# Patient Record
Sex: MALE | Race: BLACK OR AFRICAN AMERICAN | Employment: UNEMPLOYED | URBAN - METROPOLITAN AREA
[De-identification: names, ages, dates, MRNs, and addresses within clinical notes are randomized per-mention and may not be internally consistent; named-entity substitution may affect disease eponyms.]

---

## 2017-03-28 ENCOUNTER — HOSPITAL ENCOUNTER (INPATIENT)
Age: 42
LOS: 6 days | Discharge: PSYCHIATRIC OR SUBSTANCE USE FACILITY | DRG: 885 | End: 2017-04-03
Attending: PSYCHIATRY & NEUROLOGY | Admitting: PSYCHIATRY & NEUROLOGY
Payer: MEDICARE

## 2017-03-28 PROCEDURE — 1240000000 HC EMOTIONAL WELLNESS R&B

## 2017-03-28 RX ORDER — OLANZAPINE 10 MG/1
10 TABLET ORAL 2 TIMES DAILY
Status: DISCONTINUED | OUTPATIENT
Start: 2017-03-29 | End: 2017-04-03 | Stop reason: HOSPADM

## 2017-03-28 RX ORDER — ACETAMINOPHEN 325 MG/1
650 TABLET ORAL EVERY 4 HOURS PRN
Status: DISCONTINUED | OUTPATIENT
Start: 2017-03-28 | End: 2017-04-03 | Stop reason: HOSPADM

## 2017-03-28 RX ORDER — IBUPROFEN 400 MG/1
400 TABLET ORAL EVERY 6 HOURS PRN
Status: DISCONTINUED | OUTPATIENT
Start: 2017-03-28 | End: 2017-04-03 | Stop reason: HOSPADM

## 2017-03-28 RX ORDER — TRAZODONE HYDROCHLORIDE 100 MG/1
100 TABLET ORAL NIGHTLY PRN
Status: DISCONTINUED | OUTPATIENT
Start: 2017-03-28 | End: 2017-04-03 | Stop reason: HOSPADM

## 2017-03-28 RX ORDER — BENZTROPINE MESYLATE 1 MG/ML
2 INJECTION INTRAMUSCULAR; INTRAVENOUS 2 TIMES DAILY PRN
Status: DISCONTINUED | OUTPATIENT
Start: 2017-03-28 | End: 2017-04-03 | Stop reason: HOSPADM

## 2017-03-28 RX ORDER — HALOPERIDOL 5 MG/ML
10 INJECTION INTRAMUSCULAR EVERY 6 HOURS PRN
Status: DISCONTINUED | OUTPATIENT
Start: 2017-03-28 | End: 2017-04-03 | Stop reason: HOSPADM

## 2017-03-28 ASSESSMENT — SLEEP AND FATIGUE QUESTIONNAIRES
AVERAGE NUMBER OF SLEEP HOURS: 4
DIFFICULTY STAYING ASLEEP: YES
DO YOU HAVE DIFFICULTY SLEEPING: YES
DO YOU USE A SLEEP AID: NO
RESTFUL SLEEP: NO
DIFFICULTY ARISING: NO
SLEEP PATTERN: DIFFICULTY FALLING ASLEEP;DISTURBED/INTERRUPTED SLEEP;RESTLESSNESS
DIFFICULTY FALLING ASLEEP: YES

## 2017-03-28 ASSESSMENT — LIFESTYLE VARIABLES: HISTORY_ALCOHOL_USE: NO

## 2017-03-29 PROCEDURE — 1240000000 HC EMOTIONAL WELLNESS R&B

## 2017-03-29 PROCEDURE — 6370000000 HC RX 637 (ALT 250 FOR IP): Performed by: PSYCHIATRY & NEUROLOGY

## 2017-03-29 RX ADMIN — OLANZAPINE 10 MG: 10 TABLET, FILM COATED ORAL at 09:18

## 2017-03-29 ASSESSMENT — ENCOUNTER SYMPTOMS: VOMITING: 0

## 2017-03-29 ASSESSMENT — LIFESTYLE VARIABLES: HISTORY_ALCOHOL_USE: COMMENT

## 2017-03-30 PROCEDURE — 6370000000 HC RX 637 (ALT 250 FOR IP): Performed by: PSYCHIATRY & NEUROLOGY

## 2017-03-30 PROCEDURE — 1240000000 HC EMOTIONAL WELLNESS R&B

## 2017-03-30 RX ADMIN — OLANZAPINE 10 MG: 10 TABLET, FILM COATED ORAL at 21:12

## 2017-03-30 RX ADMIN — OLANZAPINE 10 MG: 10 TABLET, FILM COATED ORAL at 09:05

## 2017-03-30 RX ADMIN — TRAZODONE HYDROCHLORIDE 100 MG: 100 TABLET ORAL at 21:12

## 2017-03-30 ASSESSMENT — PAIN SCALES - GENERAL: PAINLEVEL_OUTOF10: 7

## 2017-03-31 PROCEDURE — 1240000000 HC EMOTIONAL WELLNESS R&B

## 2017-03-31 PROCEDURE — 6370000000 HC RX 637 (ALT 250 FOR IP): Performed by: PSYCHIATRY & NEUROLOGY

## 2017-03-31 RX ADMIN — OLANZAPINE 10 MG: 10 TABLET, FILM COATED ORAL at 22:52

## 2017-03-31 RX ADMIN — TRAZODONE HYDROCHLORIDE 100 MG: 100 TABLET ORAL at 22:52

## 2017-03-31 ASSESSMENT — PAIN SCALES - GENERAL: PAINLEVEL_OUTOF10: 0

## 2017-04-01 PROCEDURE — 6370000000 HC RX 637 (ALT 250 FOR IP): Performed by: PSYCHIATRY & NEUROLOGY

## 2017-04-01 PROCEDURE — 1240000000 HC EMOTIONAL WELLNESS R&B

## 2017-04-01 RX ADMIN — OLANZAPINE 10 MG: 10 TABLET, FILM COATED ORAL at 22:53

## 2017-04-01 RX ADMIN — TRAZODONE HYDROCHLORIDE 100 MG: 100 TABLET ORAL at 22:53

## 2017-04-01 RX ADMIN — OLANZAPINE 10 MG: 10 TABLET, FILM COATED ORAL at 08:13

## 2017-04-02 VITALS
TEMPERATURE: 98.6 F | DIASTOLIC BLOOD PRESSURE: 60 MMHG | HEART RATE: 94 BPM | HEIGHT: 71 IN | SYSTOLIC BLOOD PRESSURE: 117 MMHG | BODY MASS INDEX: 31.22 KG/M2 | WEIGHT: 223 LBS | RESPIRATION RATE: 14 BRPM

## 2017-04-02 PROCEDURE — 6370000000 HC RX 637 (ALT 250 FOR IP): Performed by: PSYCHIATRY & NEUROLOGY

## 2017-04-02 PROCEDURE — 1240000000 HC EMOTIONAL WELLNESS R&B

## 2017-04-02 RX ADMIN — OLANZAPINE 10 MG: 10 TABLET, FILM COATED ORAL at 08:18

## 2017-04-02 RX ADMIN — OLANZAPINE 10 MG: 10 TABLET, FILM COATED ORAL at 23:44

## 2017-04-02 RX ADMIN — TRAZODONE HYDROCHLORIDE 100 MG: 100 TABLET ORAL at 23:44

## 2017-04-03 PROCEDURE — 6370000000 HC RX 637 (ALT 250 FOR IP): Performed by: PSYCHIATRY & NEUROLOGY

## 2017-04-03 PROCEDURE — 5130000000 HC BRIDGE APPOINTMENT: Performed by: COUNSELOR

## 2017-04-03 RX ORDER — TRAZODONE HYDROCHLORIDE 100 MG/1
100 TABLET ORAL NIGHTLY PRN
Qty: 30 TABLET | Refills: 0 | Status: ON HOLD
Start: 2017-04-03 | End: 2017-11-26 | Stop reason: HOSPADM

## 2017-04-03 RX ORDER — OLANZAPINE 10 MG/1
10 TABLET ORAL 2 TIMES DAILY
Qty: 30 TABLET | Refills: 0 | Status: ON HOLD
Start: 2017-04-03 | End: 2017-11-26 | Stop reason: HOSPADM

## 2017-04-03 RX ADMIN — OLANZAPINE 10 MG: 10 TABLET, FILM COATED ORAL at 08:38

## 2017-11-17 ENCOUNTER — HOSPITAL ENCOUNTER (INPATIENT)
Age: 42
LOS: 10 days | Discharge: HOME | DRG: 885 | End: 2017-11-27
Attending: PSYCHIATRY & NEUROLOGY | Admitting: PSYCHIATRY & NEUROLOGY
Payer: MEDICARE

## 2017-11-17 PROBLEM — F20.0 SCHIZOPHRENIA, PARANOID (HCC): Status: ACTIVE | Noted: 2017-11-17

## 2017-11-17 PROBLEM — F20.9 SCHIZOPHRENIA (HCC): Status: RESOLVED | Noted: 2017-11-17 | Resolved: 2017-11-17

## 2017-11-17 PROBLEM — F20.9 SCHIZOPHRENIA (HCC): Status: ACTIVE | Noted: 2017-11-17

## 2017-11-17 PROCEDURE — 6370000000 HC RX 637 (ALT 250 FOR IP): Performed by: PSYCHIATRY & NEUROLOGY

## 2017-11-17 PROCEDURE — 1240000000 HC EMOTIONAL WELLNESS R&B

## 2017-11-17 RX ORDER — OLANZAPINE 10 MG/1
10 TABLET ORAL 2 TIMES DAILY
Status: DISCONTINUED | OUTPATIENT
Start: 2017-11-17 | End: 2017-11-27 | Stop reason: HOSPADM

## 2017-11-17 RX ORDER — ACETAMINOPHEN 325 MG/1
650 TABLET ORAL EVERY 4 HOURS PRN
Status: DISCONTINUED | OUTPATIENT
Start: 2017-11-17 | End: 2017-11-27 | Stop reason: HOSPADM

## 2017-11-17 RX ORDER — TRAZODONE HYDROCHLORIDE 100 MG/1
100 TABLET ORAL NIGHTLY PRN
Status: DISCONTINUED | OUTPATIENT
Start: 2017-11-17 | End: 2017-11-27 | Stop reason: HOSPADM

## 2017-11-17 RX ORDER — HYDROXYZINE HYDROCHLORIDE 25 MG/1
25 TABLET, FILM COATED ORAL 3 TIMES DAILY PRN
Status: DISCONTINUED | OUTPATIENT
Start: 2017-11-17 | End: 2017-11-27 | Stop reason: HOSPADM

## 2017-11-17 RX ORDER — BENZTROPINE MESYLATE 1 MG/ML
2 INJECTION INTRAMUSCULAR; INTRAVENOUS DAILY PRN
Status: DISCONTINUED | OUTPATIENT
Start: 2017-11-17 | End: 2017-11-27 | Stop reason: HOSPADM

## 2017-11-17 RX ADMIN — OLANZAPINE 10 MG: 10 TABLET, FILM COATED ORAL at 14:42

## 2017-11-17 RX ADMIN — TRAZODONE HYDROCHLORIDE 100 MG: 100 TABLET ORAL at 20:43

## 2017-11-17 RX ADMIN — HYDROXYZINE HYDROCHLORIDE 25 MG: 25 TABLET, FILM COATED ORAL at 14:42

## 2017-11-17 RX ADMIN — OLANZAPINE 10 MG: 10 TABLET, FILM COATED ORAL at 20:43

## 2017-11-17 NOTE — PLAN OF CARE
Problem: Anger Management/Homicidal Ideation  Goal: LTG-Absence of homicidal ideation  Outcome: Ongoing  Patient will not communicate with writer during intake assessment.   Per report he had been homicidal and suicidal without any specific plan

## 2017-11-17 NOTE — PLAN OF CARE
Problem: Anger Management/Homicidal Ideation  Goal: STG-Knowledge of positive coping patterns  Outcome: Ongoing  Pt did not participate in cognitive skills/ mental health education group at 1330 despite staff encouragement to attend.

## 2017-11-18 PROCEDURE — 1240000000 HC EMOTIONAL WELLNESS R&B

## 2017-11-18 ASSESSMENT — PAIN DESCRIPTION - PAIN TYPE: TYPE: ACUTE PAIN

## 2017-11-18 ASSESSMENT — PAIN DESCRIPTION - DESCRIPTORS: DESCRIPTORS: HEADACHE

## 2017-11-18 ASSESSMENT — PAIN SCALES - GENERAL: PAINLEVEL_OUTOF10: 5

## 2017-11-18 NOTE — PLAN OF CARE
Problem: Anger Management/Homicidal Ideation  Goal: STG-Able to express anger through appropriate verbalization and healthy physical outlets  Outcome: Ongoing  1:1 with pt x ten minutes. Pt encouraged to attend unit programming and interact with peers and staff. Pt also encouraged to tend to hygiene and ADLs. Pt encouraged to discuss feelings with staff and feedback and reassurance provided. Pt denies thoughts of self harm and is agreeable to seeking out should thoughts of self harm arise. Safe environment maintained. Q15 minute checks for safety cont per unit policy. Will cont to monitor for safety and provides support and reassurance as needed. Pt refuses medications. Pt is uncooperative with any staff redirection. seclusive to room and refuses groups. Much loud self talk while in dayroom. No insight into need for hospital stay or need for medications.

## 2017-11-18 NOTE — PLAN OF CARE
Problem: Altered Mood, Depressive Behavior  Goal: LTG-Able to verbalize and/or display a decrease in depressive symptoms  Outcome: Ongoing  Pt did not attend music therapy group at [phone] despite staff invitation to attend.

## 2017-11-18 NOTE — PLAN OF CARE
Problem: Anger Management/Homicidal Ideation  Goal: LTG-Able to display appropriate communication and problem solving  Outcome: Ongoing  [address] [facility]  Initial Interdisciplinary Treatment Plan NO      Original treatment plan Date & Time: 11/18/17 0818    Admission Type:  Admission Type: Voluntary    Reason for admission:   Reason for Admission: suicidal ideations, no plan armond lyles    Estimated Length of Stay:  5-7days  Estimated Discharge Date: to be determined by physician    PATIENT STRENGTHS:  Patient Strengths:Strengths:  (MUNIRA)  Patient Strengths and Limitations:Limitations:  (MUNIRA)  Addictive Behavior: Addictive Behavior  In the past 3 months, have you felt or has someone told you that you have a problem with:  :  (MUNIRA)  Do you have a history of Chemical Use?:  (MUNIRA)  Do you have a history of Alcohol Use?:  (MUNIRA)  Do you have a history of Street Drug Abuse?:  (MUNIRA)  Histroy of Prescripton Drug Abuse?:  (MUNIRA)  Medical Problems:  Past Medical History:   Diagnosis Date    Asthma     Schizoaffective disorder ([facility] [address]     Substance abuse      Status EXAM:Status and Exam  Normal: No  Facial Expression: Expressionless, Avoids Gaze  Affect: Unstable, Blunt  Level of Consciousness: Confused  Mood:Normal: No  Mood: Irritable, Suspicious  Motor Activity:Normal: No  Motor Activity: Decreased  Interview Behavior: Irritable, Uncooperative/Withdrawn, Evasive, Impulsive  Preception: Campbell to Person, Campbell to Time, Campbell to Place, Campbell to Situation  Attention:Normal: No  Attention: Unable to Concentrate, Distractible  Thought Processes: Blocking  Thought Content:Normal: No  Thought Content: Paranoia, Preoccupations  Hallucinations: Unable to assess  Delusions: No  Memory:Normal: No  Memory: Confabulation, Poor Recent, Poor Remote  Insight and Judgment: No  Insight and Judgment: Unmotivated, Unrealistic, Poor Judgment, Poor Insight  Present Suicidal Ideation:  (Pt not answering)  Present Homicidal Ideation:  (Pt not answering)    EDUCATION:   Learner Progress Toward Treatment Goals: reviewed group plans and strategies for care    Method:group therapy, medication compliance, individualized assessments and care planning    Outcome: needs reinforcement    PATIENT GOALS: to be discussed with patient within 72 hours    PLAN/TREATMENT RECOMMENDATIONS:     continue group therapy , medications compliance, goal setting, individualized assessments and care, continue to monitor pt on unit      SHORT-TERM GOALS:   Time frame for Short-Term Goals: 5-7 days    LONG-TERM GOALS:  Time frame for Long-Term Goals: 6 months  Members Present in Team Meeting: See Signature Sheet    OLGA Jang    Goal: STG-Able to express anger through appropriate verbalization and healthy physical outlets  Outcome: Ongoing    Goal: LTG-Absence of angry outbursts  Outcome: Ongoing    Goal: LTG-Absence of homicidal ideation  Outcome: Ongoing    Goal: STG-Knowledge of positive coping patterns  Outcome: Ongoing    Goal: STG-Participation in care planning  Outcome: Ongoing    Goal: Patient Specific Goal  Outcome: Ongoing      Problem: Altered Mood, Depressive Behavior  Goal: LTG-Able to verbalize acceptance of life and situations over which he or she has no control  Outcome: Ongoing    Goal: LTG-Able to verbalize and/or display a decrease in depressive symptoms  Outcome: Ongoing    Goal: STG-Able to verbalize suicidal ideations  Outcome: Ongoing    Goal: STG-Able to verbalize support system  Outcome: Ongoing    Goal: LTG-Absence of self-harm  Outcome: Ongoing    Goal: Participation in care planning  Outcome: Ongoing      Problem: Altered Mood, Psychotic Behavior  Goal: STG-Able to demonstrate trust by eating, participating in treatment and following staff's directions  Outcome: Ongoing    Goal: LTG-Able to verbalize decrease in frequency and intensity of hallucinations  Outcome: Ongoing    Goal: LTG-Able to verbalize reality based thinking  Outcome: Ongoing    Goal: STG-Adequate nutritional intake  Outcome: Ongoing    Goal: LTG-Appropriate social interaction  Outcome: Ongoing    Goal: STG-Medication therapy compliance  Outcome: Ongoing

## 2017-11-18 NOTE — PLAN OF CARE
Problem: Anger Management/Homicidal Ideation  Goal: LTG-Able to display appropriate communication and problem solving  Outcome: Ongoing  Pt not communicating at this time. Goal: STG-Able to express anger through appropriate verbalization and healthy physical outlets  Outcome: Ongoing  Pt ignores staff at this time. Goal: LTG-Absence of angry outbursts  Outcome: Ongoing  Pt does not answer any questions from staff at this time. Goal: LTG-Absence of homicidal ideation  Outcome: Ongoing  Pt would not talk to staff at this time. Goal: STG-Knowledge of positive coping patterns  Outcome: Ongoing  Pt encouraged to explore coping skills for reducing anxiety. None verbalized at this time. Goal: STG-Participation in care planning  Outcome: Ongoing  Pt is non-compliant at this time. Problem: Altered Mood, Depressive Behavior  Goal: LTG-Able to verbalize acceptance of life and situations over which he or she has no control  Outcome: Ongoing  Pt is being uncooperative at this time. Goal: LTG-Able to verbalize and/or display a decrease in depressive symptoms  Outcome: Ongoing  Pt would not answer any questions at this time. Goal: STG-Able to verbalize suicidal ideations  Outcome: Ongoing  Pt refuses to answer at this time. Goal: STG-Able to verbalize support system  Outcome: Ongoing  Pt does not verbalize at this time. Goal: LTG-Absence of self-harm  Outcome: Ongoing  Pt refuses to answer at this time. Problem: Altered Mood, Psychotic Behavior  Goal: STG-Able to demonstrate trust by eating, participating in treatment and following staff's directions  Outcome: Ongoing  Pt not following staff's directions at this time. Goal: LTG-Able to verbalize decrease in frequency and intensity of hallucinations  Outcome: Ongoing  Pt not talking to staff at this time. Goal: LTG-Able to verbalize reality based thinking  Outcome: Ongoing  Pt is isolative to self in room at this time.   Goal: STG-Adequate nutritional intake  Outcome: Ongoing  Pt would not come out of room for snacks at this time. Goal: LTG-Appropriate social interaction  Outcome: Ongoing  Pt is seclusive to room at this time. Goal: STG-Medication therapy compliance  Outcome: Ongoing  Pt is medication compliant.

## 2017-11-18 NOTE — BH NOTE
`[facility]  Admission Note     Admission Type:   Admission Type: Voluntary    Reason for admission:  Reason for Admission: suicidal ideations, no plan command trupti    PATIENT STRENGTHS:  Strengths:  (MUNIRA)    Patient Strengths and Limitations:  Limitations:  (MUNIRA)    Addictive Behavior:   Addictive Behavior  In the past 3 months, have you felt or has someone told you that you have a problem with:  :  (MUNIRA)  Do you have a history of Chemical Use?:  (MUNIRA)  Do you have a history of Alcohol Use?:  (MUNIRA)  Do you have a history of Street Drug Abuse?:  (MUNIRA)  Histroy of Prescripton Drug Abuse?:  (MUNIRA)    Medical Problems:   Past Medical History:   Diagnosis Date    Asthma     Schizoaffective disorder ([facility] [address]     Substance abuse        Status EXAM:  Status and Exam  Normal: No  Facial Expression: Avoids Gaze, Expressionless, Flat  Affect: Blunt, Unstable  Level of Consciousness: Confused  Mood:Normal: No  Mood: Suspicious, Irritable  Motor Activity:Normal: No  Motor Activity: Agitated  Interview Behavior: Evasive, Impulsive, Irritable, Uncooperative/Withdrawn  Preception: Boomer to Person, Lonni Folds to Time, Boomer to Place, Boomer to Situation  Attention:Normal: No  Attention: Distractible, Unable to Concentrate  Thought Processes: Blocking  Thought Content:Normal: No  Thought Content: Preoccupations, Paranoia  Hallucinations:  Auditory (Comment)  Delusions: No  Memory:Normal: No  Memory: Poor Recent, Poor Remote, Confabulation  Insight and Judgment: No  Insight and Judgment: Poor Judgment, Poor Insight, Unmotivated, Unrealistic  Present Suicidal Ideation: Yes  Present Homicidal Ideation: Yes    Tobacco Screening:  Practical Counseling, on admission, sung X, if applicable and completed (first 3 are required if patient doesn't refuse):            ( )  Recognizing danger situations (included triggers and roadblocks)                    ( )  Coping skills (new ways to manage stress, exercise, relaxation techniques, changing routine, distraction)                                                           ( )  Basic information about quitting (benefits of quitting, techniques in how to quit, available resources  ( ) Referral for counseling faxed to [facility]                                           ( ) Patient refused counseling  (X  ) Patient has not smoked in the last 30 days      Pt admitted with followings belongings:  Dentures: None  Vision - Corrective Lenses: None  Hearing Aid: None  Jewelry: Bracelet (Pt kept)  Body Piercings Removed: N/A  Clothing: Footwear, Jacket / coat, Shirt, Socks, Undergarments (Comment)  Were All Patient Medications Collected?: Not Applicable  Other Valuables: Cell phone, Money (Comment), Keys, Other (Comment) ($242.00 (bills))     Valuables sent home with ( N/A). Valuables placed in safe in security envelope, number:  [id number] . Patient's home medications were ( N/A) as he would not provide any information. Patient oriented to surroundings and program expectations and copy of patient rights given. Received admission packet: Refused . Consents reviewed, signed.  Refused all consents and only signed voluntary agreement                     Patient verbalize understanding:  No    Patient education on precautions: given with no indication of patient understanding                 Earlene Ramirez, LATOYA

## 2017-11-19 PROCEDURE — 1240000000 HC EMOTIONAL WELLNESS R&B

## 2017-11-19 NOTE — PROGRESS NOTES
Seclusion       No contraindication     Restraints      No contraindications    Psychiatric evaluation: This is a 60-year-old -American male patient was admitted on 79 though November 2017 from emergency room where he was brought as he has been very paranoid agitated and threatening to kill himself as he has been having auditory hallucinations. He is very labile, irritable, belligerent and not willing to provide much information. He has history of multiple psychiatric hospitalizations and poor compliance with treatment. He is not able to provide any history of substance abuse, medical problems or personal history. On mental status examination He is of average height and built, uncooperative and poorly informative as he is very paranoid, guarded, belligerent and threatening when approached. His thought process is poorly productive and is out of contact with reality. He is complaining about auditory hallucinations as well as suicidal feelings. He is alert but his orientation memory or intellectual functions cannot be evaluated because of severity of thought process disorganization and agitation. He has poor impulse control, poor judgment and no insight. Diagnostic impression  : Schizophrenia undifferentiated type    Treatment plan: Medication management, supportive therapy and hospital milieu. Side effects of medications reviewed and medication education provided.     ELOS:5-7 days

## 2017-11-19 NOTE — BH NOTE
RN has reviewed all charting per [facility], patient also refuses physical assessments and medications. He becomes irate at times during rounds, when asked if he will take his medication.

## 2017-11-20 PROCEDURE — 1240000000 HC EMOTIONAL WELLNESS R&B

## 2017-11-20 NOTE — PLAN OF CARE
Problem: Altered Mood, Depressive Behavior  Goal: STG-Able to verbalize suicidal ideations  Outcome: Ongoing  Pt did not attend communication and socialization skills group at [phone] despite staff invitation to attend.

## 2017-11-20 NOTE — PLAN OF CARE
Problem: Anger Management/Homicidal Ideation  Goal: LTG-Absence of angry outbursts  Outcome: Ongoing  Pt had no angry outbursts this shift. Goal: STG-Knowledge of positive coping patterns  Outcome: Ongoing  Pt encouraged to attend unit programming and interact with peers and staff to increase coping skills. Problem: Altered Mood, Depressive Behavior  Goal: STG-Able to verbalize suicidal ideations  Outcome: Ongoing  Pt did not vocalize SI this shift.

## 2017-11-20 NOTE — PLAN OF CARE
Problem: Anger Management/Homicidal Ideation  Goal: LTG-Absence of angry outbursts  Outcome: Ongoing  Pt did not participate in leisure/ cognitive skills group at 1100 despite staff encouragement to attend.

## 2017-11-20 NOTE — PROGRESS NOTES
He continues to be responding to internal stimuli, isolating self and doesn't have any motivation or insight. Thought process is disorganized and poorly productive. Getting agitated when approached . Affect-Superficial  Mood -  Agitated and labile  Thought process -  Disorganized showing looseness of association and tangentiality. Reality testing-Impaired  Cognition -  Impaired  Memory- Recent-Impaired                Remote-Impaired  Orientation-Disoriented                                              Insight-Poor  Judgement-Poor     Encouraged to participate and interact with peers. Attempted to develop insight. Medications reviewed. Side effects of medications reviewed and medication education provided. No side effects including akathisia,tremers,dystonia or orthostasis reported or observed. Plan: Stabilization with antipsychotic and mood stabilization medications,group therapy and develop coping skills,discharge to community. Encouraged to interact with peers  and participate in activities.

## 2017-11-20 NOTE — PLAN OF CARE
Problem: Altered Mood, Depressive Behavior  Intervention: Group therapy participation per treatment plan to increase activity level  Pt declined to attend psychotherapy at 1000 am despite encouragement. Pt offered 1:1. Patient refused one on one and continues to stay isolative to room.

## 2017-11-20 NOTE — PLAN OF CARE
Problem: Altered Mood, Depressive Behavior  Goal: STG-Able to verbalize suicidal ideations  Outcome: Ongoing  Pt did not attend community meeting and goal setting group at 0900 despite staff invitation to attend.

## 2017-11-21 PROCEDURE — 1240000000 HC EMOTIONAL WELLNESS R&B

## 2017-11-21 NOTE — PLAN OF CARE
Problem: Altered Mood, Depressive Behavior  Goal: STG-Able to verbalize suicidal ideations  Outcome: Ongoing  Therapeutic Recreation Refusal  Pt did not participate in cognitive skills at 1400 despite staff encouragement.

## 2017-11-21 NOTE — PLAN OF CARE
Problem: Altered Mood, Depressive Behavior  Goal: STG-Able to verbalize suicidal ideations  Outcome: Ongoing  Therapeutic Recreation Refusal  Pt did not participate in community meeting/goals at 0900 despite staff encouragement.

## 2017-11-22 PROCEDURE — 1240000000 HC EMOTIONAL WELLNESS R&B

## 2017-11-22 NOTE — PLAN OF CARE
Problem: Anger Management/Homicidal Ideation  Goal: LTG-Absence of angry outbursts  Outcome: Ongoing  Pt was free of angry outbursts  Goal: STG-Knowledge of positive coping patterns  Outcome: Ongoing      Problem: Altered Mood, Depressive Behavior  Goal: STG-Able to verbalize suicidal ideations  Outcome: Ongoing  Pt denies SI at this time. Pt agrees to seek out staff if they begin having SI or need to talk.  Q15min safety checks continue

## 2017-11-22 NOTE — BH NOTE
Pt was encouraged to attend evening wrap up group with staff encouragement. Pt declined to attend. Pt will continue to be encouraged to attend groups. Pt was encouraged to attend evening relaxation group pt declined group at this time. Pt will continue to be encouraged to attend groups.

## 2017-11-22 NOTE — PLAN OF CARE
Problem: Altered Mood, Depressive Behavior  Goal: STG-Able to verbalize suicidal ideations  Outcome: Ongoing  Pt did not attend RT group at 1100 d/t resting in room despite staff invitation to attend.

## 2017-11-22 NOTE — PROGRESS NOTES
His thought process is slightly improving as less incoherent and bizarre. Starting to make some sense. Still quite delusional and reality testing is impaired. Withdrawn and isolating self as paranoid around others. Affect-Superficial  Mood -  Agitated and labile  Thought process -  Disorganized showing looseness of association and tangentiality. Reality testing-Impaired  Cognition -  Impaired  Memory- Recent-Impaired                Remote-Impaired  Orientation-Disoriented                                              Insight-Poor  Judgement-Poor                                                Attempted to develop insight. Medications reviewed. Side effects of medications reviewed and medication education provided. Reassurance and support provided with focus on developing No side effects including akathisia,tremers,dystonia or orthostasis reported or observed. Plan: Stabilization with antipsychotic and mood stabilization medications,group therapy and develop copingl skills,discharge to community. Diego Payne

## 2017-11-22 NOTE — PLAN OF CARE
Problem: Anger Management/Homicidal Ideation  Goal: STG-Knowledge of positive coping patterns  Outcome: Ongoing  Pt did not attend goal setting group at 0915 despite staff invitation to attend.

## 2017-11-22 NOTE — PLAN OF CARE
Problem: Anger Management/Homicidal Ideation  Goal: LTG-Absence of angry outbursts  Outcome: Ongoing  Patient is irritable when approached,refuses groups,refused vital signs,refused medication. He comes out of his room for meals only,states \" I want people to leave me alone\"Denies thoughts of suicide,no angry outbursts noted.

## 2017-11-22 NOTE — PLAN OF CARE
Problem: Altered Mood, Depressive Behavior  Goal: STG-Able to verbalize suicidal ideations  Outcome: Ongoing  Pt did not attend aromatherapy group at 1350 despite staff invitation to attend.

## 2017-11-23 PROCEDURE — 1240000000 HC EMOTIONAL WELLNESS R&B

## 2017-11-23 NOTE — H&P
Outpatient Prescriptions on File Prior to Encounter   Medication Sig Dispense Refill    traZODone (DESYREL) 100 MG tablet Take 1 tablet by mouth nightly as needed for Sleep 30 tablet 0    OLANZapine (ZYPREXA) 10 MG tablet Take 1 tablet by mouth 2 times daily 30 tablet 0                      General health:  Fairly good. No fever or chills. Head, eyes, ears, nose, throat:  No headache. Cardiovascular/Respiratory system:  No chest pain, palpitation, shortness of breath, coughing or expectoration. Gastrointestinal tract: No abdominal pain, nausea, vomiting, diarrhea or constipation. Neuropsychiatric:  See HPI. GENERAL PHYSICAL EXAM:     Vitals: /78   Pulse 86   Temp 98.4 °F (36.9 °C) (Oral)   Resp 14   Ht 5' 11\" (1.803 m)   Wt 230 lb (104.3 kg)   BMI 32.08 kg/m²  Body mass index is 32.08 kg/m². Pt was examined with a nurse present in the room. GENERAL APPEARANCE:  Chanelle Kramer is 40 y.o.,  male, moderately obese, nourished, conscious, alert. Does not appear to be distress or pain at this time. CHEST:  Symmetrical and equal on expansion. HEART:  Regular rate and rhythm. S1 > S2, No audible murmurs or gallops. LUNGS:  Equal on expansion, normal breath sounds. No adventitious sounds. ABDOMEN:  Obese. Soft on palpation. No localized tenderness. No guarding or rigidity. No palpable organomegaly. NEUROLOGIC:  The patient is conscious, alert, oriented.             PROVISIONAL DIAGNOSES:      Principal Problem:    Paranoid schizophrenia, chronic condition with acute exacerbation ([facility] [address]  Active Problems:    Schizophrenia, paranoid ([facility] [address]      Mariana Lyman PA-C on 11/23/2017 at 11:50 AM

## 2017-11-23 NOTE — PLAN OF CARE
Problem: Altered Mood, Depressive Behavior  Goal: STG-Able to verbalize suicidal ideations  Outcome: Ongoing  Therapeutic Recreation Refusal  Pt did not participate in self assessment  at 1400 despite staff encouragement.

## 2017-11-23 NOTE — PLAN OF CARE
Problem: Anger Management/Homicidal Ideation  Goal: STG-Knowledge of positive coping patterns  Outcome: Ongoing  Patient was not able to verbalize any positive coping patterns.

## 2017-11-23 NOTE — PLAN OF CARE
Problem: Altered Mood, Depressive Behavior  Goal: LTG-Able to verbalize and/or display a decrease in depressive symptoms  Outcome: Ongoing  Wellness Group  Pt did not participate in community meeting/goals at 1100 despite staff encouragement

## 2017-11-23 NOTE — PLAN OF CARE
Problem: Altered Mood, Depressive Behavior  Goal: STG-Able to verbalize suicidal ideations  Outcome: Ongoing  Patient denies any suicidal thoughts at this time.

## 2017-11-23 NOTE — PLAN OF CARE
Problem: Anger Management/Homicidal Ideation  Goal: LTG-Absence of angry outbursts  Outcome: Ongoing  Patient remains free from angry outbursts at this time.

## 2017-11-24 PROCEDURE — 1240000000 HC EMOTIONAL WELLNESS R&B

## 2017-11-24 PROCEDURE — 6370000000 HC RX 637 (ALT 250 FOR IP): Performed by: PSYCHIATRY & NEUROLOGY

## 2017-11-24 RX ADMIN — HYDROXYZINE HYDROCHLORIDE 25 MG: 25 TABLET, FILM COATED ORAL at 02:33

## 2017-11-24 RX ADMIN — TRAZODONE HYDROCHLORIDE 100 MG: 100 TABLET ORAL at 02:33

## 2017-11-24 NOTE — PLAN OF CARE
Problem: Altered Mood, Depressive Behavior  Goal: STG-Able to verbalize suicidal ideations  Outcome: Ongoing  Pt did not attend mental health education and awareness group at [phone] despite staff invitation to attend.

## 2017-11-24 NOTE — PLAN OF CARE
Problem: Altered Mood, Depressive Behavior  Goal: STG-Able to verbalize suicidal ideations  Outcome: Ongoing  Pt did not attend music therapy skills group at 1440 despite staff invitation to attend.

## 2017-11-24 NOTE — PLAN OF CARE
Problem: Anger Management/Homicidal Ideation  Goal: LTG-Absence of angry outbursts  Outcome: Ongoing  Patient refused physical and 1:1 with writer. Pt. Was non-compliant with medication, irritable, withdrawn, flat, and guarded with poor eye contact. He sat in the milieu but did not socialize with others. Q15 minute safety checks performed and pt. Safety maintained. Goal: STG-Knowledge of positive coping patterns  Outcome: Ongoing      Problem: Altered Mood, Depressive Behavior  Intervention: Group therapy participation per treatment plan to increase activity level  No therapy attended    Goal: STG-Able to verbalize suicidal ideations  Outcome: Ongoing      Problem: Pain:  Intervention: Opioid analgesia side-effects  N/A  Intervention: Assess barriers to pain control  Barriers assessed.   Intervention: Promote participation in pain management plan  Ongoing    Goal: Pain level will decrease  Pain level will decrease   Outcome: Met This Shift    Goal: Control of acute pain  Control of acute pain   Outcome: Met This Shift    Goal: Control of chronic pain  Control of chronic pain   Outcome: Met This Shift

## 2017-11-24 NOTE — PLAN OF CARE
Problem: Altered Mood, Depressive Behavior  Goal: STG-Able to verbalize suicidal ideations  Outcome: Ongoing  Patient denies any current thoughts to harm self.

## 2017-11-24 NOTE — PROGRESS NOTES
He continues to be responding to internal stimuli, isolating self and doesn't have any motivation or insight. Thought process is disorganized and poorly productive. Psychomotor activity is retarded and has no motivation . Affect-Superficial  Mood -  Agitated and labile  Thought process -  Disorganized showing looseness of association and tangentiality. Reality testing-Impaired  Cognition -  Impaired  Memory- Recent-Impaired                Remote-Impaired  Orientation-Disoriented                                              Insight-Poor  Judgement-Poor     Encouraged to participate and interact with peers. Attempted to develop insight. Medications reviewed. Side effects of medications reviewed and medication education provided. No side effects including akathisia,tremers,dystonia or orthostasis reported or observed. Plan: He is refusing to take any medications. Attempted to develop insight into need for compliance with treatment and plan stabilization with antipsychotic and mood stabilization medications,group therapy and develop coping skills,discharge to community. Encouraged to interact with peers  and participate in activities.

## 2017-11-25 PROCEDURE — 1240000000 HC EMOTIONAL WELLNESS R&B

## 2017-11-25 NOTE — PLAN OF CARE
Problem: Anger Management/Homicidal Ideation  Goal: LTG-Absence of angry outbursts  Outcome: Ongoing    Goal: STG-Knowledge of positive coping patterns  Outcome: Ongoing  Pt denies SI,HI, unable to be assessed for AVH. Controlled but evasive, pt would not engage for 1:1. Isolative aloof of peers. Irritable labile. Normal sleep and appetite. Out for needs only. Spontaneous safety checks to be maintained by staff.

## 2017-11-25 NOTE — PLAN OF CARE
Recent  Insight and Judgment: No  Insight and Judgment: Poor Judgment, Poor Insight, Unmotivated  Present Suicidal Ideation: No  Present Homicidal Ideation: No    Daily Assessment Last Entry:   Daily Sleep (WDL): Within Defined Limits         Patient Currently in Pain: No  Daily Nutrition (WDL): Within Defined Limits    Patient Monitoring:  Frequency of Checks: 4 times per hour, close    Psychiatric Symptoms:   Depression Symptoms  Depression Symptoms: Impaired concentration, Increased irritability, Isolative  Anxiety Symptoms  Anxiety Symptoms: Generalized  Mayuri Symptoms  Mayuri Symptoms: Labile     Psychosis Symptoms  Delusion Type: No problems reported or observed. Suicide Risk CSSR-S:  1) Within the past month, have you wished you were dead or wished you could go to sleep and not wake up? :  (Pt would not answer.)  2) Within the past month, have you actually had any thoughts of killing yourself? :  (Pt would not answer.)  6)  Have you ever done anything, started to do anything, or prepared to do anything to end your life?: NO  Change in Result No Change in Plan of care No    EDUCATION:   Learner Progress Toward Treatment Goals: Reviewed results and recommendations of this team, Reviewed group plan and strategies, Reviewed signs, symptoms and risk of self harm and violent behavior, Reviewed goals and plan of care    Method: individual education, small group, verbal education    Outcome: verbalized understanding, but needs reinforcement to obtain goals    PATIENT GOALS:   short term: Pt refused to attend  Long term: Pt refused to attend    PLAN/TREATMENT RECOMMENDATIONS UPDATE:   Continue with group therapies, education of coping skills   Continue to monitor patient on unit. Medications provided/ medication compliance by patient. Continue for plans to obtain long term goals after discharge.     SHORT-TERM GOALS UPDATE:  Time frame for Short-Term Goals: 8-14days     LONG-TERM GOALS UPDATE:  Time frame for

## 2017-11-25 NOTE — PLAN OF CARE
Problem: Anger Management/Homicidal Ideation  Goal: LTG-Absence of angry outbursts  Outcome: Ongoing  Pt has remained free from angry outbursts this shift. Goal: STG-Knowledge of positive coping patterns  Outcome: Ongoing      Problem: Altered Mood, Depressive Behavior  Goal: STG-Able to verbalize suicidal ideations  Outcome: Ongoing  Pt denies suicidal thoughts at this time. Q15 minute checks for safety cont per unit policy. Will cont to monitor for safety and provide support and reassurance as needed.

## 2017-11-26 PROCEDURE — 6370000000 HC RX 637 (ALT 250 FOR IP): Performed by: PSYCHIATRY & NEUROLOGY

## 2017-11-26 PROCEDURE — 1240000000 HC EMOTIONAL WELLNESS R&B

## 2017-11-26 RX ORDER — OLANZAPINE 10 MG/1
10 TABLET ORAL 2 TIMES DAILY
Qty: 60 TABLET | Refills: 0 | Status: ON HOLD | OUTPATIENT
Start: 2017-11-26 | End: 2019-11-25 | Stop reason: SDUPTHER

## 2017-11-26 RX ORDER — TRAZODONE HYDROCHLORIDE 100 MG/1
100 TABLET ORAL NIGHTLY PRN
Qty: 30 TABLET | Refills: 0 | Status: ON HOLD | OUTPATIENT
Start: 2017-11-26 | End: 2019-11-25 | Stop reason: SDUPTHER

## 2017-11-26 RX ADMIN — HYDROXYZINE HYDROCHLORIDE 25 MG: 25 TABLET, FILM COATED ORAL at 13:50

## 2017-11-26 NOTE — PLAN OF CARE
Problem: Anger Management/Homicidal Ideation  Goal: LTG-Absence of angry outbursts  Outcome: Ongoing    Goal: STG-Knowledge of positive coping patterns  Outcome: Ongoing  Pt denies any SI,HI can not be assessed for AVH, would not engage for a 1:1. Isolative aloof out for needs only. Normal sleep and appetite. Spontaneous safety checks to be maintained by staff.

## 2017-11-26 NOTE — BH NOTE
Pt was encouraged to attend evening relaxation group pt declined group at this time. Pt will continue to be encouraged to attend groups. Pt was encouraged to attend evening wrap up group with staff encouragement. Pt declined to attend. Pt will continue to be encouraged to attend groups.

## 2017-11-26 NOTE — PLAN OF CARE
Problem: Altered Mood, Depressive Behavior  Goal: STG-Able to verbalize suicidal ideations  Outcome: Ongoing  Pt did not participate in Goal Setting and [employer] at Check despite staff encouragement.

## 2017-11-26 NOTE — PROGRESS NOTES
He is doing slightly better though and has not been feeling as overwhelmed. He denies any suicidal feelings though he is still quite guarded and paranoid. He has been refusing to take his medications. Affect-Superficial  Mood -  labile but improving  Thought process -  Slightly better organized but still showing looseness of association and tangentiality. Reality testing-Impaired  Cognition -  Intact  Memory- Recent-Intact                Remote-Intact  Orientation-Oriented to time ,place and person. Insight-Poor  Judgement-Superficial                                              Attempted to develop insight and improve reality testing. Medications reviewed. Side effects of medications reviewed and medication education provided  No side effects including akathisia,tremers,dystonia or orthostasis reported or observed. Plan: Stabilization with antipsychotic and mood stabilization medications,group therapy and develop coping skills,discharge to community. Encouraged to interact with peers  and participate in activities. Suzette Salgado

## 2017-11-27 VITALS
TEMPERATURE: 97.7 F | SYSTOLIC BLOOD PRESSURE: 143 MMHG | DIASTOLIC BLOOD PRESSURE: 96 MMHG | BODY MASS INDEX: 32.2 KG/M2 | WEIGHT: 230 LBS | HEIGHT: 71 IN | HEART RATE: 108 BPM | RESPIRATION RATE: 16 BRPM

## 2017-11-27 PROCEDURE — 5130000000 HC BRIDGE APPOINTMENT: Performed by: COUNSELOR

## 2017-11-27 NOTE — PLAN OF CARE
Problem: Pain:  Goal: Control of acute pain  Control of acute pain   Outcome: Ongoing  Patient denies any thoughts of self harm as well as pain. He has been refusing medications and is has stayed in his room. He seems anxious and irritable, he states he is ready for discharge.

## 2017-11-27 NOTE — CARE COORDINATION
Bridge Appointment completed: Reviewed Discharge Instructions with patient. Patient verbalizes understanding and agreement with the discharge plan using the teachback method.        Discharge Arrangements: to own apartment and follow up with Teddy Rockwell notified: n/a  Discharge destination/address:   [address] [address]       Transported by:  cab

## 2017-11-27 NOTE — PLAN OF CARE
Problem: Altered Mood, Depressive Behavior  Goal: STG-Able to verbalize suicidal ideations  Outcome: Ongoing  Therapeutic Recreation Refusal  Pt did not participate in Discharge planning at [phone] despite staff encouragement.

## 2017-11-27 NOTE — BH NOTE
[facility]  Discharge Note    Pt discharged with followings belongings:   Dentures: None  Vision - Corrective Lenses: None  Hearing Aid: None  Jewelry: Bracelet (Pt kept)  Body Piercings Removed: N/A  Clothing: Footwear, Jacket / coat, Shirt, Socks, Undergarments (Comment)  Were All Patient Medications Collected?: Not Applicable  Other Valuables: Cell phone, Money (Comment), Keys, Other (Comment) ($242.00 (bills))   Valuables sent home with patient. Valuables retrieved from safe, Security envelope number:  [id number] and returned to patient. Patient left department with all belongings   via cab, discharged to home. Patient education on aftercare instructions: given & understood  Information faxed to [facility]  by staff. Patient verbalize understanding of AVS:  yes.     Status EXAM upon discharge:  Status and Exam  Normal: No  Facial Expression: Flat  Affect: Blunt  Level of Consciousness: Alert  Mood:Normal: No  Mood: Anxious, Irritable  Motor Activity:Normal: No  Motor Activity: Decreased  Interview Behavior: Cooperative, Irritable  Preception: [place] to Person, [place] to Time, [place] to Place, [place] to Situation  Attention:Normal: No  Attention: Distractible  Thought Processes: Blocking  Thought Content:Normal: No  Thought Content: Paranoia  Hallucinations: None  Delusions: Yes  Delusions: Persecution  Memory:Normal: No  Memory: Poor Recent  Insight and Judgment: No  Insight and Judgment: Poor Judgment, Poor Insight, Unmotivated  Present Suicidal Ideation: No  Present Homicidal Ideation: No    Isaiah Erps, [address]

## 2017-11-27 NOTE — PLAN OF CARE
Problem: Altered Mood, Depressive Behavior  Goal: STG-Able to verbalize suicidal ideations  Outcome: Ongoing  Therapeutic Recreation Refusal  Pt did not participate in community meeting/goals/walking at 0900 despite staff encouragement.

## 2017-12-07 NOTE — DISCHARGE SUMMARY
Please see admission psychiatric evaluation as first part of discharge summary. Course of hospitalization:His lab workup including CBC, differential, urinalysis and blood chemistry were ordered but he refused blood work up. He was started on antipsychotic medications and was involved in individual supportive therapy and hospital milieu. He responded well to this treatment regimen and psychomotor activity improved significantly. Thought process was better organized and developed insight into compliance with treatment. Side effects of medications reviewed and medication education provided. Smoking cessation information was provided. He was making rational and realistic plans, not in any distress so discharged as improved. Final Diagnosis : Schizophrenia paranoid type    Discharge medications: Please see discharge medication list.      Follow up was scheduled  at [facility].

## 2018-07-23 ENCOUNTER — HOSPITAL ENCOUNTER (EMERGENCY)
Age: 43
Discharge: HOME | End: 2018-07-23
Attending: EMERGENCY MEDICINE
Payer: MEDICARE

## 2018-07-23 VITALS
SYSTOLIC BLOOD PRESSURE: 193 MMHG | OXYGEN SATURATION: 99 % | TEMPERATURE: 98.8 F | DIASTOLIC BLOOD PRESSURE: 106 MMHG | RESPIRATION RATE: 18 BRPM | WEIGHT: 210 LBS | BODY MASS INDEX: 28.44 KG/M2 | HEART RATE: 92 BPM | HEIGHT: 72 IN

## 2018-07-23 DIAGNOSIS — Z00.8 MEDICAL CLEARANCE FOR PSYCHIATRIC ADMISSION: Primary | ICD-10-CM

## 2018-07-23 LAB
-: NORMAL
ABSOLUTE EOS #: 0.21 K/UL (ref 0–0.44)
ABSOLUTE IMMATURE GRANULOCYTE: <0.03 K/UL (ref 0–0.3)
ABSOLUTE LYMPH #: 2.06 K/UL (ref 1.1–3.7)
ABSOLUTE MONO #: 0.93 K/UL (ref 0.1–1.2)
ACETAMINOPHEN LEVEL: <5 UG/ML (ref 10–30)
ALBUMIN SERPL-MCNC: 4.3 G/DL (ref 3.5–5.2)
ALBUMIN/GLOBULIN RATIO: 1.3 (ref 1–2.5)
ALP BLD-CCNC: 68 U/L (ref 40–129)
ALT SERPL-CCNC: 52 U/L (ref 5–41)
AMORPHOUS: NORMAL
ANION GAP SERPL CALCULATED.3IONS-SCNC: 11 MMOL/L (ref 9–17)
AST SERPL-CCNC: 59 U/L
BACTERIA: NORMAL
BASOPHILS # BLD: 1 % (ref 0–2)
BASOPHILS ABSOLUTE: 0.05 K/UL (ref 0–0.2)
BILIRUB SERPL-MCNC: 1.08 MG/DL (ref 0.3–1.2)
BILIRUBIN URINE: NEGATIVE
BILIRUBIN URINE: NEGATIVE
BUN BLDV-MCNC: 16 MG/DL (ref 6–20)
BUN/CREAT BLD: ABNORMAL (ref 9–20)
CALCIUM SERPL-MCNC: 9.1 MG/DL (ref 8.6–10.4)
CASTS UA: NORMAL /LPF (ref 0–8)
CHLORIDE BLD-SCNC: 99 MMOL/L (ref 98–107)
CO2: 25 MMOL/L (ref 20–31)
COLOR: ABNORMAL
COLOR: YELLOW
CREAT SERPL-MCNC: 0.99 MG/DL (ref 0.7–1.2)
CRYSTALS, UA: NORMAL /HPF
DIFFERENTIAL TYPE: NORMAL
EKG ATRIAL RATE: 87 BPM
EKG P AXIS: 44 DEGREES
EKG P-R INTERVAL: 140 MS
EKG Q-T INTERVAL: 382 MS
EKG QRS DURATION: 80 MS
EKG QTC CALCULATION (BAZETT): 459 MS
EKG R AXIS: 26 DEGREES
EKG T AXIS: 8 DEGREES
EKG VENTRICULAR RATE: 87 BPM
EOSINOPHILS RELATIVE PERCENT: 3 % (ref 1–4)
EPITHELIAL CELLS UA: NORMAL /HPF (ref 0–5)
ETHANOL PERCENT: <0.01 %
ETHANOL: <10 MG/DL
GFR AFRICAN AMERICAN: >60 ML/MIN
GFR NON-AFRICAN AMERICAN: >60 ML/MIN
GFR SERPL CREATININE-BSD FRML MDRD: ABNORMAL ML/MIN/{1.73_M2}
GFR SERPL CREATININE-BSD FRML MDRD: ABNORMAL ML/MIN/{1.73_M2}
GLUCOSE BLD-MCNC: 119 MG/DL (ref 70–99)
GLUCOSE URINE: NEGATIVE
GLUCOSE URINE: NEGATIVE
HCT VFR BLD CALC: 42.7 % (ref 40.7–50.3)
HEMOGLOBIN: 13.5 G/DL (ref 13–17)
IMMATURE GRANULOCYTES: 0 %
KETONES, URINE: NEGATIVE
KETONES, URINE: NEGATIVE
LEUKOCYTE ESTERASE, URINE: NEGATIVE
LEUKOCYTE ESTERASE, URINE: NEGATIVE
LYMPHOCYTES # BLD: 27 % (ref 24–43)
MCH RBC QN AUTO: 28.6 PG (ref 25.2–33.5)
MCHC RBC AUTO-ENTMCNC: 31.6 G/DL (ref 28.4–34.8)
MCV RBC AUTO: 90.5 FL (ref 82.6–102.9)
MONOCYTES # BLD: 12 % (ref 3–12)
MUCUS: NORMAL
NITRITE, URINE: NEGATIVE
NITRITE, URINE: NEGATIVE
NRBC AUTOMATED: 0 PER 100 WBC
OTHER OBSERVATIONS UA: NORMAL
PDW BLD-RTO: 12.6 % (ref 11.8–14.4)
PH UA: 6 (ref 5–8)
PH UA: 6 (ref 5–8)
PLATELET # BLD: 274 K/UL (ref 138–453)
PLATELET ESTIMATE: NORMAL
PMV BLD AUTO: 10 FL (ref 8.1–13.5)
POTASSIUM SERPL-SCNC: 3.8 MMOL/L (ref 3.7–5.3)
PROTEIN UA: NEGATIVE
PROTEIN UA: NEGATIVE
RBC # BLD: 4.72 M/UL (ref 4.21–5.77)
RBC # BLD: NORMAL 10*6/UL
RBC UA: NORMAL /HPF (ref 0–4)
RENAL EPITHELIAL, UA: NORMAL /HPF
SALICYLATE LEVEL: <1 MG/DL (ref 3–10)
SEG NEUTROPHILS: 57 % (ref 36–65)
SEGMENTED NEUTROPHILS ABSOLUTE COUNT: 4.42 K/UL (ref 1.5–8.1)
SODIUM BLD-SCNC: 135 MMOL/L (ref 135–144)
SPECIFIC GRAVITY UA: 1.01 (ref 1–1.03)
SPECIFIC GRAVITY UA: <1.005 (ref 1–1.03)
TOTAL PROTEIN: 7.5 G/DL (ref 6.4–8.3)
TOXIC TRICYCLIC SC,BLOOD: NEGATIVE
TRICHOMONAS: NORMAL
TURBIDITY: CLEAR
TURBIDITY: CLEAR
URINE HGB: NEGATIVE
URINE HGB: NEGATIVE
UROBILINOGEN, URINE: NORMAL
UROBILINOGEN, URINE: NORMAL
WBC # BLD: 7.7 K/UL (ref 3.5–11.3)
WBC # BLD: NORMAL 10*3/UL
WBC UA: NORMAL /HPF (ref 0–5)
YEAST: NORMAL

## 2018-07-23 PROCEDURE — 80053 COMPREHEN METABOLIC PANEL: CPT

## 2018-07-23 PROCEDURE — 80307 DRUG TEST PRSMV CHEM ANLYZR: CPT

## 2018-07-23 PROCEDURE — 93005 ELECTROCARDIOGRAM TRACING: CPT

## 2018-07-23 PROCEDURE — 85025 COMPLETE CBC W/AUTO DIFF WBC: CPT

## 2018-07-23 PROCEDURE — 99284 EMERGENCY DEPT VISIT MOD MDM: CPT

## 2018-07-23 PROCEDURE — G0480 DRUG TEST DEF 1-7 CLASSES: HCPCS

## 2018-07-23 PROCEDURE — 81001 URINALYSIS AUTO W/SCOPE: CPT

## 2018-07-23 ASSESSMENT — PAIN SCALES - GENERAL: PAINLEVEL_OUTOF10: 6

## 2018-07-23 ASSESSMENT — ENCOUNTER SYMPTOMS
SHORTNESS OF BREATH: 0
EYE PAIN: 0
COLOR CHANGE: 0
ABDOMINAL PAIN: 0
VOMITING: 0
COUGH: 0
NAUSEA: 0
RHINORRHEA: 0
SORE THROAT: 0

## 2018-07-23 ASSESSMENT — PAIN DESCRIPTION - DESCRIPTORS: DESCRIPTORS: ACHING;DISCOMFORT

## 2018-07-23 ASSESSMENT — PAIN DESCRIPTION - LOCATION: LOCATION: GENERALIZED

## 2018-07-23 NOTE — ED NOTES
Labeled blood specimen collected and sent to lab via tube system.        Amanda Whitman RN  07/23/18 1929

## 2018-07-23 NOTE — ED NOTES
Pt to room 19 in [address] [place] [address] Ave custody for medical clearance to [facility]. Pt reports that his handcuffs are too tight and that he is having tingling all over. Pt alert and oriented x4, talking in complete sentences, respirations even and unlabored. Pt acting age appropriate. White board updated, will continue to monitor.        María Cortez RN  07/23/18 4826

## 2018-07-24 LAB
AMPHETAMINE SCREEN URINE: NEGATIVE
BARBITURATE SCREEN URINE: NEGATIVE
BENZODIAZEPINE SCREEN, URINE: NEGATIVE
BUPRENORPHINE URINE: NORMAL
CANNABINOID SCREEN URINE: NEGATIVE
COCAINE METABOLITE, URINE: NEGATIVE
MDMA URINE: NORMAL
METHADONE SCREEN, URINE: NEGATIVE
METHAMPHETAMINE, URINE: NORMAL
OPIATES, URINE: NEGATIVE
OXYCODONE SCREEN URINE: NEGATIVE
PHENCYCLIDINE, URINE: NEGATIVE
PROPOXYPHENE, URINE: NORMAL
TEST INFORMATION: NORMAL
TRICYCLIC ANTIDEPRESSANTS, UR: NORMAL

## 2018-07-24 NOTE — ED NOTES
Pt hands writer urine sample, urine appears very clear in color. Labeled urine specimen collected and sent to lab via tube system.        Joaquin Gong RN  07/23/18 [id number]

## 2018-07-24 NOTE — ED PROVIDER NOTES
[facility]  ED  Emergency Department Encounter  Emergency Medicine Resident     Pt Name: Anna Awan  MRN: [id number]  [place] 1975  Date of evaluation: 7/23/18  PCP:  No primary care provider on file. CHIEF COMPLAINT       Chief Complaint   Patient presents with    Other     medical clearance for [address]       HISTORY OF PRESENT ILLNESS  (Location/Symptom, Timing/Onset, Context/Setting, Quality, Duration, Modifying Factors, Severity.)      Anna Awan is a 40 y.o. male who presents With police for medical clearance. Plan is for patient to obtain labs and go to [address]. Patient does have a history of schizophrenia and bipolar disorder. Patient has been taking his medications. Patient was sent here from rescue crisis. Patient denies any constitutional complaints. PAST MEDICAL / SURGICAL / SOCIAL / FAMILY HISTORY      has a past medical history of Asthma; Schizoaffective disorder ([facility] [address] and Substance abuse.     has no past surgical history on file. Social History     Social History    Marital status: Single     Spouse name: N/A    Number of children: N/A    Years of education: N/A     Occupational History    Not on file. Social History Main Topics    Smoking status: Never Smoker    Smokeless tobacco: Never Used      Comment: pt nonsmoker    Alcohol use Yes      Comment: Hx of Alcohol abuse    Drug use: Yes      Comment: Hx of substance abuse?  Sexual activity: Not on file     Other Topics Concern    Not on file     Social History Narrative    No narrative on file       No family history on file. Allergies:  Patient has no known allergies. Home Medications:  Prior to Admission medications    Medication Sig Start Date End Date Taking?  Authorizing Provider   traZODone (DESYREL) 100 MG tablet Take 1 tablet by mouth nightly as needed for Sleep 11/26/17   Abelino Campos MD   OLANZapine (ZYPREXA) 10 MG tablet Take 1 tablet by mouth 2 times daily 11/26/17   Rupali ZARATE Comprehensive Metabolic Panel    CBC Auto Differential    TOX SCR, BLD, ED    URINALYSIS WITH MICROSCOPIC    Urinalysis with Microscopic    EKG 12 Lead       MEDICATIONS ORDERED:  No orders of the defined types were placed in this encounter.       DIAGNOSTIC RESULTS / EMERGENCY DEPARTMENT COURSE / MDM     LABS:  Results for orders placed or performed during the hospital encounter of 07/23/18   Comprehensive Metabolic Panel   Result Value Ref Range    Glucose 119 (H) 70 - 99 mg/dL    BUN 16 6 - 20 mg/dL    CREATININE 0.99 0.70 - 1.20 mg/dL    Bun/Cre Ratio NOT REPORTED 9 - 20    Calcium 9.1 8.6 - 10.4 mg/dL    Sodium 135 135 - 144 mmol/L    Potassium 3.8 3.7 - 5.3 mmol/L    Chloride 99 98 - 107 mmol/L    CO2 25 20 - 31 mmol/L    Anion Gap 11 9 - 17 mmol/L    Alkaline Phosphatase 68 40 - 129 U/L    ALT 52 (H) 5 - 41 U/L    AST 59 (H) <40 U/L    Total Bilirubin 1.08 0.3 - 1.2 mg/dL    Total Protein 7.5 6.4 - 8.3 g/dL    Alb 4.3 3.5 - 5.2 g/dL    Albumin/Globulin Ratio 1.3 1.0 - 2.5    GFR Non-African American >60 >60 mL/min    GFR African American >60 >60 mL/min    GFR Comment          GFR Staging NOT REPORTED    CBC Auto Differential   Result Value Ref Range    WBC 7.7 3.5 - 11.3 k/uL    RBC 4.72 4.21 - 5.77 m/uL    Hemoglobin 13.5 13.0 - 17.0 g/dL    Hematocrit 42.7 40.7 - 50.3 %    MCV 90.5 82.6 - 102.9 fL    MCH 28.6 25.2 - 33.5 pg    MCHC 31.6 28.4 - 34.8 g/dL    RDW 12.6 11.8 - 14.4 %    Platelets 403 957 - 250 k/uL    MPV 10.0 8.1 - 13.5 fL    NRBC Automated 0.0 0.0 per 100 WBC    Differential Type NOT REPORTED     Seg Neutrophils 57 36 - 65 %    Lymphocytes 27 24 - 43 %    Monocytes 12 3 - 12 %    Eosinophils % 3 1 - 4 %    Basophils 1 0 - 2 %    Immature Granulocytes 0 0 %    Segs Absolute 4.42 1.50 - 8.10 k/uL    Absolute Lymph # 2.06 1.10 - 3.70 k/uL    Absolute Mono # 0.93 0.10 - 1.20 k/uL    Absolute Eos # 0.21 0.00 - 0.44 k/uL    Basophils # 0.05 0.00 - 0.20 k/uL    Absolute Immature Granulocyte <0.03

## 2018-07-24 NOTE — ED NOTES
HPI:   41 y/o M sent from rescue crisis for medical clearance for [facility]. Pt has hx of schizophrenia and bi-polar disorder. He has not been taking his medication. He was initially sent to rescue crisis due to confusion, agitation, and substantial risk of physical harm to himself and/or others. Pt denies this and has no complaints.

## 2019-05-02 ENCOUNTER — HOSPITAL ENCOUNTER (INPATIENT)
Age: 44
LOS: 3 days | Discharge: HOME | DRG: 885 | End: 2019-05-05
Attending: PSYCHIATRY & NEUROLOGY | Admitting: PSYCHIATRY & NEUROLOGY
Payer: MEDICARE

## 2019-05-02 ENCOUNTER — APPOINTMENT (OUTPATIENT)
Dept: GENERAL RADIOLOGY | Age: 44
End: 2019-05-02
Payer: MEDICARE

## 2019-05-02 ENCOUNTER — HOSPITAL ENCOUNTER (EMERGENCY)
Age: 44
Discharge: PSYCHIATRIC OR SUBSTANCE USE FACILITY | End: 2019-05-02
Attending: EMERGENCY MEDICINE
Payer: MEDICARE

## 2019-05-02 VITALS
TEMPERATURE: 98.8 F | RESPIRATION RATE: 19 BRPM | HEART RATE: 104 BPM | OXYGEN SATURATION: 97 % | SYSTOLIC BLOOD PRESSURE: 184 MMHG | DIASTOLIC BLOOD PRESSURE: 95 MMHG

## 2019-05-02 DIAGNOSIS — R45.851 SUICIDAL IDEATION: Primary | ICD-10-CM

## 2019-05-02 PROBLEM — F20.0 PARANOID SCHIZOPHRENIA (HCC): Status: ACTIVE | Noted: 2019-05-02

## 2019-05-02 LAB
ABSOLUTE EOS #: 0.42 K/UL (ref 0–0.44)
ABSOLUTE IMMATURE GRANULOCYTE: 0.03 K/UL (ref 0–0.3)
ABSOLUTE LYMPH #: 2.36 K/UL (ref 1.1–3.7)
ABSOLUTE MONO #: 1.07 K/UL (ref 0.1–1.2)
ANION GAP SERPL CALCULATED.3IONS-SCNC: 15 MMOL/L (ref 9–17)
BASOPHILS # BLD: 1 % (ref 0–2)
BASOPHILS ABSOLUTE: 0.06 K/UL (ref 0–0.2)
BUN BLDV-MCNC: 14 MG/DL (ref 6–20)
BUN/CREAT BLD: NORMAL (ref 9–20)
CALCIUM SERPL-MCNC: 9.1 MG/DL (ref 8.6–10.4)
CHLORIDE BLD-SCNC: 98 MMOL/L (ref 98–107)
CO2: 23 MMOL/L (ref 20–31)
CREAT SERPL-MCNC: 1.03 MG/DL (ref 0.7–1.2)
DIFFERENTIAL TYPE: ABNORMAL
EOSINOPHILS RELATIVE PERCENT: 5 % (ref 1–4)
ETHANOL PERCENT: <0.01 %
ETHANOL: <10 MG/DL
GFR AFRICAN AMERICAN: >60 ML/MIN
GFR NON-AFRICAN AMERICAN: >60 ML/MIN
GFR SERPL CREATININE-BSD FRML MDRD: NORMAL ML/MIN/{1.73_M2}
GFR SERPL CREATININE-BSD FRML MDRD: NORMAL ML/MIN/{1.73_M2}
GLUCOSE BLD-MCNC: 98 MG/DL (ref 70–99)
HCT VFR BLD CALC: 43 % (ref 40.7–50.3)
HEMOGLOBIN: 13.8 G/DL (ref 13–17)
IMMATURE GRANULOCYTES: 0 %
LYMPHOCYTES # BLD: 29 % (ref 24–43)
MCH RBC QN AUTO: 29.2 PG (ref 25.2–33.5)
MCHC RBC AUTO-ENTMCNC: 32.1 G/DL (ref 28.4–34.8)
MCV RBC AUTO: 90.9 FL (ref 82.6–102.9)
MONOCYTES # BLD: 13 % (ref 3–12)
NRBC AUTOMATED: 0 PER 100 WBC
PDW BLD-RTO: 12.7 % (ref 11.8–14.4)
PLATELET # BLD: 264 K/UL (ref 138–453)
PLATELET ESTIMATE: ABNORMAL
PMV BLD AUTO: 10.7 FL (ref 8.1–13.5)
POTASSIUM SERPL-SCNC: 3.8 MMOL/L (ref 3.7–5.3)
RBC # BLD: 4.73 M/UL (ref 4.21–5.77)
RBC # BLD: ABNORMAL 10*6/UL
SEG NEUTROPHILS: 52 % (ref 36–65)
SEGMENTED NEUTROPHILS ABSOLUTE COUNT: 4.2 K/UL (ref 1.5–8.1)
SODIUM BLD-SCNC: 136 MMOL/L (ref 135–144)
TROPONIN INTERP: NORMAL
TROPONIN T: NORMAL NG/ML
TROPONIN, HIGH SENSITIVITY: <6 NG/L (ref 0–22)
WBC # BLD: 8.1 K/UL (ref 3.5–11.3)
WBC # BLD: ABNORMAL 10*3/UL

## 2019-05-02 PROCEDURE — G0480 DRUG TEST DEF 1-7 CLASSES: HCPCS

## 2019-05-02 PROCEDURE — 80048 BASIC METABOLIC PNL TOTAL CA: CPT

## 2019-05-02 PROCEDURE — 96372 THER/PROPH/DIAG INJ SC/IM: CPT

## 2019-05-02 PROCEDURE — 1240000000 HC EMOTIONAL WELLNESS R&B

## 2019-05-02 PROCEDURE — 84484 ASSAY OF TROPONIN QUANT: CPT

## 2019-05-02 PROCEDURE — 93005 ELECTROCARDIOGRAM TRACING: CPT

## 2019-05-02 PROCEDURE — 99285 EMERGENCY DEPT VISIT HI MDM: CPT

## 2019-05-02 PROCEDURE — 6370000000 HC RX 637 (ALT 250 FOR IP): Performed by: STUDENT IN AN ORGANIZED HEALTH CARE EDUCATION/TRAINING PROGRAM

## 2019-05-02 PROCEDURE — 85025 COMPLETE CBC W/AUTO DIFF WBC: CPT

## 2019-05-02 PROCEDURE — 73020 X-RAY EXAM OF SHOULDER: CPT

## 2019-05-02 PROCEDURE — 6360000002 HC RX W HCPCS: Performed by: STUDENT IN AN ORGANIZED HEALTH CARE EDUCATION/TRAINING PROGRAM

## 2019-05-02 PROCEDURE — 2580000003 HC RX 258: Performed by: STUDENT IN AN ORGANIZED HEALTH CARE EDUCATION/TRAINING PROGRAM

## 2019-05-02 RX ORDER — MAGNESIUM HYDROXIDE/ALUMINUM HYDROXICE/SIMETHICONE 120; 1200; 1200 MG/30ML; MG/30ML; MG/30ML
30 SUSPENSION ORAL EVERY 6 HOURS PRN
Status: CANCELLED | OUTPATIENT
Start: 2019-05-02

## 2019-05-02 RX ORDER — HYDROXYZINE 50 MG/1
50 TABLET, FILM COATED ORAL 3 TIMES DAILY PRN
Status: DISCONTINUED | OUTPATIENT
Start: 2019-05-02 | End: 2019-05-05 | Stop reason: HOSPADM

## 2019-05-02 RX ORDER — 0.9 % SODIUM CHLORIDE 0.9 %
1000 INTRAVENOUS SOLUTION INTRAVENOUS ONCE
Status: COMPLETED | OUTPATIENT
Start: 2019-05-02 | End: 2019-05-02

## 2019-05-02 RX ORDER — ACETAMINOPHEN 325 MG/1
650 TABLET ORAL EVERY 4 HOURS PRN
Status: CANCELLED | OUTPATIENT
Start: 2019-05-02

## 2019-05-02 RX ORDER — BENZTROPINE MESYLATE 1 MG/ML
2 INJECTION INTRAMUSCULAR; INTRAVENOUS 2 TIMES DAILY PRN
Status: CANCELLED | OUTPATIENT
Start: 2019-05-02

## 2019-05-02 RX ORDER — ACETAMINOPHEN 325 MG/1
650 TABLET ORAL EVERY 4 HOURS PRN
Status: DISCONTINUED | OUTPATIENT
Start: 2019-05-02 | End: 2019-05-05 | Stop reason: HOSPADM

## 2019-05-02 RX ORDER — MAGNESIUM HYDROXIDE/ALUMINUM HYDROXICE/SIMETHICONE 120; 1200; 1200 MG/30ML; MG/30ML; MG/30ML
30 SUSPENSION ORAL EVERY 6 HOURS PRN
Status: DISCONTINUED | OUTPATIENT
Start: 2019-05-02 | End: 2019-05-05 | Stop reason: HOSPADM

## 2019-05-02 RX ORDER — NICOTINE 21 MG/24HR
1 PATCH, TRANSDERMAL 24 HOURS TRANSDERMAL DAILY
Status: CANCELLED | OUTPATIENT
Start: 2019-05-02

## 2019-05-02 RX ORDER — ZIPRASIDONE MESYLATE 20 MG/ML
20 INJECTION, POWDER, LYOPHILIZED, FOR SOLUTION INTRAMUSCULAR ONCE
Status: COMPLETED | OUTPATIENT
Start: 2019-05-02 | End: 2019-05-02

## 2019-05-02 RX ORDER — HYDROXYZINE PAMOATE 50 MG/1
25 CAPSULE ORAL 3 TIMES DAILY PRN
Status: CANCELLED | OUTPATIENT
Start: 2019-05-02

## 2019-05-02 RX ORDER — LORAZEPAM 0.5 MG/1
1 TABLET ORAL ONCE
Status: COMPLETED | OUTPATIENT
Start: 2019-05-02 | End: 2019-05-02

## 2019-05-02 RX ORDER — TRAZODONE HYDROCHLORIDE 50 MG/1
50 TABLET ORAL NIGHTLY PRN
Status: DISCONTINUED | OUTPATIENT
Start: 2019-05-02 | End: 2019-05-03

## 2019-05-02 RX ORDER — NICOTINE 21 MG/24HR
1 PATCH, TRANSDERMAL 24 HOURS TRANSDERMAL DAILY
Status: DISCONTINUED | OUTPATIENT
Start: 2019-05-03 | End: 2019-05-03

## 2019-05-02 RX ORDER — TRAZODONE HYDROCHLORIDE 50 MG/1
50 TABLET ORAL NIGHTLY PRN
Status: CANCELLED | OUTPATIENT
Start: 2019-05-02

## 2019-05-02 RX ORDER — BENZTROPINE MESYLATE 1 MG/ML
2 INJECTION INTRAMUSCULAR; INTRAVENOUS 2 TIMES DAILY PRN
Status: DISCONTINUED | OUTPATIENT
Start: 2019-05-02 | End: 2019-05-05 | Stop reason: HOSPADM

## 2019-05-02 RX ADMIN — LORAZEPAM 1 MG: 0.5 TABLET ORAL at 20:25

## 2019-05-02 RX ADMIN — SODIUM CHLORIDE 1000 ML: 9 INJECTION, SOLUTION INTRAVENOUS at 20:41

## 2019-05-02 RX ADMIN — WATER 1.2 ML: 1 INJECTION INTRAMUSCULAR; INTRAVENOUS; SUBCUTANEOUS at 20:41

## 2019-05-02 RX ADMIN — ZIPRASIDONE MESYLATE 20 MG: 20 INJECTION, POWDER, LYOPHILIZED, FOR SOLUTION INTRAMUSCULAR at 20:40

## 2019-05-02 ASSESSMENT — ENCOUNTER SYMPTOMS
SHORTNESS OF BREATH: 0
COUGH: 0
WHEEZING: 0
DIARRHEA: 0
ABDOMINAL PAIN: 0
VOMITING: 0
CHEST TIGHTNESS: 0
NAUSEA: 0

## 2019-05-02 ASSESSMENT — LIFESTYLE VARIABLES: HISTORY_ALCOHOL_USE: NO

## 2019-05-02 ASSESSMENT — SLEEP AND FATIGUE QUESTIONNAIRES
DO YOU HAVE DIFFICULTY SLEEPING: NO
AVERAGE NUMBER OF SLEEP HOURS: 6
DO YOU USE A SLEEP AID: NO

## 2019-05-02 ASSESSMENT — PAIN SCALES - GENERAL: PAINLEVEL_OUTOF10: 0

## 2019-05-02 ASSESSMENT — PATIENT HEALTH QUESTIONNAIRE - PHQ9: SUM OF ALL RESPONSES TO PHQ QUESTIONS 1-9: 18

## 2019-05-02 NOTE — ED NOTES
[] Johana    [] [address]    [x]  One [facility] ASSESSMENT      Y  N     [] [] In the past two weeks have you had thoughts of hurting yourself in any way? [] [] In the past two weeks have you had thoughts that you would be better off dead? [] [] Have you made a suicide attempt in the past two months? [] [] Do you have a plan for hurting yourself or suicide? [] [] Presence of hallucinations/voices related to hurting himself or herself or someone else.  (pt refuses to answer questions)     SUICIDE/SECURITY WATCH PRECAUTION CHECKLIST     Orders    [x]  Suicide/Security Watch Precautions initiated as checked below:   5/2/19 7:29 PM BH31/BH31H    [x] Notified physician:  Amadou Barreto MD  5/2/19 7:29 PM    [x] Orders obtained as appropriate:     [x] 1:1 Observer     [] Psych Consult     [] Psych Consult    Name:  Date:  Time:    [x] 1:1 Observer, Notified by:  Anika Mendez Nurse Supervisor    [x] Remove all personal clothes from room and place in snap/paper gown/pants. Slipper only    [x] Remove all personal belongings from room and secured away from patient. Documentation    [x] Initiate Suicide/Security Watch Precaution Flow Sheet    [x] Initiate individualized Care Plan/Problem    [x] Document why precautions initiated on flow sheet (Initiate Nursing Care Plan/Problem)    [x] 1:1 Observer in place; instructions provided. Suicide precautions require observer be within arms length. [x] Nurse-Observer Communication Hand-off initiated by RN, reviewed with Observer. Subsequently used as Hand Off between Observers. [x] Initiate every 15 minute observations per observer as delegated by the RN.     [x] Initiate RN assessment and documentation    Environmental Scan  Search Criteria and Process: OPTIONAL, see Search Policy    [] Reason for search:    [] Nursing in presence of second person to search patient    [] Patient notified of reason for body assessment and belongings search:     Persons present during search:   Results of search and disposition:       Searchers Name:      These items or items similar should be removed from the room:   [] Chairs   [] Telephone   [] Trash cans and liners   [] Plastic utensils (order Patient Safety tray)   [] Empty or remove Sharps containers   [] All personal clothing/belongings removed   [] All unnecessary lead wires, electrical cords, draw cords, etc.   [] Flowers and plants   [] Double check for lighters, matches, razors, any glass items etc that can be used as weapons. Person completing Checklist: Saniya Washburn       GENERAL INFORMATION     Y  N     [x] [] Has the patient been informed that they are on a watch and what that means? [x] [] Can the patient get out of Bed without nursing assistance? [x] [] Can the patient use the restroom without nursing assistance? [x] [] Can the patient walk the halls to [place] their legs? \"   [x] [] Does the patient have metal utensils? [x] [] Have the patient's belongings been placed out of control of the patient? [x] [] Have the patient and his/her belongings been checked for contraband? [x] [] Is the patient under any visitor restrictions? If Yes, explain:   [] [x] Is the patient under an alias? Alias Name:   Authorized visitors (no more than two are to be on the list)   Name/Relationship:   Name/Relationship:    Name of Staff member that you  Received this information from?:     General Description:    Marco VENEGAS [address] male 44 y.o. Admission weight:      Race: []  [] Black  []   []   [] [place] [] Other  Facial Hair:  [] Yes  [] No  If yes, please describe: Identifying Marks (i.e. Visible tattoos, scars, etc... ):     NURSING CARE PLAN    Nursing Diagnosis: Risk of Self Directed Harm  [] Actual  [] Potential  Date Started: 5/2/19      Etiological Factors: (related to)  [] Expressed or implied suicidal ideation/behavior  []

## 2019-05-02 NOTE — ED PROVIDER NOTES
 Physical activity:     Days per week: Not on file     Minutes per session: Not on file    Stress: Not on file   Relationships    Social connections:     Talks on phone: Not on file     Gets together: Not on file     Attends Taoist service: Not on file     Active member of club or organization: Not on file     Attends meetings of clubs or organizations: Not on file     Relationship status: Not on file    Intimate partner violence:     Fear of current or ex partner: Not on file     Emotionally abused: Not on file     Physically abused: Not on file     Forced sexual activity: Not on file   Other Topics Concern    Not on file   Social History Narrative    Not on file       No family history on file. Allergies:  Patient has no known allergies. Home Medications:  Prior to Admission medications    Medication Sig Start Date End Date Taking? Authorizing Provider   traZODone (DESYREL) 100 MG tablet Take 1 tablet by mouth nightly as needed for Sleep 11/26/17   Mariana Townsend MD   OLANZapine (ZYPREXA) 10 MG tablet Take 1 tablet by mouth 2 times daily 11/26/17   Mariana Townsend MD       REVIEW OFSYSTEMS    (2-9 systems for level 4, 10 or more for level 5)      Review of Systems   Constitutional: Negative for chills and fever. HENT: Negative. Eyes: Negative for visual disturbance. Respiratory: Negative for cough, chest tightness, shortness of breath and wheezing. Cardiovascular: Negative for chest pain, palpitations and leg swelling. Gastrointestinal: Negative for abdominal pain, diarrhea, nausea and vomiting. Musculoskeletal:        Right shoulder pain. Skin: Negative for rash and wound. Neurological: Negative for syncope, weakness, light-headedness and numbness. Psychiatric/Behavioral: Positive for suicidal ideas.        PHYSICAL EXAM   (up to 7 for level 4, 8 or more forlevel 5)      INITIAL VITALS:   Vitals:    05/02/19 1916 05/02/19 2130   BP: (!) 184/95    Pulse: 120 104   Resp: 19    Temp: 98.8 °F (37.1 °C)    TempSrc: Oral    SpO2: 97%        Physical Exam   Constitutional: He is oriented to person, place, and time. He appears well-developed and well-nourished. Patient pacing around the room, disorganized thought and speech, argumentative. HENT:   Head: Normocephalic and atraumatic. Eyes: Pupils are equal, round, and reactive to light. Conjunctivae and EOM are normal.   Neck: Normal range of motion. Neck supple. Cardiovascular: Regular rhythm and normal heart sounds. Exam reveals no gallop and no friction rub. No murmur heard. Mildly tachycardic. Pulmonary/Chest: Effort normal and breath sounds normal. No stridor. No respiratory distress. He has no wheezes. He has no rales. Abdominal: Soft. He exhibits no distension and no mass. There is no tenderness. There is no guarding. Musculoskeletal: He exhibits no edema or tenderness. Right shoulder with full painless range of motion, upper extremities neurovascularly intact with equal strength. Neurological: He is alert and oriented to person, place, and time. Skin: Skin is warm and dry. Psychiatric:   Patient very argumentative with disorganized speech and thoughts consistent with schizophrenia. Nursing note and vitals reviewed. DIFFERENTIAL  DIAGNOSIS     PLAN (LABS / IMAGING / EKG):  Orders Placed This Encounter   Procedures    XR Shoulder Right 1 VW    CBC Auto Differential    Basic Metabolic Panel    Troponin    ETHANOL    Inpatient consult to Social Work    EKG 12 Lead       MEDICATIONS ORDERED:  Orders Placed This Encounter   Medications    LORazepam (ATIVAN) tablet 1 mg    AND Linked Order Group     ziprasidone (GEODON) injection 20 mg     sterile water injection 1.2 mL    0.9 % sodium chloride bolus       DDX: Schizophrenia, fracture, shoulder dislocation, intoxication, dehydration    Initial MDM/Plan/ED course: 44 y.o. male who presents with suicidal ideation and psychiatric problem.   Patient with history of schizoaffective disorder, patient states he hasn't been on his medications for a month. He states he has suicidal thoughts but no plan and that he needs to be seen by a psychiatrist for his mental illness. Patient also complaining of right shoulder pain but denies any trauma or injury. Patient is persistently verbally argumentative and having disorganized thoughts. Vitals on exam showed tachycardia of 120 and hypertension, patient denies any cocaine use or illicit drug use but has a history of polysubstance abuse in the past.  Physical exam unremarkable, shoulder with full range of motion and neurovascularly intact. Due to patient's tachycardia and shoulder pain, EKG, shoulder x-ray, troponin ×1, basic labs obtained. Patient given oral Ativan and IM Geodon for agitation. Patient also given fluid bolus due to dehydration and tachycardia. After medication and fluid bolus, patient's heart rate returned to normal.  Lab workup unremarkable. Patient will be transferred to [facility]. Signed out to Dr. Raman Calzada discussed history, physical, work up and plan. Please see his note for further information. DIAGNOSTIC RESULTS / EMERGENCY DEPARTMENT COURSE / MDM     LABS:  Labs Reviewed   CBC WITH AUTO DIFFERENTIAL - Abnormal; Notable for the following components:       Result Value    Monocytes 13 (*)     Eosinophils % 5 (*)     All other components within normal limits   BASIC METABOLIC PANEL   TROPONIN   ETHANOL         RADIOLOGY:  Xr Shoulder Right 1 Vw    Result Date: 5/2/2019  EXAMINATION: SINGLE XRAY VIEW OF THE RIGHT SHOULDER 5/2/2019 8:49 pm COMPARISON: None. HISTORY: ORDERING SYSTEM PROVIDED HISTORY: pain TECHNOLOGIST PROVIDED HISTORY: pain Ordering Physician Provided Reason for Exam: pain Acuity: Unknown Type of Exam: Unknown FINDINGS: Single portable view of the right shoulder. No gross evidence of acute fracture or dislocation. Mild acromioclavicular degenerative changes. No acute findings. EKG  sinus tachycardia, otherwise unremarkable    All EKG's are interpreted by the [facility] Physician who either signs or Co-signs this chart in the absence of a cardiologist.      PROCEDURES:  None    CONSULTS:  IP CONSULT TO SOCIAL WORK  IP CONSULT TO HOSPITALIST    CRITICAL CARE:  Please see attending note    FINAL IMPRESSION      1. Suicidal ideation          DISPOSITION / PLAN     DISPOSITION    Transfer    PATIENT REFERRED TO:  No follow-up provider specified.     DISCHARGE MEDICATIONS:  New Prescriptions    No medications on file       Lucy Nguyen DO  Emergency Medicine Resident    (Please note that portions of this note were completed with a voice recognition program.Efforts were made to edit the dictations but occasionally words are mis-transcribed.)       Magda Vazquez DO  Resident  05/02/19 6053

## 2019-05-02 NOTE — ED PROVIDER NOTES
[facility]     Emergency Department     Faculty Note/ Attestation      Pt Name: Sandip De La Paz                                       MRN: [id number]  [place] 1975  Date of evaluation: 5/2/2019    Patients PCP:    No primary care provider on file. Attestation  I performed a history and physical examination of the patient and discussed management with the resident. I reviewed the residents note and agree with the documented findings and plan of care. Any areas of disagreement are noted on the chart. I was personally present for the key portions of any procedures. I have documented in the chart those procedures where I was not present during the key portions. I have reviewed the emergency nurses triage note. I agree with the chief complaint, past medical history, past surgical history, allergies, medications, social and family history as documented unless otherwise noted below. For Physician Assistant/ Nurse Practitioner cases/documentation I have personally evaluated this patient and have completed at least one if not all key elements of the E/M (history, physical exam, and MDM). Additional findings are as noted.       Initial Screens:        Hutchinson Coma Scale  Eye Opening: Spontaneous  Best Verbal Response: Oriented  Best Motor Response: Obeys commands  Vahid Coma Scale Score: 15    Vitals:    Vitals:    05/02/19 1916   BP: (!) 184/95   Pulse: 120   Resp: 19   Temp: 98.8 °F (37.1 °C)   TempSrc: Oral   SpO2: 97%       CHIEF COMPLAINT       Chief Complaint   Patient presents with    Suicidal     pt not willing to answer questions             DIAGNOSTIC RESULTS             RADIOLOGY:   No orders to display         LABS:  Labs Reviewed - No data to display      EMERGENCY DEPARTMENT COURSE:     -------------------------  BP: (!) 184/95, Temp: 98.8 °F (37.1 °C), Pulse: 120, Resp: 19      Comments    Schizoaffective  SI without plan  Requesting to see psychiatrist for his \"mental problems\"    8:05 PM  On my exam patient is disorganized and agitated, he is alert and knows his name, does not know which hospital this but does not the date. He does not have decisional capacity at this time, does state that he is still actively suicidal although cannot describe the plan. He consented to lab work and fluids, will give him an antipsychotic And benzo, we will need social work consult for placement.     (Please note that portions of this note were completed with a voice recognition program.  Efforts were made to edit the dictations but occasionally words are mis-transcribed.)      Martins MD  Attending Emergency Physician          Frankie Chaudhary MD  05/02/19 2005

## 2019-05-02 NOTE — ED NOTES
Pt spoke with Dr. Anders Ferguson and Dr. Josemanuel Cook spoke with pt, pt stated that he is suicidal but does not have a plan.       Saniya Washburn RN  05/02/19 2373

## 2019-05-03 LAB
EKG ATRIAL RATE: 109 BPM
EKG ATRIAL RATE: 88 BPM
EKG P AXIS: 38 DEGREES
EKG P AXIS: 39 DEGREES
EKG P-R INTERVAL: 136 MS
EKG P-R INTERVAL: 150 MS
EKG Q-T INTERVAL: 350 MS
EKG Q-T INTERVAL: 404 MS
EKG QRS DURATION: 68 MS
EKG QRS DURATION: 86 MS
EKG QTC CALCULATION (BAZETT): 471 MS
EKG QTC CALCULATION (BAZETT): 488 MS
EKG R AXIS: 19 DEGREES
EKG R AXIS: 19 DEGREES
EKG T AXIS: 1 DEGREES
EKG T AXIS: 1 DEGREES
EKG VENTRICULAR RATE: 109 BPM
EKG VENTRICULAR RATE: 88 BPM

## 2019-05-03 PROCEDURE — 90792 PSYCH DIAG EVAL W/MED SRVCS: CPT | Performed by: PSYCHIATRY & NEUROLOGY

## 2019-05-03 PROCEDURE — 1240000000 HC EMOTIONAL WELLNESS R&B

## 2019-05-03 RX ORDER — IBUPROFEN 600 MG/1
600 TABLET ORAL EVERY 6 HOURS PRN
Status: DISCONTINUED | OUTPATIENT
Start: 2019-05-03 | End: 2019-05-05 | Stop reason: HOSPADM

## 2019-05-03 RX ORDER — OLANZAPINE 10 MG/1
10 TABLET ORAL 2 TIMES DAILY
Status: DISCONTINUED | OUTPATIENT
Start: 2019-05-03 | End: 2019-05-05 | Stop reason: HOSPADM

## 2019-05-03 RX ORDER — TRAZODONE HYDROCHLORIDE 100 MG/1
100 TABLET ORAL NIGHTLY PRN
Status: DISCONTINUED | OUTPATIENT
Start: 2019-05-03 | End: 2019-05-05 | Stop reason: HOSPADM

## 2019-05-03 ASSESSMENT — ENCOUNTER SYMPTOMS
DIARRHEA: 0
ABDOMINAL DISTENTION: 0
SINUS PAIN: 0
FACIAL SWELLING: 0
EYE REDNESS: 0
RHINORRHEA: 0
EYE PAIN: 0
RESPIRATORY NEGATIVE: 1
EYE DISCHARGE: 0
PHOTOPHOBIA: 0
NAUSEA: 0

## 2019-05-03 ASSESSMENT — PAIN SCALES - GENERAL: PAINLEVEL_OUTOF10: 0

## 2019-05-03 NOTE — GROUP NOTE
Group Therapy Note    Date: May 3    Group Start Time: 1430  Group End Time: 1505  Group Topic: Psychoeducation    [address] [place] [address] Drive    Patient refused to attend values clarification/ social interaction group at 1430 after encouragement from staff. 1:1 talk time provided as alternative to group session and pt declined.       Signature:  Joyce Contreras

## 2019-05-03 NOTE — ED NOTES
SW contacted [facility] to arrange patient transport to [facility]. ETA is 2AM and no one else is able to take him as he called around.         Oracio Nugent, CHRISTO, [facility]  05/02/19 1340

## 2019-05-03 NOTE — PROGRESS NOTES
Department of Psychiatry  Attending Physician Psychiatric Assessment     CHIEF COMPLAINT: Suicidal ideations     History obtained from:  patient, electronic medical record    HISTORY OF PRESENT ILLNESS:    Rohan De La Cruz is a 44 y.o. male who presented to the ED at [place] with a specific suicidal and homicidal ideations. The patient was noticed to be very psychotic and was uncooperative with the admission process. He was responding to internal stimuli and carrying on a conversation while in his room today. He was uncooperative with the interview and became agitated easily. He was hyperverbal and was laughing uncontrollably at times during the interview. He was focused on getting muscle relaxant for shoulder/neck/back pain. He has a diagnosis of schizoaffective disorder and follows up at the [facility] but he stopped taking his medications about a month ago with no specific reason. He denied any drug use but has a history of substance abuse according to the records. The patient said that his sleep, energy and appetite are all good. Further information were not obtainable in the HPI due to his current mental status and refusal to answer questions. The patient was loud and acted arrogantly indicating grandiosity. PAST PSYCHIATRIC HISTORY:    The patient has history of multiple psychiatric hospitalizations in the past.  Last time was in 2017  The patient denied attempted suicide in the past.      Past Medical History:        Diagnosis Date    Asthma     [facility]izoa[facility]     Substance abuse ([facility]        Past Surgical History:    History reviewed. No pertinent surgical history. Medications Prior to Admission:   Medications Prior to Admission: traZODone (DESYREL) 100 MG tablet, Take 1 tablet by mouth nightly as needed for Sleep  OLANZapine (ZYPREXA) 10 MG tablet, Take 1 tablet by mouth 2 times daily    Allergies:  Patient has no known allergies.     Psychosocial History:  Unobtainable at this time      Family History:   Denied any    No family history on file. PHYSICAL EXAM:  Vitals:  BP (!) 166/106   Pulse 82   Temp 97.4 °F (36.3 °C) (Oral)   Resp 14   Ht 6' (1.829 m)   Wt 250 lb (113.4 kg)   BMI 33.91 kg/m²     Cranial nerves 1-12 grossly intact. See H&P for more physical exam.      MENTAL STATUS EXAM  Level of consciousness:  within normal limits  Appearance:  Medicaid except for underwear  Behavior/Motor:  psychomotor agitation  Attitude toward examiner:  argumentative and uncooperative  Speech:  Loud and fast  Mood:  euphoric  Affect:  mood congruent, Constricted in range. Thought processes:  linear  Thought content: grandiosity, visual and auditory hallucinations. Cognition:  oriented to person, place, and time  Memory: intact  Insight & Judgement: limited          DSM 5 DIAGNOSIS:    · Schizoaffective disorder bipolar type with suicidal ideations. Psychosocial and contextual factors:   Patient Active Problem List   Diagnosis    Paranoid schizophrenia, chronic condition with acute exacerbation ([facility]    Polysubstance abuse ([facility]    Schizophrenia, paranoid ([facility]    Paranoid schizophrenia ([facility]          TREATMENT:    · The patient is admitted with suicide precautions. · The patient gave informed consent to the following plan after discussing the risks, benefits, alternatives and side effects. .  · We will restart home medications including olanzapine 10 mg at bedtime and trazodone 100 mg at bedtime for insomnia. · I tried to discuss injectable long-acting antipsychotics for better compliance but the patient refused.     Risk Management:  close watch per standard protocol      Psychotherapy:  participation in milieu and group and individual sessions with Attending Physician,  and Physician Assistant/CNP    Reason for Admission to Psychiatric Unit:  Acute disordered/bizarre behavior or psychomotor agitation or retardation;interferes with ADLs so that patient cannot function at a less intensive care level of care during evaluation and treatment       Estimated length of stay:  5-10 days      GENERAL PATIENT/FAMILY EDUCATION  Patient will understand basic signs and symptoms, Patient will understand benefits/risks and potential side effects from proposed meds and Patient will understand their role in recovery. Family is  active in patient's care. Patient assets that may be helpful during treatment include: Intent to participate and engage in treatment, sufficient fund of knowledge and intellect to understand and utilize treatments.            Physicians Signature:  Electronically signed by Karely Grey MD, on 5/3/2019 at 7:30 PM

## 2019-05-03 NOTE — GROUP NOTE
Group Therapy Note    Date: May 3    Group Start Time: 0900  Group End Time: 0920  Group Topic: [id number] [facility]    Patient refused to attend community meeting and goal setting group at 0900 after encouragement from staff. 1:1 talk time offered as alternative to group session, but pt declined.

## 2019-05-03 NOTE — ED PROVIDER NOTES
[facility] ED  Emergency Department  Emergency Medicine Resident Sign-out     Care of Lilibeth Garcia was assumed from Dr. Fregoso and is being seen for Suicidal (pt not willing to answer questions)  . The patient's initial evaluation and plan have been discussed with the prior provider who initially evaluated the patient. EMERGENCY DEPARTMENT COURSE / MEDICAL DECISION MAKING:       MEDICATIONS GIVEN:  Orders Placed This Encounter   Medications    LORazepam (ATIVAN) tablet 1 mg    AND Linked Order Group     ziprasidone (GEODON) injection 20 mg     sterile water injection 1.2 mL    0.9 % sodium chloride bolus       LABS / RADIOLOGY:     Labs Reviewed   CBC WITH AUTO DIFFERENTIAL - Abnormal; Notable for the following components:       Result Value    Monocytes 13 (*)     Eosinophils % 5 (*)     All other components within normal limits   BASIC METABOLIC PANEL   TROPONIN   ETHANOL       Xr Shoulder Right 1 Vw    Result Date: 5/2/2019  EXAMINATION: SINGLE XRAY VIEW OF THE RIGHT SHOULDER 5/2/2019 8:49 pm COMPARISON: None. HISTORY: ORDERING SYSTEM PROVIDED HISTORY: pain TECHNOLOGIST PROVIDED HISTORY: pain Ordering Physician Provided Reason for Exam: pain Acuity: Unknown Type of Exam: Unknown FINDINGS: Single portable view of the right shoulder. No gross evidence of acute fracture or dislocation. Mild acromioclavicular degenerative changes. No acute findings. RECENT VITALS:     Temp: 98.8 °F (37.1 °C),  Pulse: 104, Resp: 19, BP: (!) 184/95, SpO2: 97 %    This patient is a 44 y.o. Male with history of schizoaffective disorder who is currently off his medications and has been for about a month who presents to ED with complaints of suicidal ideations. Patient was noted to be initially tachycardic but had a negative workup in the ED. Patient was treated with Geodon, Ativan, IV fluids and has been accepted at the [facility]. OUTSTANDING TASKS / RECOMMENDATIONS:    1.  Awaiting transfer to [facility]     FINAL IMPRESSION:     1. Suicidal ideation        DISPOSITION:         DISPOSITION:  []  Discharge   [x]  Transfer -  [facility]   []  Admission -     []  Against Medical Advice   []  Eloped   FOLLOW-UP: No follow-up provider specified.    DISCHARGE MEDICATIONS: New Prescriptions    No medications on file           Jong Cardoso MD  Emergency Medicine Resident  [facility]       Jong Cardoso MD  Resident  05/02/19 9228

## 2019-05-03 NOTE — PLAN OF CARE
[id number] [facility] [place]  Initial Interdisciplinary Treatment Plan NO      Original treatment plan Date & Time: 5/3/2019 0909    Admission Type:  Admission Type: Voluntary    Reason for admission:   Reason for Admission: Patient voluntary from [facility] with suicidal and homicidal ideation. Patient is paranoid and agitated. Patient states he been off his medications for over one month.     Estimated Length of Stay:  5-7days  Estimated Discharge Date: to be determined by physician    PATIENT STRENGTHS:  Patient Strengths:Strengths: No significant Physical Illness  Patient Strengths and Limitations:Limitations: Apathetic / unmotivated, Hopeless about future, Unrealistic self-view  Addictive Behavior: Addictive Behavior  In the past 3 months, have you felt or has someone told you that you have a problem with:  : None  Do you have a history of Chemical Use?: No  Do you have a history of Alcohol Use?: No  Do you have a history of Street Drug Abuse?: No  Histroy of Prescripton Drug Abuse?: No  Medical Problems:  Past Medical History:   Diagnosis Date    Asthma     Schizoaffective disorder ([facility]     Substance abuse ([facility]      Status EXAM:Status and Exam  Normal: No  Facial Expression: Avoids Gaze, Hostile  Affect: Inappropriate  Level of Consciousness: Alert  Mood:Normal: No  Mood: Anxious, Labile, Angry, Suspicious  Motor Activity:Normal: Yes  Interview Behavior: Irritable, Uncooperative/Withdrawn, Evasive, Aggressive  Preception: [place] to Person, [place] to Time, [place] to Place, [place] to Situation  Attention:Normal: No  Attention: Distractible  Thought Processes: Blocking  Thought Content:Normal: No  Thought Content: Poverty of Content, Paranoia  Hallucinations: None  Delusions: No  Memory:Normal: No  Memory: Poor Recent, Poor Remote  Insight and Judgment: No  Insight and Judgment: Poor Judgment, Poor Insight  Present Suicidal Ideation: No  Present Homicidal Ideation: No    EDUCATION:   Learner Progress Toward Treatment Goals: reviewed group plans and strategies for care    Method:group therapy, medication compliance, individualized assessments and care planning    Outcome: needs reinforcement    PATIENT GOALS: to be discussed with patient within 72 hours    PLAN/TREATMENT RECOMMENDATIONS:     continue group therapy , medications compliance, goal setting, individualized assessments and care, continue to monitor pt on unit      SHORT-TERM GOALS:   Time frame for Short-Term Goals: 5-7 days    LONG-TERM GOALS:  Time frame for Long-Term Goals: 6 months  Members Present in Team Meeting: See Signature Sheet    Swapna Bosch

## 2019-05-03 NOTE — H&P
resource strain: Not on file    Food insecurity:     Worry: Not on file     Inability: Not on file    Transportation needs:     Medical: Not on file     Non-medical: Not on file   Tobacco Use    Smoking status: Never Smoker    Smokeless tobacco: Never Used    Tobacco comment: pt nonsmoker   Substance and Sexual Activity    Alcohol use: Yes     Comment: Hx of Alcohol abuse    Drug use: Yes     Comment: Hx of substance abuse?  Sexual activity: Not on file   Lifestyle    Physical activity:     Days per week: Not on file     Minutes per session: Not on file    Stress: Not on file   Relationships    Social connections:     Talks on phone: Not on file     Gets together: Not on file     Attends [employer] service: Not on file     Active member of club or organization: Not on file     Attends meetings of clubs or organizations: Not on file     Relationship status: Not on file    Intimate partner violence:     Fear of current or ex partner: Not on file     Emotionally abused: Not on file     Physically abused: Not on file     Forced sexual activity: Not on file   Other Topics Concern    Not on file   Social History Narrative    Not on file           REVIEW OF SYSTEMS      No Known Allergies    No current facility-administered medications on file prior to encounter. Current Outpatient Medications on File Prior to Encounter   Medication Sig Dispense Refill    traZODone (DESYREL) 100 MG tablet Take 1 tablet by mouth nightly as needed for Sleep 30 tablet 0    OLANZapine (ZYPREXA) 10 MG tablet Take 1 tablet by mouth 2 times daily 60 tablet 0                    Review of Systems   Constitutional: Negative for activity change, diaphoresis and fatigue. HENT: Negative for facial swelling, postnasal drip, rhinorrhea and sinus pain. Eyes: Negative for photophobia, pain, discharge and redness. Respiratory: Negative. Cardiovascular: Negative.     Gastrointestinal: Negative for abdominal distention, diarrhea less than 2 seconds. No rash noted. No erythema. PROVISIONAL DIAGNOSES:      Active Problems:    Paranoid schizophrenia ([facility] [address]  Resolved Problems:    * No resolved hospital problems.  MARIA D Charles CNP on 5/3/2019 at 11:33 AM

## 2019-05-03 NOTE — BH NOTE
`[facility]  Admission Note     Admission Type:   Admission Type: Voluntary    Reason for admission:  Reason for Admission: Patient voluntary from [facility] with suicidal and homicidal ideation. Patient is paranoid and agitated. Patient states he been off his medications for over one month.     PATIENT STRENGTHS:  Strengths: No significant Physical Illness    Patient Strengths and Limitations:  Limitations: Apathetic / unmotivated, Hopeless about future, Unrealistic self-view    Addictive Behavior:   Addictive Behavior  In the past 3 months, have you felt or has someone told you that you have a problem with:  : None  Do you have a history of Chemical Use?: No  Do you have a history of Alcohol Use?: No  Do you have a history of Street Drug Abuse?: No  Histroy of Prescripton Drug Abuse?: No    Medical Problems:   Past Medical History:   Diagnosis Date    Asthma     [facility] [address]     Substance abuse ([facility] [address]        Status EXAM:  Status and Exam  Normal: No  Facial Expression: Avoids Gaze, Flat  Affect: Appropriate  Level of Consciousness: Alert  Mood:Normal: No  Mood: Anxious, Irritable, Labile, Suspicious, Angry  Motor Activity:Normal: Yes  Interview Behavior: Irritable, Uncooperative/Withdrawn, Evasive, Aggressive  Preception: [place] to Person, [place] to Time, [place] to Place, [place] to Situation  Attention:Normal: No  Attention: Distractible  Thought Processes: Blocking  Thought Content:Normal: No  Thought Content: Paranoia  Hallucinations: (refused to anwser)  Delusions: No(MUNIRA Pt refused to anwser.)  Memory:Normal: No  Memory: Poor Recent, Poor Remote  Insight and Judgment: No  Insight and Judgment: Poor Judgment, Poor Insight  Present Suicidal Ideation: No  Present Homicidal Ideation: No    Tobacco Screening:  Practical Counseling, on admission, sung X, if applicable and completed (first 3 are required if patient doesn't refuse):            ( )  Recognizing danger situations (included triggers and roadblocks)                    ( )  Coping skills (new ways to manage stress, exercise, relaxation techniques, changing routine, distraction)                                                           ( )  Basic information about quitting (benefits of quitting, techniques in how to quit, available resources  ( ) Referral for counseling faxed to [facility]                                           ( ) Patient refused counseling  (X ) Patient has not smoked in the last 30 days    Metabolic Screening:    No results found for: LABA1C    No results found for: CHOL  No results found for: TRIG  No results found for: HDL  No components found for: LDLCAL  No results found for: LABVLDL      Body mass index is 33.91 kg/m². BP Readings from Last 2 Encounters:   05/02/19 (!) 155/100   05/02/19 (!) 184/95           Pt admitted with followings belongings:  Dentures: None  Vision - Corrective Lenses: None  Hearing Aid: None  Jewelry: Bracelet  Body Piercings Removed: Yes  Clothing: Footwear, Jacket / coat, Pants, Shirt, Socks, Undergarments (Comment)  Were All Patient Medications Collected?: Not Applicable  Other Valuables: Cell phone, Money (Comment), Keys($0.55)     . Valuables placed in safe in security envelope, number:  \[id number]. Patient oriented to surroundings and program expectations and copy of patient rights given. Received admission packet:  Yes. Consents reviewed, signed No. Refused Yes . Patient verbalize understanding:  No.    Patient education on precautions: Yes    Patient was a voluntary admission from [facility] ED. Patient verbalized suicidal thoughts no plan and verbalized homicidal thoughts towards unnamed persons. Patient denies any audio or visual thoughts at this time. Patient refused to signs any admission paper work and refused any assessments. Patient was offered food and water. Patient was irritable and refused to anwser any questions.  Patient denies any additional needs at this time. 15 minute safety checks were initiated.                     Carlee Mendoza RN

## 2019-05-03 NOTE — PLAN OF CARE
Patient has been free from self harm at this time and agrees to approach staff if having any thoughts to harm self. Q 15 min safety checks continue at this time. Patient denies pain at this time. Patient was agitated this am with getting vitals done, but aloud writer to get them.   Patient took shower this am.

## 2019-05-03 NOTE — GROUP NOTE
Group Therapy Note    Date: May 3    Group Start Time: 8732  Group End Time: 9055  Group Topic: Psychoeducation    GIAN Bravo, CTRS    Patient refused to attend music [employer]) group at 2488 after encouragement from staff. 1:1 talk time offered as alternative to group session but pt declined.

## 2019-05-03 NOTE — PROGRESS NOTES
Mr Katie Mchugh declined to have H&P at this time, He has been refusing meds, vital signs and nurses say he has been easily agitated, Will attempt tomorrow.

## 2019-05-03 NOTE — ED NOTES
.Provisional Diagnosis:   Hx of Paranoid Schizophrenia. Psychosocial and Contextual Factors:  Problems with environment. Problems with social supports. C-SSRS Summary:     Patient: X  Family:   Agency: [facility]    Substance Abuse: None reported     Present Suicidal Behavior:      Verbal: Yes     Attempt: No    Past Suicidal Behavior:     Verbal:Yes    Attempt:Unknown, patient refuse to answer      Self-Injurious/Self-Mutilation: None reported     Trauma Identified: None reported       Protective Factors:    None reported     Risk Factors:    Patient been off medication for over a month. Patient has past inpatient admission last one in November 2017. Patient non-compliant with [facility]. Previous medications in 2017 were Trazodone and Zyprexa. Clinical Summary:    Patient is a 38 Y.O., single -American male who was brought to the ED by [facility]. Patient reported SI and no specific plan stating he is thinking about hurting himself. Patient reported HI '' light weight '' toward others. Patient reported being off medication for some time now. Patient denied any AH/VH. Patient reported sleeping well at night, and appetite normal. Patient agitated and in pain. Patient reported wanting inpatient treatment to deal with his life issues. Patient was guarded in answering questions and told writer to look in his chart or call [address].. Patient reported laying on concrete causing his shoulder to hurt. Patient was pacing back and forth, getting up and down in his bed, and repeating same thing while talking to others in the [facility]. Patient reported he is willing to sign in voluntarily if admitted to inpatient psych. Patient refused to answer any more questions as he was limited to what he wanted to answer. Patient presented agitated and upset by being questioned by others. Level of Care Disposition:    Pending on call psych decision.           CHRISTO Pope, BATSHEVA  05/02/19 2012

## 2019-05-03 NOTE — GROUP NOTE
Group Therapy Note    Date: May 2    Group Start Time:   Group End Time:   Group Topic: Wrap-Up    ST STEPH Suarez        Group Therapy Note    Attendees:         Patient's Goal:  ***    Notes:  ***    Status After Intervention:  {Status After Intervention:096303128}    Participation Level: {Participation Level:977987743}    Participation Quality: {[facility] PARTICIPATION QUALITY:629609593}      Speech:  {[facility] CD_SPEECH:68027}      Thought Process/Content: {Thought Process/Content:784815888}      Affective Functioning: {Affective Functionin}      Mood: {Mood:635534603}      Level of consciousness:  {Level of consciousness:744075208}      Response to Learnin Sarika Yomi BHI Responses to Learnin}      Endings: {[facility] Endings:38376}    Modes of Intervention: {[facility] Modes of Intervention:980796040}      Discipline Responsible: Citlaly CHOI Multidisciplinary:951143046}      Signature:  Erin Suarez

## 2019-05-03 NOTE — ED NOTES
SW contacted [facility] for patient transport. ETA is 30 minutes.       CHRISTO De Anda, MELONIEW  05/02/19 9839

## 2019-05-03 NOTE — CARE COORDINATION
BHI Biopsychosocial Assessment    Current Level of Psychosocial Functioning     Independent x  Dependent    Minimal Assist     Comments:    Psychosocial High Risk Factors (check all that apply)    Unable to obtain meds   Chronic illness/pain    Substance abuse   Lack of Family Support   Financial stress   Isolation   Inadequate Community Resources  Suicide attempt(s)  Not taking medications x per admission note   Victim of crime   Developmental Delay  Unable to manage personal needs    Age 72 or older   Homeless  No transportation   Readmission within 30 days  Unemployment  Traumatic Event    Comments:   Psychiatric Advanced Directives:  Lv Fields     Family to León Anderson in Treatment: MUNIRA    Sexual Orientation:  MUNIRA    Patient Strengths: stable income, linked to [facility]     Patient Barriers: agitated, sexually inappropriate, noncompliant with medications       Opiate Education Provided:  SHELLY       [facility]     Plan of Care   medication management, group/individual therapies, family meetings, psycho -education, treatment team meetings to assist with stabilization    Initial Discharge Plan:  Explore as sx decrease. At this time pt states he lives with others and has \"safe place\" to dc. Schedule med somatic with [facility]       Clinical Summary:      45 yo male voluntary admission. Admission note states pt presented to [facility]. Bryant's reporting S/H ideations, paranoid and agitated, and reporting he has been off medications x1 month. Writer observed pt in bed on this date. Pt agitated AEB statements, \"Girl you had me half way aroused and now your pissing me off\". Writer re-directed pt to keep hands away from genitals and clothes on. Pt paranoid AEB questioning writer, \"Why you standing like that? Why you covering your hand? \". Pt AOx4, able to inform writer of person/place/situation/time, but did not want to provide additional information to writer.  Pt endorsed admission reports of sx but declined further

## 2019-05-03 NOTE — GROUP NOTE
Group Therapy Note    Date: May 3    Group Start Time: 1100  Group End Time: 9606  Group Topic: Psychoeducation    [facility]    Patient refused to attend creative expression group at 1100 after encouragement from staff. 1:1 talk time offered as alternative to group session but pt declined.

## 2019-05-03 NOTE — GROUP NOTE
Group Therapy Note     Date: May 3     Group Start Time: 1600  Group End Time: 3305  Group Topic: Group Therapy     [facility] C    Cecilia Garrett LPN           Patient's Goal:  Learning coping skills and set appropriate goals.     Notes:  Patient attended groups and participated.     Status After Intervention:  Improved     Participation Level: Active Listener     Participation Quality: Appropriate        Speech:  normal        Thought Process/Content: Logical        Affective Functioning: Congruent        Mood: elevated        Level of consciousness:  Oriented x4        Response to Learning: Able to verbalize current knowledge/experience        Endings: None Reported     Modes of Intervention: Education        Discipline Responsible: Licensed Practical Nurse        Signature:   Cecilia Garrett LPN

## 2019-05-04 PROCEDURE — 1240000000 HC EMOTIONAL WELLNESS R&B

## 2019-05-04 PROCEDURE — 99238 HOSP IP/OBS DSCHRG MGMT 30/<: CPT | Performed by: PSYCHIATRY & NEUROLOGY

## 2019-05-04 PROCEDURE — 6360000002 HC RX W HCPCS: Performed by: PSYCHIATRY & NEUROLOGY

## 2019-05-04 RX ORDER — LORAZEPAM 2 MG/ML
1 INJECTION INTRAMUSCULAR EVERY 6 HOURS PRN
Status: DISCONTINUED | OUTPATIENT
Start: 2019-05-04 | End: 2019-05-05 | Stop reason: HOSPADM

## 2019-05-04 RX ORDER — HALOPERIDOL 5 MG/ML
5 INJECTION INTRAMUSCULAR EVERY 6 HOURS PRN
Status: DISCONTINUED | OUTPATIENT
Start: 2019-05-04 | End: 2019-05-05 | Stop reason: HOSPADM

## 2019-05-04 RX ORDER — CYCLOBENZAPRINE HCL 10 MG
5 TABLET ORAL 3 TIMES DAILY PRN
Status: DISCONTINUED | OUTPATIENT
Start: 2019-05-04 | End: 2019-05-05 | Stop reason: HOSPADM

## 2019-05-04 RX ADMIN — LORAZEPAM 1 MG: 2 INJECTION INTRAMUSCULAR; INTRAVENOUS at 12:48

## 2019-05-04 RX ADMIN — HALOPERIDOL LACTATE 5 MG: 5 INJECTION, SOLUTION INTRAMUSCULAR at 12:48

## 2019-05-04 NOTE — BH NOTE
patient refused to attend Wellness roup at 1600 after encouragement from staff.   1:1 talk time provided as alternative to group session

## 2019-05-04 NOTE — BH NOTE
Pt refused evening medications despite nurses encouragement. Pt is agitated  with 15 min rounds, nurse offered suggestions on helping with 15min rounds being disturbing.

## 2019-05-04 NOTE — GROUP NOTE
Group Therapy Note    Date: May 4    Group Start Time: 2040  Group End Time: 2120  Group Topic: Wrap-Up    GIAN BHI FERMÍN Velasquez LPN        Group Therapy Note    Attendees: 16/21         Patient refused to attend Wrap up Group after encouragement from staff. 1:1 talk time offered but patient refused. Signature:   Juma Velasquez LPN

## 2019-05-04 NOTE — GROUP NOTE
Group Therapy Note    Attendees: 10/24    patient refused to attend community meeting and goal setting  group at [phone] after encouragement from staff. 1:1 talk time provided as alternative to group session.     Signature:  Becca Bellamy [place]

## 2019-05-04 NOTE — BH NOTE
PRN Med Note    Pt was agitated and disruptive as evidenced by yelling at staff, sexually inappropriate comments at staff, yelling at another patient, calling another patient \"Bitch\" and calling staff \"Ho\". Staff attempted to redirect patient by offering talk time, offering PO zyprexa, and quiet time. Pt refused all. Pt was given IM Haldol and Ativan. Pt is now resting quietly in his room.

## 2019-05-04 NOTE — PLAN OF CARE
Problem: Altered Mood, Depressive Behavior:  Goal: Able to verbalize acceptance of life and situations over which he or she has no control  Description  Able to verbalize acceptance of life and situations over which he or she has no control  5/4/2019 0046 by Edison Schmid LPN  Outcome: Ongoing  5/3/2019 1202 by CHRISTO Bella, LSW  Outcome: Ongoing     Problem: Altered Mood, Depressive Behavior:  Goal: Absence of self-harm  Description  Absence of self-harm  Outcome: Ongoing     Pt remains free from self harm this shift. Pt denies wanting to cause harm to self or others at this time. Pt encouraged to seek nursing staff at anytime if he felt at danger to themselves or others. Pt states understanding. Pt was restless most of the shift, only came out for evening snack. Pt very evasive and irritable all shift. Refused a lot of his assessments.

## 2019-05-04 NOTE — PLAN OF CARE
[address] [place] [address]  Day 3 Interdisciplinary Treatment Plan NOTE    Review Date & Time: 5/4/19    Admission Type:   Admission Type: Voluntary    Reason for admission:  Reason for Admission: Patient voluntary from [facility] with suicidal and homicidal ideation. Patient is paranoid and agitated. Patient states he been off his medications for over one month. Estimated Length of Stay: 5-7 days  Estimated Discharge Date Update: to be determined by physician    PATIENT STRENGTHS:  Patient Strengths Strengths: No significant Physical Illness, Connection to output provider  Patient Strengths and Limitations:Limitations: Difficult relationships / poor social skills  Addictive Behavior:Addictive Behavior  In the past 3 months, have you felt or has someone told you that you have a problem with:  : ([place] )  Do you have a history of Chemical Use?: ([place] )  Do you have a history of Alcohol Use?: ([place]ana )  Do you have a history of Street Drug Abuse?: ([place]ana )  Histroy of Prescripton Drug Abuse?: ([place]ana )  Medical Problems:  Past Medical History:   Diagnosis Date    Asthma     Schizoaffective disorder ([facility] [address]     Substance abuse ([facility] [address]        Risk:  Fall RiskTotal: 67  Christopher Scale Christopher Scale Score: 22  BVC Total: 1  Change in scores no Changes to plan of Care no    Status EXAM:   Status and Exam  Normal: No  Facial Expression: Exaggerated, Hostile  Affect: Appropriate  Level of Consciousness: Alert  Mood:Normal: No  Mood: Anxious, Irritable  Motor Activity:Normal: No  Motor Activity: Decreased  Interview Behavior: Evasive, Uncooperative/Withdrawn, Irritable  Preception: [place] to Person, [place] to Time, [place] to Place, [place] to Situation  Attention:Normal: No  Attention: Distractible, Unable to Concentrate  Thought Processes: Loose Assoc.   Thought Content:Normal: No  Thought Content: Preoccupations  Hallucinations: Unable to assess  Delusions: No  Memory:Normal: ([place])  Memory: Poor Recent, Poor Remote  Insight and Judgment: No  Insight and Judgment: Poor Judgment, Poor Insight, Unmotivated  Present Suicidal Ideation: (MUNIRA)  Present Homicidal Ideation: (MUNIRA)    Daily Assessment Last Entry:   Daily Sleep (WDL): Within Defined Limits         Patient Currently in Pain: Denies  Daily Nutrition (WDL): Within Defined Limits    Patient Monitoring:  Frequency of Checks: 4 times per hour, close    Psychiatric Symptoms:   Depression Symptoms  Depression Symptoms: Loss of interest, Isolative, Impaired concentration, Increased irritability  Anxiety Symptoms  Anxiety Symptoms: Generalized  Mayuri Symptoms  Mayuri Symptoms: Pressured speech     Psychosis Symptoms  Delusion Type: Controlling    Suicide Risk CSSR-S:  1) Within the past month, have you wished you were dead or wished you could go to sleep and not wake up? : Yes  2) Have you actually had any thoughts of killing yourself? : Yes  3) Have you been thinking about how you might kill yourself? : No  5) Have you started to work out or worked out the details of how to kill yourself?  Do you intend to carry out this plan? : No  6) Have you ever done anything, started to do anything, or prepared to do anything to end your life?: No  Change in Result na  Change in Plan of care na       EDUCATION:   EDUCATION:   Learner Progress Toward Treatment Goals: Reviewed results and recommendations of this team, Reviewed group plan and strategies, Reviewed signs, symptoms and risk of self harm and violent behavior, Reviewed goals and plan of care    Method:small group, individual verbal education    Outcome:verbalized by patient, but needs reinforcement to obtain goals    PATIENT GOALS:  Short term: Patient will be discharged tomorrow  Long term: patient is encouraged to followup with outpatient    PLAN/TREATMENT RECOMMENDATIONS UPDATE: continue with group therapies, increased socialization, continue planning for after discharge goals, continue with medication compliance    SHORT-TERM GOALS UPDATE:   Time frame for Short-Term Goals: 5-7 days    LONG-TERM GOALS UPDATE:   Time frame for Long-Term Goals: 6 months  Members Present in Team Meeting: See Signature [address] Femi Regan

## 2019-05-04 NOTE — PLAN OF CARE
Pt refuses to be assessed refusing any meds, vitals, isolative irritable uncooperative, spontaneous safety checks to be maintained by staff.

## 2019-05-04 NOTE — CARE COORDINATION
patient refused to attend psychoeducation group at [address] [place] [address] after encouragement from staff.   1:1 talk time provided as alternative to group session

## 2019-05-04 NOTE — BH NOTE
patient refused to attend wellness group at 1100 after encouragement from staff.   1:1 talk time provided as alternative to group session

## 2019-05-04 NOTE — GROUP NOTE
Group Therapy Note    Date: May 4    Group Start Time: 1330  Group End Time: 2542  Group Topic: Recreational    patient refused to attend skills group which focused on decreasing stress levels at [phone] after encouragement from staff. 1:1 talk time provided as alternative to group session.         Signature:  Yecenia Celeste, [address]

## 2019-05-05 VITALS
DIASTOLIC BLOOD PRESSURE: 106 MMHG | RESPIRATION RATE: 14 BRPM | HEART RATE: 82 BPM | HEIGHT: 72 IN | WEIGHT: 250 LBS | TEMPERATURE: 97.4 F | SYSTOLIC BLOOD PRESSURE: 166 MMHG | BODY MASS INDEX: 33.86 KG/M2

## 2019-05-05 PROCEDURE — 5130000000 HC BRIDGE APPOINTMENT

## 2019-05-05 PROCEDURE — 6370000000 HC RX 637 (ALT 250 FOR IP): Performed by: PSYCHIATRY & NEUROLOGY

## 2019-05-05 RX ADMIN — CYCLOBENZAPRINE HYDROCHLORIDE 5 MG: 10 TABLET, FILM COATED ORAL at 07:57
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
## 2019-05-05 NOTE — GROUP NOTE
Group Therapy Note    Attendees: 10/24    patient refused to attend community meeting and goal setting group at [phone] after encouragement from staff. 1:1 talk time provided as alternative to group session.       Signature:  Masha Dugan [place]

## 2019-05-05 NOTE — CARE COORDINATION
Bridge Appointment completed: Reviewed Discharge Instructions with patient. Patient verbalizes understanding and agreement with the discharge plan using the teachback method. Discharge Arrangements:  Follow up with Jannette Velásquez notified: n/a  Discharge destination/address:  [address] [place] [address] [place], [place], [address]      Transported by:  [facility] cab

## 2019-05-05 NOTE — BH NOTE
Patient given tobacco quitline number 59530653443 at this time, refusing to call at this time, states \" I just dont want to quit now\"- patient given information as to the dangers of long term tobacco use. Continue to reinforce the importance of tobacco cessation.

## 2019-05-05 NOTE — DISCHARGE INSTR - DIET

## 2019-05-05 NOTE — BH NOTE
[id number] [facility]  Discharge Note    Pt discharged with followings belongings:   Dentures: None  Vision - Corrective Lenses: None  Hearing Aid: None  Jewelry: Bracelet  Body Piercings Removed: Yes  Clothing: Footwear, Jacket / coat, Pants, Shirt, Socks, Undergarments (Comment)  Were All Patient Medications Collected?: Not Applicable  Other Valuables: Cell phone, Money (Comment), Keys($0.55)   Valuables sent home with patient. Valuables retrieved from safe, Security envelope number:  [id number] and returned to patient. Patient left department with Departure Mode: By self, In cab via Mobility at Departure: Ambulatory, discharged to Discharged to: Other (Comment)(Hotel). Patient education on aftercare instructions. Information faxed to [facility] by writer Patient verbalize understanding of AVS:  yes.     Status EXAM upon discharge:  Status and Exam  Normal: No  Facial Expression: Flat  Affect: Blunt  Level of Consciousness: Alert  Mood:Normal: No  Mood: Anxious, Suspicious, Angry, Irritable  Motor Activity:Normal: No  Motor Activity: Unusual Posture/Gait  Interview Behavior: Uncooperative/Withdrawn, Irritable, Impulsive, Evasive  Preception: [place] to Person, [place] to Time, [place] to Place, [place] to Situation  Attention:Normal: No  Attention: Distractible, Unable to Concentrate  Thought Processes: Blocking, Flt.of Ideas, Perseveration  Thought Content:Normal: No  Thought Content: Preoccupations, Obsessions, Paranoia  Hallucinations: Unable to assess  Delusions: Yes  Delusions: [place], Obsessions  Memory:Normal: No  Memory: Poor Recent, Poor Remote  Insight and Judgment: No  Insight and Judgment: Poor Judgment, Poor Insight, Unmotivated, Unrealistic  Present Suicidal Ideation: (MUNIRA)  Present Homicidal Ideation: (MUNIRA)    Deidra Chapman RN

## 2019-05-05 NOTE — DISCHARGE SUMMARY
Patient ID:  Lilibeth Garcia  [id number]  40 y.o.  1975    Admit date: 5/2/2019    Discharge date and time: 5/5/19 in the morning    Admitting Physician: Millie Conway MD     Discharge Physician: Millie Conway MD    Admission Diagnoses: Paranoid schizophrenia ([facility] [F20.0]    Discharge Diagnoses:   Paranoid schizophrenia [facility])     Patient Active Problem List   Diagnosis Code    Paranoid schizophrenia, chronic condition with acute exacerbation ([facility] F20.0    Polysubstance abuse ([facility] F19.10    Schizophrenia, paranoid ([facility] F20.0    Paranoid schizophrenia ([facility] F20.0        Admission Condition: poor    Discharged Condition: stable    Indication for Admission: threat to self    History of Present Illnes (present tense wording indicates findings from admission exam on 5/2/2019 and are not necessarily indicative of current findings): The patient was easily agitated, seemed to have grandiose delusions, was uncooperative with care, he said that he had suicidal ideations on admission. He was responding to internal stimuli. Hospital Course:   Upon admission, Lilibeth Garcia was provided a safe secure environment, introduced to unit milieu. Patient participated in groups and individual therapies. Meds were were ordered including olanzapine and trazodone but the patient was not interested in taking them. He was uncooperative. The psychotic symptoms seem to decrease and suicidal ideations resolved. The patient was not participating in any therapeutic activity in the unit and was causing altercations with other patients. On morning rounds 5/4/2019, patient endorsed feeling ready for discharge. Patient denies suicidal or homicidal ideations, denies hallucinations or delusions. Denies SE's from meds.   It was decided that pt had achieved maximum benefit from hospital care and can be discharged       Consults:   None    Significant Diagnostic Studies: Routine labs and diagnostics    Treatments: Psychotropic medications, therapy with group, milieu, and 1:1 with nurses, social workers, PAARLIN/CNP, and Attending physician. Discharge Medications:  Current Discharge Medication List      CONTINUE these medications which have NOT CHANGED    Details   traZODone (DESYREL) 100 MG tablet Take 1 tablet by mouth nightly as needed for Sleep  Qty: 30 tablet, Refills: 0      OLANZapine (ZYPREXA) 10 MG tablet Take 1 tablet by mouth 2 times daily  Qty: 60 tablet, Refills: 0                Discharge Exam:  Level of consciousness:  Within normal limits  Appearance: Street clothes, seated, with good grooming  Behavior/Motor: No abnormalities noted  Attitude toward examiner:  Cooperative, attentive, good eye contact  Speech:  spontaneous, normal rate, normal volume and well articulated  Mood:  euthymic  Affect:  Mood-congruent with normal range  Thought processes:  linear, goal directed and coherent  Thought content:  No Homocidal ideation  Suicidal Ideation:  No suicidal ideation  Delusions:  no evidence of delusions  Perceptual Disturbance:  denies any perceptual disturbance  Cognition:  Intact  Memory: age appropriate  Insight & Judgement: fair  Medication side effects:  Denies any. Disposition: home    Patient Instructions: Activity: activity as tolerated    Follow-up as scheduled with psychiatric care within 1 week (see discharge papers)    Time Spent: 35 minutes    Engagement: Patient displayed a good level of engagement with the treatments offered during this admission.        Discharge planning, findings, and recommendations were discussed with and approved by Dr. Radha Bosch MD    Signed:  Radha Bosch   5/4/2019  8:59 PM

## 2019-05-05 NOTE — PLAN OF CARE
Pt refuses assessment, exhibits paranoid behaviors, slept most of shift dur to medication for behavioral outburst. Anticipates dc today.

## 2019-05-05 NOTE — PLAN OF CARE
Problem: Altered Mood, Depressive Behavior:  Goal: Able to verbalize acceptance of life and situations over which he or she has no control  Description  Able to verbalize acceptance of life and situations over which he or she has no control  5/5/2019 0753 by Seun Amaya RN  Outcome: Completed  5/5/2019 0612 by Elias Varela LPN  Note:   Un accepting of admission assessment or medications. Problem: Altered Mood, Depressive Behavior:  Goal: Absence of self-harm  Description  Absence of self-harm  5/5/2019 0753 by Seun Amaya RN  Outcome: Completed  5/5/2019 0612 by Elias Varela LPN  Note:   Pt remains free from harm.      Problem: Pain:  Goal: Pain level will decrease  Description  Pain level will decrease  Outcome: Completed     Problem: Pain:  Goal: Control of acute pain  Description  Control of acute pain  Outcome: Completed     Problem: Pain:  Goal: Control of chronic pain  Description  Control of chronic pain  Outcome: Completed

## 2019-11-08 PROBLEM — F20.9 SCHIZOPHRENIA (HCC): Status: ACTIVE | Noted: 2019-11-08

## 2019-11-09 ENCOUNTER — HOSPITAL ENCOUNTER (INPATIENT)
Age: 44
LOS: 16 days | Discharge: HOME | DRG: 885 | End: 2019-11-25
Attending: PSYCHIATRY & NEUROLOGY | Admitting: PSYCHIATRY & NEUROLOGY
Payer: MEDICARE

## 2019-11-09 PROCEDURE — 90792 PSYCH DIAG EVAL W/MED SRVCS: CPT | Performed by: NURSE PRACTITIONER

## 2019-11-09 PROCEDURE — 1240000000 HC EMOTIONAL WELLNESS R&B

## 2019-11-09 RX ORDER — BENZTROPINE MESYLATE 1 MG/1
1 TABLET ORAL 2 TIMES DAILY
Status: DISCONTINUED | OUTPATIENT
Start: 2019-11-09 | End: 2019-11-25 | Stop reason: HOSPADM

## 2019-11-09 RX ORDER — OLANZAPINE 10 MG/1
10 TABLET ORAL 2 TIMES DAILY
Status: DISCONTINUED | OUTPATIENT
Start: 2019-11-09 | End: 2019-11-25 | Stop reason: HOSPADM

## 2019-11-09 RX ORDER — TRAZODONE HYDROCHLORIDE 100 MG/1
100 TABLET ORAL NIGHTLY PRN
Status: DISCONTINUED | OUTPATIENT
Start: 2019-11-09 | End: 2019-11-25 | Stop reason: HOSPADM

## 2019-11-09 RX ORDER — TRAZODONE HYDROCHLORIDE 50 MG/1
50 TABLET ORAL NIGHTLY PRN
Status: DISCONTINUED | OUTPATIENT
Start: 2019-11-09 | End: 2019-11-09

## 2019-11-09 RX ORDER — BENZTROPINE MESYLATE 1 MG/1
1 TABLET ORAL 2 TIMES DAILY
Status: ON HOLD | COMMUNITY
End: 2019-11-25 | Stop reason: SDUPTHER

## 2019-11-09 ASSESSMENT — SLEEP AND FATIGUE QUESTIONNAIRES
DO YOU HAVE DIFFICULTY SLEEPING: YES
SLEEP PATTERN: DIFFICULTY FALLING ASLEEP;DISTURBED/INTERRUPTED SLEEP;EARLY AWAKENING
DIFFICULTY FALLING ASLEEP: YES
RESTFUL SLEEP: NO
DO YOU USE A SLEEP AID: NO
AVERAGE NUMBER OF SLEEP HOURS: 4
DIFFICULTY ARISING: NO
DIFFICULTY STAYING ASLEEP: YES

## 2019-11-09 ASSESSMENT — LIFESTYLE VARIABLES
HISTORY_ALCOHOL_USE: NO
HISTORY_ALCOHOL_USE: NO

## 2019-11-09 ASSESSMENT — PATIENT HEALTH QUESTIONNAIRE - PHQ9: SUM OF ALL RESPONSES TO PHQ QUESTIONS 1-9: 11

## 2019-11-09 ASSESSMENT — PAIN SCALES - GENERAL: PAINLEVEL_OUTOF10: 0

## 2019-11-10 PROCEDURE — 6370000000 HC RX 637 (ALT 250 FOR IP): Performed by: NURSE PRACTITIONER

## 2019-11-10 PROCEDURE — 1240000000 HC EMOTIONAL WELLNESS R&B

## 2019-11-10 PROCEDURE — 99232 SBSQ HOSP IP/OBS MODERATE 35: CPT | Performed by: PSYCHIATRY & NEUROLOGY

## 2019-11-10 RX ORDER — MAGNESIUM HYDROXIDE/ALUMINUM HYDROXICE/SIMETHICONE 120; 1200; 1200 MG/30ML; MG/30ML; MG/30ML
15 SUSPENSION ORAL EVERY 6 HOURS PRN
Status: DISCONTINUED | OUTPATIENT
Start: 2019-11-10 | End: 2019-11-25 | Stop reason: HOSPADM

## 2019-11-10 RX ORDER — NICOTINE 21 MG/24HR
1 PATCH, TRANSDERMAL 24 HOURS TRANSDERMAL DAILY
Status: DISCONTINUED | OUTPATIENT
Start: 2019-11-10 | End: 2019-11-11

## 2019-11-10 RX ORDER — ACETAMINOPHEN 325 MG/1
650 TABLET ORAL EVERY 4 HOURS PRN
Status: DISCONTINUED | OUTPATIENT
Start: 2019-11-10 | End: 2019-11-25 | Stop reason: HOSPADM

## 2019-11-10 RX ORDER — BENZTROPINE MESYLATE 1 MG/ML
2 INJECTION INTRAMUSCULAR; INTRAVENOUS 2 TIMES DAILY PRN
Status: DISCONTINUED | OUTPATIENT
Start: 2019-11-10 | End: 2019-11-25 | Stop reason: HOSPADM

## 2019-11-10 RX ADMIN — BENZTROPINE MESYLATE 1 MG: 1 TABLET ORAL at 08:41

## 2019-11-10 RX ADMIN — OLANZAPINE 10 MG: 10 TABLET, FILM COATED ORAL at 08:41

## 2019-11-11 PROCEDURE — 1240000000 HC EMOTIONAL WELLNESS R&B

## 2019-11-11 PROCEDURE — 99232 SBSQ HOSP IP/OBS MODERATE 35: CPT | Performed by: PSYCHIATRY & NEUROLOGY

## 2019-11-11 PROCEDURE — 99221 1ST HOSP IP/OBS SF/LOW 40: CPT | Performed by: PHYSICIAN ASSISTANT

## 2019-11-11 ASSESSMENT — PAIN SCALES - GENERAL: PAINLEVEL_OUTOF10: 0

## 2019-11-12 PROCEDURE — 6370000000 HC RX 637 (ALT 250 FOR IP): Performed by: NURSE PRACTITIONER

## 2019-11-12 PROCEDURE — 99232 SBSQ HOSP IP/OBS MODERATE 35: CPT | Performed by: NURSE PRACTITIONER

## 2019-11-12 PROCEDURE — 1240000000 HC EMOTIONAL WELLNESS R&B

## 2019-11-12 RX ADMIN — OLANZAPINE 10 MG: 10 TABLET, FILM COATED ORAL at 11:13

## 2019-11-12 RX ADMIN — BENZTROPINE MESYLATE 1 MG: 1 TABLET ORAL at 22:38

## 2019-11-12 RX ADMIN — BENZTROPINE MESYLATE 1 MG: 1 TABLET ORAL at 11:13

## 2019-11-12 RX ADMIN — OLANZAPINE 10 MG: 10 TABLET, FILM COATED ORAL at 22:38

## 2019-11-13 PROCEDURE — 99232 SBSQ HOSP IP/OBS MODERATE 35: CPT | Performed by: PSYCHIATRY & NEUROLOGY

## 2019-11-13 PROCEDURE — 1240000000 HC EMOTIONAL WELLNESS R&B

## 2019-11-13 PROCEDURE — 6370000000 HC RX 637 (ALT 250 FOR IP): Performed by: NURSE PRACTITIONER

## 2019-11-13 RX ADMIN — OLANZAPINE 10 MG: 10 TABLET, FILM COATED ORAL at 21:21

## 2019-11-13 RX ADMIN — BENZTROPINE MESYLATE 1 MG: 1 TABLET ORAL at 21:21

## 2019-11-13 RX ADMIN — BENZTROPINE MESYLATE 1 MG: 1 TABLET ORAL at 08:42

## 2019-11-13 RX ADMIN — OLANZAPINE 10 MG: 10 TABLET, FILM COATED ORAL at 08:42

## 2019-11-14 PROCEDURE — 6370000000 HC RX 637 (ALT 250 FOR IP): Performed by: NURSE PRACTITIONER

## 2019-11-14 PROCEDURE — 99232 SBSQ HOSP IP/OBS MODERATE 35: CPT | Performed by: NURSE PRACTITIONER

## 2019-11-14 PROCEDURE — 1240000000 HC EMOTIONAL WELLNESS R&B

## 2019-11-14 RX ADMIN — BENZTROPINE MESYLATE 1 MG: 1 TABLET ORAL at 21:29

## 2019-11-14 RX ADMIN — OLANZAPINE 10 MG: 10 TABLET, FILM COATED ORAL at 21:29

## 2019-11-15 PROCEDURE — 6370000000 HC RX 637 (ALT 250 FOR IP): Performed by: NURSE PRACTITIONER

## 2019-11-15 PROCEDURE — 1240000000 HC EMOTIONAL WELLNESS R&B

## 2019-11-15 PROCEDURE — 99232 SBSQ HOSP IP/OBS MODERATE 35: CPT | Performed by: NURSE PRACTITIONER

## 2019-11-15 RX ADMIN — OLANZAPINE 10 MG: 10 TABLET, FILM COATED ORAL at 10:18

## 2019-11-15 RX ADMIN — BENZTROPINE MESYLATE 1 MG: 1 TABLET ORAL at 10:18

## 2019-11-16 PROCEDURE — 6370000000 HC RX 637 (ALT 250 FOR IP): Performed by: NURSE PRACTITIONER

## 2019-11-16 PROCEDURE — 99232 SBSQ HOSP IP/OBS MODERATE 35: CPT | Performed by: NURSE PRACTITIONER

## 2019-11-16 PROCEDURE — 1240000000 HC EMOTIONAL WELLNESS R&B

## 2019-11-16 RX ADMIN — OLANZAPINE 10 MG: 10 TABLET, FILM COATED ORAL at 08:11

## 2019-11-16 RX ADMIN — BENZTROPINE MESYLATE 1 MG: 1 TABLET ORAL at 22:46

## 2019-11-16 RX ADMIN — BENZTROPINE MESYLATE 1 MG: 1 TABLET ORAL at 08:11

## 2019-11-16 RX ADMIN — OLANZAPINE 10 MG: 10 TABLET, FILM COATED ORAL at 22:46

## 2019-11-17 PROCEDURE — 6370000000 HC RX 637 (ALT 250 FOR IP): Performed by: NURSE PRACTITIONER

## 2019-11-17 PROCEDURE — 1240000000 HC EMOTIONAL WELLNESS R&B

## 2019-11-17 PROCEDURE — 99232 SBSQ HOSP IP/OBS MODERATE 35: CPT | Performed by: PSYCHIATRY & NEUROLOGY

## 2019-11-17 RX ADMIN — BENZTROPINE MESYLATE 1 MG: 1 TABLET ORAL at 20:46

## 2019-11-17 RX ADMIN — OLANZAPINE 10 MG: 10 TABLET, FILM COATED ORAL at 20:45

## 2019-11-17 RX ADMIN — BENZTROPINE MESYLATE 1 MG: 1 TABLET ORAL at 08:55

## 2019-11-17 RX ADMIN — OLANZAPINE 10 MG: 10 TABLET, FILM COATED ORAL at 08:56

## 2019-11-18 PROCEDURE — 6370000000 HC RX 637 (ALT 250 FOR IP): Performed by: NURSE PRACTITIONER

## 2019-11-18 PROCEDURE — 99232 SBSQ HOSP IP/OBS MODERATE 35: CPT | Performed by: NURSE PRACTITIONER

## 2019-11-18 PROCEDURE — 1240000000 HC EMOTIONAL WELLNESS R&B

## 2019-11-18 RX ADMIN — OLANZAPINE 10 MG: 10 TABLET, FILM COATED ORAL at 09:53

## 2019-11-18 RX ADMIN — BENZTROPINE MESYLATE 1 MG: 1 TABLET ORAL at 09:53

## 2019-11-19 PROCEDURE — 6370000000 HC RX 637 (ALT 250 FOR IP): Performed by: NURSE PRACTITIONER

## 2019-11-19 PROCEDURE — 1240000000 HC EMOTIONAL WELLNESS R&B

## 2019-11-19 PROCEDURE — 99232 SBSQ HOSP IP/OBS MODERATE 35: CPT | Performed by: PSYCHIATRY & NEUROLOGY

## 2019-11-19 RX ADMIN — OLANZAPINE 10 MG: 10 TABLET, FILM COATED ORAL at 08:19

## 2019-11-19 RX ADMIN — BENZTROPINE MESYLATE 1 MG: 1 TABLET ORAL at 08:19

## 2019-11-19 RX ADMIN — OLANZAPINE 10 MG: 10 TABLET, FILM COATED ORAL at 21:43

## 2019-11-19 RX ADMIN — BENZTROPINE MESYLATE 1 MG: 1 TABLET ORAL at 21:43

## 2019-11-20 PROCEDURE — 6370000000 HC RX 637 (ALT 250 FOR IP): Performed by: NURSE PRACTITIONER

## 2019-11-20 PROCEDURE — 99232 SBSQ HOSP IP/OBS MODERATE 35: CPT | Performed by: PSYCHIATRY & NEUROLOGY

## 2019-11-20 PROCEDURE — 1240000000 HC EMOTIONAL WELLNESS R&B

## 2019-11-20 RX ADMIN — OLANZAPINE 10 MG: 10 TABLET, FILM COATED ORAL at 22:00

## 2019-11-20 RX ADMIN — OLANZAPINE 10 MG: 10 TABLET, FILM COATED ORAL at 09:06

## 2019-11-20 RX ADMIN — BENZTROPINE MESYLATE 1 MG: 1 TABLET ORAL at 22:00

## 2019-11-20 RX ADMIN — BENZTROPINE MESYLATE 1 MG: 1 TABLET ORAL at 09:06

## 2019-11-21 PROCEDURE — 99232 SBSQ HOSP IP/OBS MODERATE 35: CPT | Performed by: NURSE PRACTITIONER

## 2019-11-21 PROCEDURE — 6370000000 HC RX 637 (ALT 250 FOR IP): Performed by: NURSE PRACTITIONER

## 2019-11-21 PROCEDURE — 1240000000 HC EMOTIONAL WELLNESS R&B

## 2019-11-21 RX ADMIN — OLANZAPINE 10 MG: 10 TABLET, FILM COATED ORAL at 08:55

## 2019-11-21 RX ADMIN — BENZTROPINE MESYLATE 1 MG: 1 TABLET ORAL at 20:44

## 2019-11-21 RX ADMIN — BENZTROPINE MESYLATE 1 MG: 1 TABLET ORAL at 08:55

## 2019-11-21 RX ADMIN — OLANZAPINE 10 MG: 10 TABLET, FILM COATED ORAL at 20:44

## 2019-11-22 PROCEDURE — 6370000000 HC RX 637 (ALT 250 FOR IP): Performed by: NURSE PRACTITIONER

## 2019-11-22 PROCEDURE — 99232 SBSQ HOSP IP/OBS MODERATE 35: CPT | Performed by: PSYCHIATRY & NEUROLOGY

## 2019-11-22 PROCEDURE — 1240000000 HC EMOTIONAL WELLNESS R&B

## 2019-11-22 RX ADMIN — BENZTROPINE MESYLATE 1 MG: 1 TABLET ORAL at 08:05

## 2019-11-22 RX ADMIN — BENZTROPINE MESYLATE 1 MG: 1 TABLET ORAL at 21:05

## 2019-11-22 RX ADMIN — OLANZAPINE 10 MG: 10 TABLET, FILM COATED ORAL at 08:05

## 2019-11-22 RX ADMIN — OLANZAPINE 10 MG: 10 TABLET, FILM COATED ORAL at 21:05

## 2019-11-23 PROCEDURE — 6370000000 HC RX 637 (ALT 250 FOR IP): Performed by: NURSE PRACTITIONER

## 2019-11-23 PROCEDURE — 99232 SBSQ HOSP IP/OBS MODERATE 35: CPT | Performed by: NURSE PRACTITIONER

## 2019-11-23 PROCEDURE — 1240000000 HC EMOTIONAL WELLNESS R&B

## 2019-11-23 RX ADMIN — OLANZAPINE 10 MG: 10 TABLET, FILM COATED ORAL at 08:40

## 2019-11-23 RX ADMIN — BENZTROPINE MESYLATE 1 MG: 1 TABLET ORAL at 23:15

## 2019-11-23 RX ADMIN — OLANZAPINE 10 MG: 10 TABLET, FILM COATED ORAL at 23:15

## 2019-11-23 RX ADMIN — BENZTROPINE MESYLATE 1 MG: 1 TABLET ORAL at 08:40

## 2019-11-24 PROCEDURE — 99232 SBSQ HOSP IP/OBS MODERATE 35: CPT | Performed by: NURSE PRACTITIONER

## 2019-11-24 PROCEDURE — 6370000000 HC RX 637 (ALT 250 FOR IP): Performed by: NURSE PRACTITIONER

## 2019-11-24 PROCEDURE — 1240000000 HC EMOTIONAL WELLNESS R&B

## 2019-11-24 RX ADMIN — OLANZAPINE 10 MG: 10 TABLET, FILM COATED ORAL at 08:28

## 2019-11-24 RX ADMIN — OLANZAPINE 10 MG: 10 TABLET, FILM COATED ORAL at 22:30

## 2019-11-24 RX ADMIN — BENZTROPINE MESYLATE 1 MG: 1 TABLET ORAL at 08:28

## 2019-11-24 RX ADMIN — BENZTROPINE MESYLATE 1 MG: 1 TABLET ORAL at 22:30

## 2019-11-25 VITALS
WEIGHT: 240 LBS | HEART RATE: 74 BPM | TEMPERATURE: 97.6 F | SYSTOLIC BLOOD PRESSURE: 113 MMHG | RESPIRATION RATE: 14 BRPM | HEIGHT: 72 IN | BODY MASS INDEX: 32.51 KG/M2 | DIASTOLIC BLOOD PRESSURE: 60 MMHG

## 2019-11-25 PROCEDURE — 6370000000 HC RX 637 (ALT 250 FOR IP): Performed by: NURSE PRACTITIONER

## 2019-11-25 PROCEDURE — 99239 HOSP IP/OBS DSCHRG MGMT >30: CPT | Performed by: PSYCHIATRY & NEUROLOGY

## 2019-11-25 PROCEDURE — 5130000000 HC BRIDGE APPOINTMENT

## 2019-11-25 RX ORDER — TRAZODONE HYDROCHLORIDE 100 MG/1
100 TABLET ORAL NIGHTLY PRN
Qty: 30 TABLET | Refills: 0 | Status: ON HOLD | OUTPATIENT
Start: 2019-11-25 | End: 2020-03-06

## 2019-11-25 RX ORDER — BENZTROPINE MESYLATE 1 MG/1
1 TABLET ORAL 2 TIMES DAILY
Qty: 60 TABLET | Refills: 0 | Status: ON HOLD | OUTPATIENT
Start: 2019-11-25 | End: 2020-04-29 | Stop reason: HOSPADM

## 2019-11-25 RX ORDER — OLANZAPINE 10 MG/1
10 TABLET ORAL 2 TIMES DAILY
Qty: 60 TABLET | Refills: 0 | Status: ON HOLD | OUTPATIENT
Start: 2019-11-25 | End: 2020-04-29 | Stop reason: SDUPTHER

## 2019-11-25 RX ADMIN — BENZTROPINE MESYLATE 1 MG: 1 TABLET ORAL at 08:40

## 2019-11-25 RX ADMIN — OLANZAPINE 10 MG: 10 TABLET, FILM COATED ORAL at 08:40

## 2020-03-05 ENCOUNTER — HOSPITAL ENCOUNTER (INPATIENT)
Age: 45
LOS: 7 days | Discharge: HOME | DRG: 885 | End: 2020-03-12
Attending: PSYCHIATRY & NEUROLOGY | Admitting: PSYCHIATRY & NEUROLOGY
Payer: MEDICARE

## 2020-03-05 PROBLEM — F25.9 SCHIZOAFFECTIVE DISORDER (HCC): Status: ACTIVE | Noted: 2020-03-05

## 2020-03-05 PROCEDURE — 1240000000 HC EMOTIONAL WELLNESS R&B

## 2020-03-05 RX ORDER — ACETAMINOPHEN 325 MG/1
650 TABLET ORAL EVERY 4 HOURS PRN
Status: DISCONTINUED | OUTPATIENT
Start: 2020-03-05 | End: 2020-03-12 | Stop reason: HOSPADM

## 2020-03-05 RX ORDER — OLANZAPINE 10 MG/1
10 INJECTION, POWDER, LYOPHILIZED, FOR SOLUTION INTRAMUSCULAR 3 TIMES DAILY PRN
Status: DISCONTINUED | OUTPATIENT
Start: 2020-03-05 | End: 2020-03-12 | Stop reason: HOSPADM

## 2020-03-05 RX ORDER — OLANZAPINE 7.5 MG/1
7.5 TABLET ORAL 3 TIMES DAILY PRN
Status: DISCONTINUED | OUTPATIENT
Start: 2020-03-05 | End: 2020-03-12 | Stop reason: HOSPADM

## 2020-03-05 RX ORDER — HYDROXYZINE 50 MG/1
50 TABLET, FILM COATED ORAL 3 TIMES DAILY PRN
Status: DISCONTINUED | OUTPATIENT
Start: 2020-03-05 | End: 2020-03-12 | Stop reason: HOSPADM

## 2020-03-05 RX ORDER — MAGNESIUM HYDROXIDE/ALUMINUM HYDROXICE/SIMETHICONE 120; 1200; 1200 MG/30ML; MG/30ML; MG/30ML
30 SUSPENSION ORAL EVERY 6 HOURS PRN
Status: DISCONTINUED | OUTPATIENT
Start: 2020-03-05 | End: 2020-03-12 | Stop reason: HOSPADM

## 2020-03-05 RX ORDER — TRAZODONE HYDROCHLORIDE 50 MG/1
50 TABLET ORAL NIGHTLY PRN
Status: DISCONTINUED | OUTPATIENT
Start: 2020-03-05 | End: 2020-03-12 | Stop reason: HOSPADM

## 2020-03-05 RX ORDER — POLYETHYLENE GLYCOL 3350 17 G/17G
17 POWDER, FOR SOLUTION ORAL DAILY PRN
Status: DISCONTINUED | OUTPATIENT
Start: 2020-03-05 | End: 2020-03-12 | Stop reason: HOSPADM

## 2020-03-05 RX ORDER — BENZTROPINE MESYLATE 1 MG/ML
2 INJECTION INTRAMUSCULAR; INTRAVENOUS 2 TIMES DAILY PRN
Status: DISCONTINUED | OUTPATIENT
Start: 2020-03-05 | End: 2020-03-12 | Stop reason: HOSPADM

## 2020-03-05 ASSESSMENT — SLEEP AND FATIGUE QUESTIONNAIRES
DO YOU USE A SLEEP AID: NO
AVERAGE NUMBER OF SLEEP HOURS: 6
DO YOU HAVE DIFFICULTY SLEEPING: NO

## 2020-03-05 ASSESSMENT — LIFESTYLE VARIABLES: HISTORY_ALCOHOL_USE: NO

## 2020-03-06 PROBLEM — F25.9 SCHIZOAFFECTIVE DISORDER (HCC): Chronic | Status: ACTIVE | Noted: 2020-03-05

## 2020-03-06 PROCEDURE — 6370000000 HC RX 637 (ALT 250 FOR IP): Performed by: PSYCHIATRY & NEUROLOGY

## 2020-03-06 PROCEDURE — 2500000003 HC RX 250 WO HCPCS: Performed by: NURSE PRACTITIONER

## 2020-03-06 PROCEDURE — 1240000000 HC EMOTIONAL WELLNESS R&B

## 2020-03-06 PROCEDURE — 90792 PSYCH DIAG EVAL W/MED SRVCS: CPT | Performed by: PSYCHIATRY & NEUROLOGY

## 2020-03-06 PROCEDURE — 2580000003 HC RX 258: Performed by: NURSE PRACTITIONER

## 2020-03-06 PROCEDURE — 6370000000 HC RX 637 (ALT 250 FOR IP): Performed by: NURSE PRACTITIONER

## 2020-03-06 RX ORDER — BENZTROPINE MESYLATE 1 MG/1
1 TABLET ORAL 2 TIMES DAILY
Status: DISCONTINUED | OUTPATIENT
Start: 2020-03-06 | End: 2020-03-12 | Stop reason: HOSPADM

## 2020-03-06 RX ORDER — TIZANIDINE 4 MG/1
4 TABLET ORAL 2 TIMES DAILY PRN
Status: DISCONTINUED | OUTPATIENT
Start: 2020-03-06 | End: 2020-03-12 | Stop reason: HOSPADM

## 2020-03-06 RX ORDER — OLANZAPINE 10 MG/1
10 TABLET ORAL 2 TIMES DAILY
Status: DISCONTINUED | OUTPATIENT
Start: 2020-03-06 | End: 2020-03-12 | Stop reason: HOSPADM

## 2020-03-06 RX ADMIN — HYDROXYZINE HYDROCHLORIDE 50 MG: 50 TABLET, FILM COATED ORAL at 19:45

## 2020-03-06 RX ADMIN — ACETAMINOPHEN 650 MG: 325 TABLET, FILM COATED ORAL at 19:45

## 2020-03-06 RX ADMIN — WATER 2.1 ML: 1 INJECTION, SOLUTION INTRAVENOUS at 15:49

## 2020-03-06 RX ADMIN — TIZANIDINE 4 MG: 4 TABLET ORAL at 12:00

## 2020-03-06 RX ADMIN — TIZANIDINE 4 MG: 4 TABLET ORAL at 19:45

## 2020-03-06 RX ADMIN — OLANZAPINE 10 MG: 10 INJECTION, POWDER, FOR SOLUTION INTRAMUSCULAR at 15:49

## 2020-03-06 ASSESSMENT — PAIN - FUNCTIONAL ASSESSMENT: PAIN_FUNCTIONAL_ASSESSMENT: 0-10

## 2020-03-06 ASSESSMENT — PAIN SCALES - GENERAL: PAINLEVEL_OUTOF10: 7
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
## 2020-03-06 NOTE — BH NOTE
Patient given tobacco quitline number 83010359225 at this time, refusing to call at this time, states \" I just dont want to quit now\"- patient given information as to the dangers of long term tobacco use. Continue to reinforce the importance of tobacco cessation.

## 2020-03-06 NOTE — BH NOTE
Pt is agitated as evidence by yelling out and and screaming at nursing staff. Patient continues to put his call light. Staff attempted to find and relieve the distress by talking to pt, refocusing on new activity, administer PRN medications. Pt is currently accepted of PRN medications and is resting in room. Safety maintained, will continue to monitor.

## 2020-03-06 NOTE — BH NOTE
Medical: Not on file     Non-medical: Not on file   Tobacco Use    Smoking status: Never Smoker    Smokeless tobacco: Never Used    Tobacco comment: pt nonsmoker   Substance and Sexual Activity    Alcohol use: Yes     Comment: Hx of Alcohol abuse    Drug use: Yes     Comment: Hx of substance abuse?  Sexual activity: Not on file   Lifestyle    Physical activity:     Days per week: Not on file     Minutes per session: Not on file    Stress: Not on file   Relationships    Social connections:     Talks on phone: Not on file     Gets together: Not on file     Attends Mandaeism service: Not on file     Active member of club or organization: Not on file     Attends meetings of clubs or organizations: Not on file     Relationship status: Not on file    Intimate partner violence:     Fear of current or ex partner: Not on file     Emotionally abused: Not on file     Physically abused: Not on file     Forced sexual activity: Not on file   Other Topics Concern    Not on file   Social History Narrative    Not on file       Review of systems  Constitutional:  negative for chills, fevers, sweats  Respiratory:  negative for cough, dyspnea on exertion, hemoptysis, shortness of breath, wheezing  Cardiovascular:  negative for chest pain, chest pressure/discomfort, lower extremity edema, palpitations  Gastrointestinal:  negative for abdominal pain, constipation, diarrhea, nausea, vomiting  Neurological:  negative for dizziness, headache    Mental Status  Pt. was alert, fully oriented, and cooperative. Appearance and hygiene weredisheveled, poor hygiene . Mood was depressed. Affect was \"dysthymic and poorly reactive Thought process was demonstrative of poverty of thought and thought blocking. Patient endorsed auditory hallucinations. Patient endorsed suicidal ideations. Patient denied homicidal ideations . Patient's gross cognitive functions were intact. Insight and judgement were poor.  Both recent and remote memory were intact. Psychomotor status was slowed     Diagnostic Impression  Principal Problem:    Schizoaffective disorder ([address]  Resolved Problems:    * No resolved hospital problems. *      Medications   benztropine  1 mg Oral BID    OLANZapine  10 mg Oral BID     acetaminophen, polyethylene glycol, hydrOXYzine, traZODone, benztropine mesylate, aluminum & magnesium hydroxide-simethicone, OLANZapine **OR** OLANZapine, sterile water    Treatment Plan:    1. Admit to inpatient psychiatric treatment  2. Supportive therapy with medication management. Reviewed risks and benefits as well as potential side effects with patient. 3. Therapeutic activities and groups  4. Follow up at [facility] after symptoms stabilized    Estimated length of stay: 5-7 days    Marlin Quintana MD  Psychiatrist.  Dragon voice recognition software used in portions of this document.  Occasionally words are mis-transcribed

## 2020-03-06 NOTE — GROUP NOTE
Group Therapy Note    Date: 3/6/2020    Group Start Time: 0900  Group End Time: 7290  Group Topic: [address] [place], [place]        Group Therapy Note    Attendees: 7  Pt did not attend [employer] d/t resting in room despite staff invitation to attend. 1:1 talk time offered as alternative to group session, pt declined.
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
## 2020-03-06 NOTE — GROUP NOTE
Group Therapy Note    Date: 3/6/2020    Group Start Time: 1430  Group End Time: 8347  Group Topic: Recreational    [facility]azo        Group Therapy Note    Attendees:6/16    patient refused to attend recreation and leisure group at [id number] after encouragement from staff. 1:1 talk time provided as alternative to group session.          Signature:  Adrian Trevino
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
## 2020-03-07 PROCEDURE — 99232 SBSQ HOSP IP/OBS MODERATE 35: CPT | Performed by: NURSE PRACTITIONER

## 2020-03-07 PROCEDURE — 6370000000 HC RX 637 (ALT 250 FOR IP): Performed by: NURSE PRACTITIONER

## 2020-03-07 PROCEDURE — 6370000000 HC RX 637 (ALT 250 FOR IP): Performed by: PSYCHIATRY & NEUROLOGY

## 2020-03-07 PROCEDURE — 1240000000 HC EMOTIONAL WELLNESS R&B

## 2020-03-07 RX ADMIN — TIZANIDINE 4 MG: 4 TABLET ORAL at 08:14

## 2020-03-07 RX ADMIN — TRAZODONE HYDROCHLORIDE 50 MG: 50 TABLET ORAL at 21:56

## 2020-03-07 RX ADMIN — ACETAMINOPHEN 650 MG: 325 TABLET, FILM COATED ORAL at 08:14

## 2020-03-07 RX ADMIN — BENZTROPINE MESYLATE 1 MG: 1 TABLET ORAL at 21:56

## 2020-03-07 RX ADMIN — HYDROXYZINE HYDROCHLORIDE 50 MG: 50 TABLET, FILM COATED ORAL at 21:56

## 2020-03-07 RX ADMIN — OLANZAPINE 10 MG: 10 TABLET ORAL at 21:56

## 2020-03-07 RX ADMIN — ACETAMINOPHEN 650 MG: 325 TABLET, FILM COATED ORAL at 21:56

## 2020-03-07 RX ADMIN — BENZTROPINE MESYLATE 1 MG: 1 TABLET ORAL at 08:13

## 2020-03-07 RX ADMIN — OLANZAPINE 10 MG: 10 TABLET ORAL at 08:14

## 2020-03-07 ASSESSMENT — PAIN SCALES - GENERAL
PAINLEVEL_OUTOF10: 0
PAINLEVEL_OUTOF10: 2
PAINLEVEL_OUTOF10: 3
PAINLEVEL_OUTOF10: 0
PAINLEVEL_OUTOF10: 9

## 2020-03-07 ASSESSMENT — PAIN DESCRIPTION - LOCATION: LOCATION: BACK

## 2020-03-07 NOTE — GROUP NOTE
Group Therapy Note    Date: 3/7/2020    Group Start Time: 1600  Group End Time: 8188   Group Topic: Healthy Living/Wellness         patient refused to attend Wellness group at 1600 after encouragement from staff.   1:1 talk time provided as alternative to group session          Signature:  Melanie Officer, RN

## 2020-03-07 NOTE — PROGRESS NOTES
Department of Psychiatry  Attending Progress Note  Chief Complaint: Schizoaffective disorder [facility])     SUBJECTIVE:  Macrina Bee is seen in his room today. He has been isolative to his room, only out for meals. He states he is in a lot of back pain. He endorses some fleeting SI. He states he hers mumbles at times and the voices can be bothersome. He is disheveled and malodorous. He endorses poor sleep and appetite. Patient has limited insight and judgement. Patient feels helpless and hopeless and times about current situation and cannot contract for safety outside of the hospital setting. Charting and medications reviewed. Support and reassurance provided. There is no safe alterative at this time than continued hospitalization. OBJECTIVE    Physical  /70   Pulse 80   Temp 97.9 °F (36.6 °C) (Oral)   Resp 14   Ht 6' (1.829 m)   Wt 257 lb 15 oz (117 kg)   BMI 34.98 kg/m²      Mental Status Evaluation:  Orientation: alertness: alert   Mood:. depressed      Affect:  constricted      Appearance:  disheveled and piercings   Activity:  Psychomotor Retardation   Gait/Posture: Normal   Speech:  normal pitch and normal volume   Thought Process:  circumstantial   Thought Content:  hallucinations   Sensorium:  person, place, time/date and situation   Cognition:  grossly intact   Memory: intact   Insight:  limited   Judgment: limited   Suicidal Ideations: passive   Homicidal Ideations: Negative for homicidal ideation      Medication Side Effects: absent       Attention Span attention span appeared shorter than expected for age       Labs  No results found for this or any previous visit (from the past 67 hour(s)).     Medications  Current Facility-Administered Medications   Medication Dose Route Frequency Provider Last Rate Last Dose    benztropine (COGENTIN) tablet 1 mg  1 mg Oral BID [place] [employer] Jose A BEAN MD   1 mg at 03/07/20 0813    OLANZapine (ZYPREXA) tablet 10 mg  10 mg Oral BID Minna Snellen, MD   10

## 2020-03-07 NOTE — PLAN OF CARE
Circumstantial  Thought Content:Normal: No  Thought Content: Preoccupations  Hallucinations: None  Delusions: No  Memory:Normal: Yes  Memory: (MUNIRA; patient does not participate in assessment)  Insight and Judgment: No  Insight and Judgment: Poor Judgment, Poor Insight  Present Suicidal Ideation: No  Present Homicidal Ideation: No    Daily Assessment Last Entry:   Daily Sleep (WDL): Within Defined Limits         Patient Currently in Pain: Yes  Daily Nutrition (WDL): Within Defined Limits    Patient Monitoring:  Frequency of Checks: 4 times per hour, close    Psychiatric Symptoms:   Depression Symptoms  Depression Symptoms: No problems reported or observed. Anxiety Symptoms  Anxiety Symptoms: Generalized  Mayuri Symptoms  Mayuri Symptoms: No problems reported or observed. Psychosis Symptoms  Delusion Type: No problems reported or observed.     Suicide Risk CSSR-S:  1) Within the past month, have you wished you were dead or wished you could go to sleep and not wake up? : No  2) Have you actually had any thoughts of killing yourself? : No  6) Have you ever done anything, started to do anything, or prepared to do anything to end your life?: No  Change in Result: Change in Plan of care:      EDUCATION:   Learner Progress Toward Treatment Goals: Reviewed results and recommendations of this team, Reviewed group plan and strategies, Reviewed signs, symptoms and risk of self harm and violent behavior, Reviewed goals and plan of care    Method:  small group, individual verbal education    Outcome: Verbalized by patient but needs reinforcement to obtain goals    PATIENT GOALS:  Short term:  Pt declined to participate  Long term:  Pt declined to participate    PLAN/TREATMENT RECOMMENDATIONS UPDATE:  continue with group therapies, increased socialization, continue planning for after discharge goals, continue with medication compliance    SHORT-TERM GOALS UPDATE:   Time frame for Short-Term Goals:  5-7 days    LONG-TERM GOALS

## 2020-03-07 NOTE — GROUP NOTE
Group Therapy Note    Date: 3/7/2020    Group Start Time: 0900  Group End Time: 1010  Group Topic: Community Meeting    [facility]    Patient refused to attend community meeting and goal setting group at 0900 after encouragement from staff. 1:1 talk time offered by staff as alternative to group session.

## 2020-03-08 PROCEDURE — 6370000000 HC RX 637 (ALT 250 FOR IP): Performed by: NURSE PRACTITIONER

## 2020-03-08 PROCEDURE — 1240000000 HC EMOTIONAL WELLNESS R&B

## 2020-03-08 PROCEDURE — 6370000000 HC RX 637 (ALT 250 FOR IP): Performed by: PSYCHIATRY & NEUROLOGY

## 2020-03-08 PROCEDURE — 99232 SBSQ HOSP IP/OBS MODERATE 35: CPT | Performed by: PSYCHIATRY & NEUROLOGY

## 2020-03-08 RX ADMIN — OLANZAPINE 10 MG: 10 TABLET ORAL at 09:48

## 2020-03-08 RX ADMIN — ACETAMINOPHEN 650 MG: 325 TABLET, FILM COATED ORAL at 09:48

## 2020-03-08 RX ADMIN — TIZANIDINE 4 MG: 4 TABLET ORAL at 09:48

## 2020-03-08 RX ADMIN — BENZTROPINE MESYLATE 1 MG: 1 TABLET ORAL at 09:48

## 2020-03-08 ASSESSMENT — PAIN DESCRIPTION - DESCRIPTORS: DESCRIPTORS: ACHING

## 2020-03-08 ASSESSMENT — PAIN DESCRIPTION - LOCATION: LOCATION: BACK

## 2020-03-08 ASSESSMENT — PAIN SCALES - GENERAL
PAINLEVEL_OUTOF10: 0
PAINLEVEL_OUTOF10: 6

## 2020-03-08 NOTE — PLAN OF CARE
Problem: Anger Management/Homicidal Ideation:  Goal: Able to display appropriate communication and problem solving  Description: Able to display appropriate communication and problem solving  3/7/2020 2241 by Aj Thomas LPN  Outcome: Ongoing     Problem: Anger Management/Homicidal Ideation:  Goal: Absence of angry outbursts  Description: Absence of angry outbursts  3/7/2020 2241 by Aj Thomas LPN  Outcome: Ongoing     Problem: Anger Management/Homicidal Ideation:  Goal: Absence of homicidal ideation  Description: Absence of homicidal ideation  3/7/2020 2241 by Aj Thomas LPN  Outcome: Ongoing     Patient is cooperative and free of anger at this time. Patient denies sucidal and homicidal ideation at this time. Patient is currently free of self harm. Patient agrees to seek staff if thoughts of self-harm arise. Patient is free of physical injuries and falls at this time. Patient currently denies anxiety and depression. Patient denies visual and audio hallucinations at this time. Patient is currently medication compliant and in behavioral control. A safe is environment is maintained for this patient at this time. Safety checks continue every 15 minutes. Writer will continue to monitor and provide support as needed.

## 2020-03-08 NOTE — GROUP NOTE
Group Therapy Note    Date: 3/8/2020    Group Start Time: 1330  Group End Time: 8531  Group Topic: Psychoeducation    GIAN Rajan, CTRS    Patient refused to attend music trivia group at 1330 after encouragement from staff. 1:1 talk time offered by staff as alternative to group session.

## 2020-03-08 NOTE — GROUP NOTE
Group Therapy Note    Date: 3/8/2020    Group Start Time: 0900  Group End Time: 4023  Group Topic: Community Meeting    [facility]    Patient refused to attend community meeting and goal setting group at 0900 after encouragement from staff. 1:1 talk time offered by staff as alternative to group session.

## 2020-03-08 NOTE — PROGRESS NOTES
Department of Psychiatry  Attending Progress Note  Chief Complaint: Schizoaffective disorder [facility])     SUBJECTIVE:  Debora Ibarra is seen in his room today. Found him very irritable and minimally interactive. Reports not feeling good due to chronic pain. He endorses some fleeting SI. Patient feels helpless and hopeless and times about current situation and cannot contract for safety outside of the hospital setting. Charting and medications reviewed. Support and reassurance provided. There is no safe alterative at this time than continued hospitalization. OBJECTIVE    Physical  /66   Pulse 76   Temp 97.9 °F (36.6 °C) (Oral)   Resp 14   Ht 6' (1.829 m)   Wt 257 lb 15 oz (117 kg)   BMI 34.98 kg/m²      Mental Status Evaluation:  Orientation: alertness: alert   Mood:. depressed      Affect:  constricted      Appearance:  disheveled and piercings   Activity:  Psychomotor Retardation   Gait/Posture: Normal   Speech:  normal pitch and normal volume   Thought Process:  circumstantial   Thought Content:  hallucinations   Sensorium:  person, place, time/date and situation   Cognition:  grossly intact   Memory: intact   Insight:  limited   Judgment: limited   Suicidal Ideations: passive   Homicidal Ideations: Negative for homicidal ideation      Medication Side Effects: absent       Attention Span attention span appeared shorter than expected for age       Labs  No results found for this or any previous visit (from the past 67 hour(s)).     Medications  Current Facility-Administered Medications   Medication Dose Route Frequency Provider Last Rate Last Dose    benztropine (COGENTIN) tablet 1 mg  1 mg Oral BID Jessica BEAN MD   1 mg at 03/08/20 0948    OLANZapine (ZYPREXA) tablet 10 mg  10 mg Oral BID Po BEAN MD   10 mg at 03/08/20 0948    tiZANidine (ZANAFLEX) tablet 4 mg  4 mg Oral BID MARIA EUGENIA Rodriguez MD   4 mg at 03/08/20 0948    acetaminophen (TYLENOL) tablet 650 mg  650 mg Oral

## 2020-03-08 NOTE — GROUP NOTE
Group Therapy Note    Date: 3/8/2020    Group Start Time: 1000  Group End Time: 0111  Group Topic: Psychoeducation    CHRISTO Brewer LSW        Group Therapy Note    Attendees: 7/20    patient refused to attend psychoeducation group at 1000 after encouragement from staff.   1:1 talk time provided as alternative to group session          Signature:  CHRISTO Carrillo, BATSHEVA

## 2020-03-08 NOTE — BH NOTE
patient refused to attend warp up group at 9:30 pm after encouragement from staff.   1:1 talk time provided as alternative to group session

## 2020-03-09 PROCEDURE — 6370000000 HC RX 637 (ALT 250 FOR IP): Performed by: PSYCHIATRY & NEUROLOGY

## 2020-03-09 PROCEDURE — 99232 SBSQ HOSP IP/OBS MODERATE 35: CPT | Performed by: PSYCHIATRY & NEUROLOGY

## 2020-03-09 PROCEDURE — 1240000000 HC EMOTIONAL WELLNESS R&B

## 2020-03-09 PROCEDURE — 6370000000 HC RX 637 (ALT 250 FOR IP): Performed by: NURSE PRACTITIONER

## 2020-03-09 RX ADMIN — TIZANIDINE 4 MG: 4 TABLET ORAL at 09:35

## 2020-03-09 RX ADMIN — BENZTROPINE MESYLATE 1 MG: 1 TABLET ORAL at 09:35

## 2020-03-09 RX ADMIN — ACETAMINOPHEN 650 MG: 325 TABLET, FILM COATED ORAL at 09:35

## 2020-03-09 RX ADMIN — OLANZAPINE 10 MG: 10 TABLET ORAL at 21:11

## 2020-03-09 RX ADMIN — OLANZAPINE 10 MG: 10 TABLET ORAL at 09:35

## 2020-03-09 RX ADMIN — BENZTROPINE MESYLATE 1 MG: 1 TABLET ORAL at 21:11

## 2020-03-09 RX ADMIN — TRAZODONE HYDROCHLORIDE 50 MG: 50 TABLET ORAL at 21:11

## 2020-03-09 ASSESSMENT — PAIN DESCRIPTION - LOCATION: LOCATION: BACK

## 2020-03-09 ASSESSMENT — PAIN SCALES - GENERAL
PAINLEVEL_OUTOF10: 6
PAINLEVEL_OUTOF10: 0

## 2020-03-09 ASSESSMENT — PAIN DESCRIPTION - DESCRIPTORS: DESCRIPTORS: ACHING

## 2020-03-09 NOTE — PLAN OF CARE
Problem: Anger Management/Homicidal Ideation:  Goal: Able to display appropriate communication and problem solving  Description: Able to display appropriate communication and problem solving  3/8/2020 2156 by Radha Magallon LPN  Outcome: Not Met This Shift  Note: Patient did not want to talk due to sleeping in his room. Patient educated to seek help from staff if he needs anything. Patient was isolative to his room all evening.   BC/MC     Problem: Anger Management/Homicidal Ideation:  Goal: Absence of angry outbursts  Description: Absence of angry outbursts  3/8/2020 2156 by Radha Magallon LPN  Outcome: Ongoing     Problem: Anger Management/Homicidal Ideation:  Goal: Absence of homicidal ideation  Description: Absence of homicidal ideation  Outcome: Ongoing     Problem: Anger Management/Homicidal Ideation:  Goal: Participates in care planning  Description: Participates in care planning  Outcome: Not Met This Shift     Problem: Tobacco Use:  Goal: Inpatient tobacco use cessation counseling participation  Description: Inpatient tobacco use cessation counseling participation  Outcome: Ongoing     Problem: Pain:  Goal: Pain level will decrease  Description: Pain level will decrease  3/8/2020 2156 by Radha Magallon LPN  Outcome: Ongoing     Problem: Pain:  Goal: Control of acute pain  Description: Control of acute pain  Outcome: Ongoing     Problem: Pain:  Goal: Control of chronic pain  Description: Control of chronic pain  Outcome: Ongoing

## 2020-03-09 NOTE — PLAN OF CARE
Problem: Anger Management/Homicidal Ideation:  Goal: Absence of angry outbursts  Description: Absence of angry outbursts  3/9/2020 1547 by Maru Camarena LPN  Outcome: Ongoing, Patient behavior well controlled. Problem: Anger Management/Homicidal Ideation:  Goal: Absence of homicidal ideation  Description: Absence of homicidal ideation  3/9/2020 1547 by Maru Camarena LPN  Outcome: Ongoing. Patient cooperative and medication compliant. Problem: Pain:  Goal: Pain level will decrease  Description: Pain level will decrease  3/9/2020 1547 by Maru Camarena LPN  Outcome: Ongoing. Patient compliant of back discomfort; as needed pain medication given when needed.

## 2020-03-09 NOTE — GROUP NOTE
Group Therapy Note    Date: 3/9/2020    Group Start Time: 1600  Group End Time: 5815  Group Topic: Healthy Living/Wellness    [address] [place] [address]        Group Therapy Note    Attendees: 12    Patient refused 1600 group. Patient was offered alternative 1:1, and continued to refuse. Staff will continue to educate and encourage group attendance.      Signature:  Emmer Aschoff, LPN

## 2020-03-09 NOTE — GROUP NOTE
Group Therapy Note    Date: 3/9/2020    Group Start Time: 1000  Group End Time: 0315  Group Topic: Psychotherapy    [address], LSW    patient refused to attend psychotherapy group at [facility] after encouragement from staff.   1:1 talk time provided as alternative to group session

## 2020-03-10 PROCEDURE — 1240000000 HC EMOTIONAL WELLNESS R&B

## 2020-03-10 PROCEDURE — 99232 SBSQ HOSP IP/OBS MODERATE 35: CPT | Performed by: NURSE PRACTITIONER

## 2020-03-10 PROCEDURE — 6370000000 HC RX 637 (ALT 250 FOR IP): Performed by: NURSE PRACTITIONER

## 2020-03-10 PROCEDURE — 6370000000 HC RX 637 (ALT 250 FOR IP): Performed by: PSYCHIATRY & NEUROLOGY

## 2020-03-10 RX ADMIN — TRAZODONE HYDROCHLORIDE 50 MG: 50 TABLET ORAL at 21:10

## 2020-03-10 RX ADMIN — OLANZAPINE 10 MG: 10 TABLET ORAL at 08:25

## 2020-03-10 RX ADMIN — BENZTROPINE MESYLATE 1 MG: 1 TABLET ORAL at 08:24

## 2020-03-10 RX ADMIN — TIZANIDINE 4 MG: 4 TABLET ORAL at 21:09

## 2020-03-10 RX ADMIN — BENZTROPINE MESYLATE 1 MG: 1 TABLET ORAL at 21:09

## 2020-03-10 RX ADMIN — HYDROXYZINE HYDROCHLORIDE 50 MG: 50 TABLET, FILM COATED ORAL at 21:09

## 2020-03-10 RX ADMIN — OLANZAPINE 10 MG: 10 TABLET ORAL at 21:10

## 2020-03-10 NOTE — PROGRESS NOTES
Department of Psychiatry  Attending Physician Progress Note    Chief Complaint: Schizoaffective disorder [facility])     SUBJECTIVE:  Debora Ibarra is seen in his room today. He is less depressed and anxious. He states he is having less auditory hallucinations. He endorses fleeting SI. He is not irritable or agitated. Sleep and appetite are improved. Isolative to his room for long periods of time and does not attend groups. Medication compliant and does not endorse any side effects. Patient feels helpless and hopeless at times about current situation and cannot contract for safety outside of the hospital setting. Medication and charting reviewed. Support and reassurance provided. There is no safe alternative at this time than continued hospitalization. OBJECTIVE    Physical  VITALS:    BP (!) 142/90   Pulse 102   Temp 98.3 °F (36.8 °C) (Oral)   Resp 14   Ht 6' (1.829 m)   Wt 257 lb 15 oz (117 kg)   BMI 34.98 kg/m²     Mental Status Examination:    Level of consciousness:  Within normal limits  Appearance: Street clothes, seated, with good grooming  Behavior/Motor: No abnormalities noted  Attitude toward examiner:  Cooperative, attentive, good eye contact  Speech:  spontaneous, normal rate, normal volume and well articulated  Mood:  Depressed, irritable  Affect:  Mood-congruent, constricted in range. Thought processes:  linear, goal-directed and coherent  Thought content:  denies homicidal ideation  Suicidal Ideation:  less suicidal ideation  Delusions:  no evidence of delusions  Perceptual Disturbance:  No visual or auditory hallucinations. Cognition:  Intact  Memory: grossly intact.   Insight & Judgement: partial       Medications  Current Facility-Administered Medications: benztropine (COGENTIN) tablet 1 mg, 1 mg, Oral, BID  OLANZapine (ZYPREXA) tablet 10 mg, 10 mg, Oral, BID  tiZANidine (ZANAFLEX) tablet 4 mg, 4 mg, Oral, BID PRN  acetaminophen (TYLENOL) tablet 650 mg, 650 mg, Oral, Q4H PRN  polyethylene

## 2020-03-10 NOTE — PLAN OF CARE
Problem: Anger Management/Homicidal Ideation:  Goal: Absence of angry outbursts  Description: Absence of angry outbursts  3/10/2020 0907 by Darene Jeans, RN  Outcome: Ongoing  Note: Pt was irritable this morning and refused vitals assessment. Pt was cooperative with mealtime and medication administration. He is isolative to his room. No angry outbursts thus far during shift. Safety checks maintained q15 min and irregular rounding maintained. Problem: Pain:  Goal: Pain level will decrease  Description: Pain level will decrease  Outcome: Ongoing  Note: Pt refused pain assessment and did not request medication for pain this morning.

## 2020-03-10 NOTE — PROGRESS NOTES
Department of Psychiatry  Attending Physician Progress Note    Chief Complaint: Schizoaffective disorder [facility])     SUBJECTIVE:     The patient was feeling very depressed and hopeless about the future. But he is feeling somewhat better and was not agitated or aggressive during the interview. He tends to be loud and agitated usually. He reported decrease in the auditory hallucinations and suicidal ideations. Sleep, energy and appetite were impaired. The suicidal ideations are less. There was no major side effects. There is no safe alternative other than the hospital treatment at this time. OBJECTIVE    Physical  VITALS:    BP (!) 142/90   Pulse 102   Temp 98.3 °F (36.8 °C) (Oral)   Resp 14   Ht 6' (1.829 m)   Wt 257 lb 15 oz (117 kg)   BMI 34.98 kg/m²     Mental Status Examination:    Level of consciousness:  Within normal limits  Appearance: Street clothes, seated, with good grooming  Behavior/Motor: No abnormalities noted  Attitude toward examiner:  Cooperative, attentive, good eye contact  Speech:  spontaneous, normal rate, normal volume and well articulated  Mood:  Depressed, irritable  Affect:  Mood-congruent, constricted in range. Thought processes:  linear, goal-directed and coherent  Thought content:  denies homicidal ideation  Suicidal Ideation:  less suicidal ideation  Delusions:  no evidence of delusions  Perceptual Disturbance:  No visual or auditory hallucinations. Cognition:  Intact  Memory: grossly intact.   Insight & Judgement: partial       Medications  Current Facility-Administered Medications: benztropine (COGENTIN) tablet 1 mg, 1 mg, Oral, BID  OLANZapine (ZYPREXA) tablet 10 mg, 10 mg, Oral, BID  tiZANidine (ZANAFLEX) tablet 4 mg, 4 mg, Oral, BID PRN  acetaminophen (TYLENOL) tablet 650 mg, 650 mg, Oral, Q4H PRN  polyethylene glycol (GLYCOLAX) packet 17 g, 17 g, Oral, Daily PRN  hydrOXYzine (ATARAX) tablet 50 mg, 50 mg, Oral, TID PRN  traZODone (DESYREL) tablet 50 mg, 50 mg, Oral, Nightly PRN  benztropine mesylate (COGENTIN) injection 2 mg, 2 mg, Intramuscular, BID PRN  aluminum & magnesium hydroxide-simethicone (MAALOX) 200-200-20 MG/5ML suspension 30 mL, 30 mL, Oral, Q6H PRN  OLANZapine (ZYPREXA) tablet 7.5 mg, 7.5 mg, Oral, TID PRN **OR** OLANZapine (ZYPREXA) injection 10 mg, 10 mg, Intramuscular, TID PRN  sterile water injection 2.1 mL, 2.1 mL, Intramuscular, Q4H PRN    No results found for this or any previous visit (from the past 72 hour(s)). ASSESSMENT     Principal Problem:    Schizoaffective disorder ([facility] [address]  Resolved Problems:    * No resolved hospital problems. *      PLAN    · Continue olanzapine 10 mg twice a day. · Continue unit milieu and group psychotherapy.     Electronically Signed by Ketan Patel MD , 3/9/2020 8:18 PM

## 2020-03-10 NOTE — GROUP NOTE
Group Therapy Note    Date: 3/10/2020    Group Start Time: 1430  Group End Time: 1307  Group Topic: Psychoeducation    [address] [place] [address] Drive    Patient refused to attend wellness education/ cognitive skills group at 1430 after encouragement from staff. 1:1 talk time provided by staff.        Signature:  Tania Mena

## 2020-03-10 NOTE — PLAN OF CARE
Problem: Anger Management/Homicidal Ideation:  Goal: Absence of angry outbursts  Description: Absence of angry outbursts  3/9/2020 2025 by Jessica Coleman LPN  Outcome: Ongoing     Problem: Anger Management/Homicidal Ideation:  Goal: Absence of homicidal ideation  Description: Absence of homicidal ideation  3/9/2020 2025 by Jessica Coleman LPN  Outcome: Ongoing   Patient denies suicidal ideas at this time. Patient denies depressive symptoms at this time. Patient has been in room. Patient has had no angry outbursts at this time. Patient denies homicidal ideas at this time.

## 2020-03-10 NOTE — GROUP NOTE
Group Therapy Note    Date: 3/10/2020    Group Start Time: 0900  Group End Time: 0915  Group Topic: Community Meeting    GIAN Cueto    Patient refused to attend community meeting/ goals group at 0900 after encouragement from staff. 1:1 talk time provided by staff.       Signature:  Elvia Cueto

## 2020-03-10 NOTE — GROUP NOTE
Group Therapy Note    Date: 3/10/2020    Group Start Time: 1100  Group End Time: 6934  Group Topic: Cognitive Skills    GIAN Mary Se    Patient refused to attend leisure/ cognitive skills group at 1100 after encouragement from staff. 1:1 talk time provided by staff.       Signature:  Tyra Kamara

## 2020-03-10 NOTE — BH NOTE
Patient refused to attend 2030 wrapup group despite encouragement from staff, 1:1 talk time offered as alternative.

## 2020-03-11 PROCEDURE — 6370000000 HC RX 637 (ALT 250 FOR IP): Performed by: PSYCHIATRY & NEUROLOGY

## 2020-03-11 PROCEDURE — 6370000000 HC RX 637 (ALT 250 FOR IP): Performed by: NURSE PRACTITIONER

## 2020-03-11 PROCEDURE — 1240000000 HC EMOTIONAL WELLNESS R&B

## 2020-03-11 PROCEDURE — 99232 SBSQ HOSP IP/OBS MODERATE 35: CPT | Performed by: NURSE PRACTITIONER

## 2020-03-11 RX ADMIN — BENZTROPINE MESYLATE 1 MG: 1 TABLET ORAL at 08:38

## 2020-03-11 RX ADMIN — ACETAMINOPHEN 650 MG: 325 TABLET, FILM COATED ORAL at 08:38

## 2020-03-11 RX ADMIN — TIZANIDINE 4 MG: 4 TABLET ORAL at 20:39

## 2020-03-11 RX ADMIN — TRAZODONE HYDROCHLORIDE 50 MG: 50 TABLET ORAL at 20:38

## 2020-03-11 RX ADMIN — OLANZAPINE 10 MG: 10 TABLET ORAL at 20:38

## 2020-03-11 RX ADMIN — TIZANIDINE 4 MG: 4 TABLET ORAL at 08:38

## 2020-03-11 RX ADMIN — HYDROXYZINE HYDROCHLORIDE 50 MG: 50 TABLET, FILM COATED ORAL at 20:39

## 2020-03-11 RX ADMIN — OLANZAPINE 10 MG: 10 TABLET ORAL at 08:38

## 2020-03-11 RX ADMIN — BENZTROPINE MESYLATE 1 MG: 1 TABLET ORAL at 20:39

## 2020-03-11 ASSESSMENT — PAIN SCALES - GENERAL
PAINLEVEL_OUTOF10: 0
PAINLEVEL_OUTOF10: 0
PAINLEVEL_OUTOF10: 5

## 2020-03-11 NOTE — GROUP NOTE
Group Therapy Note    Date: 3/11/2020    Group Start Time: 1000  Group End Time: 8996  Group Topic: Psychotherapy    [address], LSW    patient refused to attend psychotherapy group at [facility] after encouragement from staff.   1:1 talk time provided as alternative to group session

## 2020-03-11 NOTE — PROGRESS NOTES
PRN  acetaminophen (TYLENOL) tablet 650 mg, 650 mg, Oral, Q4H PRN  polyethylene glycol (GLYCOLAX) packet 17 g, 17 g, Oral, Daily PRN  hydrOXYzine (ATARAX) tablet 50 mg, 50 mg, Oral, TID PRN  traZODone (DESYREL) tablet 50 mg, 50 mg, Oral, Nightly PRN  benztropine mesylate (COGENTIN) injection 2 mg, 2 mg, Intramuscular, BID PRN  aluminum & magnesium hydroxide-simethicone (MAALOX) 200-200-20 MG/5ML suspension 30 mL, 30 mL, Oral, Q6H PRN  OLANZapine (ZYPREXA) tablet 7.5 mg, 7.5 mg, Oral, TID PRN **OR** OLANZapine (ZYPREXA) injection 10 mg, 10 mg, Intramuscular, TID PRN  sterile water injection 2.1 mL, 2.1 mL, Intramuscular, Q4H PRN    No results found for this or any previous visit (from the past 72 hour(s)). ASSESSMENT     Principal Problem:    Schizoaffective disorder ([facility] [address]  Resolved Problems:    * No resolved hospital problems. *      PLAN    · Continue olanzapine 10 mg twice a day. · Continue unit milieu and group psychotherapy. · Possible discharge tomorrow if continued improvement.  .     Electronically Signed by MARIA D Lay CNP , 3/11/2020 3:07 PM

## 2020-03-11 NOTE — GROUP NOTE
Group Therapy Note    Date: 3/11/2020    Group Start Time: 1100  Group End Time: 2710  Group Topic: Psychoeducation    [facility]        Group Therapy Note    Attendees: 3/8    patient refused to attend relapse prevention group at [id number] after encouragement from staff. 1:1 talk time provided as alternative to group session.        Signature:  Manan Whatley, [address]

## 2020-03-11 NOTE — PLAN OF CARE
Problem: Anger Management/Homicidal Ideation:  Goal: Absence of angry outbursts  Description: Absence of angry outbursts  3/10/2020 2243 by Fadumo Saunders LPN  Outcome: Ongoing  Note: Patient has been in behavioral control and not had any angry outbursts this evening     Problem: Anger Management/Homicidal Ideation:  Goal: Absence of homicidal ideation  Description: Absence of homicidal ideation  Outcome: Ongoing  Note: Patient denies suicidal and homicidal thoughts and hallucinations. He reports depression and anxiety are improved. He has been mostly isolative to his room besides showering and needs.  Q15min safety checks continue

## 2020-03-11 NOTE — PLAN OF CARE
Problem: Anger Management/Homicidal Ideation:  Goal: Able to display appropriate communication and problem solving  Description: Able to display appropriate communication and problem solving  3/11/2020 1404 by Mehul Sheppard RN  Outcome: Ongoing  Patient is seclusive to room and bed, affect blunt, irritable upon approach, refused groups, evasive during talk time, denies thoughts to harm self or others  Problem: Anger Management/Homicidal Ideation:  Goal: Absence of angry outbursts  Description: Absence of angry outbursts  3/11/2020 1404 by Mehul Sheppard RN  Outcome: Ongoing  No angry outbursts noted  Problem: Pain:  Goal: Control of chronic pain  Description: Control of chronic pain  3/11/2020 1404 by Mehul Sheppard RN  Outcome: Ongoing  Patient stated his pain has much decreased today and is relieved by tylenol and zanaflex which patient received this morning.

## 2020-03-12 VITALS
DIASTOLIC BLOOD PRESSURE: 57 MMHG | BODY MASS INDEX: 34.94 KG/M2 | HEART RATE: 79 BPM | WEIGHT: 257.94 LBS | SYSTOLIC BLOOD PRESSURE: 141 MMHG | TEMPERATURE: 98.3 F | HEIGHT: 72 IN | RESPIRATION RATE: 14 BRPM

## 2020-03-12 PROCEDURE — 6370000000 HC RX 637 (ALT 250 FOR IP): Performed by: PSYCHIATRY & NEUROLOGY

## 2020-03-12 PROCEDURE — 99238 HOSP IP/OBS DSCHRG MGMT 30/<: CPT | Performed by: NURSE PRACTITIONER

## 2020-03-12 RX ORDER — TRAZODONE HYDROCHLORIDE 50 MG/1
50 TABLET ORAL NIGHTLY PRN
Qty: 14 TABLET | Refills: 0 | Status: ON HOLD | OUTPATIENT
Start: 2020-03-12 | End: 2020-04-29

## 2020-03-12 RX ORDER — TIZANIDINE 4 MG/1
4 TABLET ORAL 2 TIMES DAILY PRN
Qty: 14 TABLET | Refills: 0 | Status: ON HOLD | OUTPATIENT
Start: 2020-03-12 | End: 2020-04-21

## 2020-03-12 RX ADMIN — BENZTROPINE MESYLATE 1 MG: 1 TABLET ORAL at 10:36

## 2020-03-12 RX ADMIN — OLANZAPINE 10 MG: 10 TABLET ORAL at 10:36

## 2020-03-12 NOTE — GROUP NOTE
Group Therapy Note    Date: 3/12/2020    Group Start Time: 1000  Group End Time: 6105  Group Topic: Psychoeducation    [facility]    Patient refused to attend discharge planning group at 1000 after encouragement from staff. 1:1 talk time offered by staff as alternative to group session.

## 2020-03-12 NOTE — DISCHARGE SUMMARY
Medication List      START taking these medications    Details   tiZANidine (ZANAFLEX) 4 MG tablet Take 1 tablet by mouth 2 times daily as needed (muscle spasms)  Qty: 14 tablet, Refills: 0         CONTINUE these medications which have CHANGED    Details   traZODone (DESYREL) 50 MG tablet Take 1 tablet by mouth nightly as needed for Sleep  Qty: 14 tablet, Refills: 0         CONTINUE these medications which have NOT CHANGED    Details   benztropine (COGENTIN) 1 MG tablet Take 1 tablet by mouth 2 times daily  Qty: 60 tablet, Refills: 0      OLANZapine (ZYPREXA) 10 MG tablet Take 1 tablet by mouth 2 times daily  Qty: 60 tablet, Refills: 0                Core Measures statement:   Not applicable    Discharge Exam:  Level of consciousness:  Within normal limits  Appearance: Street clothes, seated, with good grooming  Behavior/Motor: No abnormalities noted  Attitude toward examiner:  Cooperative, attentive, good eye contact  Speech:  spontaneous, normal rate, normal volume and well articulated  Mood:  euthymic  Affect:  mood congruent  Thought processes:  linear, goal directed and coherent  Thought content:  Homocidal ideation denies  Suicidal Ideation:  denies suicidal ideation  Delusions:  no evidence of delusions  Perceptual Disturbance:  denies any perceptual disturbance  Cognition:  In tact  Memory: age appropriate  Insight & Judgement: fair  Medication side effects:  denies     Disposition: home    Patient Instructions: Activity: activity as tolerated    Follow-up as scheduled with Zef    Time Spent: 35 minutes    Engagement: Patient displayed a good level of engagement with the treatments offered during this admission. Discharge planning, findings, and recommendations were discussed with the patient and treatment team.    Signed:  Lary Brand CNP  3/12/2020  11:10 AM  Dragon voice recognition software used in portions of this document.

## 2020-03-12 NOTE — GROUP NOTE
Group Therapy Note    Date: 3/12/2020    Group Start Time: 1330  Group End Time: 7942  Group Topic: Cognitive Skills    GIAN Pineda, CTRS    Patient did not attend Cognitive Skills Group after encouragement from staff. 1:1 talk time offered but patient refused.      Signature:  Jeancarlos Lynch

## 2020-03-12 NOTE — PLAN OF CARE
Problem: Anger Management/Homicidal Ideation:  Goal: Absence of angry outbursts  Description: Absence of angry outbursts  3/11/2020 2049 by Lisa Em LPN  Outcome: Ongoing  Note: Patient has been in behavioral control and free from angry outbursts, he has been isolative to his room and out for needs only. Q15min safety checks continue     Problem: Anger Management/Homicidal Ideation:  Goal: Absence of homicidal ideation  Description: Absence of homicidal ideation  3/11/2020 2049 by Lisa Em LPN  Outcome: Ongoing  Note: Patient denies suicidal and homicidal ideations, as well as hallucinations. He is evasive and flat during 1:1 talk time. He is isolative to his room, entitled and out for needs.  Q15min safety checks continue

## 2020-03-12 NOTE — GROUP NOTE
Group Therapy Note    Date: 3/12/2020    Group Start Time: 1100am  Group End Time: 1145am  Group Topic: Psychotherapy    [facility] [address], BATSHEVA    patient refused to attend psychotherapy group at 11a after encouragement from staff.   1:1 talk time provided as alternative to group session

## 2020-03-12 NOTE — PROGRESS NOTES
Patient refused wrap up group despite encouragement to attend, 1:1 talk offered. Patient accepting.  Jostin Patino LPN

## 2020-04-20 ENCOUNTER — HOSPITAL ENCOUNTER (INPATIENT)
Age: 45
LOS: 9 days | Discharge: HOME | DRG: 885 | End: 2020-04-29
Attending: PSYCHIATRY & NEUROLOGY | Admitting: PSYCHIATRY & NEUROLOGY
Payer: MEDICARE

## 2020-04-20 PROCEDURE — 1240000000 HC EMOTIONAL WELLNESS R&B

## 2020-04-20 RX ORDER — TRAZODONE HYDROCHLORIDE 50 MG/1
50 TABLET ORAL NIGHTLY PRN
Status: DISCONTINUED | OUTPATIENT
Start: 2020-04-21 | End: 2020-04-29 | Stop reason: HOSPADM

## 2020-04-20 RX ORDER — ACETAMINOPHEN 325 MG/1
650 TABLET ORAL EVERY 4 HOURS PRN
Status: DISCONTINUED | OUTPATIENT
Start: 2020-04-20 | End: 2020-04-29 | Stop reason: HOSPADM

## 2020-04-20 RX ORDER — BENZTROPINE MESYLATE 1 MG/ML
2 INJECTION INTRAMUSCULAR; INTRAVENOUS 2 TIMES DAILY PRN
Status: DISCONTINUED | OUTPATIENT
Start: 2020-04-20 | End: 2020-04-29 | Stop reason: HOSPADM

## 2020-04-20 RX ORDER — MAGNESIUM HYDROXIDE/ALUMINUM HYDROXICE/SIMETHICONE 120; 1200; 1200 MG/30ML; MG/30ML; MG/30ML
30 SUSPENSION ORAL EVERY 6 HOURS PRN
Status: DISCONTINUED | OUTPATIENT
Start: 2020-04-20 | End: 2020-04-29 | Stop reason: HOSPADM

## 2020-04-20 ASSESSMENT — PAIN SCALES - GENERAL: PAINLEVEL_OUTOF10: 0

## 2020-04-20 ASSESSMENT — SLEEP AND FATIGUE QUESTIONNAIRES
DO YOU HAVE DIFFICULTY SLEEPING: NO
AVERAGE NUMBER OF SLEEP HOURS: 7
DO YOU USE A SLEEP AID: NO

## 2020-04-20 ASSESSMENT — PATIENT HEALTH QUESTIONNAIRE - PHQ9: SUM OF ALL RESPONSES TO PHQ QUESTIONS 1-9: 3

## 2020-04-20 ASSESSMENT — LIFESTYLE VARIABLES: HISTORY_ALCOHOL_USE: NO

## 2020-04-21 PROCEDURE — 90792 PSYCH DIAG EVAL W/MED SRVCS: CPT | Performed by: PSYCHIATRY & NEUROLOGY

## 2020-04-21 PROCEDURE — 6370000000 HC RX 637 (ALT 250 FOR IP): Performed by: NURSE PRACTITIONER

## 2020-04-21 PROCEDURE — 6370000000 HC RX 637 (ALT 250 FOR IP): Performed by: PSYCHIATRY & NEUROLOGY

## 2020-04-21 PROCEDURE — 1240000000 HC EMOTIONAL WELLNESS R&B

## 2020-04-21 RX ORDER — OLANZAPINE 10 MG/1
10 TABLET ORAL 2 TIMES DAILY
Status: DISCONTINUED | OUTPATIENT
Start: 2020-04-21 | End: 2020-04-29 | Stop reason: HOSPADM

## 2020-04-21 RX ORDER — DIVALPROEX SODIUM 500 MG/1
500 TABLET, EXTENDED RELEASE ORAL DAILY
Status: DISCONTINUED | OUTPATIENT
Start: 2020-04-21 | End: 2020-04-25

## 2020-04-21 RX ADMIN — DIVALPROEX SODIUM 500 MG: 500 TABLET, EXTENDED RELEASE ORAL at 10:15

## 2020-04-21 RX ADMIN — OLANZAPINE 10 MG: 10 TABLET, FILM COATED ORAL at 22:35

## 2020-04-21 RX ADMIN — OLANZAPINE 10 MG: 10 TABLET, FILM COATED ORAL at 09:44

## 2020-04-21 RX ADMIN — ACETAMINOPHEN 650 MG: 325 TABLET, FILM COATED ORAL at 10:15

## 2020-04-21 RX ADMIN — TRAZODONE HYDROCHLORIDE 50 MG: 50 TABLET ORAL at 22:35

## 2020-04-21 ASSESSMENT — PAIN SCALES - GENERAL
PAINLEVEL_OUTOF10: 5
PAINLEVEL_OUTOF10: 7

## 2020-04-21 ASSESSMENT — LIFESTYLE VARIABLES: HISTORY_ALCOHOL_USE: COMMENT

## 2020-04-21 NOTE — PLAN OF CARE
Problem: Altered Mood, Depressive Behavior:  Goal: Able to verbalize and/or display a decrease in depressive symptoms  Description: Able to verbalize and/or display a decrease in depressive symptoms  4/21/2020 1140 by Serjio Salinas RN  Outcome: Ongoing    Pt denies having homicidal or suicidal thoughts. Pt denies having anxiety or depression. Pt denies having hallucinations. Pt is isolative to room. Pt is irritable with staff. Pt comes out for meals and needs. Problem: Altered Mood, Depressive Behavior:  Goal: Able to verbalize support systems  Description: Able to verbalize support systems  4/21/2020 1140 by Serjio Salinas RN  Outcome: Ongoing    Pt did not report having any positive support. Pt became irritable and started to ask how many more questions there were. Problem: Pain:  Goal: Control of acute pain  Description: Control of acute pain  4/21/2020 1140 by Serjio Salinas RN  Outcome: Ongoing    Pt requested PRN for back. Pt has been resting in his bed since medication administration.

## 2020-04-21 NOTE — BH NOTE
Psychiatric Admission Note         Gurwinder Burton is a 38 y.o. male who was admitted To psychiatric unit. He is belligerent and angry. He is having suicidal and homicidal thoughts. He is experiencing auditory hallucinations. He has labile mood and affect. He feels that people are trying to harm him or kill him. He feels hopeless and useless worthless. He feels depressed. He refused to discuss about his family history other than stating that nobody in the family has mental illness          Past Psychiatric History   Patient Reports noncompliance with outpatient psychiatric care. Reported history of psychiatric inpatient hospitalizations. Reported history of suicide attempts. History of Substance Abuse     Denies alcohol use or use of any illicit drugs. Family History of psychiatric disorders    Family history: denied       Medical History   Allergies:  Patient has no known allergies. Past Medical History:   Diagnosis Date    Asthma     [facility] ([facility]     [facility]       History reviewed. No pertinent surgical history. Labs  No results found for this or any previous visit (from the past 72 hour(s)). SOCIAL HISTORY  Social History     Socioeconomic History    Marital status: Single     Spouse name: Not on file    Number of children: Not on file    Years of education: Not on file    Highest education level: Not on file   Occupational History    Not on file   Social Needs    Financial resource strain: Not on file    Food insecurity     Worry: Not on file     Inability: Not on file    Transportation needs     Medical: Not on file     Non-medical: Not on file   Tobacco Use    Smoking status: Never Smoker    Smokeless tobacco: Never Used    Tobacco comment: pt nonsmoker   Substance and Sexual Activity    Alcohol use: Yes     Comment: Hx of Alcohol abuse    Drug use: Yes     Comment: Hx of substance abuse?     Sexual hydroxide-simethicone    Treatment Plan:    1. Admit to inpatient psychiatric treatment  2. Supportive therapy with medication management. Reviewed risks and benefits as well as potential side effects with patient. 3. Therapeutic activities and groups  4. Follow up at [facility][1] after symptoms stabilized    Estimated length of stay: 5-7 days    Cole Shirley MD  Psychiatrist.  Dragon voice recognition software used in portions of this document.  Occasionally words are mis-transcribed
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
## 2020-04-22 PROCEDURE — 6370000000 HC RX 637 (ALT 250 FOR IP): Performed by: NURSE PRACTITIONER

## 2020-04-22 PROCEDURE — 99232 SBSQ HOSP IP/OBS MODERATE 35: CPT | Performed by: PSYCHIATRY & NEUROLOGY

## 2020-04-22 PROCEDURE — 6370000000 HC RX 637 (ALT 250 FOR IP): Performed by: PSYCHIATRY & NEUROLOGY

## 2020-04-22 PROCEDURE — 1240000000 HC EMOTIONAL WELLNESS R&B

## 2020-04-22 RX ADMIN — OLANZAPINE 10 MG: 10 TABLET, FILM COATED ORAL at 22:42

## 2020-04-22 RX ADMIN — ACETAMINOPHEN 650 MG: 325 TABLET, FILM COATED ORAL at 22:43

## 2020-04-22 ASSESSMENT — PAIN SCALES - GENERAL
PAINLEVEL_OUTOF10: 0
PAINLEVEL_OUTOF10: 3

## 2020-04-22 NOTE — PLAN OF CARE
Problem: Altered Mood, Depressive Behavior:  Goal: Able to verbalize and/or display a decrease in depressive symptoms  Description: Able to verbalize and/or display a decrease in depressive symptoms  4/22/2020 1233 by Ciara Patino LPN  Outcome: Ongoing  Note: Patient refused 1:1 interview with writer. Patient is very irritable, isolative, and refusing medications and assessments.

## 2020-04-22 NOTE — BH NOTE
Patient was approached multiple times about taking his medication. Patient continued to refuse despite education and encouragement. When asked again at lunch time patient was irritable and refused.

## 2020-04-22 NOTE — GROUP NOTE
Group Therapy Note    Date: 4/22/2020    Group Start Time: 3826  Group End Time: 0930  Group Topic: [address], [address]        Group Therapy Note    Attendees: 10    Pt did not attend [employer] d/t resting in room despite staff invitation to attend. 1:1 talk time offered as alternative to group session, pt declined.
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
## 2020-04-22 NOTE — GROUP NOTE
Group Therapy Note    Date: 4/22/2020    Group Start Time: 1600  Group End Time: 0721  Group Topic: Healthy Living/Wellness    ELVIRA [facility] A Nolia Goltz, LPN        Group Therapy Note    Attendees: 8    Patient refused to participate in 1600 group. Patient was encouraged and educated, offered 1:1 alternative. Patient continued to refuse.        Signature:  Nolia Goltz, LPN

## 2020-04-22 NOTE — GROUP NOTE
Group Therapy Note    Date: 4/22/2020    Group Start Time: 1100  Group End Time: 36  Group Topic: Psychoeducation    GIAN Garcia    Patient refused to attend leisure/ coping skills group at 1100 after encouragement from staff. 1:1 talk time provided by staff.      Signature:  Germán Garcia

## 2020-04-23 PROCEDURE — 99232 SBSQ HOSP IP/OBS MODERATE 35: CPT | Performed by: NURSE PRACTITIONER

## 2020-04-23 PROCEDURE — 6370000000 HC RX 637 (ALT 250 FOR IP): Performed by: PSYCHIATRY & NEUROLOGY

## 2020-04-23 PROCEDURE — 6370000000 HC RX 637 (ALT 250 FOR IP): Performed by: NURSE PRACTITIONER

## 2020-04-23 PROCEDURE — 1240000000 HC EMOTIONAL WELLNESS R&B

## 2020-04-23 RX ADMIN — OLANZAPINE 10 MG: 10 TABLET, FILM COATED ORAL at 22:10

## 2020-04-23 RX ADMIN — ACETAMINOPHEN 650 MG: 325 TABLET, FILM COATED ORAL at 22:21

## 2020-04-23 ASSESSMENT — PAIN SCALES - GENERAL
PAINLEVEL_OUTOF10: 5
PAINLEVEL_OUTOF10: 0

## 2020-04-23 NOTE — GROUP NOTE
Group Therapy Note    Date: 4/23/2020    Group Start Time: 1100  Group End Time: 0014  Group Topic: Recreational    GIAN Garcia, CTRS    Patient did not attended Recreation Therapy Group after encouragement from staff. 1:1 talk time offered but patient refused.      Signature:  Daysi Maynard

## 2020-04-23 NOTE — PLAN OF CARE
Problem: Altered Mood, Depressive Behavior:  Goal: Able to verbalize and/or display a decrease in depressive symptoms  Description: Able to verbalize and/or display a decrease in depressive symptoms  4/22/2020 2046 by Gina Urias RN  Outcome: Ongoing  Note: Pt free from depreesion     Problem: Altered Mood, Depressive Behavior:  Goal: Able to verbalize support systems  Description: Able to verbalize support systems  4/22/2020 2046 by Gina Urias RN  Outcome: Ongoing     Problem: Pain:  Goal: Pain level will decrease  Description: Pain level will decrease  4/22/2020 2046 by Gina Urias RN  Outcome: Ongoing  Note: Pt free from pain     Problem: Pain:  Goal: Control of acute pain  Description: Control of acute pain  4/22/2020 2046 by Gina Urias RN  Outcome: Ongoing  Note: Pt free from pain

## 2020-04-23 NOTE — GROUP NOTE
Patient did not develop daily goal after encouragement from staff. 1:1 talk time offered but patient refused.      Signature:  Anni Negron

## 2020-04-24 ENCOUNTER — APPOINTMENT (OUTPATIENT)
Dept: GENERAL RADIOLOGY | Age: 45
DRG: 885 | End: 2020-04-24
Attending: PSYCHIATRY & NEUROLOGY
Payer: MEDICARE

## 2020-04-24 PROCEDURE — 1240000000 HC EMOTIONAL WELLNESS R&B

## 2020-04-24 PROCEDURE — 72100 X-RAY EXAM L-S SPINE 2/3 VWS: CPT

## 2020-04-24 PROCEDURE — 6370000000 HC RX 637 (ALT 250 FOR IP): Performed by: PSYCHIATRY & NEUROLOGY

## 2020-04-24 PROCEDURE — 99232 SBSQ HOSP IP/OBS MODERATE 35: CPT | Performed by: PSYCHIATRY & NEUROLOGY

## 2020-04-24 PROCEDURE — 99222 1ST HOSP IP/OBS MODERATE 55: CPT | Performed by: INTERNAL MEDICINE

## 2020-04-24 PROCEDURE — 6370000000 HC RX 637 (ALT 250 FOR IP): Performed by: NURSE PRACTITIONER

## 2020-04-24 RX ADMIN — ACETAMINOPHEN 650 MG: 325 TABLET, FILM COATED ORAL at 21:06

## 2020-04-24 RX ADMIN — TRAZODONE HYDROCHLORIDE 50 MG: 50 TABLET ORAL at 21:06

## 2020-04-24 RX ADMIN — OLANZAPINE 10 MG: 10 TABLET, FILM COATED ORAL at 08:08

## 2020-04-24 RX ADMIN — DIVALPROEX SODIUM 500 MG: 500 TABLET, EXTENDED RELEASE ORAL at 08:08

## 2020-04-24 RX ADMIN — OLANZAPINE 10 MG: 10 TABLET, FILM COATED ORAL at 21:06

## 2020-04-24 ASSESSMENT — PAIN DESCRIPTION - ORIENTATION: ORIENTATION: LOWER

## 2020-04-24 ASSESSMENT — PAIN SCALES - GENERAL
PAINLEVEL_OUTOF10: 3
PAINLEVEL_OUTOF10: 0

## 2020-04-24 ASSESSMENT — PAIN DESCRIPTION - PAIN TYPE: TYPE: ACUTE PAIN

## 2020-04-24 ASSESSMENT — PAIN DESCRIPTION - LOCATION: LOCATION: BACK

## 2020-04-24 NOTE — BH NOTE
(DEPAKOTE ER) extended release tablet 500 mg  500 mg Oral Daily Ko BEAN MD   500 mg at 04/24/20 9650    acetaminophen (TYLENOL) tablet 650 mg  650 mg Oral Q4H PRN Erla , APRN - CNP   650 mg at 04/23/20 2221    traZODone (DESYREL) tablet 50 mg  50 mg Oral Nightly PRN Erla , APRN - CNP   50 mg at 04/21/20 2235    benztropine mesylate (COGENTIN) injection 2 mg  2 mg Intramuscular BID PRN Erla , APRN - CNP        magnesium hydroxide (MILK OF MAGNESIA) 400 MG/5ML suspension 30 mL  30 mL Oral Daily PRN Erla , APRN - CNP        aluminum & magnesium hydroxide-simethicone (MAALOX) 200-200-20 MG/5ML suspension 30 mL  30 mL Oral Q6H PRN Erla , APRN - CNP             OLANZapine  10 mg Oral BID    divalproex  500 mg Oral Daily       ASSESSMENT  Schizoaffective disorder ([facility]     Patient's Response to Treatment: negative    PLAN  Dragon voice recognition software used in portions of this document. To continue current management. Tried to provide insight into his illness and encouraged him to take medications.

## 2020-04-24 NOTE — PLAN OF CARE
Problem: Altered Mood, Depressive Behavior:  Goal: Able to verbalize and/or display a decrease in depressive symptoms  4/24/2020 0855 by Julian Shanks RN  Outcome: Ongoing   Patient denies anxiety and depression,  reports sleep has improved. Patient appears to remain irritable and entitled. Patient unwilling to follow unit policy. Refusing groups therapies,  remains isolative to room. Patient was willing to take medications if they were brought to him after he woke up this morning. Problem: Altered Mood, Depressive Behavior:  Goal: Able to verbalize support systems  4/24/2020 0855 by Julian Shanks RN  Outcome: Ongoing    Patient denies depression,  denies thoughts of self harm or thoughts to harm others. Problem: Pain:  Description: Pain management should include both nonpharmacologic and pharmacologic interventions.   Goal: Pain level will decrease  Outcome: Ongoing    Patient denies having pain,  pain medications are prescribed as needed

## 2020-04-24 NOTE — GROUP NOTE
Group Therapy Note    Date: 4/24/2020    Group Start Time: 0300  Group End Time: 0345  Group Topic: Psychoeducation    GIAN FERNANDEZ    CHRISTO Altamirano, MELONIEW        Group Therapy Note    Attendees: 17         Patient's Goal:  Pt will demonstrate increased interpersonal interaction. Notes:  Topic was Coping Skills for anxiety.      Status After Intervention:  Unchanged    Participation Level: Minimal    Participation Quality: Lethargic      Speech:  normal      Thought Process/Content: Linear      Affective Functioning: Flat      Mood: depressed      Level of consciousness:  Drowsy      Response to Learning: Resistant      Endings: None Reported    Modes of Intervention: Education      Discipline Responsible: /Counselor      Signature:  CHRISTO Altamirano, BATSHEVA

## 2020-04-24 NOTE — CONSULTS
[facility] Internal Medicine    CONSULTATION / HISTORY AND PHYSICAL EXAMINATION            Date:   4/24/2020  Patient name:  Raymundo Hassan  Date of admission:  4/20/2020  8:07 PM  MRN:   [id number]  Account:  [de-identified]  YOB: 1975  PCP:    No primary care provider on file. Room:   [address] [place] [address]  Code Status:    Full Code    Physician Requesting Consult: Ronnie Stiles MD    Reason for Consult: Medical management    Chief Complaint:     No chief complaint on file. Back pain    History Obtained From:     Patient medical record nursing staff    History of Present Illness:     Back pain  Onset less than  Four weeks    Severity is moderate  Not associated with wt loss or radiation of pain on the legs  No bowel bladder incontinence   Aggravated with bending and better with pain meds and rest.        Past Medical History:     Past Medical History:   Diagnosis Date    Asthma     Hypertension     [facility]     Substance abuse ([facility]         Past Surgical History:     History reviewed. No pertinent surgical history. Medications Prior to Admission:     Prior to Admission medications    Medication Sig Start Date End Date Taking? Authorizing Provider   traZODone (DESYREL) 50 MG tablet Take 1 tablet by mouth nightly as needed for Sleep 3/12/20 3/26/20  MARIA D Chen - CNP   benztropine (COGENTIN) 1 MG tablet Take 1 tablet by mouth 2 times daily 11/25/19   Ronnie Stiles MD   OLANZapine (ZYPREXA) 10 MG tablet Take 1 tablet by mouth 2 times daily 11/25/19   Ronnie Stiles MD        Allergies:     Patient has no known allergies. Social History:     Tobacco:    reports that he has never smoked. He has never used smokeless tobacco.  Alcohol:      reports current alcohol use. Drug Use:  reports current drug use.     Family History:     Family History   Family history unknown: Yes       Review of Systems:     Positive and Negative as

## 2020-04-24 NOTE — PROGRESS NOTES
Patient did not develop daily goal after encouragement from staff. 1:1 talk time offered but patient refused.       Signature: Sandi Mclain

## 2020-04-25 PROCEDURE — 6370000000 HC RX 637 (ALT 250 FOR IP): Performed by: NURSE PRACTITIONER

## 2020-04-25 PROCEDURE — 99232 SBSQ HOSP IP/OBS MODERATE 35: CPT | Performed by: NURSE PRACTITIONER

## 2020-04-25 PROCEDURE — 1240000000 HC EMOTIONAL WELLNESS R&B

## 2020-04-25 PROCEDURE — 99232 SBSQ HOSP IP/OBS MODERATE 35: CPT | Performed by: INTERNAL MEDICINE

## 2020-04-25 PROCEDURE — 6370000000 HC RX 637 (ALT 250 FOR IP): Performed by: PSYCHIATRY & NEUROLOGY

## 2020-04-25 RX ORDER — DIVALPROEX SODIUM 500 MG/1
500 TABLET, EXTENDED RELEASE ORAL 2 TIMES DAILY
Status: DISCONTINUED | OUTPATIENT
Start: 2020-04-25 | End: 2020-04-27

## 2020-04-25 RX ADMIN — DIVALPROEX SODIUM 500 MG: 500 TABLET, EXTENDED RELEASE ORAL at 09:18

## 2020-04-25 RX ADMIN — OLANZAPINE 10 MG: 10 TABLET, FILM COATED ORAL at 09:18

## 2020-04-25 RX ADMIN — ACETAMINOPHEN 650 MG: 325 TABLET, FILM COATED ORAL at 21:57

## 2020-04-25 RX ADMIN — DIVALPROEX SODIUM 500 MG: 500 TABLET, EXTENDED RELEASE ORAL at 21:57

## 2020-04-25 RX ADMIN — TRAZODONE HYDROCHLORIDE 50 MG: 50 TABLET ORAL at 21:57

## 2020-04-25 RX ADMIN — OLANZAPINE 10 MG: 10 TABLET, FILM COATED ORAL at 21:57

## 2020-04-25 ASSESSMENT — PAIN SCALES - GENERAL: PAINLEVEL_OUTOF10: 4
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
## 2020-04-25 NOTE — GROUP NOTE
Group Therapy Note    Date: 4/25/2020    Group Start Time: 0900  Group End Time: 0915  Group Topic: [address], [address]        Group Therapy Note    Attendees: 6    Pt did not attend [employer] d/t resting in room despite staff invitation to attend. 1:1 talk time offered as alternative to group session, pt declined.

## 2020-04-26 PROCEDURE — 99232 SBSQ HOSP IP/OBS MODERATE 35: CPT | Performed by: NURSE PRACTITIONER

## 2020-04-26 PROCEDURE — 1240000000 HC EMOTIONAL WELLNESS R&B

## 2020-04-26 PROCEDURE — 6370000000 HC RX 637 (ALT 250 FOR IP): Performed by: NURSE PRACTITIONER

## 2020-04-26 PROCEDURE — 6370000000 HC RX 637 (ALT 250 FOR IP): Performed by: PSYCHIATRY & NEUROLOGY

## 2020-04-26 RX ADMIN — DIVALPROEX SODIUM 500 MG: 500 TABLET, EXTENDED RELEASE ORAL at 08:39

## 2020-04-26 RX ADMIN — ACETAMINOPHEN 650 MG: 325 TABLET, FILM COATED ORAL at 08:43

## 2020-04-26 RX ADMIN — OLANZAPINE 10 MG: 10 TABLET, FILM COATED ORAL at 08:39

## 2020-04-26 RX ADMIN — DIVALPROEX SODIUM 500 MG: 500 TABLET, EXTENDED RELEASE ORAL at 21:21

## 2020-04-26 RX ADMIN — ACETAMINOPHEN 650 MG: 325 TABLET, FILM COATED ORAL at 21:21

## 2020-04-26 RX ADMIN — TRAZODONE HYDROCHLORIDE 50 MG: 50 TABLET ORAL at 21:21

## 2020-04-26 RX ADMIN — OLANZAPINE 10 MG: 10 TABLET, FILM COATED ORAL at 21:21

## 2020-04-26 ASSESSMENT — PAIN SCALES - GENERAL
PAINLEVEL_OUTOF10: 5
PAINLEVEL_OUTOF10: 5
PAINLEVEL_OUTOF10: 1
PAINLEVEL_OUTOF10: 3

## 2020-04-26 NOTE — GROUP NOTE
Group Therapy Note    Date: 4/26/2020    Group Start Time: 1330  Group End Time: 1450  Group Topic: Art Therapy     [facility] [address], CTRS        Group Therapy Note    Attendees: 11    Pt did not attend Therapeutic Recreation d/t resting in room despite staff invitation to attend. 1:1 talk time offered as alternative to group session, pt declined.

## 2020-04-26 NOTE — BH NOTE
Group Therapy Note     Date: 4/26/2020     Group Start Time: 1600  Group End Time: 1630  Group Topic: Wellness Group      JUAN JOSE Villalobos, RN         Group Therapy Note     Attendees: 9/22     Pt did not attend Wellness Group d/t resting in room despite staff invitation to attend. 1:1 talk time offered as alternative to group session, pt declined

## 2020-04-27 PROCEDURE — 1240000000 HC EMOTIONAL WELLNESS R&B

## 2020-04-27 PROCEDURE — 99232 SBSQ HOSP IP/OBS MODERATE 35: CPT | Performed by: PSYCHIATRY & NEUROLOGY

## 2020-04-27 PROCEDURE — 6370000000 HC RX 637 (ALT 250 FOR IP): Performed by: PSYCHIATRY & NEUROLOGY

## 2020-04-27 PROCEDURE — 6370000000 HC RX 637 (ALT 250 FOR IP): Performed by: NURSE PRACTITIONER

## 2020-04-27 RX ORDER — LORAZEPAM 2 MG/ML
2 INJECTION INTRAMUSCULAR 2 TIMES DAILY PRN
Status: DISCONTINUED | OUTPATIENT
Start: 2020-04-27 | End: 2020-04-29 | Stop reason: HOSPADM

## 2020-04-27 RX ORDER — ZIPRASIDONE MESYLATE 20 MG/ML
20 INJECTION, POWDER, LYOPHILIZED, FOR SOLUTION INTRAMUSCULAR EVERY 12 HOURS PRN
Status: DISCONTINUED | OUTPATIENT
Start: 2020-04-27 | End: 2020-04-29 | Stop reason: HOSPADM

## 2020-04-27 RX ORDER — ZIPRASIDONE MESYLATE 20 MG/ML
20 INJECTION, POWDER, LYOPHILIZED, FOR SOLUTION INTRAMUSCULAR EVERY 12 HOURS PRN
Status: DISCONTINUED | OUTPATIENT
Start: 2020-04-27 | End: 2020-04-27

## 2020-04-27 RX ADMIN — OLANZAPINE 10 MG: 10 TABLET, FILM COATED ORAL at 08:22

## 2020-04-27 RX ADMIN — OLANZAPINE 10 MG: 10 TABLET, FILM COATED ORAL at 22:47

## 2020-04-27 RX ADMIN — TRAZODONE HYDROCHLORIDE 50 MG: 50 TABLET ORAL at 22:47

## 2020-04-27 RX ADMIN — DIVALPROEX SODIUM 750 MG: 250 TABLET, FILM COATED, EXTENDED RELEASE ORAL at 08:21

## 2020-04-27 RX ADMIN — DIVALPROEX SODIUM 750 MG: 250 TABLET, FILM COATED, EXTENDED RELEASE ORAL at 22:45

## 2020-04-27 NOTE — PLAN OF CARE
Problem: Pain:  Goal: Control of chronic pain  Description: Control of chronic pain  Outcome: Met This Shift  Note: Patient reports the tylenol at night time helps with his back pain.      Problem: Altered Mood, Depressive Behavior:  Goal: Able to verbalize and/or display a decrease in depressive symptoms  Description: Able to verbalize and/or display a decrease in depressive symptoms  4/26/2020 2212 by Chay Oakes RN  Outcome: Ongoing  Note: Patient continues to isolate himself, does come out for needs, reports he is feeling better, was irritable earlier in the day but pleasant and cooperative during night shift, patient medication compliant and behaviorally controlled thus far

## 2020-04-27 NOTE — PROGRESS NOTES
Group Therapy Note    Date: 4/27/2020    [facility]    Writer attempted to meet with patient 1:1, but patient refused 1:1 talk time. Patient did not develop daily goal after encouragement from staff.      Signature:  Tamia Kingston

## 2020-04-28 PROCEDURE — 1240000000 HC EMOTIONAL WELLNESS R&B

## 2020-04-28 PROCEDURE — 6370000000 HC RX 637 (ALT 250 FOR IP): Performed by: NURSE PRACTITIONER

## 2020-04-28 PROCEDURE — 6370000000 HC RX 637 (ALT 250 FOR IP): Performed by: PSYCHIATRY & NEUROLOGY

## 2020-04-28 PROCEDURE — 99232 SBSQ HOSP IP/OBS MODERATE 35: CPT | Performed by: PSYCHIATRY & NEUROLOGY

## 2020-04-28 RX ADMIN — OLANZAPINE 10 MG: 10 TABLET, FILM COATED ORAL at 21:24

## 2020-04-28 RX ADMIN — DIVALPROEX SODIUM 750 MG: 250 TABLET, FILM COATED, EXTENDED RELEASE ORAL at 12:01

## 2020-04-28 RX ADMIN — OLANZAPINE 10 MG: 10 TABLET, FILM COATED ORAL at 12:01

## 2020-04-28 RX ADMIN — DIVALPROEX SODIUM 750 MG: 250 TABLET, FILM COATED, EXTENDED RELEASE ORAL at 21:24

## 2020-04-28 RX ADMIN — TRAZODONE HYDROCHLORIDE 50 MG: 50 TABLET ORAL at 21:24

## 2020-04-28 NOTE — GROUP NOTE
Group Therapy Note    Date: 4/28/2020    Group Start Time: 1330  Group End Time: 7419  Group Topic: Recreational    [facility], [facility]    Patient refused to attend Recreational Therapy Group at 1330 after encouragement from staff. 1:1 talk time offered.     Signature:  Dax Gorman

## 2020-04-28 NOTE — BH NOTE
OLANZapine (ZYPREXA) tablet 10 mg  10 mg Oral BID Shania BEAN MD   10 mg at 04/27/20 2247    acetaminophen (TYLENOL) tablet 650 mg  650 mg Oral Q4H PRN Cj Mckeon, APRN - CNP   650 mg at 04/26/20 2121    traZODone (DESYREL) tablet 50 mg  50 mg Oral Nightly PRN Cjst Mckeon, APRN - CNP   50 mg at 04/27/20 2247    benztropine mesylate (COGENTIN) injection 2 mg  2 mg Intramuscular BID PRN Cj Mckeon, APRN - CNP        magnesium hydroxide (MILK OF MAGNESIA) 400 MG/5ML suspension 30 mL  30 mL Oral Daily PRN Cj Mckeon, APRN - CNP        aluminum & magnesium hydroxide-simethicone (MAALOX) 200-200-20 MG/5ML suspension 30 mL  30 mL Oral Q6H PRN Cj Mckeon, APRN - CNP             divalproex  750 mg Oral BID    OLANZapine  10 mg Oral BID       ASSESSMENT  Schizoaffective disorder (HCC)     Patient's Response to Treatment: negative    PLAN  Dragon voice recognition software used in portions of this document. To continue current management. Supportive therapy was provided.

## 2020-04-28 NOTE — GROUP NOTE
Group Therapy Note    Date: 4/28/2020    Group Start Time: 1100  Group End Time: 9350  Group Topic: Recreational    CZ STEPH Vásquez, CTRS    Patient did not attend Recreation Therapy Group after encouragement from staff. 1:1 talk time offered but patient refused.      Signature:  Vesna Smallwood
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
## 2020-04-29 VITALS
TEMPERATURE: 97.9 F | RESPIRATION RATE: 16 BRPM | SYSTOLIC BLOOD PRESSURE: 131 MMHG | HEART RATE: 83 BPM | BODY MASS INDEX: 35 KG/M2 | HEIGHT: 71 IN | DIASTOLIC BLOOD PRESSURE: 68 MMHG | WEIGHT: 250 LBS

## 2020-04-29 PROCEDURE — 99239 HOSP IP/OBS DSCHRG MGMT >30: CPT | Performed by: PSYCHIATRY & NEUROLOGY

## 2020-04-29 PROCEDURE — 6370000000 HC RX 637 (ALT 250 FOR IP): Performed by: PSYCHIATRY & NEUROLOGY

## 2020-04-29 RX ORDER — TRAZODONE HYDROCHLORIDE 50 MG/1
50 TABLET ORAL NIGHTLY PRN
Qty: 30 TABLET | Refills: 0 | Status: ON HOLD | OUTPATIENT
Start: 2020-04-29 | End: 2020-07-24

## 2020-04-29 RX ORDER — OLANZAPINE 10 MG/1
10 TABLET ORAL 2 TIMES DAILY
Qty: 60 TABLET | Refills: 0 | Status: ON HOLD | OUTPATIENT
Start: 2020-04-29 | End: 2020-07-20

## 2020-04-29 RX ORDER — DIVALPROEX SODIUM 250 MG/1
750 TABLET, EXTENDED RELEASE ORAL 2 TIMES DAILY
Qty: 60 TABLET | Refills: 0 | Status: ON HOLD | OUTPATIENT
Start: 2020-04-29 | End: 2020-07-20

## 2020-04-29 RX ADMIN — OLANZAPINE 10 MG: 10 TABLET, FILM COATED ORAL at 08:00

## 2020-04-29 RX ADMIN — DIVALPROEX SODIUM 750 MG: 250 TABLET, FILM COATED, EXTENDED RELEASE ORAL at 07:59

## 2020-04-29 NOTE — CARE COORDINATION
1:1 completed. Pt discharging on this date and states he will need transportation to [facility] to  his car. [facility]; however, Pat Letters was informed [facility] will no longer provide transportation to patients in order to  their car. Writer discussed process with pt, he was accepting of information. Pt remained in bed during interaction, had appropriate eye contact and was cooperative.

## 2020-04-29 NOTE — GROUP NOTE
Group Therapy Note    Date: 4/29/2020    Group Start Time: 0940  Group End Time: 0930  Group Topic: Community Meeting    [facility] STEPH FERNANDEZ    [place], [place]    Patient refused to attend Recreational Therapy Group at 2043 after encouragement from staff. 1:1 talk time offered.     Signature:  Timothy Fofana

## 2020-04-29 NOTE — GROUP NOTE
Group Therapy Note    Date: 4/29/2020    Group Start Time: 1100  Group End Time: 9171  Group Topic: Recreational    CZ STEPH José, CTRS    Patient was not able to attend Recreation Therapy Group due to planning hospital discharge.       Signature:  Nicole Child

## 2020-04-29 NOTE — DISCHARGE SUMMARY
DISCHARGE SUMMARY  Patient ID:  Layton Parrish  [id number]  50 y.o.  1975    Admit date: 4/20/2020    Discharge date and time: 4/29/2020  7:57 AM     Admitting Physician: Brayton Babinski, MD     Discharge Physician:  Cole Shirley MD    Admission Diagnoses: Schizoaffective disorder ([address] [F25.9]  Schizoaffective disorder ([address] [F25.9]  Schizoaffective disorder ([address] [F25.9]    Discharge Diagnoses:   Schizoaffective disorder [facility])     Patient Active Problem List   Diagnosis Code    Paranoid schizophrenia, chronic condition with acute exacerbation ([address] F20.0    Polysubstance abuse ([address] F19.10    Schizophrenia, paranoid ([address] F20.0    Paranoid schizophrenia ([address] F20.0    Schizoaffective disorder, bipolar type ([address] F25.0    Schizophrenia ([address] F20.9    Schizoaffective disorder ([address] F25.9        Admission Condition: poor    Discharged Condition: stable    Indication for Admission: threat to self    History of Present Illnes (present tense wording indicates findings from admission exam on 4/20/2020 and are not necessarily indicative of current findings): Hospital Course:   Upon admission, Layton Parrish was provided a safe secure environment, introduced to unit milieu. Patient participated in groups and individual therapies. Meds were adjusted. After few days of hospital care, patient began to feel improvement. Depression lifted, thoughts to harm self ceased. Sleep improved, appetite was good. On morning rounds 4/29/2020, patient endorsed feeling ready for discharge. Patient denies suicidal or homicidal ideations, denies hallucinations or delusions. Denies SE's from meds. It was decided that pt had achieved maximum benefit from hospital care and can be discharged     Consults:   None    Significant Diagnostic Studies: Routine labs and diagnostics    Treatments: Psychotropic medications, therapy with group, milieu, and 1:1 with nurses, social workers, PA-C/CNP, and Attending physician. Discharge Medications:  Current Discharge Medication List      START taking these medications    Details   divalproex (DEPAKOTE ER) 250 MG extended release tablet Take 3 tablets by mouth 2 times daily  Qty: 60 tablet, Refills: 0         CONTINUE these medications which have CHANGED    Details   traZODone (DESYREL) 50 MG tablet Take 1 tablet by mouth nightly as needed for Sleep  Qty: 30 tablet, Refills: 0      OLANZapine (ZYPREXA) 10 MG tablet Take 1 tablet by mouth 2 times daily  Qty: 60 tablet, Refills: 0         STOP taking these medications       tiZANidine (ZANAFLEX) 4 MG tablet Comments:   Reason for Stopping:         benztropine (COGENTIN) 1 MG tablet Comments:   Reason for Stopping:                  Core Measures statement:   Not applicable    Discharge Exam:  Level of consciousness:  Within normal limits  Appearance: Street clothes, seated, with good grooming  Behavior/Motor: No abnormalities noted  Attitude toward examiner:  Cooperative, attentive, good eye contact  Speech:  spontaneous, normal rate, normal volume and well articulated  Mood:  euthymic  Affect:  mood congruent  Thought processes:  linear, goal directed and coherent  Thought content:  Homocidal ideation denies  Suicidal Ideation:  denies suicidal ideation  Delusions:  no evidence of delusions  Perceptual Disturbance:  denies any perceptual disturbance  Cognition:  In tact  Memory: age appropriate  Insight & Judgement: fair  Medication side effects:  denies     Disposition: home    Patient Instructions: Activity: activity as tolerated    Follow-up as scheduled with [facility] and PCP     Time Spent: 35 minutes    Engagement: Patient displayed a good level of engagement with the treatments offered during this admission. Discharge planning, findings, and recommendations were discussed with patient. Signed:  Delfin Mariano   4/29/2020  7:57 AM  Dragon voice recognition software used in portions of this document.

## 2020-07-17 ENCOUNTER — HOSPITAL ENCOUNTER (EMERGENCY)
Age: 45
Discharge: HOME | End: 2020-07-17
Attending: EMERGENCY MEDICINE
Payer: MEDICARE

## 2020-07-17 PROCEDURE — 99282 EMERGENCY DEPT VISIT SF MDM: CPT

## 2020-07-17 ASSESSMENT — ENCOUNTER SYMPTOMS
ABDOMINAL PAIN: 0
COUGH: 0

## 2020-07-17 NOTE — ED PROVIDER NOTES
arthralgias. Skin: Negative for rash. Neurological: Negative for headaches. Psychiatric/Behavioral: Positive for agitation. Negative for hallucinations, self-injury and suicidal ideas. PAST MEDICAL HISTORY     Past Medical History:   Diagnosis Date    Asthma     Hypertension     [facility]     Schizoaffective disorder ([facility]     Substance abuse ([facility]        SURGICAL HISTORY     No past surgical history on file. CURRENT MEDICATIONS       Previous Medications    DIVALPROEX (DEPAKOTE ER) 250 MG EXTENDED RELEASE TABLET    Take 3 tablets by mouth 2 times daily    OLANZAPINE (ZYPREXA) 10 MG TABLET    Take 1 tablet by mouth 2 times daily    TRAZODONE (DESYREL) 50 MG TABLET    Take 1 tablet by mouth nightly as needed for Sleep       ALLERGIES     has No Known Allergies. FAMILY HISTORY     has no family status information on file. SOCIAL HISTORY      reports that he has never smoked. He has never used smokeless tobacco. He reports current alcohol use. He reports current drug use. PHYSICAL EXAM     General: NAD  Head: NCAT  Eye: Pupils are equal and reactive  Heart: Regular rate and rhythm no murmurs  Lungs: Clear to auscultation bilaterally, no respiratory distress  Chest wall: No crepitus, no tenderness palpation  Abdomen: Soft, nontender, nondistended, with no peritoneal signs  Neurologic: The patient is ANO x3, he has fluent speech, he is moving all extremities appropriately. Extremities: No edema, no erythema, no focal tenderness,    MEDICAL DECISION MAKING:     MDM  56-year-old male with complaints of leg pain which is chronic for him. No sign of any neurologic deficits. No sign of any discitis osteomyelitis or cauda equina syndrome. Patient also with complaints of wanting to speak with psychiatrist.  No suicidal or homicidal thoughts. Patient declines any further evaluation in the emergency department.  Reports that he prefers to follow-up with his outpatient psychiatrist. I spoke with our   and the patient is able to follow at the [facility]. He is declining any evaluation here in our emergency department. At this time  I do not see any   Indication for an involuntary admission. He does not appear intoxicated. He is denying any suicidal or homicidal thoughts. His mood is labile, I reviewed his previous admission here to [facility] earlier this year. I think it is appropriate for the patient to follow-up with outpatient psychiatry at this time. DIAGNOSTIC RESULTS     EMERGENCY DEPARTMENT COURSE:   Vitals: There were no vitals filed for this visit. The patient was given the following medications while in the emergency department:  No orders of the defined types were placed in this encounter. -------------------------  CRITICAL CARE:   CONSULTS: None  PROCEDURES: Procedures     FINAL IMPRESSION      1. Schizoaffective disorder, unspecified type ([facility]    2.  Right leg pain          DISPOSITION/PLAN   DISPOSITION Decision To Discharge 07/17/2020 01:49:52 PM      PATIENT REFERRED TO:  Montse Terrell [address] [place] [address]  [address] [place] [address]  [address] [facility] [address]  [phone]    If symptoms worsen      DISCHARGE MEDICATIONS:  New Prescriptions    No medications on file         Jarocho Mcbride MD  Attending Emergency Physician                     Jarocho Mcbride MD  07/17/20 5206

## 2020-07-17 NOTE — ED NOTES
Pt belligerent, yelling profanities at ER staff and security. Pt refusing to answer this nurse's or the ER physician's questions. Pt states he came to this ER for chronic leg pain. Pt denies S/I or H/I, and he denies any psychiatric issues . Pt refused to have his vitals taken. Pt requesting to leave. Pt has no obvious s/s's of injury or trauma. Pt is ultimately escorted out of the ER by this RN and security.         Lourdes Baum RN  07/17/20 6400

## 2020-07-20 ENCOUNTER — HOSPITAL ENCOUNTER (INPATIENT)
Age: 45
LOS: 4 days | Discharge: HOME | DRG: 885 | End: 2020-07-24
Attending: PSYCHIATRY & NEUROLOGY | Admitting: PSYCHIATRY & NEUROLOGY
Payer: MEDICARE

## 2020-07-20 PROCEDURE — 6370000000 HC RX 637 (ALT 250 FOR IP): Performed by: NURSE PRACTITIONER

## 2020-07-20 PROCEDURE — 1240000000 HC EMOTIONAL WELLNESS R&B

## 2020-07-20 RX ORDER — ACETAMINOPHEN 325 MG/1
650 TABLET ORAL EVERY 4 HOURS PRN
Status: DISCONTINUED | OUTPATIENT
Start: 2020-07-20 | End: 2020-07-24 | Stop reason: HOSPADM

## 2020-07-20 RX ORDER — MAGNESIUM HYDROXIDE/ALUMINUM HYDROXICE/SIMETHICONE 120; 1200; 1200 MG/30ML; MG/30ML; MG/30ML
30 SUSPENSION ORAL EVERY 6 HOURS PRN
Status: DISCONTINUED | OUTPATIENT
Start: 2020-07-20 | End: 2020-07-24 | Stop reason: HOSPADM

## 2020-07-20 RX ORDER — HYDROXYZINE HYDROCHLORIDE 25 MG/1
25 TABLET, FILM COATED ORAL 3 TIMES DAILY PRN
Status: DISCONTINUED | OUTPATIENT
Start: 2020-07-20 | End: 2020-07-24 | Stop reason: HOSPADM

## 2020-07-20 RX ORDER — TRAZODONE HYDROCHLORIDE 50 MG/1
50 TABLET ORAL NIGHTLY PRN
Status: DISCONTINUED | OUTPATIENT
Start: 2020-07-20 | End: 2020-07-24 | Stop reason: HOSPADM

## 2020-07-20 RX ORDER — NICOTINE 21 MG/24HR
1 PATCH, TRANSDERMAL 24 HOURS TRANSDERMAL DAILY
Status: DISCONTINUED | OUTPATIENT
Start: 2020-07-20 | End: 2020-07-22

## 2020-07-20 RX ADMIN — TRAZODONE HYDROCHLORIDE 50 MG: 50 TABLET ORAL at 21:14

## 2020-07-20 RX ADMIN — HYDROXYZINE HYDROCHLORIDE 25 MG: 25 TABLET, FILM COATED ORAL at 21:14

## 2020-07-20 ASSESSMENT — PAIN SCALES - GENERAL
PAINLEVEL_OUTOF10: 0
PAINLEVEL_OUTOF10: 5

## 2020-07-20 ASSESSMENT — LIFESTYLE VARIABLES: HISTORY_ALCOHOL_USE: NO

## 2020-07-20 ASSESSMENT — PAIN DESCRIPTION - LOCATION: LOCATION: FOOT;LEG

## 2020-07-20 ASSESSMENT — SLEEP AND FATIGUE QUESTIONNAIRES
DO YOU HAVE DIFFICULTY SLEEPING: NO
DO YOU USE A SLEEP AID: NO

## 2020-07-20 ASSESSMENT — PATIENT HEALTH QUESTIONNAIRE - PHQ9: SUM OF ALL RESPONSES TO PHQ QUESTIONS 1-9: 14

## 2020-07-20 ASSESSMENT — PAIN DESCRIPTION - DESCRIPTORS: DESCRIPTORS: SHARP;SHOOTING

## 2020-07-20 ASSESSMENT — PAIN DESCRIPTION - FREQUENCY: FREQUENCY: INTERMITTENT

## 2020-07-20 ASSESSMENT — PAIN DESCRIPTION - ORIENTATION: ORIENTATION: LEFT;RIGHT

## 2020-07-20 NOTE — BH NOTE
`[facility]  Admission Note     Admission Type:   Admission Type: Voluntary    Reason for admission:  Reason for Admission: Suicidal Ideations with plan to walk into traffic, Homicidal towards anyone    PATIENT STRENGTHS:  Strengths: Communication    Patient Strengths and Limitations:  Limitations: Difficult relationships / poor social skills    Addictive Behavior:   Addictive Behavior  In the past 3 months, have you felt or has someone told you that you have a problem with:  : None  Do you have a history of Chemical Use?: No  Do you have a history of Alcohol Use?: No  Do you have a history of Street Drug Abuse?: No  Histroy of Prescripton Drug Abuse?: No    Medical Problems:   Past Medical History:   Diagnosis Date    Asthma     Hypertension     Morbid [facility] ([facility]     Schizoaffective disorder ([facility]     Substance abuse ([facility]        Status EXAM:  Status and Exam  Normal: No  Facial Expression: Exaggerated  Affect: Blunt  Level of Consciousness: Alert  Mood:Normal: No  Mood: Labile, Irritable  Motor Activity:Normal: Yes  Interview Behavior: Cooperative, Irritable  Preception: [place] to Person, Lucyann Emerald to Time, [place] to Place, [place] to Situation  Attention:Normal: No  Attention: Distractible  Thought Processes: Circumstantial  Thought Content:Normal: No  Thought Content: Preoccupations, Paranoia  Hallucinations: None  Delusions: No  Memory:Normal: No  Memory: Poor Recent  Insight and Judgment: No  Insight and Judgment: Poor Judgment, Poor Insight  Present Suicidal Ideation: Yes(patient is able to contract for safety)  Present Homicidal Ideation: Yes(patient states he wont act on these thoughts while he is here)    Tobacco Screening:  Practical Counseling, on admission, sung X, if applicable and completed (first 3 are required if patient doesn't refuse):            ( )  Recognizing danger situations (included triggers and roadblocks)                    ( )  Coping skills (new ways to manage

## 2020-07-20 NOTE — BH NOTE
Patient did not participate in PM wellness group,  After invitation given. Patient remain seclusive to self. Every 15 minute checks maintained.

## 2020-07-20 NOTE — PROGRESS NOTES
Pt unable to complete leisure assessment at this time. Rec Therapy staff will meet with pt on 7/21/2020 to complete assessment.

## 2020-07-21 PROCEDURE — 6370000000 HC RX 637 (ALT 250 FOR IP): Performed by: NURSE PRACTITIONER

## 2020-07-21 PROCEDURE — 90792 PSYCH DIAG EVAL W/MED SRVCS: CPT | Performed by: NURSE PRACTITIONER

## 2020-07-21 PROCEDURE — 1240000000 HC EMOTIONAL WELLNESS R&B

## 2020-07-21 RX ORDER — OLANZAPINE 7.5 MG/1
7.5 TABLET ORAL NIGHTLY
Status: DISCONTINUED | OUTPATIENT
Start: 2020-07-21 | End: 2020-07-24 | Stop reason: HOSPADM

## 2020-07-21 RX ORDER — DIVALPROEX SODIUM 500 MG/1
500 TABLET, EXTENDED RELEASE ORAL 2 TIMES DAILY
Status: DISCONTINUED | OUTPATIENT
Start: 2020-07-21 | End: 2020-07-24 | Stop reason: HOSPADM

## 2020-07-21 RX ADMIN — HYDROXYZINE HYDROCHLORIDE 25 MG: 25 TABLET, FILM COATED ORAL at 20:39

## 2020-07-21 RX ADMIN — DIVALPROEX SODIUM 500 MG: 500 TABLET, EXTENDED RELEASE ORAL at 20:39

## 2020-07-21 RX ADMIN — TRAZODONE HYDROCHLORIDE 50 MG: 50 TABLET ORAL at 20:39

## 2020-07-21 RX ADMIN — OLANZAPINE 7.5 MG: 7.5 TABLET, FILM COATED ORAL at 20:39

## 2020-07-21 ASSESSMENT — PAIN SCALES - GENERAL: PAINLEVEL_OUTOF10: 0

## 2020-07-21 NOTE — H&P
HISTORY and [id number]       NAME:  Mike Coronel  MRN: [id number]   YOB: 1975   Date: 7/21/2020   Age: 38 y.o. Gender: male     COMPLAINT AND PRESENT HISTORY:      Mike Coronel is 38 y.o.,  male, admitted because of Schizoaffective Disorder/ Schizophrenia. Active Problems:  According to the ED reports, pt transferred from  [facility] for suicidal ideation to shoot self and homicidal ideation toward no one specific. Pt has no plain for suicidal or homicidal ideations. Patient denies auditory hallucinations, or visual hallucinations. Patient stats that he has been on his psychiatric medications . Patient denies any current alcohol or substance abuse   Patient admits to sleep disturbances. Patient admits he has good appetite . Pt report easily to get agitated and he has irritable mood. Patient  Complain of back pain . He denies any other somatic complaints. No significant lab values or procedures. No chest pain or shortness of breath. No fever/chills. No N/V/D. Please see patient's psychiatric hx for more information. DIAGNOSTIC RESULTS   Labs:  CBC: No results for input(s): WBC, HGB, PLT in the last 72 hours. BMP:  No results for input(s): NA, K, CL, CO2, BUN, CREATININE, GLUCOSE in the last 72 hours. Hepatic: No results for input(s): AST, ALT, ALB, BILITOT, ALKPHOS in the last 72 hours. Lipids: No results for input(s): CHOL, HDL in the last 72 hours.     Invalid input(s): LDLCALCU  U/A:  Lab Results   Component Value Date    COLORU YELLOW 07/23/2018    WBCUA None 07/23/2018    RBCUA None 07/23/2018    MUCUS NOT REPORTED 07/23/2018    BACTERIA NOT REPORTED 07/23/2018    SPECGRAV 1.008 07/23/2018    LEUKOCYTESUR NEGATIVE 07/23/2018    GLUCOSEU NEGATIVE 07/23/2018    AMORPHOUS NOT REPORTED 07/23/2018       PAST MEDICAL HISTORY     Past Medical History:   Diagnosis Date    Asthma     Hypertension     [facility]bid [facility] ([facility]     Schizoaffective disorder ([facility]     Substance abuse ([facility]        Pt denies any history of Diabetes mellitus type 2, hypertension, stroke, heart disease, COPD, Asthma, GERD, HLD, Cancer, Seizures,Thyroid disease, Kidney Disease, Hepatitis, TB.    SURGICAL HISTORY     History reviewed. No pertinent surgical history. FAMILY HISTORY       Family History   Family history unknown: Yes       SOCIAL HISTORY       Social History     Socioeconomic History    Marital status: Single     Spouse name: None    Number of children: None    Years of education: None    Highest education level: None   Occupational History    None   Social Needs    Financial resource strain: None    Food insecurity     Worry: None     Inability: None    Transportation needs     Medical: None     Non-medical: None   Tobacco Use    Smoking status: Never Smoker    Smokeless tobacco: Never Used    Tobacco comment: pt nonsmoker   Substance and Sexual Activity    Alcohol use: Yes     Comment: Hx of Alcohol abuse    Drug use: Yes     Comment: Hx of substance abuse?  Sexual activity: None   Lifestyle    Physical activity     Days per week: None     Minutes per session: None    Stress: None   Relationships    Social connections     Talks on phone: None     Gets together: None     Attends Latter-day service: None     Active member of club or organization: None     Attends meetings of clubs or organizations: None     Relationship status: None    Intimate partner violence     Fear of current or ex partner: None     Emotionally abused: None     Physically abused: None     Forced sexual activity: None   Other Topics Concern    None   Social History Narrative    None           REVIEW OF SYSTEMS      No Known Allergies    No current facility-administered medications on file prior to encounter.       Current Outpatient Medications on File Prior to Encounter   Medication Sig Dispense Refill    traZODone (DESYREL) 50 MG tablet Take 1 tablet by mouth nightly as needed for Sleep 30 tablet 0                    General health:  Fairly good. No fever or chills. Skin:  No itching, redness or rash. Head, eyes, ears, nose, throat:  No headache, epistaxis, rhinorrhea hearing loss or sore throat. Neck:  No pain, stiffness or masses. Cardiovascular/Respiratory system:  No chest pain, palpitation, shortness of breath, coughing or expectoration. Gastrointestinal tract: No abdominal pain, nausea, vomiting, dysphagia, diarrhea or constipation. Genitourinary:  No burning on micturition. No hesitancy, urgency, frequency or discoloration of urine. Locomotor:  No bone or joint pains. No swelling or deformities. Positive for back pain . Neuropsychiatric:  See HPI. GENERAL PHYSICAL EXAM:     Vitals: BP (!) 122/55   Pulse 62   Temp 98.5 °F (36.9 °C) (Oral)   Resp 12   Ht 5' 11\" (1.803 m)   Wt 240 lb (108.9 kg)   BMI 33.47 kg/m²  Body mass index is 33.47 kg/m². Pt was examined with a nurse present in the room. GENERAL APPEARANCE:  Popeye Smalls is 38 y.o.,  male, mildly obese, nourished, conscious, alert. Does not appear to be distress or pain at this time. SKIN:  Warm, dry, no cyanosis or jaundice. HEAD:  Normocephalic, atraumatic, no swelling or tenderness. EYES:  Pupils equal, reactive to light, Conjunctiva is clear, EOMs intact steven. eyelids WNL. EARS:  No discharge, no marked hearing loss. NOSE:  No rhinorrhea, epistaxis or septal deformity. THROAT:  Not congested. No ulceration bleeding or discharge. NECK:  No stiffness, trachea central.  No palpable masses or L.N.      CHEST:  Symmetrical and equal on expansion. HEART:  Regular rate and rhythm. S1 > S2, No audible murmurs or gallops. LUNGS:  Equal on expansion, normal breath sounds. No adventitious sounds. ABDOMEN:  Obese. Soft on palpation. No localized tenderness. No guarding or rigidity.  No palpable organomegaly. LYMPHATICS:  No palpable cervical Lymphadenopathy. LOCOMOTOR, BACK AND SPINE: no tenderness with palpation, no deformities. EXTREMITIES:  Symmetrical, no pretibial edema. Hailys sign negative. No discoloration or ulcerations. NEUROLOGIC:  The patient is conscious, alert, oriented,Cranial nerve II-XII intact, taste and smell were not examined. No apparent focal sensory or motor deficits. Muscle strength equal Jasson. No facial droop, tongue protrudes centrally, no slurring of the speech. PROVISIONAL DIAGNOSES:      Active Problems:    Schizoaffective disorder ([facility]  Resolved Problems:    * No resolved hospital problems.  *      MARIA D Cabrera - CNP on 7/21/2020 at 3:33 PM

## 2020-07-21 NOTE — PLAN OF CARE
Problem: Altered Mood, Depressive Behavior:  Goal: Able to verbalize and/or display a decrease in depressive symptoms  Description: Able to verbalize and/or display a decrease in depressive symptoms  Outcome: Ongoing  Note: Patient isolative to room. Extremely agitated when asked questions. SI no plan.       Problem: Altered Mood, Depressive Behavior:  Goal: Ability to disclose and discuss suicidal ideas will improve  Description: Ability to disclose and discuss suicidal ideas will improve  Outcome: Ongoing     Problem: Altered Mood, Depressive Behavior:  Goal: Absence of self-harm  Description: Absence of self-harm  Outcome: Ongoing

## 2020-07-21 NOTE — GROUP NOTE
Group Therapy Note    Date: 7/21/2020    Group Start Time: 1330  Group End Time: 4417  Group Topic: Psychoeducation    GIAN Jaime, CTRS    Patient refused to attend socialization skills group at 1330 after encouragement from staff.   1:1 talk time offered by attended group as alternative to group session

## 2020-07-21 NOTE — PLAN OF CARE
Problem: Altered Mood, Depressive Behavior:  Goal: Able to verbalize and/or display a decrease in depressive symptoms  Description: Able to verbalize and/or display a decrease in depressive symptoms  7/21/2020 0821 by Chelsea Smart  Outcome: Ongoing  Pt admits to feelings of depression and rates the severity 10/10. Writer offered support and pt accepted. Will continue to monitor and provide support as needed. Q15min checks for safety. Problem: Altered Mood, Depressive Behavior:  Goal: Ability to disclose and discuss suicidal ideas will improve  Description: Ability to disclose and discuss suicidal ideas will improve  7/21/2020 0821 by Chelsea Smart  Outcome: Ongoing  Patient admits to suicidal ideations without a plan and contracts for safety. Writer provided support. Safe environment maintained and patient remains free from self-harm. Agreeable to seeking staff should intent to harm self arise. Q15min checks for safety. Problem: Altered Mood, Depressive Behavior:  Goal: Absence of self-harm  Description: Absence of self-harm  7/21/2020 3769 by Chelsea Smart  Outcome: Ongoing  Safe environment maintained and patient remains free from self-harm. Agreeable to seeking staff should thoughts to harm self or others arise. Q15min checks for safety.

## 2020-07-21 NOTE — PLAN OF CARE
5 [facility]  Initial Interdisciplinary Treatment Plan NO      Original treatment plan Date & Time: 7/21/2020 0816    Admission Type:  Admission Type: Voluntary    Reason for admission:   Reason for Admission: Suicidal Ideations with plan to walk into traffic, Homicidal towards anyone    Estimated Length of Stay:  5-7days  Estimated Discharge Date: to be determined by physician    PATIENT STRENGTHS:  Patient Strengths:Strengths: Communication  Patient Strengths and Limitations:Limitations: Difficult relationships / poor social skills  Addictive Behavior: Addictive Behavior  In the past 3 months, have you felt or has someone told you that you have a problem with:  : None  Do you have a history of Chemical Use?: No  Do you have a history of Alcohol Use?: No  Do you have a history of Street Drug Abuse?: No  Histroy of Prescripton Drug Abuse?: No  Medical Problems:  Past Medical History:   Diagnosis Date    Asthma     Hypertension     Morbid obesity ([facility] [address]     Schizoaffective disorder ([facility] [address]     Substance abuse ([facility]      Status EXAM:Status and Exam  Normal: No  Facial Expression: Avoids Gaze, Flat, Sad, Hostile  Affect: Blunt  Level of Consciousness: Alert  Mood:Normal: No  Mood: Depressed, Anxious, Helpless, Irritable, Labile  Motor Activity:Normal: No  Motor Activity: Decreased  Interview Behavior: Irritable, Evasive  Preception: [place] to Person, [place] to Time, [place] to Place, [place] to Situation  Attention:Normal: No  Attention: Distractible  Thought Processes: Circumstantial  Thought Content:Normal: No  Thought Content: Poverty of Content  Hallucinations: None  Delusions: No  Memory:Normal: No  Memory: Poor Remote  Insight and Judgment: No  Insight and Judgment: Poor Judgment, Poor Insight, Unmotivated  Present Suicidal Ideation: No  Present Homicidal Ideation: No    EDUCATION:   Learner Progress Toward Treatment Goals: reviewed group plans and strategies for care    Method:group therapy, medication compliance, individualized assessments and care planning    Outcome: needs reinforcement    PATIENT GOALS: to be discussed with patient within 72 hours    PLAN/TREATMENT RECOMMENDATIONS:     continue group therapy , medications compliance, goal setting, individualized assessments and care, continue to monitor pt on unit      SHORT-TERM GOALS:   Time frame for Short-Term Goals: 5-7 days    LONG-TERM GOALS:  Time frame for Long-Term Goals: 6 months  Members Present in Team Meeting: See Signature Sheet    Francesca Mandujano

## 2020-07-21 NOTE — GROUP NOTE
Group Therapy Note    Date: 7/21/2020    Group Start Time: 0900  Group End Time: 0915  Group Topic: Community Meeting    [facility]    Patient refused to attend community meeting and goal setting group at 0900 after encouragement from staff. 1:1 talk time offered by staff as alternative to group session.

## 2020-07-21 NOTE — GROUP NOTE
Group Therapy Note    Date: 7/21/2020    Group Start Time: 1000  Group End Time: 7367  Group Topic: Psychotherapy    [address], LSW    patient refused to attend psychotherapy group at [address] [facility] after encouragement from staff.   1:1 talk time provided as alternative to group session

## 2020-07-21 NOTE — CARE COORDINATION
BHI Biopsychosocial Assessment    Current Level of Psychosocial Functioning     Independent: xx  Dependent:   Minimal Assist:      Psychosocial High Risk Factors (check all that apply)    Unable to obtain meds:    Chronic illness/pain:     Substance abuse:    Lack of Family Support:    Financial stress:    Isolation:    Inadequate Community Resources:    Suicide attempt(s):    Not taking medications: xx   Victim of crime:    Developmental Delay:    Unable to manage personal needs:    Age 72 or older:    Homeless:    No transportation:    Readmission within 30 days:    Unemployment:    Traumatic Event:      Psychiatric Advanced Directives: None reported    Family to Involve in Treatment: None reported    Sexual Orientation: [facility]    Patient Strengths: previous inpatient treatment and successful discharge at [facility]    Patient Barriers: non-compliance with outpatient treatment and medication    Opiate Education: Patient chart indicates there is no substance use    CMHC/mental health history: Patient has been linked to [facility] at previous inpatient admission and discharge, Chart indicates patient did not  medications from pharmacy following this discharge (April 2020). Notes from [facility] indicate history of schizophrenia, psychosis, bipolar. Plan of Care   medication management, group/individual therapies, family meetings, psycho -education, treatment team meetings to assist with stabilization    Initial Discharge Plan: Patient to stabilize with medication      Clinical Summary:      Pt is a 38year old  male who presented to the [facility] via [facility] with reported suicidal ideation. Records from [facility] report suicidal and homicidal ideation \"toward anyone,\" however, CSSRS from [facility] indicates no wish to be dead. Patient  Chart indicates patient has not picked up medications from pharmacy following previous admission to [facility]. Patient chart also indicates patient has not attended follow up appointments with Yaquelin Valentin.   Patient chart indicates patient lives in his own home. Patient is not willing to meet with writer. Unable to assess education, work history, or income. Patient chart does not indicate current auditory or visual hallucinations. Unable to assess current suicidal or homicidal ideation.

## 2020-07-21 NOTE — VIRTUAL HEALTH
activity: Not on file   Lifestyle    Physical activity     Days per week: Not on file     Minutes per session: Not on file    Stress: Not on file   Relationships    Social connections     Talks on phone: Not on file     Gets together: Not on file     Attends Mandaeism service: Not on file     Active member of club or organization: Not on file     Attends meetings of clubs or organizations: Not on file     Relationship status: Not on file    Intimate partner violence     Fear of current or ex partner: Not on file     Emotionally abused: Not on file     Physically abused: Not on file     Forced sexual activity: Not on file   Other Topics Concern    Not on file   Social History Narrative    Not on file     Mental Status  Pt. was alert, fully oriented, and cooperative with encouragement. Appearance and hygiene werefair . Mood was angry. Affect was irritable Thought process was linear. Patient denied any hallucinations or paranoia. Patient denied suicidal ideations. Patient endorsed homicidal ideations . Patient's gross cognitive functions were intact. Insight and judgement were poor. Both recent and remote memory were intact. Psychomotor status was agitated. Diagnostic Impression    Schizoaffective Disorder      Medications   nicotine  1 patch Transdermal Daily     acetaminophen, traZODone, magnesium hydroxide, aluminum & magnesium hydroxide-simethicone, hydrOXYzine    Treatment Plan:  · Continue inpatient psychiatric treatment. · Supportive therapy with medication management. Reviewed risks and benefits as well as potential side effects with patient. · Continue Depakote ER 500mg BID beginning 7/21/2020; titrate to 750mg BID. · Continue Zyprexa 7.5mg @ HS beginning 7/21/2020; titrate to 10mg nightly. · Depakote level ordered for 7/25/2020. · Review medications for efficacy and side effects. · Therapeutic support and empathetic care provided greater than 20 minutes.   · Engage in therapeutic activities and groups. · Follow up at [facility] [place] after symptoms stabilized. Estimated length of stay: 5-7 days    MARIA D Lyons - CNP  Psychiatric Advanced Practice Nurse    Patient Location:  [address] [place] [address]    Provider Location ([facility]): Brendon Sanchez    This virtual visit was conducted via interactive/real-time audio/video.

## 2020-07-21 NOTE — PROGRESS NOTES
Pharmacy Medication History Note      List of current medications patient is taking is complete. Source of information: [address] [facility]    The patient has not picked up any prescriptions from [address] [place] [address] since 2015. He has had prescriptions sent to [address] [place] [address] including medications from last discharge but the patient never picked them up so prescriptions placed on file for patient. Please let me know if you have any questions about this encounter. Thank you!     Electronically signed by LISA Armendariz [facility] on 7/21/2020 at 9:13 AM

## 2020-07-22 PROCEDURE — 6370000000 HC RX 637 (ALT 250 FOR IP): Performed by: NURSE PRACTITIONER

## 2020-07-22 PROCEDURE — 1240000000 HC EMOTIONAL WELLNESS R&B

## 2020-07-22 PROCEDURE — 99232 SBSQ HOSP IP/OBS MODERATE 35: CPT | Performed by: NURSE PRACTITIONER

## 2020-07-22 RX ADMIN — DIVALPROEX SODIUM 500 MG: 500 TABLET, EXTENDED RELEASE ORAL at 08:46

## 2020-07-22 RX ADMIN — HYDROXYZINE HYDROCHLORIDE 25 MG: 25 TABLET, FILM COATED ORAL at 20:54

## 2020-07-22 RX ADMIN — DIVALPROEX SODIUM 500 MG: 500 TABLET, EXTENDED RELEASE ORAL at 20:54

## 2020-07-22 RX ADMIN — OLANZAPINE 7.5 MG: 7.5 TABLET, FILM COATED ORAL at 20:54

## 2020-07-22 RX ADMIN — TRAZODONE HYDROCHLORIDE 50 MG: 50 TABLET ORAL at 20:54

## 2020-07-22 NOTE — PLAN OF CARE
[address] [place] [address]  Day 3 Interdisciplinary Treatment Plan NOTE    Review Date & Time: 7/22/2020 1317    Admission Type:   Admission Type: Voluntary    Reason for admission:  Reason for Admission: Suicidal Ideations with plan to walk into traffic, Homicidal towards anyone  Estimated Length of Stay: 5-7 days  Estimated Discharge Date Update: to be determined by physician    PATIENT STRENGTHS:  Patient Strengths Strengths: Other(MUNIRA, patient would not meet with writer)  Patient Strengths and Limitations:Limitations: External locus of control, Difficult relationships / poor social skills, Multiple barriers to leisure interests, Tendency to isolate self, Apathetic / unmotivated  Addictive Behavior:Addictive Behavior  In the past 3 months, have you felt or has someone told you that you have a problem with:  : Other(Comments)(MUNIRA)  Do you have a history of Chemical Use?: (MUNIRA)  Do you have a history of Alcohol Use?: (MUNIRA)  Do you have a history of Street Drug Abuse?: (MUNIRA)  Histroy of Prescripton Drug Abuse?: (MUNIRA)  Medical Problems:  Past Medical History:   Diagnosis Date    Asthma     Hypertension     Morbid obesity ([facility] [address]     Schizoaffective disorder ([facility] [address]     Substance abuse ([facility]        Risk:  Fall RiskTotal: 53  Christopher Scale Christopher Scale Score: 23  BVC Total: 1  Change in scores no Changes to plan of Care no    Status EXAM:   Status and Exam  Normal: No  Facial Expression: Avoids Gaze, Flat  Affect: Blunt  Level of Consciousness: Alert  Mood:Normal: No  Mood: Depressed, Anxious, Irritable  Motor Activity:Normal: No  Motor Activity: Decreased  Interview Behavior: Cooperative, Evasive  Preception: [place] to Person, [place] to Time, [place] to Place, [place] to Situation  Attention:Normal: No  Attention: Distractible  Thought Processes: Blocking  Thought Content:Normal: No  Thought Content: Poverty of Content  Hallucinations: None  Delusions: No  Memory:Normal: No  Memory: Poor Recent, Poor Remote  Insight and Judgment: No  Insight and Judgment: Poor Judgment, Poor Insight, Unmotivated  Present Suicidal Ideation: No  Present Homicidal Ideation: No    Daily Assessment Last Entry:   Daily Sleep (WDL): Within Defined Limits         Patient Currently in Pain: Denies  Daily Nutrition (WDL): Within Defined Limits    Patient Monitoring:  Frequency of Checks: 4 times per hour, close    Psychiatric Symptoms:   Depression Symptoms  Depression Symptoms: Impaired concentration, Isolative, Loss of interest, Feelings of hopelessess  Anxiety Symptoms  Anxiety Symptoms: Generalized  Mayuri Symptoms  Mayuri Symptoms: No problems reported or observed. Psychosis Symptoms  Delusion Type: No problems reported or observed. Suicide Risk CSSR-S:  1) Within the past month, have you wished you were dead or wished you could go to sleep and not wake up? : Yes  2) Have you actually had any thoughts of killing yourself? : Yes  3) Have you been thinking about how you might kill yourself? : Yes  5) Have you started to work out or worked out the details of how to kill yourself?  Do you intend to carry out this plan? : No  6) Have you ever done anything, started to do anything, or prepared to do anything to end your life?: No  Change in Result No Change in Plan of care No      EDUCATION:   EDUCATION:   Learner Progress Toward Treatment Goals: Reviewed results and recommendations of this team, Reviewed group plan and strategies, Reviewed signs, symptoms and risk of self harm and violent behavior, Reviewed goals and plan of care    Method:small group, individual verbal education    Outcome:verbalized by patient, but needs reinforcement to obtain goals    PATIENT GOALS:  Short term: Pt refused to attend  Long term: Pt refused to attend     PLAN/TREATMENT RECOMMENDATIONS UPDATE: continue with group therapies, increased socialization, continue planning for after discharge goals, continue with medication compliance    SHORT-TERM GOALS UPDATE:   Time frame for Short-Term Goals: 5-7 days    LONG-TERM GOALS UPDATE:   Time frame for Long-Term Goals: 6 months  Members Present in Team Meeting: See Signature Sheet    Malorie Saab

## 2020-07-22 NOTE — GROUP NOTE
Group Therapy Note    Date: 7/22/2020    Group Start Time: 1330  Group End Time: 1296  Group Topic: Music Therapy    GIAN Auguste        Group Therapy Note  Pt did not attend music therapy group d/t resting in room despite staff invitation to attend. 1:1 talk time offered as alternative to group session, pt declined.

## 2020-07-22 NOTE — PLAN OF CARE
Problem: Altered Mood, Depressive Behavior:  Goal: Able to verbalize and/or display a decrease in depressive symptoms  Description: Able to verbalize and/or display a decrease in depressive symptoms  Outcome: Ongoing  Patient is seclusive to room and bed, took medications as prescribed but refused talk time and vitals. Patient affect is flat and patient refused all groups. Patient refused to discuss emotional issues with staff. Goal: Ability to disclose and discuss suicidal ideas will improve  Description: Ability to disclose and discuss suicidal ideas will improve  Outcome: Ongoing  Patient denies thoughts to harm self   Goal: Absence of self-harm  Description: Absence of self-harm  Outcome: Ongoing  No self harm noted.

## 2020-07-22 NOTE — VIRTUAL HEALTH
Department of Psychiatry  Nurse Practitioner Progress Note    Chief Complaint: Schizoaffective Disorder    SUBJECTIVE: Christiano Bergman is a 38 y.o. male who was involuntarily admitted from [facility] for suicidal and homicidal ideation. Patient stated that he had thoughts to shoot himself and that he had non-specific homicidal thoughts. His last admission so the [facility] was on 4/20/2020. Patient is seen via tele psych with staff present. He is in bed stating that he is sleeping although he is wide awake. He is again irritable and short with writer. He has been isolative to room and has not attended groups. He stated that he is tired and does not want to talk. He denied SI, hallucinations, depression and anxiety endorsing non-specific hallucinations. Chart and medications reviewed. Therapeutic support, empathetic care and psycho education provided. At this time there is no safe alternative other than inpatient care. OBJECTIVE    Physical  /60   Pulse 70   Temp 98.1 °F (36.7 °C) (Oral)   Resp 14   Ht 5' 11\" (1.803 m)   Wt 240 lb (108.9 kg)   BMI 33.47 kg/m²      Mental Status Evaluation:  Orientation: alertness: alert   Mood:. depressed and irritable      Affect:  blunted and flat      Appearance:  age appropriate   Activity:  Psychomotor Agitation   Gait/Posture: Normal   Speech:  pressured   Thought Process:  circumstantial   Thought Content:  hallucinations   Sensorium:  person, place and situation   Cognition:  grossly intact   Memory: intact   Insight:  limited   Judgment: limited   Suicidal Ideations: denies suicidal ideation   Homicidal Ideations: Positive for homicidal ideation      Medication Side Effects: absent       Attention Span attention span appeared shorter than expected for age       Labs  No results found for this or any previous visit (from the past 67 hour(s)).     Medications  Current Facility-Administered Medications   Medication Dose Route Frequency Provider Last Rate Last Dose    divalproex (DEPAKOTE ER) extended release tablet 500 mg  500 mg Oral BID MARIA D Wayne CNP   500 mg at 07/22/20 0846    OLANZapine (ZYPREXA) tablet 7.5 mg  7.5 mg Oral Nightly MARIA D Wayne CNP   7.5 mg at 07/21/20 2039    acetaminophen (TYLENOL) tablet 650 mg  650 mg Oral Q4H PRN Allan Creamer Coffee, APRN - CNP        traZODone (DESYREL) tablet 50 mg  50 mg Oral Nightly PRN Allan Creamer Coffee, APRN - CNP   50 mg at 07/21/20 2039    magnesium hydroxide (MILK OF MAGNESIA) 400 MG/5ML suspension 30 mL  30 mL Oral Daily PRN Allan Creamer Coffee, MARIA D - CNP        aluminum & magnesium hydroxide-simethicone (MAALOX) 200-200-20 MG/5ML suspension 30 mL  30 mL Oral Q6H PRN Allan Creamer Coffee, APRN - CNP        hydrOXYzine (ATARAX) tablet 25 mg  25 mg Oral TID PRN Allan Creamer Coffee, MARIA D - CNP   25 mg at 07/21/20 2039         divalproex  500 mg Oral BID    OLANZapine  7.5 mg Oral Nightly       ASSESSMENT  Schizoaffective Disorder    Patient's Response to Treatment:   No Change    PLAN  · Continue inpatient psychiatric treatment. · Supportive therapy with medication management. Reviewed risks and benefits as well as potential side effects with patient. · Continue Depakote ER 500mg BID beginning 7/21/2020; titrate to 750mg BID. · Continue Zyprexa 7.5mg @ HS beginning 7/21/2020; titrate to 10mg nightly. · Depakote level ordered for 7/25/2020. · Review medications for efficacy and side effects. · Therapeutic support and empathetic care provided. · Engage in therapeutic activities and groups. · Follow up at [facility] [place] after symptoms stabilized.     Estimated length of stay: 5-7 days      Electronically signed by MARIA D Wayne CNP on 7/22/2020 at 11:21 AM    Patient Location:  [address] [place] [address]    Provider Location (City/State): [place]    This virtual visit was conducted via interactive/real-time audio/video.

## 2020-07-23 PROCEDURE — 6370000000 HC RX 637 (ALT 250 FOR IP): Performed by: NURSE PRACTITIONER

## 2020-07-23 PROCEDURE — 99232 SBSQ HOSP IP/OBS MODERATE 35: CPT | Performed by: NURSE PRACTITIONER

## 2020-07-23 PROCEDURE — 1240000000 HC EMOTIONAL WELLNESS R&B

## 2020-07-23 RX ADMIN — DIVALPROEX SODIUM 500 MG: 500 TABLET, EXTENDED RELEASE ORAL at 22:50

## 2020-07-23 RX ADMIN — OLANZAPINE 7.5 MG: 7.5 TABLET, FILM COATED ORAL at 22:50

## 2020-07-23 RX ADMIN — HYDROXYZINE HYDROCHLORIDE 25 MG: 25 TABLET, FILM COATED ORAL at 22:50

## 2020-07-23 RX ADMIN — DIVALPROEX SODIUM 500 MG: 500 TABLET, EXTENDED RELEASE ORAL at 09:28

## 2020-07-23 RX ADMIN — TRAZODONE HYDROCHLORIDE 50 MG: 50 TABLET ORAL at 22:50

## 2020-07-23 NOTE — VIRTUAL HEALTH
Department of Psychiatry  Nurse Practitioner Progress Note    Chief Complaint: Schizoaffective Disorder    SUBJECTIVE: Michelle Franz is a 38 y.o. male who was involuntarily admitted from [facility] for suicidal and homicidal ideation. Patient stated that he had thoughts to shoot himself and that he had non-specific homicidal thoughts. His last admission so the [facility] was on 4/20/2020. Patient is seen via tele psych with staff present. He is in bed stating that he is tired and is refusing to meet with writer. He is again irritable and short with writer as well as staff. He stated that staff that he is not suicidal, homicidal, is not experiencing hallucinations, depression or anxiety. He has not attended groups and is taking medication as prescribed. He remains isolative to room. Chart and medications reviewed. Therapeutic support, empathetic care and psycho education provided. At this time there is no safe alternative other than inpatient care. OBJECTIVE    Physical  /60   Pulse 70   Temp 98.1 °F (36.7 °C) (Oral)   Resp 14   Ht 5' 11\" (1.803 m)   Wt 240 lb (108.9 kg)   BMI 33.47 kg/m²      Mental Status Evaluation:  Orientation: alertness: alert   Mood:. depressed and irritable      Affect:  blunted and flat      Appearance:  age appropriate   Activity:  Psychomotor Agitation   Gait/Posture: Normal   Speech:  pressured   Thought Process:  circumstantial   Thought Content:  preoccupied   Sensorium:  person, place and situation   Cognition:  grossly intact   Memory: intact   Insight:  limited   Judgment: limited   Suicidal Ideations: denies suicidal ideation   Homicidal Ideations: denied   Medication Side Effects: absent       Attention Span attention span appeared shorter than expected for age       Labs  No results found for this or any previous visit (from the past 67 hour(s)).     Medications  Current Facility-Administered Medications   Medication Dose Route Frequency Provider Last Rate Last Dose    divalproex (DEPAKOTE ER) extended release tablet 500 mg  500 mg Oral BID Joellen Lema APRFERNANDO - CNP   500 mg at 07/23/20 5658    OLANZapine (ZYPREXA) tablet 7.5 mg  7.5 mg Oral Nightly Joellen Lema, APRN - CNP   7.5 mg at 07/22/20 2054    acetaminophen (TYLENOL) tablet 650 mg  650 mg Oral Q4H PRN [place] Coffee, APRN - CNP        traZODone (DESYREL) tablet 50 mg  50 mg Oral Nightly PRN [place] Coffee, APRN - CNP   50 mg at 07/22/20 2054    magnesium hydroxide (MILK OF MAGNESIA) 400 MG/5ML suspension 30 mL  30 mL Oral Daily PRN [place] Coffee, APRN - CNP        aluminum & magnesium hydroxide-simethicone (MAALOX) 200-200-20 MG/5ML suspension 30 mL  30 mL Oral Q6H PRN [facility] Coffee, APRN - CNP        hydrOXYzine (ATARAX) tablet 25 mg  25 mg Oral TID PRN [place] Coffee, APRN - CNP   25 mg at 07/22/20 2054         divalproex  500 mg Oral BID    OLANZapine  7.5 mg Oral Nightly       ASSESSMENT  Schizoaffective Disorder    Patient's Response to Treatment:   No Change    PLAN  · Continue inpatient psychiatric treatment. · Supportive therapy with medication management. Reviewed risks and benefits as well as potential side effects with patient. · Continue Depakote ER 500mg BID beginning 7/21/2020; titrate to 750mg BID. · Continue Zyprexa 7.5mg @ HS beginning 7/21/2020; titrate to 10mg nightly. · Depakote level ordered for 7/25/2020. · Review medications for efficacy and side effects. · Therapeutic support and empathetic care provided. · Engage in therapeutic activities and groups. · Follow up at [facility] after symptoms stabilized.     Estimated length of stay: 5-7 days      Electronically signed by MARIA D Mcwilliams CNP on 7/23/2020 at 2:00 PM    Patient Location:  [address] [place] [address]    Provider Location (City/State): [place]    This virtual visit was conducted via interactive/real-time audio/video.

## 2020-07-23 NOTE — GROUP NOTE
Group Therapy Note    Date: 7/23/2020    Group Start Time: 7960  Group End Time: 6351  Group Topic: Psychoeducation    SANAZ KnightS    Patient refused to attend socialization skills group at  after encouragement from staff. 1:1 talk time offered by staff as alternative to group session.

## 2020-07-23 NOTE — GROUP NOTE
Group Therapy Note    Date: 7/23/2020    Group Start Time: 1000  Group End Time: 7483  Group Topic: Psychotherapy    [address], LSW    patient refused to attend psychotherapy group at [facility] after encouragement from staff.   1:1 talk time provided as alternative to group session

## 2020-07-23 NOTE — PLAN OF CARE
Problem: Pain:  Goal: Pain level will decrease  Description: Pain level will decrease  Outcome: Ongoing   Patient states absence of pain; Patient has not requested PRN pharmalogical pain measures; Patient given suggestion to use non-pharmalogical pain mediation measures such as: distraction, guided imagery, yoga, exercise, art therapy, group therapy, meditation, tv, music. Problem: Pain:  Goal: Control of acute pain  Description: Control of acute pain  Outcome: Ongoing    Patient states absence of acute pain; Patient has not requested PRN pharmalogical pain measures; Patient given suggestion to use non-pharmalogical pain mediation measures such as: distraction, guided imagery, yoga, exercise, art therapy, group therapy, meditation, tv, music. Problem: Pain:  Goal: Control of chronic pain  Description: Control of chronic pain  Outcome: Ongoing    Patient states absence of chronic pain; Patient has not requested PRN pharmalogical pain measures; Patient given suggestion to use non-pharmalogical pain mediation measures such as: distraction, guided imagery, yoga, exercise, art therapy, group therapy, meditation, tv, music. Problem: Altered Mood, Depressive Behavior:  Goal: Able to verbalize and/or display a decrease in depressive symptoms  Description: Able to verbalize and/or display a decrease in depressive symptoms  Outcome: Ongoing   Patient cooperative upon approach; Patient states absence of depressive symptoms verbally, but has displayed depressive symptoms physically as evidenced by increased sleep, absent from groups, isolative, out for meals only,  decreased motor activity. Problem: Altered Mood, Depressive Behavior:  Goal: Ability to disclose and discuss suicidal ideas will improve  Description: Ability to disclose and discuss suicidal ideas will improve  Outcome: Ongoing   Patient cooperative upon approach;  Patient denies suicidal ideation at this time; Patient agrees to contract for safety if

## 2020-07-23 NOTE — GROUP NOTE
Group Therapy Note    Date: 7/23/2020    Group Start Time: 1330  Group End Time: 2639  Group Topic: Cognitive Skills    GIAN Garcia      Patient declined to attend recreational therapy group at 1330 despite encouragement from staff. 1:1 talk time offered by staff as alternative to group session.       Signature:  Vimal Garcia

## 2020-07-24 VITALS
DIASTOLIC BLOOD PRESSURE: 57 MMHG | HEIGHT: 71 IN | SYSTOLIC BLOOD PRESSURE: 107 MMHG | WEIGHT: 240 LBS | RESPIRATION RATE: 16 BRPM | HEART RATE: 65 BPM | BODY MASS INDEX: 33.6 KG/M2 | TEMPERATURE: 98.1 F

## 2020-07-24 PROCEDURE — 6370000000 HC RX 637 (ALT 250 FOR IP): Performed by: NURSE PRACTITIONER

## 2020-07-24 PROCEDURE — 99238 HOSP IP/OBS DSCHRG MGMT 30/<: CPT | Performed by: NURSE PRACTITIONER

## 2020-07-24 RX ORDER — HYDROXYZINE HYDROCHLORIDE 25 MG/1
25 TABLET, FILM COATED ORAL 3 TIMES DAILY PRN
Qty: 90 TABLET | Refills: 0 | Status: SHIPPED | OUTPATIENT
Start: 2020-07-24 | End: 2020-08-23

## 2020-07-24 RX ORDER — TRAZODONE HYDROCHLORIDE 50 MG/1
50 TABLET ORAL NIGHTLY PRN
Qty: 30 TABLET | Refills: 0 | Status: ON HOLD | OUTPATIENT
Start: 2020-07-24 | End: 2020-09-07

## 2020-07-24 RX ORDER — DIVALPROEX SODIUM 500 MG/1
500 TABLET, EXTENDED RELEASE ORAL 2 TIMES DAILY
Qty: 60 TABLET | Refills: 0 | Status: ON HOLD | OUTPATIENT
Start: 2020-07-24 | End: 2020-09-07 | Stop reason: HOSPADM

## 2020-07-24 RX ORDER — OLANZAPINE 7.5 MG/1
7.5 TABLET ORAL NIGHTLY
Qty: 30 TABLET | Refills: 0 | Status: ON HOLD | OUTPATIENT
Start: 2020-07-24 | End: 2020-09-07 | Stop reason: HOSPADM

## 2020-07-24 RX ADMIN — DIVALPROEX SODIUM 500 MG: 500 TABLET, EXTENDED RELEASE ORAL at 09:52

## 2020-07-24 NOTE — GROUP NOTE
Group Therapy Note    Date: 7/24/2020    Group Start Time: 1100  Group End Time: 1130  Group Topic: Cognitive Skills    GIAN Morales    Patient declined to attend socialization skills group at 1100 despite encouragement from staff. 1:1 talk time offered by staff as alternative to group session.       Signature:  Maren Morales

## 2020-07-24 NOTE — VIRTUAL HEALTH
admission, Kalpesh Whyte was provided a safe secure environment, introduced to unit milieu. Patient did not participate in groups and individual therapies. Meds were adjusted. After few days of hospital care, patient began to feel improvement. Depression lifted, thoughts to harm self ceased. Sleep improved, appetite was good. On morning rounds 7/24/2020, patient endorsed feeling ready for discharge. Patient denies suicidal or homicidal ideations, denies hallucinations or delusions. Denies SE's from meds. It was decided that pt had achieved maximum benefit from hospital care and can be discharged     Consults: None    Significant Diagnostic Studies: Routine labs and diagnostics    Treatments: Psychotropic medications, refused therapy with group, did not socialize in the milieu, and limited 1:1 with nurses, social workers, PAARLIN/CNP, and Attending physician.       Discharge Medications:  Current Discharge Medication List      START taking these medications    Details   hydrOXYzine (ATARAX) 25 MG tablet Take 1 tablet by mouth 3 times daily as needed for Itching or Anxiety  Qty: 90 tablet, Refills: 0         CONTINUE these medications which have CHANGED    Details   divalproex (DEPAKOTE ER) 500 MG extended release tablet Take 1 tablet by mouth 2 times daily  Qty: 60 tablet, Refills: 0      traZODone (DESYREL) 50 MG tablet Take 1 tablet by mouth nightly as needed for Sleep  Qty: 30 tablet, Refills: 0      OLANZapine (ZYPREXA) 7.5 MG tablet Take 1 tablet by mouth nightly  Qty: 30 tablet, Refills: 0            Core Measures statement:   Not applicable    Discharge Exam:  Level of consciousness:  Within normal limits  Appearance: Street clothes, seated, with fair grooming  Behavior/Motor: No abnormalities noted  Attitude toward examiner:  Cooperative with encouragement, attentive, fair eye contact  Speech:  spontaneous, normal rate, normal volume  Mood:  euthymic  Affect:  mood congruent  Thought processes: linear  Thought content:  Homocidal ideation denies  Suicidal Ideation:  denies suicidal ideation  Delusions:  no evidence of delusions  Perceptual Disturbance:  denies any perceptual disturbance  Cognition:  In tact  Memory: age appropriate  Insight & Judgement: poor  Medication side effects:  denies     Disposition: home    Patient Instructions: Activity: activity as tolerated    Follow-up as scheduled with [facility]    Time Spent: 15 minutes    Engagement: Patient displayed a poor level of engagement with the treatments offered during this admission. Discharge planning, findings, and recommendations were discussed with patient and treatment team.      Signed:  Esau Salinas   7/24/2020  11:21 AM    Patient Location:  [address] [place] [address]    Provider Location (City/State): [place]    This virtual visit was conducted via interactive/real-time audio/video.

## 2020-07-24 NOTE — BH NOTE
[facility]  Discharge Note    Pt discharged with followings belongings:   Dentures: None  Vision - Corrective Lenses: None  Hearing Aid: None  Jewelry: Bracelet  Body Piercings Removed: N/A  Clothing: Footwear, Jacket / coat, Pants, Shirt, Socks, Undergarments (Comment)  Were All Patient Medications Collected?: Not Applicable  Other Valuables: [facility], Cell phone   Valuables sent home with patient. Valuables retrieved from safe, Security envelope number:  [id number] and returned to patient. Patient education on aftercare instructions: yes  Information faxed to [facility] by nurse Patient verbalize understanding of AVS:  yes. Patient left ambulatory via taxi by self to private residence.   Status EXAM upon discharge:  Status and Exam  Normal: No  Facial Expression: Flat  Affect: Blunt, Stable  Level of Consciousness: Alert  Mood:Normal: No  Mood: Anxious, Depressed  Motor Activity:Normal: No  Motor Activity: Decreased  Interview Behavior: Cooperative  Preception: Blaine to Person, Keisha Marrow to Time, Blaine to Place, Blaine to Situation  Attention:Normal: No  Attention: Distractible  Thought Processes: Blocking  Thought Content:Normal: No  Thought Content: Poverty of Content  Hallucinations: None  Delusions: No  Memory:Normal: No  Memory: Poor Recent  Insight and Judgment: No  Insight and Judgment: Poor Judgment, Poor Insight, Unmotivated  Present Suicidal Ideation: No  Present Homicidal Ideation: No      Metabolic Screening:    No results found for: LABA1C    No results found for: CHOL  No results found for: TRIG  No results found for: HDL  No components found for: LDLCAL  No results found for: Alexei Moctezuma RN

## 2020-07-24 NOTE — GROUP NOTE
Group Therapy Note    Date: 7/24/2020    Group Start Time: 9463  Group End Time: 7138  Group Topic: Community Meeting    [address] [place], [place]    Patient refused to attend community meeting and goal setting group at 0235 after encouragement from staff. 1:1 talk time offered by staff as alternative to group session.

## 2020-07-24 NOTE — PLAN OF CARE
Problem: Altered Mood, Depressive Behavior:  Goal: Able to verbalize and/or display a decrease in depressive symptoms  Description: Able to verbalize and/or display a decrease in depressive symptoms  7/24/2020 0045 by Va Srinivasan RN  Outcome: Ongoing  Note: Patient denies suicidal ideation and verbally contracts for safety. Patient remains safe during Q15 min and random safety checks. Patient remains in hospital appropriate clothing at all times and free from harmful objects. Patient is accepting of talk time with writer. Denies any thoughts to harm others, denies any hallucinations. Isolative to room. Allowed vitals and took nighttime medications. Ate pm snack.

## 2020-07-24 NOTE — GROUP NOTE
Group Therapy Note    Date: 7/24/2020    Group Start Time: 4480  Group End Time: 7329  Group Topic: Psychoeducation    GIAN Mcmahon, SANAZS    Patient refused to attend music [employer] group at [phone] after encouragement from staff. 1:1 talk time offered by staff as alternative to group session.

## 2020-07-24 NOTE — GROUP NOTE
Group Therapy Note    Date: 7/24/2020    Group Start Time: 1000  Group End Time: 9725  Group Topic: Psychotherapy    [address], LSW    patient refused to attend psychotherapy group at [facility] after encouragement from staff.   1:1 talk time provided as alternative to group session

## 2020-07-24 NOTE — PROGRESS NOTES
CLINICAL PHARMACY NOTE: MEDS TO [address] Select Patient?: No  Total # of Prescriptions Filled: 4   The following medications were delivered to the patient:  · Hydroxyzine  · Trazodone  · Divalproex  · Olanzapine  ·   Total # of Interventions Completed: 0  Time Spent (min): 30    Additional Documentation:

## 2020-07-27 NOTE — DISCHARGE SUMMARY
Sol Spann RN    Registered Nurse       Psych    Signed    Date of Service:  7/24/2020  4:18 PM                Signed              Show:Clear all  [x]Manual[x]Template[]Copied    Added by:  Mayra Vital RN    []Vivienne for details  [facility]  Discharge Note     Pt discharged with followings belongings:   Dentures: None  Vision - Corrective Lenses: None  Hearing Aid: None  Jewelry: Bracelet  Body Piercings Removed: N/A  Clothing: Footwear, Jacket / coat, Pants, Shirt, Socks, Undergarments (Comment)  Were All Patient Medications Collected?: Not Applicable  Other Valuables: [facility], Cell phone   Valuables sent home with patient. Valuables retrieved from safe, Security envelope number:  [id number] and returned to patient. Patient education on aftercare instructions: yes  Information faxed to [facility] by nurse Patient verbalize understanding of AVS:  yes. Patient left ambulatory via taxi by self to private residence.   Status EXAM upon discharge:  Status and Exam  Normal: No  Facial Expression: Flat  Affect: Blunt, Stable  Level of Consciousness: Alert  Mood:Normal: No  Mood: Anxious, Depressed  Motor Activity:Normal: No  Motor Activity: Decreased  Interview Behavior: Cooperative  Preception: [place] to Person, Masha Rodger to Time, [place] to Place, [place] to Situation  Attention:Normal: No  Attention: Distractible  Thought Processes: Blocking  Thought Content:Normal: No  Thought Content: Poverty of Content  Hallucinations: None  Delusions: No  Memory:Normal: No  Memory: Poor Recent  Insight and Judgment: No  Insight and Judgment: Poor Judgment, Poor Insight, Unmotivated  Present Suicidal Ideation: No  Present Homicidal Ideation: No        Metabolic Screening:     No results found for: LABA1C     No results found for: CHOL  No results found for: TRIG  No results found for: HDL  No components found for: LDLCAL  No results found for: LABVLDL     LATOYA Srinivasan, CTRS Recreational Therapist       Group Note    Signed    Date of Service:  7/24/2020  2:43 PM                Signed                                                                                   Group Therapy Note     Date: 7/24/2020     Group Start Time: 1285  Group End Time: 7436  Group Topic: Psychoeducation     GIAN Galvan, CTRS     Patient refused to attend music trivia group at [phone] after encouragement from staff.  1:1 talk time offered by staff as alternative to group session.                     [address]    Progress Notes    Signed    Date of Service:  7/24/2020 12:41 PM                Signed              Show:Clear all  [x]Manual[x]Template[]Copied    Added by:  Roslyn Coleman    []Vivienne for details  CLINICAL PHARMACY NOTE: MEDS TO [address] Select Patient?: No  Total # of Prescriptions Filled: 4              The following medications were delivered to the patient:  · Hydroxyzine  · Trazodone  · Divalproex  · Olanzapine  ·    Total # of Interventions Completed: 0  Time Spent (min): 30     Additional Documentation:                  BATSHEVA Jarvis        Specialty:  Licensed Clinical     Group Note    Signed    Date of Service:  7/24/2020 12:37 PM                Signed                                                                                   Group Therapy Note     Date: 7/24/2020     Group Start Time: 1000  Group End Time: 1045  Group Topic: Psychotherapy     [address], LSW     patient refused to attend psychotherapy group at [facility] after encouragement from staff.  1:1 talk time provided as alternative to group session               [facility]    Recreational Therapist    Specialty:  Behavioral Health    Group Note    Signed    Date of Service:  7/24/2020 11:42 AM                Signed                                                                                   Group Therapy Note     Date: 7/24/2020     Group Start Time: 1100  Group End Time: 1130  Group Topic: Cognitive Skills     GIAN KOVACS    Cassy Gutierres     Patient declined to attend socialization skills group at 1100 despite encouragement from staff.  1:1 talk time offered by staff as alternative to group session. Signature:  MARIA D Palma CNP    Nurse Practitioner    Psychiatry    [facility]    Signed    Date of Service:  7/24/2020 11:21 AM                Signed              Show:Clear all  [x]Manual[x]Template[x]Copied    Added by:  [x]MARIA D Verduzco CNP    []Vivienne for details  [address]     Patient ID:  Aruna Yu  [id number]  84 y.o.  1975     Admit date: 7/20/2020     Discharge date and time: 7/24/2020  11:21 AM      Admitting Physician: Cathy Hooper MD      Discharge Physician:  MARIA D Flaherty CNP     [facility] Diagnoses: [facility] [F25.9]  Schizoaffective disorder ([facility] [F25.9]  Schizoaffective disorder ([facility] [F25.9]     Discharge Diagnoses:   Schizoaffective Disorder          Patient Active Problem List   Diagnosis Code    Polysubstance abuse ([facility] F19.10    Schizophrenia, paranoid ([facility] F20.0    Paranoid schizophrenia ([facility] F20.0    Schizoaffective disorder, bipolar type ([facility] F25.0    Schizophrenia ([facility] F20.9    Schizoaffective disorder ([facility] F25.9         Admission Condition: poor     Discharged Condition: stable. Admission Hx: Marco Vizcarra is a 38 y. o. male who was involuntarily admitted from [facility] for suicidal and homicidal ideation. Patient stated that he had thoughts to shoot himself and that he had non-specific homicidal thoughts. His last admission so the [facility] was on 4/20/2020.     Patient is denying all including SI, HI, hallucinations, anxiety and depression. He has been isolative to room refusing to attend groups.  He has not been observed responding to internal stimuli therefore; writer felt that patient had benefited from his hospitalization and that due to his lack of participation would not benefit from additional time at the [facility]. Patient agreed to discharge. He has been medication compliant. Patient reports improved sleep and good appetite. Provider discussed importance of follow-up and medication compliance. Chart and medications reviewed. Therapeutic support provided.         Indication for Admission: threat to self     History of Present Illnes (present tense wording indicates findings from admission exam on 7/20/2020 and are not necessarily indicative of current findings):      Hospital Course:   Upon admission, Shreya Fernandes was provided a safe secure environment, introduced to unit milieu. Patient did not participate in groups and individual therapies. Meds were adjusted. After few days of hospital care, patient began to feel improvement. Depression lifted, thoughts to harm self ceased. Sleep improved, appetite was good. On morning rounds 7/24/2020, patient endorsed feeling ready for discharge. Patient denies suicidal or homicidal ideations, denies hallucinations or delusions. Denies SE's from meds.   It was decided that pt had achieved maximum benefit from hospital care and can be discharged      Consults: None     Significant Diagnostic Studies: Routine labs and diagnostics     Treatments: Psychotropic medications, refused therapy with group, did not socialize in the milieu, and limited 1:1 with nurses, social workers, PA-C/CNP, and Attending physician.       Discharge Medications:      Current Discharge Medication List           START taking these medications     Details   hydrOXYzine (ATARAX) 25 MG tablet Take 1 tablet by mouth 3 times daily as needed for Itching or Anxiety  Qty: 90 tablet, Refills: 0               CONTINUE these medications which have CHANGED     Details   divalproex (DEPAKOTE ER) 500 MG extended release tablet Take 1 tablet by mouth 2 times daily  Qty: 60 tablet, Refills: 0       traZODone (DESYREL) 50 MG tablet Take 1 tablet by mouth nightly as needed for Sleep  Qty: 30 tablet, Refills: 0       OLANZapine (ZYPREXA) 7.5 MG tablet Take 1 tablet by mouth nightly  Qty: 30 tablet, Refills: 0              Core Measures statement:   Not applicable     Discharge Exam:  Level of consciousness:  Within normal limits  Appearance: Street clothes, seated, with fair grooming  Behavior/Motor: No abnormalities noted  Attitude toward examiner:  Cooperative with encouragement, attentive, fair eye contact  Speech:  spontaneous, normal rate, normal volume  Mood:  euthymic  Affect:  mood congruent  Thought processes:  linear  Thought content:  Homocidal ideation denies  Suicidal Ideation:  denies suicidal ideation  Delusions:  no evidence of delusions  Perceptual Disturbance:  denies any perceptual disturbance  Cognition:  In tact  Memory: age appropriate  Insight & Judgement: poor  Medication side effects:  denies      Disposition: home     Patient Instructions: Activity: activity as tolerated     Follow-up as scheduled with [facility]     Time Spent: 15 minutes     Engagement: Patient displayed a poor level of engagement with the treatments offered during this admission.         Discharge planning, findings, and recommendations were discussed with patient and treatment team.      Signed:  Emery Hardy   7/24/2020  11:21 AM     Patient Location:  [address] [place] [address]     Provider Location ([facility]): [place]     This virtual visit was conducted via interactive/real-time audio/video.             Cosigned by:  Johnny Pressley MD at 7/24/2020  4:14 PM    Revision History                          Woody Marcial RN    Registered Nurse       Care Coordination    Signed    Date of Service:  7/24/2020 10:58 AM                Signed              Show:Clear all  [x]Manual[x]Template[]Copied    Added by:  [x]Shreya Crews RN    []Vivienne for details  Patient

## 2020-07-27 NOTE — DISCHARGE SUMMARY
MARIA D Seaman CNP    Nurse Practitioner    Psychiatry    [facility]    Signed    Date of Service:  7/24/2020 11:21 AM                Signed              Show:Clear all  [x]Manual[x]Template[x]Copied    Added by:  [x]MARIA D Connelly CNP    []Vivienne for details  [address]     Patient ID:  Leonid Ruiz  [id number]  53 y.o.  1975     Admit date: 7/20/2020     Discharge date and time: 7/24/2020  11:21 AM      Admitting Physician: Jemal Plunkett MD      Discharge Physician:  MARIA D Seaman CNP     Admission Diagnoses: Schizoaffective disorder ([facility] [F25.9]  Schizoaffective disorder ([facility] [F25.9]  Schizoaffective disorder ([facility] [F25.9]     Discharge Diagnoses:   Schizoaffective Disorder          Patient Active Problem List   Diagnosis Code    Polysubstance abuse ([facility] F19.10    Schizophrenia, paranoid ([address] F20.0    Paranoid schizophrenia ([address] F20.0    Schizoaffective disorder, bipolar type ([address] F25.0    Schizophrenia ([facility] F20.9    Schizoaffective disorder ([facility] F25.9         Admission Condition: poor     Discharged Condition: stable. Admission Hx: Marco Vizcarra is a 38 y. o. male who was involuntarily admitted from [facility] for suicidal and homicidal ideation. Patient stated that he had thoughts to shoot himself and that he had non-specific homicidal thoughts. His last admission so the [facility] was on 4/20/2020.     Patient is denying all including SI, HI, hallucinations, anxiety and depression. He has been isolative to room refusing to attend groups. He has not been observed responding to internal stimuli therefore; writer felt that patient had benefited from his hospitalization and that due to his lack of participation would not benefit from additional time at the [facility]. Patient agreed to discharge. He has been medication compliant. Patient reports improved sleep and good appetite.  Provider discussed importance of follow-up and medication compliance. Chart and medications reviewed. Therapeutic support provided.         Indication for Admission: threat to self     History of Present Illnes (present tense wording indicates findings from admission exam on 7/20/2020 and are not necessarily indicative of current findings):      Hospital Course:   Upon admission, Eli Yu was provided a safe secure environment, introduced to unit milieu. Patient did not participate in groups and individual therapies. Meds were adjusted. After few days of hospital care, patient began to feel improvement. Depression lifted, thoughts to harm self ceased. Sleep improved, appetite was good. On morning rounds 7/24/2020, patient endorsed feeling ready for discharge. Patient denies suicidal or homicidal ideations, denies hallucinations or delusions. Denies SE's from meds.   It was decided that pt had achieved maximum benefit from hospital care and can be discharged      Consults: None     Significant Diagnostic Studies: Routine labs and diagnostics     Treatments: Psychotropic medications, refused therapy with group, did not socialize in the milieu, and limited 1:1 with nurses, social workers, PA-C/CNP, and Attending physician.       Discharge Medications:      Current Discharge Medication List           START taking these medications     Details   hydrOXYzine (ATARAX) 25 MG tablet Take 1 tablet by mouth 3 times daily as needed for Itching or Anxiety  Qty: 90 tablet, Refills: 0               CONTINUE these medications which have CHANGED     Details   divalproex (DEPAKOTE ER) 500 MG extended release tablet Take 1 tablet by mouth 2 times daily  Qty: 60 tablet, Refills: 0       traZODone (DESYREL) 50 MG tablet Take 1 tablet by mouth nightly as needed for Sleep  Qty: 30 tablet, Refills: 0       OLANZapine (ZYPREXA) 7.5 MG tablet Take 1 tablet by mouth nightly  Qty: 30 tablet, Refills: 0              Core Measures statement:   Not applicable     Discharge Exam:  Level of

## 2020-08-29 ENCOUNTER — HOSPITAL ENCOUNTER (INPATIENT)
Age: 45
LOS: 9 days | Discharge: HOME | DRG: 885 | End: 2020-09-07
Attending: PSYCHIATRY & NEUROLOGY | Admitting: PSYCHIATRY & NEUROLOGY
Payer: MEDICARE

## 2020-08-29 PROBLEM — F33.9 MAJOR DEPRESSIVE DISORDER, RECURRENT (HCC): Status: ACTIVE | Noted: 2020-08-29

## 2020-08-29 PROCEDURE — 1240000000 HC EMOTIONAL WELLNESS R&B

## 2020-08-29 RX ORDER — TRAZODONE HYDROCHLORIDE 50 MG/1
50 TABLET ORAL NIGHTLY PRN
Status: DISCONTINUED | OUTPATIENT
Start: 2020-08-29 | End: 2020-09-07 | Stop reason: HOSPADM

## 2020-08-29 ASSESSMENT — SLEEP AND FATIGUE QUESTIONNAIRES
DIFFICULTY FALLING ASLEEP: YES
SLEEP PATTERN: INSOMNIA;DISTURBED/INTERRUPTED SLEEP
RESTFUL SLEEP: NO
AVERAGE NUMBER OF SLEEP HOURS: 4
DIFFICULTY STAYING ASLEEP: YES
DO YOU USE A SLEEP AID: NO
DIFFICULTY ARISING: NO
DO YOU HAVE DIFFICULTY SLEEPING: YES

## 2020-08-29 ASSESSMENT — PAIN SCALES - GENERAL: PAINLEVEL_OUTOF10: 0

## 2020-08-29 ASSESSMENT — PATIENT HEALTH QUESTIONNAIRE - PHQ9: SUM OF ALL RESPONSES TO PHQ QUESTIONS 1-9: 7

## 2020-08-29 ASSESSMENT — LIFESTYLE VARIABLES: HISTORY_ALCOHOL_USE: NO

## 2020-08-30 PROCEDURE — 1240000000 HC EMOTIONAL WELLNESS R&B

## 2020-08-30 PROCEDURE — 99222 1ST HOSP IP/OBS MODERATE 55: CPT | Performed by: PSYCHIATRY & NEUROLOGY

## 2020-08-30 PROCEDURE — 6370000000 HC RX 637 (ALT 250 FOR IP): Performed by: PSYCHIATRY & NEUROLOGY

## 2020-08-30 RX ORDER — OLANZAPINE 10 MG/1
10 TABLET ORAL NIGHTLY
Status: DISCONTINUED | OUTPATIENT
Start: 2020-08-30 | End: 2020-09-07 | Stop reason: HOSPADM

## 2020-08-30 RX ORDER — POLYETHYLENE GLYCOL 3350 17 G/17G
17 POWDER, FOR SOLUTION ORAL DAILY PRN
Status: DISCONTINUED | OUTPATIENT
Start: 2020-08-30 | End: 2020-09-07 | Stop reason: HOSPADM

## 2020-08-30 RX ORDER — OLANZAPINE 7.5 MG/1
7.5 TABLET ORAL NIGHTLY
Status: DISCONTINUED | OUTPATIENT
Start: 2020-08-30 | End: 2020-08-30

## 2020-08-30 RX ORDER — NICOTINE 21 MG/24HR
1 PATCH, TRANSDERMAL 24 HOURS TRANSDERMAL DAILY
Status: DISCONTINUED | OUTPATIENT
Start: 2020-08-30 | End: 2020-08-30

## 2020-08-30 RX ORDER — DIVALPROEX SODIUM 500 MG/1
500 TABLET, EXTENDED RELEASE ORAL 2 TIMES DAILY
Status: DISCONTINUED | OUTPATIENT
Start: 2020-08-30 | End: 2020-09-07 | Stop reason: HOSPADM

## 2020-08-30 RX ORDER — ACETAMINOPHEN 325 MG/1
650 TABLET ORAL EVERY 4 HOURS PRN
Status: DISCONTINUED | OUTPATIENT
Start: 2020-08-30 | End: 2020-09-07 | Stop reason: HOSPADM

## 2020-08-30 RX ORDER — TRAZODONE HYDROCHLORIDE 50 MG/1
50 TABLET ORAL NIGHTLY PRN
Status: DISCONTINUED | OUTPATIENT
Start: 2020-08-30 | End: 2020-08-30

## 2020-08-30 RX ADMIN — DIVALPROEX SODIUM 500 MG: 500 TABLET, FILM COATED, EXTENDED RELEASE ORAL at 12:49

## 2020-08-30 RX ADMIN — TRAZODONE HYDROCHLORIDE 50 MG: 50 TABLET ORAL at 20:40

## 2020-08-30 RX ADMIN — OLANZAPINE 10 MG: 10 TABLET, FILM COATED ORAL at 20:40

## 2020-08-30 RX ADMIN — DIVALPROEX SODIUM 500 MG: 500 TABLET, FILM COATED, EXTENDED RELEASE ORAL at 20:41

## 2020-08-30 NOTE — PLAN OF CARE
therapy, medication compliance, individualized assessments and care planning    Outcome: needs reinforcement    PATIENT GOALS: to be discussed with patient within 72 hours    PLAN/TREATMENT RECOMMENDATIONS:     continue group therapy , medications compliance, goal setting, individualized assessments and care, continue to monitor pt on unit      SHORT-TERM GOALS:   Time frame for Short-Term Goals: 5-7 days    LONG-TERM GOALS:  Time frame for Long-Term Goals: 6 months  Members Present in Team Meeting: See Signature Sheet    Wash Late

## 2020-08-30 NOTE — BH NOTE
`[facility]  Admission Note     Admission Type:   Admission Type: Involuntary    Reason for admission:  Reason for Admission: suicidal thoughts and homicidal thoughts to shoot self and others, auditory hallucinations of people yelling at him    PATIENT STRENGTHS:  Strengths: No significant Physical Illness, Motivated    Patient Strengths and Limitations:  Limitations: Lacks leisure interests, Difficulty problem solving/relies on others to help solve problems    Addictive Behavior:   Addictive Behavior  In the past 3 months, have you felt or has someone told you that you have a problem with:  : None  Do you have a history of Chemical Use?: No  Do you have a history of Alcohol Use?: No  Do you have a history of Street Drug Abuse?: No  Histroy of Prescripton Drug Abuse?: No    Medical Problems:   Past Medical History:   Diagnosis Date    Asthma     Hypertension     Morbid [facility] ([facility]     [facility]     Substance abuse ([facility]        Status EXAM:  Status and Exam  Normal: No  Facial Expression: Flat  Affect: Blunt  Level of Consciousness: Alert  Mood:Normal: No  Mood: Anxious, Suspicious  Motor Activity:Normal: No  Motor Activity: Decreased  Interview Behavior: Irritable, Cooperative  Preception: [place] to Person, Brenda  to Time, [place] to Place, [place] to Situation  Attention:Normal: No  Attention: Distractible  Thought Processes: Blocking  Thought Content:Normal: No  Thought Content: Paranoia  Hallucinations:  Auditory (Comment), Command(Comment)  Delusions: No  Memory:Normal: No  Memory: Poor Recent  Insight and Judgment: No  Insight and Judgment: Poor Judgment, Poor Insight  Present Suicidal Ideation: Yes(fleeting thoughts)  Present Homicidal Ideation: Yes(to shoot others non specific)    Tobacco Screening:  Practical Counseling, on admission, sung X, if applicable and completed (first 3 are required if patient doesn't refuse):            ( )  Recognizing danger situations (included triggers and roadblocks)                    ( )  Coping skills (new ways to manage stress, exercise, relaxation techniques, changing routine, distraction)                                                           ( )  Basic information about quitting (benefits of quitting, techniques in how to quit, available resources  ( ) Referral for counseling faxed to [facility]                                           ( ) Patient refused counseling  (X ) Patient has not smoked in the last 30 days    Metabolic Screening:    No results found for: LABA1C    No results found for: CHOL  No results found for: TRIG  No results found for: HDL  No components found for: LDLCAL  No results found for: LABVLDL      Body mass index is 35.87 kg/m². BP Readings from Last 2 Encounters:   08/29/20 121/79   07/24/20 (!) 107/57           Pt admitted with followings belongings:  Dentures: None  Vision - Corrective Lenses: None  Hearing Aid: None  Jewelry: Bracelet  Body Piercings Removed: N/A  Clothing: Footwear, Pants, Shirt, Undergarments (Comment)  Were All Patient Medications Collected?: Not Applicable  Other Valuables: Cell phone, [address] sent home with NA. Valuables placed in safe in security envelope, number:  [id number]. Patient's home medications were verified. Patient oriented to surroundings and program expectations and copy of patient rights given. Received admission packet:  yes. Consents reviewed, signed yes. Refused NA. Patient verbalize understanding:  yes. Patient education on precautions: yes    PT admitted to unit wanded for safety and changed into hospital clothing. PT off medications for the last month and having increase in suicidal and homicidal thoughts. PT hearing voices that are \"yelling at me\". PT reports poor sleep. Pt denies any drug or alcohol abuse. PT unsure where he will go on discharge.                     Reji Martin RN

## 2020-08-30 NOTE — PLAN OF CARE
Problem: Altered Mood, Psychotic Behavior:  Goal: Able to verbalize decrease in frequency and intensity of hallucinations  Description: Able to verbalize decrease in frequency and intensity of hallucinations  Outcome: Ongoing   1:1 with pt x ten minutes. Pt encouraged to attend unit programming and interact with peers and staff. Pt also encouraged to tend to hygiene and ADLs. Pt encouraged to discuss feelings with staff and feedback and reassurance provided. Pt seclusive to room for long intervals. Paranoid et suspious. Evasive when asked questions. Admits to feeling anxious. Admits to hearing voices. Pt admits to having thoughts of self harm. Agreeable to seeking out staff should feelings increase. Able to identify positive coping skills and plan for safety. Will cont to monitor q15 minutes for safety and provide support and reassurance as needed.

## 2020-08-30 NOTE — BH NOTE
Department of Psychiatry  Attending History and Physical - Adult         CHIEF COMPLAINT:  Suicidal ideation    History obtained from:  patient    HISTORY OF PRESENT ILLNESS:          The patient is a 41 y.o. male with significant past medical history of Schizoaffective Disorder who presents with worsening depression and suicidal ideation    PSYCHIATRIC HISTORY:      The patient is currently receiving care for the above psychiatric illness. Past mental health outpatient care includes:  1-5 treatment centers    Past mental health hospitalizations: 1-5 admissions    Lifetime Psychiatric Review of Systems         Mayuri or Hypomania:  yes -      Panic Attacks:  no     Phobias:  no     Obsessions and Compulsions:  no     Body or Vocal Tics:  no     Hallucinations:  yes -      Delusions:  yes -     Past psychiatric medications include:  zyprexa    Adverse reactions from psychotropic medications:  none    Past Medical History:        Diagnosis Date    Asthma     Hypertension     [facility]bid [facility] ([facility]     Schizoaffective disorder ([facility]     Substance abuse ([facility]      Past Surgical History:    History reviewed. No pertinent surgical history. Medications Prior to Admission:   Medications Prior to Admission: divalproex (DEPAKOTE ER) 500 MG extended release tablet, Take 1 tablet by mouth 2 times daily  OLANZapine (ZYPREXA) 7.5 MG tablet, Take 1 tablet by mouth nightly  traZODone (DESYREL) 50 MG tablet, Take 1 tablet by mouth nightly as needed for Sleep  Allergies:  Patient has no known allergies. Family History:       Family history unknown: Yes     Psychiatric Family History  Patient is unable to give family history at this time.     REVIEW OF SYSTEMS:  CONSTITUTIONAL:  negative  CARDIOVASCULAR:  negative    PHYSICAL EXAM:    Vitals:  /79   Pulse 70   Temp 98 °F (36.7 °C) (Oral)   Resp 14   Ht 5' 10\" (1.778 m)   Wt 250 lb (113.4 kg)   BMI 35.87 kg/m²     Mental Status Examination:     Level of consciousness:  within normal limits  Appearance:  hospital attire  Behavior/Motor:  psychomotor retardation  Attitude toward examiner:  cooperative and attentive  Speech:  slow  Mood:  depressed  Affect:  mood congruent  Thought processes:  linear and goal directed  Thought content:  Homocidal ideation denies  Suicidal Ideation:  active  Delusions:  no evidence of delusions  Perceptual Disturbance:  denies any perceptual disturbance  Cognition:  oriented to person, place, and time  Concentration succeeded  Memory intact  Insight:  poor  Judgment:  poor    Cranial nerve exam II-XII intact    DSM-IV DIAGNOSIS:    Impression    (Axis I):        Schizoaffective disorder; depressed type      ASSESSMENT AND PLAN:    Admit to unit  Labs and EKG  Resume and adjust medications accordingly  Milieu therapy

## 2020-08-30 NOTE — GROUP NOTE
Group Therapy Note    Date: 8/30/2020    Group Start Time: 1330  Group End Time: 1430  Group Topic: Music Therapy    [facility]    Bobbye Dace        Group Therapy Note    Pt did not attend song sharing group d/t resting in room despite staff invitation to attend. 1:1 talk time offered as alternative to group session, pt declined.

## 2020-08-30 NOTE — BH NOTE
RT ASSESSMENT TREATMENT GOALS    []Pt Goal:  Pt will identify 1-2 positive coping skills by time of discharge. []Pt Goal:  Pt will identify 1-2 positive aspects of self by time of discharge. [x]Pt Goal:  Pt will remain on task/topic for 15-30 minutes during group by time of discharge. []Pt Goal:  Pt will identify 1-2 aspects of relapse prevention plan by time of discharge. [x]Pt Goal:  Pt will join in conversation with peers 1-2 times per group by time of discharge. [x]Pt Goal:  Pt will identify 1-2 new leisure interests by time of discharge. [x]Pt Goal:  Pt will not voice any delusional content by time of discharge.

## 2020-08-30 NOTE — CARE COORDINATION
Psycho-social assessment unable to be completed due to pt refusal.  Pt reported, \"I haven't slept in days. \" Pt was most recently admitted to the [facility] due to homicidal and suicidal ideation on 7/20/2020. Assessment to be completed at a later time.

## 2020-08-30 NOTE — GROUP NOTE
Group Therapy Note    Date: 8/30/2020    Group Start Time: 1000  Group End Time: 1100  Group Topic: Psychoeducation    [facility] CHRISTO Dalton, BATSHEVA        Group Therapy Note    Patient declined to attend psycho education group at 10 am despite encouragement by staff. 1:1 was offered as an alternative.              Signature:  CHRISTO Gomez Trellis Maize

## 2020-08-30 NOTE — BH NOTE
Group Therapy Note     Date: 8/30/2020     Group Start Time: 1600  Group End Time: 1630  Group Topic: Wellness Group      JUAN JOSE Almaguer, RN      Group Therapy Note     Pt did not attend wellness group d/t resting in room despite staff invitation to attend. 1:1 talk time offered as alternative to group session, pt declined.

## 2020-08-31 PROBLEM — F25.1 SCHIZOAFFECTIVE DISORDER, DEPRESSIVE TYPE (HCC): Status: ACTIVE | Noted: 2020-08-29

## 2020-08-31 PROCEDURE — 6370000000 HC RX 637 (ALT 250 FOR IP): Performed by: PSYCHIATRY & NEUROLOGY

## 2020-08-31 PROCEDURE — 1240000000 HC EMOTIONAL WELLNESS R&B

## 2020-08-31 PROCEDURE — 99232 SBSQ HOSP IP/OBS MODERATE 35: CPT | Performed by: PSYCHIATRY & NEUROLOGY

## 2020-08-31 RX ADMIN — DIVALPROEX SODIUM 500 MG: 500 TABLET, FILM COATED, EXTENDED RELEASE ORAL at 08:40

## 2020-08-31 RX ADMIN — OLANZAPINE 10 MG: 10 TABLET, FILM COATED ORAL at 20:34

## 2020-08-31 RX ADMIN — TRAZODONE HYDROCHLORIDE 50 MG: 50 TABLET ORAL at 20:34

## 2020-08-31 RX ADMIN — DIVALPROEX SODIUM 500 MG: 500 TABLET, FILM COATED, EXTENDED RELEASE ORAL at 20:34

## 2020-08-31 NOTE — GROUP NOTE
Group Therapy Note    Date: 8/31/2020    Group Start Time: 1430  Group End Time: 7143  Group Topic: Psychoeducation    GIAN Reynolds, CTRS    Patient refused to attend Psychoeducation Group at 1430 after encouragement from staff. 1:1 talk time offered.     Signature:  Neeraj Esteban

## 2020-08-31 NOTE — GROUP NOTE
Group Therapy Note    Date: 8/31/2020    Group Start Time: 8172  Group End Time: 0900  Group Topic: Community Meeting    [address] [facility], [facility] [place]    Patient refused to attend Goal Setting / [employer] at 1971 after encouragement from staff. 1:1 talk time offered.     Signature:  Bran Garcia

## 2020-08-31 NOTE — GROUP NOTE
Group Therapy Note    Date: 8/31/2020    Group Start Time: 1100  Group End Time: 1130  Group Topic: Recreational    [facility]    Gael Day    patient refused to attend recreational therapy group at 1100 after encouragement from staff.   1:1 talk time provided as alternative to group session     Signature:  Lv Landis

## 2020-08-31 NOTE — PLAN OF CARE
Problem: Altered Mood, Psychotic Behavior:  Goal: Able to verbalize decrease in frequency and intensity of hallucinations  Description: Able to verbalize decrease in frequency and intensity of hallucinations  Note: Pt admits to auditory hallucinations and describes it as someone yelling inside his head. PT reports poor sleep due to them and asks for prn sleep medications this evening. Problem: Altered Mood, Psychotic Behavior:  Goal: Absence of self-harm  Description: Absence of self-harm  Note: Pt admits to fleeting suicidal thoughts denies any current plan. PT agrees to seek out staff if thoughts becomes overwhelming. PT is isolative to his room this evening only coming out for needs. PT maintained on 15 min safety checks and rounds at irregular intervals.

## 2020-08-31 NOTE — GROUP NOTE
Group Therapy Note    Date: 8/31/2020    Group Start Time: 1330  Group End Time: 5063  Group Topic: Recreational    [facility]    Gael Day    Patient refused to attend cognitive skills group at 1330 after encouragement from staff.   1:1 talk time provided as alternative to group session     Signature:  Xander Lopez

## 2020-08-31 NOTE — PLAN OF CARE
Problem: Altered Mood, Psychotic Behavior:  Goal: Able to verbalize decrease in frequency and intensity of hallucinations  Description: Able to verbalize decrease in frequency and intensity of hallucinations  8/31/2020 1427 by Raiza Gonzalez  Outcome: Ongoing     Problem: Altered Mood, Psychotic Behavior:  Goal: Absence of self-harm  Description: Absence of self-harm  8/31/2020 1427 by Raiza Gonzalez  Outcome: Ongoing    Patient is isolative, resting in bed most of the day. Does come out for meals with good appetite. Cooperative with vitals and medications, evasive and refuses talk time, but is controlled and polite. \"yes, no\" answers. Says yes to suicidal thoughts. No groups.

## 2020-08-31 NOTE — PLAN OF CARE
[address] [place] [address]  Day 3 Interdisciplinary Treatment Plan NOTE    Review Date & Time: 8/31/2020 0950    Admission Type:   Admission Type: Involuntary    Reason for admission:  Reason for Admission: suicidal thoughts and homicidal thoughts to shoot self and others, auditory hallucinations of people yelling at him  Estimated Length of Stay:  5-7 days  Estimated Discharge Date Update:   to be determined by physician    PATIENT STRENGTHS:  Patient Strengths:Strengths: No significant Physical Illness, Motivated  Patient Strengths and Limitations:Limitations: (Unable to assess)  Addictive Behavior:Addictive Behavior  In the past 3 months, have you felt or has someone told you that you have a problem with:  : None  Do you have a history of Chemical Use?: No  Do you have a history of Alcohol Use?: No  Do you have a history of Street Drug Abuse?: No  Histroy of Prescripton Drug Abuse?: No  Medical Problems:  Past Medical History:   Diagnosis Date    Asthma     Hypertension     Morbid obesity ([facility]     Schizoaffective disorder ([facility]     Substance abuse ([facility]        Risk:  Fall RiskTotal: 67  Christopher Scale Christopher Scale Score: 22  BVC Total: 1  Change in scores:  No Changes to plan of Care:  No    Status EXAM:   Status and Exam  Normal: No  Facial Expression: Flat  Affect: Blunt  Level of Consciousness: Lethargic  Mood:Normal: No  Mood: Anxious, Irritable, Suspicious  Motor Activity:Normal: No  Motor Activity: Decreased  Interview Behavior: Evasive  Preception: Barbeau to Person, Barbeau to Time, Barbeau to Place, [place]beau to Situation  Attention:Normal: No  Attention: Distractible  Thought Processes: Blocking  Thought Content:Normal: No  Thought Content: Paranoia  Hallucinations:  Auditory (Comment)  Delusions: No  Memory:Normal: No  Memory: Poor Recent  Insight and Judgment: No  Insight and Judgment: Poor Judgment, Poor Insight, Unmotivated  Present Suicidal Ideation: Yes(fleeting thoughts denies current plan)  Present

## 2020-08-31 NOTE — GROUP NOTE
Group Therapy Note    Date: 8/31/2020    Group Start Time: 1000  Group End Time: 3749  Group Topic: Psychotherapy    [facility]    [address], [place]        Group Therapy Note    Attendees: 3/12      Pt denied 1:1. Despite staff encouragement, pt denied 10:00am psychotherapy group.               Signature:  [address], [place]

## 2020-09-01 PROCEDURE — 99232 SBSQ HOSP IP/OBS MODERATE 35: CPT | Performed by: PSYCHIATRY & NEUROLOGY

## 2020-09-01 PROCEDURE — 1240000000 HC EMOTIONAL WELLNESS R&B

## 2020-09-01 PROCEDURE — 6370000000 HC RX 637 (ALT 250 FOR IP): Performed by: PSYCHIATRY & NEUROLOGY

## 2020-09-01 RX ADMIN — OLANZAPINE 10 MG: 10 TABLET, FILM COATED ORAL at 21:05

## 2020-09-01 RX ADMIN — DIVALPROEX SODIUM 500 MG: 500 TABLET, FILM COATED, EXTENDED RELEASE ORAL at 21:05

## 2020-09-01 RX ADMIN — TRAZODONE HYDROCHLORIDE 50 MG: 50 TABLET ORAL at 21:05

## 2020-09-01 RX ADMIN — DIVALPROEX SODIUM 500 MG: 500 TABLET, FILM COATED, EXTENDED RELEASE ORAL at 07:49

## 2020-09-01 NOTE — H&P
HISTORY and [address]       NAME:  Shreya Fernandes  MRN: [id number]   YOB: 1975   Date: 9/1/2020   Age: 41 y.o. Gender: male     COMPLAINT AND PRESENT HISTORY:      Shreya Fernandes is 41 y.o.,  male, admitted because of Schizoaffective Disorder. Patient admitted via the ED with suicidal ideation, auditory hallucinations. Patient denies current suicidal or homicidal ideation to examiner. He does report some auditory hallucinations. Poor insight. Patient reports poor sleep, appetite fair. Reports poor concentration, memory is intact. Denies alcohol or substance abuse. He is homeless at present. Compliance to medication unknown at present. He denies any fever or chills. No chest pain or shortness of breath. No nausea, vomiting, diarrhea, constipation. No urinary complaints today. See psychiatric admission note for further psychiatric history. DIAGNOSTIC RESULTS   Labs:  CBC: No results for input(s): WBC, HGB, PLT in the last 72 hours. BMP:  No results for input(s): NA, K, CL, CO2, BUN, CREATININE, GLUCOSE in the last 72 hours. Hepatic: No results for input(s): AST, ALT, ALB, BILITOT, ALKPHOS in the last 72 hours. Lipids: No results for input(s): CHOL, HDL in the last 72 hours. Invalid input(s): LDLCALCU  Thyroid: No results for input(s): P6JKOTN, P1OPFBP, FT4, TSH in the last 72 hours. PAST MEDICAL HISTORY     Past Medical History:   Diagnosis Date    Asthma     Hypertension     [facility] ([facility]     Schizoaffective disorder ([facility]     Substance abuse ([facility]        SURGICAL HISTORY     History reviewed. No pertinent surgical history.     FAMILY HISTORY       Family History   Family history unknown: Yes       SOCIAL HISTORY       Social History     Socioeconomic History    Marital status: Single     Spouse name: None    Number of children: None    Years of education: None    Highest education level: None   Occupational History    None   Social Needs    Financial resource strain: None    Food insecurity     Worry: None     Inability: None    Transportation needs     Medical: None     Non-medical: None   Tobacco Use    Smoking status: Never Smoker    Smokeless tobacco: Never Used    Tobacco comment: pt nonsmoker   Substance and Sexual Activity    Alcohol use: Yes     Comment: Hx of Alcohol abuse    Drug use: Yes     Comment: Hx of substance abuse?  Sexual activity: None   Lifestyle    Physical activity     Days per week: None     Minutes per session: None    Stress: None   Relationships    Social connections     Talks on phone: None     Gets together: None     Attends Alevism service: None     Active member of club or organization: None     Attends meetings of clubs or organizations: None     Relationship status: None    Intimate partner violence     Fear of current or ex partner: None     Emotionally abused: None     Physically abused: None     Forced sexual activity: None   Other Topics Concern    None   Social History Narrative    None           REVIEW OF SYSTEMS      No Known Allergies    No current facility-administered medications on file prior to encounter. Current Outpatient Medications on File Prior to Encounter   Medication Sig Dispense Refill    divalproex (DEPAKOTE ER) 500 MG extended release tablet Take 1 tablet by mouth 2 times daily 60 tablet 0    OLANZapine (ZYPREXA) 7.5 MG tablet Take 1 tablet by mouth nightly 30 tablet 0    traZODone (DESYREL) 50 MG tablet Take 1 tablet by mouth nightly as needed for Sleep 30 tablet 0        Review of Systems   Unable to perform ROS: Psychiatric disorder   Patient denies all  GENERAL PHYSICAL EXAM:     Vitals: /71   Pulse 70   Temp 98.2 °F (36.8 °C) (Oral)   Resp 14   Ht 5' 10\" (1.778 m)   Wt 250 lb (113.4 kg)   BMI 35.87 kg/m²  Body mass index is 35.87 kg/m². Pt was examined with a nurse present in the room.      GENERAL APPEARANCE: Leonid Ruiz is 41 y.o.,  male, moderately obese, nourished, conscious, alert. Does not appear to be distress or pain at this time. SKIN:  Warm, dry, no cyanosis or jaundice. HEAD:  Normocephalic, atraumatic, no swelling or tenderness. EYES: Conjunctive are clear, sclera white bilaterally. Eyelids within normal limits. EARS:  No discharge, no marked hearing loss. NOSE:  No rhinorrhea, epistaxis or septal deformity. THROAT:  Uvula midline. No ulceration bleeding or discharge. NECK:  No stiffness, trachea central.    CHEST:  Symmetrical and equal on expansion. HEART:  Regular rate and rhythm. S1 > S2, No audible murmurs or gallops. LUNGS:  Equal on expansion, normal breath sounds. ABDOMEN:  Obese. Soft on palpation. No localized tenderness. No guarding or rigidity. LOCOMOTOR, BACK AND SPINE:  No tenderness or deformities. EXTREMITIES:  Symmetrical, no pedal edema. No calf tenderness. No discoloration or ulcerations. NEUROLOGIC:  The patient is conscious, alert, oriented, Gait and balance WNL. No apparent focal sensory deficits. No motor deficits, muscle strength equal Jasson. No facial droop, tongue protrudes centrally, no slurring of the speech. Cranial nerves 3-12 reveal no deficits, taste and smell not assessed. PROVISIONAL DIAGNOSES:      Principal Problem:    Schizoaffective disorder, depressive type ([facility]  Resolved Problems:    * No resolved hospital problems.  MARIA D Fuentes - CNP on 9/1/2020 at 1:14 PM

## 2020-09-01 NOTE — GROUP NOTE
Group Therapy Note    Date: 9/1/2020    Group Start Time: 1330  Group End Time: 4694  Group Topic: Cognitive Skills    GIAN Diaz, [address]        Group Therapy Note    Attendees: 7/21      Pt did not attend RT skills group d/t resting in room despite staff invitation to attend. 1:1 talk time offered as alternative to group session, pt declined.

## 2020-09-01 NOTE — GROUP NOTE
Group Therapy Note    Date: 9/1/2020    Group Start Time: 1100  Group End Time: 4429  Group Topic: Recreational    [address] [facility], [facility]    Patient refused to attend Recreational Therapy Group at 1100 after encouragement from staff. 1:1 talk time offered.     Signature:  Elsa Anderson

## 2020-09-01 NOTE — PLAN OF CARE
Problem: Altered Mood, Psychotic Behavior:  Goal: Able to verbalize decrease in frequency and intensity of hallucinations  Description: Able to verbalize decrease in frequency and intensity of hallucinations  9/1/2020 0913 by Jessa Tran RN  Outcome: Ongoing   1:1 with pt x ten minutes. Pt encouraged to attend unit programming and interact with peers and staff. Pt also encouraged to tend to hygiene and ADLs. Pt encouraged to discuss feelings with staff and feedback and reassurance provided. Pt denies thoughts of self harm and is agreeable to seeking out should thoughts of self harm arise. Safe environment maintained. Q15 minute checks for safety cont per unit policy. Will cont to monitor for safety and provides support and reassurance as needed. Pt compliant with medications and shows no side effects. Refused groups and is seclusive to room. Some paranoia.

## 2020-09-01 NOTE — PROGRESS NOTES
Charting done this shift reviewed by Electronically signed by Janna Sandhu RN on 9/1/2020 at 1:50 AM

## 2020-09-01 NOTE — GROUP NOTE
Group Therapy Note    Date: 9/1/2020    Group Start Time: 1000  Group End Time: 1050  Group Topic: Psychotherapy    [facility]    [address], [place]        Group Therapy Note    Attendees: 4/11      Pt denied 1:1. Despite staff encouragement, pt denied 10:00am psychotherapy group.               Signature:  [address], [place]

## 2020-09-02 PROCEDURE — 6370000000 HC RX 637 (ALT 250 FOR IP): Performed by: PSYCHIATRY & NEUROLOGY

## 2020-09-02 PROCEDURE — 99232 SBSQ HOSP IP/OBS MODERATE 35: CPT | Performed by: PSYCHIATRY & NEUROLOGY

## 2020-09-02 PROCEDURE — 1240000000 HC EMOTIONAL WELLNESS R&B

## 2020-09-02 RX ADMIN — OLANZAPINE 10 MG: 10 TABLET, FILM COATED ORAL at 20:24

## 2020-09-02 RX ADMIN — DIVALPROEX SODIUM 500 MG: 500 TABLET, FILM COATED, EXTENDED RELEASE ORAL at 08:13

## 2020-09-02 RX ADMIN — DIVALPROEX SODIUM 500 MG: 500 TABLET, FILM COATED, EXTENDED RELEASE ORAL at 20:24

## 2020-09-02 RX ADMIN — TRAZODONE HYDROCHLORIDE 50 MG: 50 TABLET ORAL at 20:24

## 2020-09-02 NOTE — PLAN OF CARE
Problem: Altered Mood, Psychotic Behavior:  Goal: Able to verbalize decrease in frequency and intensity of hallucinations  Description: Able to verbalize decrease in frequency and intensity of hallucinations  Outcome: Ongoing   . Pt is seclusive to his room aloof of staff and peers but is cooperative when approached. He eats well at snack and does accept all medication. Problem: Altered Mood, Psychotic Behavior:  Goal: Absence of self-harm  Description: Absence of self-harm  Outcome: Ongoing   Pt denies thoughts of harming themself and verbally agrees to remain safe while on the unit.  No self harming behaviors are noted this shift

## 2020-09-02 NOTE — GROUP NOTE
Group Therapy Note    Date: 9/2/2020    Group Start Time: 1430  Group End Time: 4399  Group Topic: Healthy Living/Wellness    [address] [facility], [facility]    Patient refused to attend Recreational Therapy Group at 1430 after encouragement from staff. 1:1 talk time offered.     Signature:  Jared Yusuf

## 2020-09-02 NOTE — GROUP NOTE
Group Therapy Note    Date: 9/2/2020    Group Start Time: 1330  Group End Time: 9260  Group Topic: Cognitive Skills    GIAN Diaz, [address]        Group Therapy Note    Attendees: 8/17      Pt did not attend RT skills group d/t resting in room despite staff invitation to attend. 1:1 talk time offered as alternative to group session, pt declined.

## 2020-09-02 NOTE — PLAN OF CARE
Problem: Altered Mood, Psychotic Behavior:  Goal: Able to verbalize decrease in frequency and intensity of hallucinations  Description: Able to verbalize decrease in frequency and intensity of hallucinations  9/2/2020 1456 by Petrona Lee  Outcome: Ongoing  Pt. Denies hallucinations. Pt. Does relate to depression and anxiety. Pt. Is isolative in room for long periods. Pt is paranoid and refuses to eat in dayroom with peers. Says \" I can't eat with people. I'll get hopper virus. \" Pt. Does take medication from nurse. Problem: Altered Mood, Psychotic Behavior:  Goal: Absence of self-harm  Description: Absence of self-harm  9/2/2020 1456 by Petrona Lee  Outcome: Ongoing  Pt. Denies suicidal or homicidal thoughts. Remains isolative in room. Pt. Remains safe on the unit. Q 15 minute checks for safety maintained.

## 2020-09-02 NOTE — PROGRESS NOTES
Daily Progress Note  Mac Robbins MD  9/2/2020  CHIEF COMPLAINT:  depression    Reviewed patient's current plan of care and vital signs with nursing staff. Sleep:  8 hours last night  Attending groups: No: isolated to room    SUBJECTIVE:    Patient continues to remain largely isolated to his room. He was irritable after patient was intrusive with him yesterday. Continues to report having some vague homicidal ideations against family members but denies any plan or intent. Denies any suicidal ideation plan or intent. Staff reports that he has been compliant with his medications. He denies any side effects on the medication. We will discharge soon if he continues to improve. Case discussed with staff and treatment team this morning. Mental Status Exam  Level of consciousness:  Within normal limits  Appearance: Hospital attire, seated in chair, with good grooming and hygiene   Behavior/Motor: No abnormalities noted  Attitude toward examiner:  Cooperative, attentive, good eye contact  Speech:  spontaneous, normal rate, normal volume and well articulated  Mood:  irritable  Affect: labile  Thought processes:  linear, goal directed and coherent  Thought content:  Passive homicidal ideation, but mentions they are getting better  Suicidal Ideation: denies suicidal ideation  Delusions:  no evidence of delusions  Perceptual Disturbance:  denies any perceptual disturbance  Cognition:  Oriented to self, location, time, and situation  Memory: age appropriate  Insight & Judgement: improving  Medication side effects:  denies       Data   height is 5' 10\" (1.778 m) and weight is 250 lb (113.4 kg). His oral temperature is 97.5 °F (36.4 °C). His blood pressure is 120/72 and his pulse is 80. His respiration is 14. Labs:   No visits with results within 2 Day(s) from this visit.    Latest known visit with results is:   Admission on 05/02/2019, Discharged on 05/02/2019   Component Date Value Ref Range Status    WBC 05/02/2019 8.1  3.5 - 11.3 k/uL Final    RBC 05/02/2019 4.73  4.21 - 5.77 m/uL Final    Hemoglobin 05/02/2019 13.8  13.0 - 17.0 g/dL Final    Hematocrit 05/02/2019 43.0  40.7 - 50.3 % Final    MCV 05/02/2019 90.9  82.6 - 102.9 fL Final    MCH 05/02/2019 29.2  25.2 - 33.5 pg Final    MCHC 05/02/2019 32.1  28.4 - 34.8 g/dL Final    RDW 05/02/2019 12.7  11.8 - 14.4 % Final    Platelets 11/24/5286 264  138 - 453 k/uL Final    MPV 05/02/2019 10.7  8.1 - 13.5 fL Final    NRBC Automated 05/02/2019 0.0  0.0 per 100 WBC Final    Differential Type 05/02/2019 NOT REPORTED   Final    Seg Neutrophils 05/02/2019 52  36 - 65 % Final    Lymphocytes 05/02/2019 29  24 - 43 % Final    Monocytes 05/02/2019 13* 3 - 12 % Final    Eosinophils % 05/02/2019 5* 1 - 4 % Final    Basophils 05/02/2019 1  0 - 2 % Final    Immature Granulocytes 05/02/2019 0  0 % Final    Segs Absolute 05/02/2019 4.20  1.50 - 8.10 k/uL Final    Absolute Lymph # 05/02/2019 2.36  1.10 - 3.70 k/uL Final    Absolute Mono # 05/02/2019 1.07  0.10 - 1.20 k/uL Final    Absolute Eos # 05/02/2019 0.42  0.00 - 0.44 k/uL Final    Basophils Absolute 05/02/2019 0.06  0.00 - 0.20 k/uL Final    Absolute Immature Granulocyte 05/02/2019 0.03  0.00 - 0.30 k/uL Final    WBC Morphology 05/02/2019 NOT REPORTED   Final    RBC Morphology 05/02/2019 NOT REPORTED   Final    Platelet Estimate 23/97/3510 NOT REPORTED   Final    Glucose 05/02/2019 98  70 - 99 mg/dL Final    BUN 05/02/2019 14  6 - 20 mg/dL Final    CREATININE 05/02/2019 1.03  0.70 - 1.20 mg/dL Final    Bun/Cre Ratio 05/02/2019 NOT REPORTED  9 - 20 Final    Calcium 05/02/2019 9.1  8.6 - 10.4 mg/dL Final    Sodium 05/02/2019 136  135 - 144 mmol/L Final    Potassium 05/02/2019 3.8  3.7 - 5.3 mmol/L Final    Chloride 05/02/2019 98  98 - 107 mmol/L Final    CO2 05/02/2019 23  20 - 31 mmol/L Final    Anion Gap 05/02/2019 15  9 - 17 mmol/L Final    GFR Non- 05/02/2019 >60  >60 divalproex (DEPAKOTE ER) extended release tablet 500 mg, 500 mg, Oral, BID  OLANZapine (ZYPREXA) tablet 10 mg, 10 mg, Oral, Nightly  acetaminophen (TYLENOL) tablet 650 mg, 650 mg, Oral, Q4H PRN  polyethylene glycol (GLYCOLAX) packet 17 g, 17 g, Oral, Daily PRN  traZODone (DESYREL) tablet 50 mg, 50 mg, Oral, Nightly PRN    ASSESSMENT  Schizoaffective disorder, depressive type [facility])     PLAN  Patient s symptoms show some improvement  Continue same medications today  Attempt to develop insight  Psycho-education conducted. Supportive Therapy conducted. Probable discharge is TBD  Follow-up TBD    Electronically signed by Serafin Das MD on 9/2/20 at 5:38 PM EDT      **This report has been created using voice recognition software. It may contain minor errors which are inherent in voice recognition technology. **

## 2020-09-02 NOTE — GROUP NOTE
Group Therapy Note    Date: 9/2/2020    Group Start Time: 0900  Group End Time: 0915  Group Topic: Community Meeting    [facility]    [place], [place]        Group Therapy Note    Attendees: 8/21      Pt did not attend [employer] d/t resting in room despite staff invitation to attend. 1:1 talk time offered as alternative to group session, pt declined.

## 2020-09-02 NOTE — GROUP NOTE
Group Therapy Note    Date: 9/2/2020    Group Start Time: 1000  Group End Time: 1050  Group Topic: Psychotherapy    [facility]    [address], [place]        Group Therapy Note    Attendees: 5/9      Pt denied 1:1. Despite staff encouragement, pt denied 10:00am psychotherapy group.               Signature:  [address], [place]

## 2020-09-02 NOTE — PROGRESS NOTES
05/02/2019   Component Date Value Ref Range Status    WBC 05/02/2019 8.1  3.5 - 11.3 k/uL Final    RBC 05/02/2019 4.73  4.21 - 5.77 m/uL Final    Hemoglobin 05/02/2019 13.8  13.0 - 17.0 g/dL Final    Hematocrit 05/02/2019 43.0  40.7 - 50.3 % Final    MCV 05/02/2019 90.9  82.6 - 102.9 fL Final    MCH 05/02/2019 29.2  25.2 - 33.5 pg Final    MCHC 05/02/2019 32.1  28.4 - 34.8 g/dL Final    RDW 05/02/2019 12.7  11.8 - 14.4 % Final    Platelets 21/94/5749 264  138 - 453 k/uL Final    MPV 05/02/2019 10.7  8.1 - 13.5 fL Final    NRBC Automated 05/02/2019 0.0  0.0 per 100 WBC Final    Differential Type 05/02/2019 NOT REPORTED   Final    Seg Neutrophils 05/02/2019 52  36 - 65 % Final    Lymphocytes 05/02/2019 29  24 - 43 % Final    Monocytes 05/02/2019 13* 3 - 12 % Final    Eosinophils % 05/02/2019 5* 1 - 4 % Final    Basophils 05/02/2019 1  0 - 2 % Final    Immature Granulocytes 05/02/2019 0  0 % Final    Segs Absolute 05/02/2019 4.20  1.50 - 8.10 k/uL Final    Absolute Lymph # 05/02/2019 2.36  1.10 - 3.70 k/uL Final    Absolute Mono # 05/02/2019 1.07  0.10 - 1.20 k/uL Final    Absolute Eos # 05/02/2019 0.42  0.00 - 0.44 k/uL Final    Basophils Absolute 05/02/2019 0.06  0.00 - 0.20 k/uL Final    Absolute Immature Granulocyte 05/02/2019 0.03  0.00 - 0.30 k/uL Final    WBC Morphology 05/02/2019 NOT REPORTED   Final    RBC Morphology 05/02/2019 NOT REPORTED   Final    Platelet Estimate 58/96/7009 NOT REPORTED   Final    Glucose 05/02/2019 98  70 - 99 mg/dL Final    BUN 05/02/2019 14  6 - 20 mg/dL Final    CREATININE 05/02/2019 1.03  0.70 - 1.20 mg/dL Final    Bun/Cre Ratio 05/02/2019 NOT REPORTED  9 - 20 Final    Calcium 05/02/2019 9.1  8.6 - 10.4 mg/dL Final    Sodium 05/02/2019 136  135 - 144 mmol/L Final    Potassium 05/02/2019 3.8  3.7 - 5.3 mmol/L Final    Chloride 05/02/2019 98  98 - 107 mmol/L Final    CO2 05/02/2019 23  20 - 31 mmol/L Final    Anion Gap 05/02/2019 15  9 - 17 mmol/L Final    GFR Non- 05/02/2019 >60  >60 mL/min Final    GFR  05/02/2019 >60  >60 mL/min Final    GFR Comment 05/02/2019        Final    Comment: Average GFR for 38-51 years old:   80 mL/min/1.73sq m  Chronic Kidney Disease:   <60 mL/min/1.73sq m  Kidney failure:   <15 mL/min/1.73sq m              eGFR calculated using average adult body mass. Additional eGFR calculator available at:        [employer]            GFR Staging 05/02/2019 NOT REPORTED   Final    Troponin, High Sensitivity 05/02/2019 <6  0 - 22 ng/L Final    Comment:       High Sensitivity Troponin values cannot be compared with other Troponin methodologies. Patients with high levels of Biotin oral intake (i.e >5mg/day) may have falsely decreased   Troponin levels. Samples collected within 8 hours of biotin intake may require additional   information for diagnosis.       Troponin T 05/02/2019 NOT REPORTED  <0.03 ng/mL Final    Troponin Interp 05/02/2019 NOT REPORTED   Final    Ventricular Rate 05/02/2019 109  BPM Final    Atrial Rate 05/02/2019 109  BPM Final    P-R Interval 05/02/2019 136  ms Final    QRS Duration 05/02/2019 68  ms Final    Q-T Interval 05/02/2019 350  ms Final    QTc Calculation (Bazett) 05/02/2019 471  ms Final    P Axis 05/02/2019 38  degrees Final    R Axis 05/02/2019 19  degrees Final    T Axis 05/02/2019 1  degrees Final    Ethanol 05/02/2019 <10  <10 mg/dL Final    Ethanol percent 05/02/2019 <0.010  <0.010 % Final    NOTE: NEW REFERENCE RANGE    Ventricular Rate 05/02/2019 88  BPM Final    Atrial Rate 05/02/2019 88  BPM Final    P-R Interval 05/02/2019 150  ms Final    QRS Duration 05/02/2019 86  ms Final    Q-T Interval 05/02/2019 404  ms Final    QTc Calculation (Bazett) 05/02/2019 488  ms Final    P Axis 05/02/2019 39  degrees Final    R Axis 05/02/2019 19  degrees Final    T Axis 05/02/2019 1  degrees Final Medications  Current Facility-Administered Medications: divalproex (DEPAKOTE ER) extended release tablet 500 mg, 500 mg, Oral, BID  OLANZapine (ZYPREXA) tablet 10 mg, 10 mg, Oral, Nightly  acetaminophen (TYLENOL) tablet 650 mg, 650 mg, Oral, Q4H PRN  polyethylene glycol (GLYCOLAX) packet 17 g, 17 g, Oral, Daily PRN  traZODone (DESYREL) tablet 50 mg, 50 mg, Oral, Nightly PRN    ASSESSMENT  Schizoaffective disorder, depressive type ([facility] [address]     PLAN  Patient s symptoms show no change  Discussed to go up on his olanzapine however patient is not interested in that at this point. Attempt to develop insight  Psycho-education conducted. Supportive Therapy conducted. Probable discharge is TBD  Follow-up TBD    Electronically signed by Celina Lorenzo MD on 9/1/20 at 8:05 PM EDT    **This report has been created using voice recognition software. It may contain minor errors which are inherent in voice recognition technology. **

## 2020-09-02 NOTE — GROUP NOTE
Group Therapy Note    Date: 9/2/2020    Group Start Time: 1100  Group End Time: 9033  Group Topic: Recreational    [address] [facility], [facility]    Patient refused to attend Recreational Therapy Group at 1100 after encouragement from staff. 1:1 talk time offered.     Signature:  Andrew Galvez

## 2020-09-03 PROCEDURE — 99232 SBSQ HOSP IP/OBS MODERATE 35: CPT | Performed by: PSYCHIATRY & NEUROLOGY

## 2020-09-03 PROCEDURE — 1240000000 HC EMOTIONAL WELLNESS R&B

## 2020-09-03 PROCEDURE — 6370000000 HC RX 637 (ALT 250 FOR IP): Performed by: PSYCHIATRY & NEUROLOGY

## 2020-09-03 RX ADMIN — DIVALPROEX SODIUM 500 MG: 500 TABLET, FILM COATED, EXTENDED RELEASE ORAL at 20:50

## 2020-09-03 RX ADMIN — OLANZAPINE 10 MG: 10 TABLET, FILM COATED ORAL at 20:50

## 2020-09-03 RX ADMIN — DIVALPROEX SODIUM 500 MG: 500 TABLET, FILM COATED, EXTENDED RELEASE ORAL at 11:58

## 2020-09-03 NOTE — GROUP NOTE
Group Therapy Note    Date: 9/3/2020    Group Start Time: 1330  Group End Time: 4935  Group Topic: Psychoeducation    GIAN Lopez      Patient declined to attend coping skills group at 1330 despite encouragement from staff. 1:1 talk time offered by staff as alternative to group session.       Signature:  Manny Pelaez

## 2020-09-03 NOTE — BH NOTE
Pt did not attend 1600 wellness group due to resting in room and choosing not to attend. 1:1 talk time offered however Pt declined.

## 2020-09-03 NOTE — GROUP NOTE
Group Therapy Note    Date: 9/3/2020    Group Start Time: 1100  Group End Time: 7731  Group Topic: Psychoeducation    GIAN Gutierres    Patient declined to attend social skills group at 1100 despite encouragement from staff. 1:1 talk time offered by staff as alternative to group session.       Signature:  Cassy Gutierres

## 2020-09-03 NOTE — GROUP NOTE
Group Therapy Note    Date: 9/3/2020    Group Start Time: 0900  Group End Time: 8119  Group Topic: Community Meeting    GIAN Salomon [place], [place]        Group Therapy Note    Attendees: 9/19         Pt did not participate in Community Meeting/Goals Group at 900am when encouraged by RT due to resting in room. Pt was offered talk time as an alternative to group but declined.       Discipline Responsible: Psychoeducational Specialist      Signature:  Zenia Cantu

## 2020-09-04 PROCEDURE — 1240000000 HC EMOTIONAL WELLNESS R&B

## 2020-09-04 PROCEDURE — 6370000000 HC RX 637 (ALT 250 FOR IP): Performed by: PSYCHIATRY & NEUROLOGY

## 2020-09-04 PROCEDURE — 99232 SBSQ HOSP IP/OBS MODERATE 35: CPT | Performed by: PSYCHIATRY & NEUROLOGY

## 2020-09-04 RX ADMIN — OLANZAPINE 10 MG: 10 TABLET, FILM COATED ORAL at 20:34

## 2020-09-04 RX ADMIN — DIVALPROEX SODIUM 500 MG: 500 TABLET, FILM COATED, EXTENDED RELEASE ORAL at 20:34

## 2020-09-04 RX ADMIN — TRAZODONE HYDROCHLORIDE 50 MG: 50 TABLET ORAL at 20:34

## 2020-09-04 RX ADMIN — DIVALPROEX SODIUM 500 MG: 500 TABLET, FILM COATED, EXTENDED RELEASE ORAL at 08:06

## 2020-09-04 NOTE — PROGRESS NOTES
Daily Progress Note  Rachael Puga MD  9/4/2020  CHIEF COMPLAINT:  depression    Reviewed patient's current plan of care and vital signs with nursing staff. Sleep:  8 hours last night  Attending groups: No: isolated to room    SUBJECTIVE:  Patient feels better than before. Mood and affect are better. Patient denies any suicidal ideation plan or intent. Denies any homicidal thoughts, that was explored with the patient. Oriented to time place and person. Recent and remote memory is intact. Patient feels hopeful. Sleep and appetite is good. No side effect from medication reported. Side-effect of medication were discussed with the patient . Patient is responding to current treatment. Discharge soon, if patient continues to show improvement. Case discussed with the staff. Mental Status Exam  Level of consciousness:  Within normal limits  Appearance: Hospital attire, seated in chair, with good grooming and hygiene   Behavior/Motor: No abnormalities noted  Attitude toward examiner:  Cooperative, attentive, good eye contact  Speech:  spontaneous, normal rate, normal volume and well articulated  Mood:  irritable  Affect: labile  Thought processes:  linear, goal directed and coherent  Thought content:  denies homicidal ideation, but mentions they are getting better  Suicidal Ideation: denies suicidal ideation  Delusions:  no evidence of delusions  Perceptual Disturbance:  denies any perceptual disturbance  Cognition:  Oriented to self, location, time, and situation  Memory: age appropriate  Insight & Judgement: improving  Medication side effects:  denies       Data   height is 5' 10\" (1.778 m) and weight is 250 lb (113.4 kg). His oral temperature is 98.2 °F (36.8 °C). His blood pressure is 125/89 and his pulse is 93. His respiration is 14. Labs:   No visits with results within 2 Day(s) from this visit.    Latest known visit with results is:   Admission on 05/02/2019, Discharged on 05/02/2019   Component Date Value Ref Range Status    WBC 05/02/2019 8.1  3.5 - 11.3 k/uL Final    RBC 05/02/2019 4.73  4.21 - 5.77 m/uL Final    Hemoglobin 05/02/2019 13.8  13.0 - 17.0 g/dL Final    Hematocrit 05/02/2019 43.0  40.7 - 50.3 % Final    MCV 05/02/2019 90.9  82.6 - 102.9 fL Final    MCH 05/02/2019 29.2  25.2 - 33.5 pg Final    MCHC 05/02/2019 32.1  28.4 - 34.8 g/dL Final    RDW 05/02/2019 12.7  11.8 - 14.4 % Final    Platelets 17/48/2709 264  138 - 453 k/uL Final    MPV 05/02/2019 10.7  8.1 - 13.5 fL Final    NRBC Automated 05/02/2019 0.0  0.0 per 100 WBC Final    Differential Type 05/02/2019 NOT REPORTED   Final    Seg Neutrophils 05/02/2019 52  36 - 65 % Final    Lymphocytes 05/02/2019 29  24 - 43 % Final    Monocytes 05/02/2019 13* 3 - 12 % Final    Eosinophils % 05/02/2019 5* 1 - 4 % Final    Basophils 05/02/2019 1  0 - 2 % Final    Immature Granulocytes 05/02/2019 0  0 % Final    Segs Absolute 05/02/2019 4.20  1.50 - 8.10 k/uL Final    Absolute Lymph # 05/02/2019 2.36  1.10 - 3.70 k/uL Final    Absolute Mono # 05/02/2019 1.07  0.10 - 1.20 k/uL Final    Absolute Eos # 05/02/2019 0.42  0.00 - 0.44 k/uL Final    Basophils Absolute 05/02/2019 0.06  0.00 - 0.20 k/uL Final    Absolute Immature Granulocyte 05/02/2019 0.03  0.00 - 0.30 k/uL Final    WBC Morphology 05/02/2019 NOT REPORTED   Final    RBC Morphology 05/02/2019 NOT REPORTED   Final    Platelet Estimate 74/66/0301 NOT REPORTED   Final    Glucose 05/02/2019 98  70 - 99 mg/dL Final    BUN 05/02/2019 14  6 - 20 mg/dL Final    CREATININE 05/02/2019 1.03  0.70 - 1.20 mg/dL Final    Bun/Cre Ratio 05/02/2019 NOT REPORTED  9 - 20 Final    Calcium 05/02/2019 9.1  8.6 - 10.4 mg/dL Final    Sodium 05/02/2019 136  135 - 144 mmol/L Final    Potassium 05/02/2019 3.8  3.7 - 5.3 mmol/L Final    Chloride 05/02/2019 98  98 - 107 mmol/L Final    CO2 05/02/2019 23  20 - 31 mmol/L Final    Anion Gap 05/02/2019 15  9 - 17 mmol/L Final    GFR Non- 05/02/2019 >60  >60 mL/min Final    GFR  05/02/2019 >60  >60 mL/min Final    GFR Comment 05/02/2019        Final    Comment: Average GFR for 38-51 years old:   80 mL/min/1.73sq m  Chronic Kidney Disease:   <60 mL/min/1.73sq m  Kidney failure:   <15 mL/min/1.73sq m              eGFR calculated using average adult body mass. Additional eGFR calculator available at:        [email or web address]            GFR Staging 05/02/2019 NOT REPORTED   Final    Troponin, High Sensitivity 05/02/2019 <6  0 - 22 ng/L Final    Comment:       High Sensitivity Troponin values cannot be compared with other Troponin methodologies. Patients with high levels of Biotin oral intake (i.e >5mg/day) may have falsely decreased   Troponin levels. Samples collected within 8 hours of biotin intake may require additional   information for diagnosis.       Troponin T 05/02/2019 NOT REPORTED  <0.03 ng/mL Final    Troponin Interp 05/02/2019 NOT REPORTED   Final    Ventricular Rate 05/02/2019 109  BPM Final    Atrial Rate 05/02/2019 109  BPM Final    P-R Interval 05/02/2019 136  ms Final    QRS Duration 05/02/2019 68  ms Final    Q-T Interval 05/02/2019 350  ms Final    QTc Calculation (Bazett) 05/02/2019 471  ms Final    P Axis 05/02/2019 38  degrees Final    R Axis 05/02/2019 19  degrees Final    T Axis 05/02/2019 1  degrees Final    Ethanol 05/02/2019 <10  <10 mg/dL Final    Ethanol percent 05/02/2019 <0.010  <0.010 % Final    NOTE: NEW REFERENCE RANGE    Ventricular Rate 05/02/2019 88  BPM Final    Atrial Rate 05/02/2019 88  BPM Final    P-R Interval 05/02/2019 150  ms Final    QRS Duration 05/02/2019 86  ms Final    Q-T Interval 05/02/2019 404  ms Final    QTc Calculation (Bazett) 05/02/2019 488  ms Final    P Axis 05/02/2019 39  degrees Final    R Axis 05/02/2019 19  degrees Final    T Axis 05/02/2019 1  degrees Final            Medications  Current Facility-Administered Medications: divalproex (DEPAKOTE ER) extended release tablet 500 mg, 500 mg, Oral, BID  OLANZapine (ZYPREXA) tablet 10 mg, 10 mg, Oral, Nightly  acetaminophen (TYLENOL) tablet 650 mg, 650 mg, Oral, Q4H PRN  polyethylene glycol (GLYCOLAX) packet 17 g, 17 g, Oral, Daily PRN  traZODone (DESYREL) tablet 50 mg, 50 mg, Oral, Nightly PRN    ASSESSMENT  Schizoaffective disorder, depressive type [facility])     PLAN  Patient s symptoms show some improvement  Continue same medications today  Attempt to develop insight  Psycho-education conducted. Supportive Therapy conducted. Probable discharge is saturday  Follow-up TBD    Electronically signed by Celina Lorenzo MD on 9/4/20 at 3:46 PM EDT    **This report has been created using voice recognition software. It may contain minor errors which are inherent in voice recognition technology. **

## 2020-09-04 NOTE — GROUP NOTE
Group Therapy Note    Date: 9/4/2020    Group Start Time: 1100  Group End Time: 5452  Group Topic: Recreational    [address]        Group Therapy Note    Attendees: 5    Pt did not attend Therapeutic Recreation d/t resting in room despite staff invitation to attend. 1:1 talk time offered as alternative to group session, pt declined.

## 2020-09-04 NOTE — PLAN OF CARE
Problem: Altered Mood, Psychotic Behavior:  Goal: Absence of self-harm  Description: Absence of self-harm  9/4/2020 1210 by Ezra Gold LPN  Outcome: Ongoing   Pt is free from self harm and agrees to feeling safe on the unit. Pt denies suicidal ideations. Problem: Altered Mood, Psychotic Behavior:  Goal: Able to verbalize decrease in frequency and intensity of hallucinations  Description: Able to verbalize decrease in frequency and intensity of hallucinations  9/4/2020 1210 by Ezra Gold LPN  Outcome: Ongoing   Pt denies auditory/visual hallucinations. Pt isolates to room coming out for meals and needs only. Pt. Remains on q15 min checks and frequent spontaneous checks throughout shift. Pt. Safety maintained.

## 2020-09-04 NOTE — GROUP NOTE
Group Therapy Note    Date: 9/4/2020    Group Start Time: 1430  Group End Time: 6779  Group Topic: Psychoeducation    [address]        Group Therapy Note    Attendees: 8    Pt did not attend Therapeutic Recreation d/t resting in room despite staff invitation to attend. 1:1 talk time offered as alternative to group session, pt declined.

## 2020-09-04 NOTE — PROGRESS NOTES
05/02/2019   Component Date Value Ref Range Status    WBC 05/02/2019 8.1  3.5 - 11.3 k/uL Final    RBC 05/02/2019 4.73  4.21 - 5.77 m/uL Final    Hemoglobin 05/02/2019 13.8  13.0 - 17.0 g/dL Final    Hematocrit 05/02/2019 43.0  40.7 - 50.3 % Final    MCV 05/02/2019 90.9  82.6 - 102.9 fL Final    MCH 05/02/2019 29.2  25.2 - 33.5 pg Final    MCHC 05/02/2019 32.1  28.4 - 34.8 g/dL Final    RDW 05/02/2019 12.7  11.8 - 14.4 % Final    Platelets 82/43/1573 264  138 - 453 k/uL Final    MPV 05/02/2019 10.7  8.1 - 13.5 fL Final    NRBC Automated 05/02/2019 0.0  0.0 per 100 WBC Final    Differential Type 05/02/2019 NOT REPORTED   Final    Seg Neutrophils 05/02/2019 52  36 - 65 % Final    Lymphocytes 05/02/2019 29  24 - 43 % Final    Monocytes 05/02/2019 13* 3 - 12 % Final    Eosinophils % 05/02/2019 5* 1 - 4 % Final    Basophils 05/02/2019 1  0 - 2 % Final    Immature Granulocytes 05/02/2019 0  0 % Final    Segs Absolute 05/02/2019 4.20  1.50 - 8.10 k/uL Final    Absolute Lymph # 05/02/2019 2.36  1.10 - 3.70 k/uL Final    Absolute Mono # 05/02/2019 1.07  0.10 - 1.20 k/uL Final    Absolute Eos # 05/02/2019 0.42  0.00 - 0.44 k/uL Final    Basophils Absolute 05/02/2019 0.06  0.00 - 0.20 k/uL Final    Absolute Immature Granulocyte 05/02/2019 0.03  0.00 - 0.30 k/uL Final    WBC Morphology 05/02/2019 NOT REPORTED   Final    RBC Morphology 05/02/2019 NOT REPORTED   Final    Platelet Estimate 47/28/2290 NOT REPORTED   Final    Glucose 05/02/2019 98  70 - 99 mg/dL Final    BUN 05/02/2019 14  6 - 20 mg/dL Final    CREATININE 05/02/2019 1.03  0.70 - 1.20 mg/dL Final    Bun/Cre Ratio 05/02/2019 NOT REPORTED  9 - 20 Final    Calcium 05/02/2019 9.1  8.6 - 10.4 mg/dL Final    Sodium 05/02/2019 136  135 - 144 mmol/L Final    Potassium 05/02/2019 3.8  3.7 - 5.3 mmol/L Final    Chloride 05/02/2019 98  98 - 107 mmol/L Final    CO2 05/02/2019 23  20 - 31 mmol/L Final    Anion Gap 05/02/2019 15  9 - 17 mmol/L Final    GFR Non- 05/02/2019 >60  >60 mL/min Final    GFR  05/02/2019 >60  >60 mL/min Final    GFR Comment 05/02/2019        Final    Comment: Average GFR for 38-51 years old:   80 mL/min/1.73sq m  Chronic Kidney Disease:   <60 mL/min/1.73sq m  Kidney failure:   <15 mL/min/1.73sq m              eGFR calculated using average adult body mass. Additional eGFR calculator available at:        [email or web address]            GFR Staging 05/02/2019 NOT REPORTED   Final    Troponin, High Sensitivity 05/02/2019 <6  0 - 22 ng/L Final    Comment:       High Sensitivity Troponin values cannot be compared with other Troponin methodologies. Patients with high levels of Biotin oral intake (i.e >5mg/day) may have falsely decreased   Troponin levels. Samples collected within 8 hours of biotin intake may require additional   information for diagnosis.       Troponin T 05/02/2019 NOT REPORTED  <0.03 ng/mL Final    Troponin Interp 05/02/2019 NOT REPORTED   Final    Ventricular Rate 05/02/2019 109  BPM Final    Atrial Rate 05/02/2019 109  BPM Final    P-R Interval 05/02/2019 136  ms Final    QRS Duration 05/02/2019 68  ms Final    Q-T Interval 05/02/2019 350  ms Final    QTc Calculation (Bazett) 05/02/2019 471  ms Final    P Axis 05/02/2019 38  degrees Final    R Axis 05/02/2019 19  degrees Final    T Axis 05/02/2019 1  degrees Final    Ethanol 05/02/2019 <10  <10 mg/dL Final    Ethanol percent 05/02/2019 <0.010  <0.010 % Final    NOTE: NEW REFERENCE RANGE    Ventricular Rate 05/02/2019 88  BPM Final    Atrial Rate 05/02/2019 88  BPM Final    P-R Interval 05/02/2019 150  ms Final    QRS Duration 05/02/2019 86  ms Final    Q-T Interval 05/02/2019 404  ms Final    QTc Calculation (Bazett) 05/02/2019 488  ms Final    P Axis 05/02/2019 39  degrees Final    R Axis 05/02/2019 19  degrees Final    T Axis 05/02/2019 1  degrees Final Medications  Current Facility-Administered Medications: divalproex (DEPAKOTE ER) extended release tablet 500 mg, 500 mg, Oral, BID  OLANZapine (ZYPREXA) tablet 10 mg, 10 mg, Oral, Nightly  acetaminophen (TYLENOL) tablet 650 mg, 650 mg, Oral, Q4H PRN  polyethylene glycol (GLYCOLAX) packet 17 g, 17 g, Oral, Daily PRN  traZODone (DESYREL) tablet 50 mg, 50 mg, Oral, Nightly PRN    ASSESSMENT  Schizoaffective disorder, depressive type [facility])     PLAN  Patient s symptoms show some improvement  Continue same medications today  Attempt to develop insight  Psycho-education conducted. Supportive Therapy conducted. Probable discharge is saturday  Follow-up TBD    Electronically signed by Lew Jarquin MD on 9/3/20 at 9:14 PM EDT    **This report has been created using voice recognition software. It may contain minor errors which are inherent in voice recognition technology. **

## 2020-09-04 NOTE — GROUP NOTE
Group Therapy Note    Date: 9/4/2020    Group Start Time: 1330  Group End Time: 2919  Group Topic: Cognitive Skills    GIAN Ceron [place], [facility]    Patient refused to attend Recreational Therapy Group at 1330 after encouragement from staff. 1:1 talk time offered.     Signature:  Riley Solares

## 2020-09-04 NOTE — PLAN OF CARE
Problem: Altered Mood, Psychotic Behavior:  Goal: Able to verbalize decrease in frequency and intensity of hallucinations  Description: Able to verbalize decrease in frequency and intensity of hallucinations  9/3/2020 2223 by Christiano Bullard LPN  Outcome: Ongoing  Pt states hallucinations have decreased. Goal: Absence of self-harm  Description: Absence of self-harm  9/3/2020 2223 by Christiano Bullard LPN  Outcome: Ongoing  Pt remains free from self harm. Every 15 min checks maintained for pt safety.

## 2020-09-04 NOTE — GROUP NOTE
Group Therapy Note    Date: 9/4/2020    Group Start Time: 1000  Group End Time: 4894  Group Topic: Psychotherapy    [facility]    [address], [place]        Group Therapy Note    Attendees: 4/10             Pt denied 1:1. Despite staff encouragement, pt denied 10:00am psychotherapy group.      Signature:  [address], [place]

## 2020-09-05 PROCEDURE — 6370000000 HC RX 637 (ALT 250 FOR IP): Performed by: PSYCHIATRY & NEUROLOGY

## 2020-09-05 PROCEDURE — 1240000000 HC EMOTIONAL WELLNESS R&B

## 2020-09-05 PROCEDURE — 99232 SBSQ HOSP IP/OBS MODERATE 35: CPT | Performed by: PSYCHIATRY & NEUROLOGY

## 2020-09-05 RX ADMIN — TRAZODONE HYDROCHLORIDE 50 MG: 50 TABLET ORAL at 21:14

## 2020-09-05 RX ADMIN — DIVALPROEX SODIUM 500 MG: 500 TABLET, FILM COATED, EXTENDED RELEASE ORAL at 08:26

## 2020-09-05 RX ADMIN — OLANZAPINE 10 MG: 10 TABLET, FILM COATED ORAL at 21:14

## 2020-09-05 RX ADMIN — DIVALPROEX SODIUM 500 MG: 500 TABLET, FILM COATED, EXTENDED RELEASE ORAL at 21:14

## 2020-09-05 NOTE — PLAN OF CARE
Problem: Altered Mood, Psychotic Behavior:  Goal: Absence of self-harm  Description: Absence of self-harm  9/5/2020 1534 by Nayana Edwards RN  Outcome: Ongoing   Patient is free of self harm this shift and will continue to be provided a safe environment. Patient denies suicidal ideation and homicidal ideation. Patient is isolative to room and only comes out for meals. Patient does not social with peers. Despite encouragement from staff patient refused shower. Patient is accepting of nourishment from staff. When writer asks patient about depression and anxiety patient states \"It's going\". Remains behavioral control and med compliant. Q15 minute checks maintained.

## 2020-09-05 NOTE — GROUP NOTE
Group Therapy Note    Date: 9/5/2020    Group Start Time: 0900  Group End Time: 0915  Group Topic: Community Meeting    [facility]    [place], [place]        Group Therapy Note    Attendees: 7/16      Pt did not attend [employer] d/t resting in room despite staff invitation to attend. 1:1 talk time offered as alternative to group session, pt declined.

## 2020-09-05 NOTE — GROUP NOTE
Group Therapy Note    Date: 9/5/2020    Group Start Time: 1330  Group End Time: 8402  Group Topic: Cognitive Skills    [facility]    [place], [place]        Group Therapy Note    Attendees: 5/17      Pt did not attend RT skills group d/t resting in room despite staff invitation to attend. 1:1 talk time offered as alternative to group session, pt declined.

## 2020-09-05 NOTE — PLAN OF CARE
Problem: Altered Mood, Psychotic Behavior:  Goal: Able to verbalize decrease in frequency and intensity of hallucinations  Description: Able to verbalize decrease in frequency and intensity of hallucinations  Outcome: Ongoing   Patient denies hallucinations. Problem: Altered Mood, Psychotic Behavior:  Goal: Absence of self-harm  Description: Absence of self-harm  Outcome: Ongoing   Patient denies thoughts of self harm. Patient agrees to seek out staff if symptoms arise. Patient reports depression and anxiety. Patient verbalizes readiness for discharge. Patient is isolative to his room for long intervals. Patient is compliant with his medications. Patient remains safe on unit. Patient safety maintained q15 minute checks.

## 2020-09-05 NOTE — PROGRESS NOTES
Daily Progress Note  Tania Chase MD  9/5/2020  CHIEF COMPLAINT:  depression    Reviewed patient's current plan of care and vital signs with nursing staff. Sleep:  8 hours last night  Attending groups: No: isolated to room    SUBJECTIVE:  Marco-patient reports that he was having some fleeting homicidal ideations last night but reports that he was able to control them. Denies any intent or plan. Denies any suicidal or homicidal ideation plan or intent. He is hopeful about his recovery. Tolerating medications well and denies any side effects of the medication. We will continue same medication today and observe. Discharge soon if he continues to improve. Mental Status Exam  Level of consciousness:  Within normal limits  Appearance: Hospital attire, seated in chair, with good grooming and hygiene   Behavior/Motor: No abnormalities noted  Attitude toward examiner:  Cooperative, attentive, good eye contact  Speech:  spontaneous, normal rate, normal volume and well articulated  Mood:  irritable  Affect: labile  Thought processes:  linear, goal directed and coherent  Thought content:  denies homicidal ideation, but mentions they are getting better  Suicidal Ideation: denies suicidal ideation  Delusions:  no evidence of delusions  Perceptual Disturbance:  denies any perceptual disturbance  Cognition:  Oriented to self, location, time, and situation  Memory: age appropriate  Insight & Judgement: improving  Medication side effects:  denies       Data   height is 5' 10\" (1.778 m) and weight is 250 lb (113.4 kg). His temperature is 98.6 °F (37 °C). His blood pressure is 121/58 (abnormal) and his pulse is 79. His respiration is 14. Labs:   No visits with results within 2 Day(s) from this visit.    Latest known visit with results is:   Admission on 05/02/2019, Discharged on 05/02/2019   Component Date Value Ref Range Status    WBC 05/02/2019 8.1  3.5 - 11.3 k/uL Final    RBC 05/02/2019 4.73  4.21 - 5.77 m/uL Comment 05/02/2019        Final    Comment: Average GFR for 38-51 years old:   80 mL/min/1.73sq m  Chronic Kidney Disease:   <60 mL/min/1.73sq m  Kidney failure:   <15 mL/min/1.73sq m              eGFR calculated using average adult body mass. Additional eGFR calculator available at:        [email or web address]            GFR Staging 05/02/2019 NOT REPORTED   Final    Troponin, High Sensitivity 05/02/2019 <6  0 - 22 ng/L Final    Comment:       High Sensitivity Troponin values cannot be compared with other Troponin methodologies. Patients with high levels of Biotin oral intake (i.e >5mg/day) may have falsely decreased   Troponin levels. Samples collected within 8 hours of biotin intake may require additional   information for diagnosis.       Troponin T 05/02/2019 NOT REPORTED  <0.03 ng/mL Final    Troponin Interp 05/02/2019 NOT REPORTED   Final    Ventricular Rate 05/02/2019 109  BPM Final    Atrial Rate 05/02/2019 109  BPM Final    P-R Interval 05/02/2019 136  ms Final    QRS Duration 05/02/2019 68  ms Final    Q-T Interval 05/02/2019 350  ms Final    QTc Calculation (Bazett) 05/02/2019 471  ms Final    P Axis 05/02/2019 38  degrees Final    R Axis 05/02/2019 19  degrees Final    T Axis 05/02/2019 1  degrees Final    Ethanol 05/02/2019 <10  <10 mg/dL Final    Ethanol percent 05/02/2019 <0.010  <0.010 % Final    NOTE: NEW REFERENCE RANGE    Ventricular Rate 05/02/2019 88  BPM Final    Atrial Rate 05/02/2019 88  BPM Final    P-R Interval 05/02/2019 150  ms Final    QRS Duration 05/02/2019 86  ms Final    Q-T Interval 05/02/2019 404  ms Final    QTc Calculation (Bazett) 05/02/2019 488  ms Final    P Axis 05/02/2019 39  degrees Final    R Axis 05/02/2019 19  degrees Final    T Axis 05/02/2019 1  degrees Final            Medications  Current Facility-Administered Medications: divalproex (DEPAKOTE ER) extended release tablet 500 mg, 500 mg, Oral, BID  OLANZapine (ZYPREXA) tablet 10 mg, 10 mg, Oral, Nightly  acetaminophen (TYLENOL) tablet 650 mg, 650 mg, Oral, Q4H PRN  polyethylene glycol (GLYCOLAX) packet 17 g, 17 g, Oral, Daily PRN  traZODone (DESYREL) tablet 50 mg, 50 mg, Oral, Nightly PRN    ASSESSMENT  Schizoaffective disorder, depressive type [facility])     PLAN  Patient s symptoms show some improvement  Continue same medications today  Attempt to develop insight  Psycho-education conducted. Supportive Therapy conducted. Probable discharge is saturday  Follow-up TBD    Electronically signed by Carlitos Kay MD on 9/4/20 at 3:46 PM EDT    **This report has been created using voice recognition software. It may contain minor errors which are inherent in voice recognition technology. **

## 2020-09-05 NOTE — BH NOTE
Despite encouragement from staff patient refused open rec. One to one talk time offered. Patient refused. Will continue to monitor.

## 2020-09-06 PROCEDURE — 6370000000 HC RX 637 (ALT 250 FOR IP): Performed by: PSYCHIATRY & NEUROLOGY

## 2020-09-06 PROCEDURE — 99231 SBSQ HOSP IP/OBS SF/LOW 25: CPT | Performed by: PSYCHIATRY & NEUROLOGY

## 2020-09-06 PROCEDURE — 1240000000 HC EMOTIONAL WELLNESS R&B

## 2020-09-06 RX ADMIN — DIVALPROEX SODIUM 500 MG: 500 TABLET, FILM COATED, EXTENDED RELEASE ORAL at 20:31

## 2020-09-06 RX ADMIN — OLANZAPINE 10 MG: 10 TABLET, FILM COATED ORAL at 20:31

## 2020-09-06 RX ADMIN — DIVALPROEX SODIUM 500 MG: 500 TABLET, FILM COATED, EXTENDED RELEASE ORAL at 08:17

## 2020-09-06 RX ADMIN — TRAZODONE HYDROCHLORIDE 50 MG: 50 TABLET ORAL at 20:31

## 2020-09-06 NOTE — PLAN OF CARE
Problem: Altered Mood, Psychotic Behavior:  Goal: Able to verbalize decrease in frequency and intensity of hallucinations  Description: Able to verbalize decrease in frequency and intensity of hallucinations  Outcome: Ongoing  Goal: Absence of self-harm  Description: Absence of self-harm  9/5/2020 2132 by Dahiana Pascual LPN  Outcome: Ongoing  Pt remains free from self harm. Every 15 minutes maintained for pt safety. Pt continues to be isolative to room.

## 2020-09-06 NOTE — GROUP NOTE
Group Therapy Note    Date: 9/6/2020    Group Start Time: 1430  Group End Time: 3154  Group Topic: Cognitive Skills    [facility]    [place], [place]        Group Therapy Note    Attendees: 4/17      Pt did not attend RT skills group d/t resting in room despite staff invitation to attend. 1:1 talk time offered as alternative to group session, pt declined.

## 2020-09-06 NOTE — GROUP NOTE
Group Therapy Note    Date: 9/6/2020    Group Start Time: 0845  Group End Time: 0416  Group Topic: Community Meeting    [facility]    [place], [place]        Group Therapy Note    Attendees: 3/17      Pt did not attend [employer] d/t resting in room despite staff invitation to attend. 1:1 talk time offered as alternative to group session, pt declined.

## 2020-09-06 NOTE — PLAN OF CARE
[facility] Interdisciplinary Treatment Plan Note     Review Date & Time: 9/6/2020 0930    Admission Type:   Admission Type: Involuntary    Reason for admission:  Reason for Admission: suicidal thoughts and homicidal thoughts to shoot self and others, auditory hallucinations of people yelling at him    Estimated Length of Stay :  5-7 days  Estimated Discharge Date Update: to be determined by physician    PATIENT STRENGTHS:  Patient Strengths:Strengths: No significant Physical Illness, Motivated  Patient Strengths and Limitations:Limitations: (Unable to assess)  Addictive Behavior:Addictive Behavior  In the past 3 months, have you felt or has someone told you that you have a problem with:  : None  Do you have a history of Chemical Use?: No  Do you have a history of Alcohol Use?: No  Do you have a history of Street Drug Abuse?: No  Histroy of Prescripton Drug Abuse?: No  Medical Problems:   Past Medical History:   Diagnosis Date    Asthma     Hypertension     Morbid obesity ([facility] [address]     Schizoaffective disorder ([facility] [address]     Substance abuse ([facility]        Risk:  Fall RiskTotal: 65  Christopher Scale Christopher Scale Score: 22  BVC Total: 0    Change in scores no.  Changes to plan of Care no    Status EXAM:   Status and Exam  Normal: No  Facial Expression: Flat  Affect: Blunt  Level of Consciousness: Alert  Mood:Normal: No  Mood: Depressed, Anxious  Motor Activity:Normal: No  Motor Activity: Decreased  Interview Behavior: Cooperative  Preception: [place] to Person, [place]charlene Huger to Time, [place] to Place, [place] to Situation  Attention:Normal: Yes  Attention: Distractible  Thought Processes: Circumstantial  Thought Content:Normal: No  Thought Content: Poverty of Content  Hallucinations: None  Delusions: No  Memory:Normal: Yes  Memory: Poor Recent, Poor Remote  Insight and Judgment: No  Insight and Judgment: Poor Judgment, Poor Insight, Unmotivated  Present Suicidal Ideation: No  Present Homicidal Ideation: No    Daily Assessment Last Entry:   Daily Sleep (WDL): Within Defined Limits         Patient Currently in Pain: Denies  Daily Nutrition (WDL): Within Defined Limits    Patient Monitoring:  Frequency of Checks: 4 times per hour, close    Psychiatric Symptoms:   Depression Symptoms  Depression Symptoms: Isolative, Loss of interest  Anxiety Symptoms  Anxiety Symptoms: Generalized  Mayuri Symptoms  Mayuri Symptoms: No problems reported or observed. Psychosis Symptoms  Delusion Type: No problems reported or observed. Suicide Risk CSSR-S:  1) Within the past month, have you wished you were dead or wished you could go to sleep and not wake up? : Yes  2) Have you actually had any thoughts of killing yourself? : No  6) Have you ever done anything, started to do anything, or prepared to do anything to end your life?: No  Change in Result no Change in Plan of care no    EDUCATION:   Learner Progress Toward Treatment Goals: Reviewed results and recommendations of this team, Reviewed group plan and strategies, Reviewed signs, symptoms and risk of self harm and violent behavior, Reviewed goals and plan of care    Method: individual education, small group, verbal education    Outcome: verbalized understanding, but needs reinforcement to obtain goals    PATIENT GOALS:   short term:Pt refused   Long term:Pt refused     PLAN/TREATMENT RECOMMENDATIONS UPDATE:   Continue with group therapies, education of coping skills   Continue to monitor patient on unit. Medications provided/ medication compliance by patient. Continue for plans to obtain long term goals after discharge.     SHORT-TERM GOALS UPDATE:  Time frame for Short-Term Goals: 8-14days     LONG-TERM GOALS UPDATE:  Time frame for Long-Term Goals: 6 months  Members Present in Team Meeting: See Signature Sheet    Joe, [address]

## 2020-09-06 NOTE — GROUP NOTE
Group Therapy Note    Date: 9/6/2020    Group Start Time: 1000  Group End Time: 9967  Group Topic: Psychoeducation    GIAN Terry LPC        Group Therapy Note    Patient declined to attend Psychoeducation group at 1000 am despite encouragement. Patient was offered a 1:1 time to meet after group or alternative activity to do and patient refused.      Signature:  Bakari Terry [facility], LPC

## 2020-09-06 NOTE — PLAN OF CARE
Problem: Altered Mood, Psychotic Behavior:  Goal: Absence of self-harm  Description: Absence of self-harm  9/6/2020 1000 by Duane Bowden RN  Outcome: Ongoing   Patient is free of self harm this shift and will continue to be provided a safe environment. Problem: Altered Mood, Psychotic Behavior:  Goal: Able to verbalize decrease in frequency and intensity of hallucinations  Description: Able to verbalize decrease in frequency and intensity of hallucinations  9/6/2020 1000 by Duane Bowden RN  Outcome: Ongoing   Patient denies auditory hallucinations, visual hallucinations, suicidal ideation, and homicidal ideations. When writer asks patient about depression and anxiety patient states \"It's getting better\". Patient is isolative to room and comes out for needs only. Despite encouragement from staff patient refused shower. Patient is accepting of nourishment from staff. Remains behavioral control and med compliant. Q15 minute checks maintained.

## 2020-09-07 VITALS
TEMPERATURE: 98 F | SYSTOLIC BLOOD PRESSURE: 95 MMHG | DIASTOLIC BLOOD PRESSURE: 58 MMHG | HEIGHT: 70 IN | BODY MASS INDEX: 35.79 KG/M2 | WEIGHT: 250 LBS | RESPIRATION RATE: 14 BRPM | HEART RATE: 60 BPM

## 2020-09-07 PROCEDURE — 99239 HOSP IP/OBS DSCHRG MGMT >30: CPT | Performed by: PSYCHIATRY & NEUROLOGY

## 2020-09-07 PROCEDURE — 6370000000 HC RX 637 (ALT 250 FOR IP): Performed by: PSYCHIATRY & NEUROLOGY

## 2020-09-07 RX ORDER — DIVALPROEX SODIUM 500 MG/1
500 TABLET, EXTENDED RELEASE ORAL 2 TIMES DAILY
Qty: 60 TABLET | Refills: 0 | Status: ON HOLD | OUTPATIENT
Start: 2020-09-07 | End: 2020-10-09 | Stop reason: HOSPADM

## 2020-09-07 RX ORDER — TRAZODONE HYDROCHLORIDE 50 MG/1
50 TABLET ORAL NIGHTLY PRN
Qty: 30 TABLET | Refills: 0 | Status: ON HOLD | OUTPATIENT
Start: 2020-09-07 | End: 2020-10-09 | Stop reason: HOSPADM

## 2020-09-07 RX ORDER — OLANZAPINE 10 MG/1
10 TABLET ORAL NIGHTLY
Qty: 30 TABLET | Refills: 0 | Status: ON HOLD | OUTPATIENT
Start: 2020-09-07 | End: 2020-10-09 | Stop reason: HOSPADM

## 2020-09-07 RX ADMIN — DIVALPROEX SODIUM 500 MG: 500 TABLET, FILM COATED, EXTENDED RELEASE ORAL at 08:29

## 2020-09-07 NOTE — GROUP NOTE
Group Therapy Note    Date: 9/7/2020    Group Start Time: 0900  Group End Time: 0915  Group Topic: Community Meeting    GIAN Perez        Group Therapy Note    Pt did not attend community meeting group d/t resting in room despite staff invitation to attend. 1:1 talk time offered as alternative to group session, pt declined.

## 2020-09-07 NOTE — PROGRESS NOTES
Daily Progress Note  Lynne Hernandez MD  9/6/2020  CHIEF COMPLAINT:  depression    Reviewed patient's current plan of care and vital signs with nursing staff. Sleep:  8 hours last night  Attending groups: No: isolated to room    SUBJECTIVE:  Patient feels better than before. Mood and affect are better. Patient reports fleeting suicidal thoughts with no intent or plan. Patient notes that these thoughts are occurring less frequently. Denies any homicidal thoughts, that was explored with the patient. Oriented to time place and person. Recent and remote memory is intact. Patient feels hopeful. Sleep and appetite is good. No side effect from medication reported. Side-effect of medication were discussed with the patient . Patient is responding to current treatment. Discharge soon, if patient continues to show improvement. Case discussed with the staff. Mental Status Exam  Level of consciousness:  Within normal limits  Appearance: Hospital attire, seated in chair, with good grooming and hygiene   Behavior/Motor: No abnormalities noted  Attitude toward examiner:  Cooperative, attentive, good eye contact  Speech:  spontaneous, normal rate, normal volume and well articulated  Mood:  irritable  Affect: labile  Thought processes:  linear, goal directed and coherent  Thought content:  denies homicidal ideation  Suicidal Ideation:passive suicidal ideation  Delusions:  no evidence of delusions  Perceptual Disturbance:  denies any perceptual disturbance  Cognition:  Oriented to self, location, time, and situation  Memory: age appropriate  Insight & Judgement: improving  Medication side effects:  denies       Data   height is 5' 10\" (1.778 m) and weight is 250 lb (113.4 kg). His oral temperature is 98.3 °F (36.8 °C). His blood pressure is 122/77 and his pulse is 80. His respiration is 14. Labs:   No visits with results within 2 Day(s) from this visit.    Latest known visit with results is:   Admission on 05/02/2019, Discharged on 05/02/2019   Component Date Value Ref Range Status    WBC 05/02/2019 8.1  3.5 - 11.3 k/uL Final    RBC 05/02/2019 4.73  4.21 - 5.77 m/uL Final    Hemoglobin 05/02/2019 13.8  13.0 - 17.0 g/dL Final    Hematocrit 05/02/2019 43.0  40.7 - 50.3 % Final    MCV 05/02/2019 90.9  82.6 - 102.9 fL Final    MCH 05/02/2019 29.2  25.2 - 33.5 pg Final    MCHC 05/02/2019 32.1  28.4 - 34.8 g/dL Final    RDW 05/02/2019 12.7  11.8 - 14.4 % Final    Platelets 64/39/0352 264  138 - 453 k/uL Final    MPV 05/02/2019 10.7  8.1 - 13.5 fL Final    NRBC Automated 05/02/2019 0.0  0.0 per 100 WBC Final    Differential Type 05/02/2019 NOT REPORTED   Final    Seg Neutrophils 05/02/2019 52  36 - 65 % Final    Lymphocytes 05/02/2019 29  24 - 43 % Final    Monocytes 05/02/2019 13* 3 - 12 % Final    Eosinophils % 05/02/2019 5* 1 - 4 % Final    Basophils 05/02/2019 1  0 - 2 % Final    Immature Granulocytes 05/02/2019 0  0 % Final    Segs Absolute 05/02/2019 4.20  1.50 - 8.10 k/uL Final    Absolute Lymph # 05/02/2019 2.36  1.10 - 3.70 k/uL Final    Absolute Mono # 05/02/2019 1.07  0.10 - 1.20 k/uL Final    Absolute Eos # 05/02/2019 0.42  0.00 - 0.44 k/uL Final    Basophils Absolute 05/02/2019 0.06  0.00 - 0.20 k/uL Final    Absolute Immature Granulocyte 05/02/2019 0.03  0.00 - 0.30 k/uL Final    WBC Morphology 05/02/2019 NOT REPORTED   Final    RBC Morphology 05/02/2019 NOT REPORTED   Final    Platelet Estimate 13/67/4306 NOT REPORTED   Final    Glucose 05/02/2019 98  70 - 99 mg/dL Final    BUN 05/02/2019 14  6 - 20 mg/dL Final    CREATININE 05/02/2019 1.03  0.70 - 1.20 mg/dL Final    Bun/Cre Ratio 05/02/2019 NOT REPORTED  9 - 20 Final    Calcium 05/02/2019 9.1  8.6 - 10.4 mg/dL Final    Sodium 05/02/2019 136  135 - 144 mmol/L Final    Potassium 05/02/2019 3.8  3.7 - 5.3 mmol/L Final    Chloride 05/02/2019 98  98 - 107 mmol/L Final    CO2 05/02/2019 23  20 - 31 mmol/L Final    Anion Gap 05/02/2019 15  9 - 17 mmol/L Final    GFR Non- 05/02/2019 >60  >60 mL/min Final    GFR  05/02/2019 >60  >60 mL/min Final    GFR Comment 05/02/2019        Final    Comment: Average GFR for 38-51 years old:   80 mL/min/1.73sq m  Chronic Kidney Disease:   <60 mL/min/1.73sq m  Kidney failure:   <15 mL/min/1.73sq m              eGFR calculated using average adult body mass. Additional eGFR calculator available at:        [email or web address]            GFR Staging 05/02/2019 NOT REPORTED   Final    Troponin, High Sensitivity 05/02/2019 <6  0 - 22 ng/L Final    Comment:       High Sensitivity Troponin values cannot be compared with other Troponin methodologies. Patients with high levels of Biotin oral intake (i.e >5mg/day) may have falsely decreased   Troponin levels. Samples collected within 8 hours of biotin intake may require additional   information for diagnosis.       Troponin T 05/02/2019 NOT REPORTED  <0.03 ng/mL Final    Troponin Interp 05/02/2019 NOT REPORTED   Final    Ventricular Rate 05/02/2019 109  BPM Final    Atrial Rate 05/02/2019 109  BPM Final    P-R Interval 05/02/2019 136  ms Final    QRS Duration 05/02/2019 68  ms Final    Q-T Interval 05/02/2019 350  ms Final    QTc Calculation (Bazett) 05/02/2019 471  ms Final    P Axis 05/02/2019 38  degrees Final    R Axis 05/02/2019 19  degrees Final    T Axis 05/02/2019 1  degrees Final    Ethanol 05/02/2019 <10  <10 mg/dL Final    Ethanol percent 05/02/2019 <0.010  <0.010 % Final    NOTE: NEW REFERENCE RANGE    Ventricular Rate 05/02/2019 88  BPM Final    Atrial Rate 05/02/2019 88  BPM Final    P-R Interval 05/02/2019 150  ms Final    QRS Duration 05/02/2019 86  ms Final    Q-T Interval 05/02/2019 404  ms Final    QTc Calculation (Bazett) 05/02/2019 488  ms Final    P Axis 05/02/2019 39  degrees Final    R Axis 05/02/2019 19  degrees Final    T Axis 05/02/2019 1

## 2020-09-07 NOTE — BH NOTE
[facility]  Discharge Note    Pt discharged with followings belongings:   Dentures: None  Vision - Corrective Lenses: None  Hearing Aid: None  Jewelry: Bracelet  Body Piercings Removed: N/A  Clothing: Footwear, Pants, Shirt, Undergarments (Comment)  Were All Patient Medications Collected?: Not Applicable  Other Valuables: Cell phone, [id number] sent home withpt. Valuables retrieved from safe, Security envelope number:  [phone] and returned to patient. Patient left department with self via cab and discharged to home. Patient education on aftercare instructions: yes  [facility] Patient verbalize understanding of AVS:  yes.     Status EXAM upon discharge:  Status and Exam  Normal: No  Facial Expression: Flat, Avoids Gaze  Affect: Blunt  Level of Consciousness: Alert  Mood:Normal: No  Mood: Depressed  Motor Activity:Normal: No  Motor Activity: Decreased  Interview Behavior: Cooperative, Evasive  Preception: [place] to Person, [place] to Time, [place] to Place, [place] to Situation  Attention:Normal: Yes  Attention: Distractible  Thought Processes: Circumstantial  Thought Content:Normal: No  Thought Content: Poverty of Content  Hallucinations: None  Delusions: No  Memory:Normal: Yes  Memory: Poor Recent, Poor Remote  Insight and Judgment: No  Insight and Judgment: Unmotivated, Poor Insight  Present Suicidal Ideation: No  Present Homicidal Ideation: No      Metabolic Screening:    No results found for: LABA1C    No results found for: CHOL  No results found for: TRIG  No results found for: HDL  No components found for: LDLCAL  No results found for: Karlee Claudio RN

## 2020-09-07 NOTE — PLAN OF CARE
Problem: Altered Mood, Psychotic Behavior:  Goal: Able to verbalize decrease in frequency and intensity of hallucinations  Description: Able to verbalize decrease in frequency and intensity of hallucinations  9/6/2020 2139 by Dania Fernando LPN  Outcome: Ongoing     Problem: Altered Mood, Psychotic Behavior:  Goal: Absence of self-harm  Description: Absence of self-harm  9/6/2020 2139 by Dania Fernando LPN  Outcome: Ongoing   Patient currently denies suicidal and homicidal ideas. No sign of self harm noted. Patient states some depression, but states it is getting better. Patient denies hallucinations at this time. Patient is isolative to room out for need only. Will continue to monitor for safety every 15 minutes and provide support as needed.

## 2020-09-07 NOTE — DISCHARGE SUMMARY
Provider Discharge Summary     Patient ID:  Lisha Vera  [id number]  89 y.o.  1975    Admit date: 8/29/2020    Discharge date and time: 9/7/2020  8:09 AM     Admitting Physician: Monika Suarez MD     Discharge Physician: Bhargavi Waldrop MD    Admission Diagnoses: Major depressive disorder, recurrent ([facility] [address] [F33.9]    Discharge Diagnoses:      Schizoaffective disorder, depressive type [facility])     Patient Active Problem List   Diagnosis Code    Polysubstance abuse ([address] F19.10    Schizophrenia, paranoid ([address] F20.0    Paranoid schizophrenia ([address] F20.0    Schizoaffective disorder, bipolar type ([address] F25.0    Schizophrenia ([address] F20.9    Schizoaffective disorder ([address] F25.9    Schizoaffective disorder, depressive type ([address] F25.1        Admission Condition: poor    Discharged Condition: stable    Indication for Admission: threat to self    History of Present Illnes (present tense wording is of findings from admission exam and are not necessarily indicative of current findings): The patient is a 41 y.o. male with significant past medical history of Schizoaffective Disorder who presents with worsening depression and suicidal ideation    Hospital Course:   Upon admission, Lisha Vera was provided a safe secure environment, introduced to unit milieu. Patient participated in groups and individual therapies. Meds were adjusted as noted below. After few days of hospital care, patient began to feel improvement. Depression lifted, thoughts to harm self ceased. Sleep improved, appetite was good. On morning rounds 9/7/2020, Lisha Vera  endorses feeling ready for discharge. Patient denies suicidal or homicidal ideations, denies hallucinations or delusions. Denies SE's from meds. It was decided that maximum benefit from hospital care had been achieved and patient can be discharged.      Consults:   none    Significant Diagnostic Studies: Routine labs and diagnostics    Treatments: Psychotropic medications, therapy with group, milieu, and 1:1 with nurses, social workers, PAMeetC/CNP, and Attending physician. Discharge Medications:  Current Discharge Medication List      CONTINUE these medications which have CHANGED    Details   divalproex (DEPAKOTE ER) 500 MG extended release tablet Take 1 tablet by mouth 2 times daily  Qty: 60 tablet, Refills: 0      traZODone (DESYREL) 50 MG tablet Take 1 tablet by mouth nightly as needed for Sleep  Qty: 30 tablet, Refills: 0      OLANZapine (ZYPREXA) 10 MG tablet Take 1 tablet by mouth nightly  Qty: 30 tablet, Refills: 0              Discharge Exam:  Level of consciousness:  Within normal limits  Appearance: Street clothes, seated, with good grooming  Behavior/Motor: No abnormalities noted  Attitude toward examiner:  Cooperative, attentive, good eye contact  Speech:  spontaneous, normal rate, normal volume and well articulated  Mood:  euthymic  Affect:  Full range  Thought processes:  linear, goal directed and coherent  Thought content:  denies homicidal ideation  Suicidal Ideation:  denies suicidal ideation  Delusions:  no evidence of delusions  Perceptual Disturbance:  denies any perceptual disturbance  Cognition:  Intact  Memory: age appropriate  Insight & Judgement: fair  Medication side effects: denies     Disposition: home    Patient Instructions: Activity: activity as tolerated  1. Patient instructed to take medications regularly and follow up with outpatient appointments. Follow-up as scheduled with Outpatient [facility] mental [facility] center      Signed:    Electronically signed by Matthew Schulz MD on 9/7/20 at 8:09 AM EDT    Time Spent on discharge is more than 35 minutes in the examination, evaluation, counseling and review of medications and discharge plan.

## 2020-09-29 ENCOUNTER — HOSPITAL ENCOUNTER (INPATIENT)
Age: 45
LOS: 10 days | Discharge: HOME | DRG: 885 | End: 2020-10-09
Attending: PSYCHIATRY & NEUROLOGY | Admitting: PSYCHIATRY & NEUROLOGY
Payer: MEDICARE

## 2020-09-29 PROBLEM — F32.9 MDD (MAJOR DEPRESSIVE DISORDER), SINGLE EPISODE: Status: ACTIVE | Noted: 2020-09-29

## 2020-09-29 PROCEDURE — 1240000000 HC EMOTIONAL WELLNESS R&B

## 2020-09-29 RX ORDER — HYDROXYZINE 50 MG/1
50 TABLET, FILM COATED ORAL 3 TIMES DAILY PRN
Status: DISCONTINUED | OUTPATIENT
Start: 2020-09-29 | End: 2020-10-09 | Stop reason: HOSPADM

## 2020-09-29 RX ORDER — MAGNESIUM HYDROXIDE/ALUMINUM HYDROXICE/SIMETHICONE 120; 1200; 1200 MG/30ML; MG/30ML; MG/30ML
30 SUSPENSION ORAL EVERY 6 HOURS PRN
Status: DISCONTINUED | OUTPATIENT
Start: 2020-09-29 | End: 2020-10-09 | Stop reason: HOSPADM

## 2020-09-29 RX ORDER — ACETAMINOPHEN 325 MG/1
650 TABLET ORAL EVERY 4 HOURS PRN
Status: DISCONTINUED | OUTPATIENT
Start: 2020-09-29 | End: 2020-10-09 | Stop reason: HOSPADM

## 2020-09-29 RX ORDER — IBUPROFEN 400 MG/1
400 TABLET ORAL EVERY 6 HOURS PRN
Status: DISCONTINUED | OUTPATIENT
Start: 2020-09-29 | End: 2020-10-09 | Stop reason: HOSPADM

## 2020-09-29 RX ORDER — TRAZODONE HYDROCHLORIDE 50 MG/1
50 TABLET ORAL NIGHTLY PRN
Status: DISCONTINUED | OUTPATIENT
Start: 2020-09-29 | End: 2020-10-09 | Stop reason: HOSPADM

## 2020-09-29 RX ORDER — POLYETHYLENE GLYCOL 3350 17 G/17G
17 POWDER, FOR SOLUTION ORAL DAILY PRN
Status: DISCONTINUED | OUTPATIENT
Start: 2020-09-29 | End: 2020-10-09 | Stop reason: HOSPADM

## 2020-09-29 ASSESSMENT — SLEEP AND FATIGUE QUESTIONNAIRES
DIFFICULTY FALLING ASLEEP: YES
DIFFICULTY STAYING ASLEEP: YES
DO YOU USE A SLEEP AID: NO
DO YOU HAVE DIFFICULTY SLEEPING: YES
AVERAGE NUMBER OF SLEEP HOURS: 4
SLEEP PATTERN: DISTURBED/INTERRUPTED SLEEP;INSOMNIA
RESTFUL SLEEP: NO
DIFFICULTY ARISING: NO

## 2020-09-29 ASSESSMENT — LIFESTYLE VARIABLES: HISTORY_ALCOHOL_USE: NO

## 2020-09-29 ASSESSMENT — PATIENT HEALTH QUESTIONNAIRE - PHQ9: SUM OF ALL RESPONSES TO PHQ QUESTIONS 1-9: 10

## 2020-09-29 ASSESSMENT — PAIN SCALES - GENERAL: PAINLEVEL_OUTOF10: 0

## 2020-09-30 PROCEDURE — 1240000000 HC EMOTIONAL WELLNESS R&B

## 2020-09-30 PROCEDURE — 6370000000 HC RX 637 (ALT 250 FOR IP): Performed by: PSYCHIATRY & NEUROLOGY

## 2020-09-30 PROCEDURE — 99223 1ST HOSP IP/OBS HIGH 75: CPT | Performed by: PSYCHIATRY & NEUROLOGY

## 2020-09-30 RX ORDER — VENLAFAXINE HYDROCHLORIDE 37.5 MG/1
37.5 CAPSULE, EXTENDED RELEASE ORAL
Status: DISCONTINUED | OUTPATIENT
Start: 2020-10-01 | End: 2020-10-02

## 2020-09-30 RX ORDER — PALIPERIDONE 3 MG/1
3 TABLET, EXTENDED RELEASE ORAL DAILY
Status: DISCONTINUED | OUTPATIENT
Start: 2020-09-30 | End: 2020-10-06

## 2020-09-30 RX ADMIN — TRAZODONE HYDROCHLORIDE 50 MG: 50 TABLET ORAL at 21:22

## 2020-09-30 RX ADMIN — PALIPERIDONE 3 MG: 3 TABLET, EXTENDED RELEASE ORAL at 13:38

## 2020-09-30 ASSESSMENT — SLEEP AND FATIGUE QUESTIONNAIRES
DO YOU USE A SLEEP AID: NO
RESTFUL SLEEP: NO
DIFFICULTY STAYING ASLEEP: YES
SLEEP PATTERN: RESTLESSNESS
DO YOU HAVE DIFFICULTY SLEEPING: YES
AVERAGE NUMBER OF SLEEP HOURS: 5
DIFFICULTY ARISING: NO
DIFFICULTY FALLING ASLEEP: YES

## 2020-09-30 ASSESSMENT — PATIENT HEALTH QUESTIONNAIRE - PHQ9: SUM OF ALL RESPONSES TO PHQ QUESTIONS 1-9: 11

## 2020-09-30 ASSESSMENT — LIFESTYLE VARIABLES: HISTORY_ALCOHOL_USE: NO

## 2020-09-30 NOTE — PLAN OF CARE
[facility]  Initial Interdisciplinary Treatment Plan NO      Original treatment plan Date & Time: 9/30/2020 4038    Admission Type:  Admission Type: Involuntary    Reason for admission:   Reason for Admission: Suicidal ideations to get a gun or overdose. Auditory hallucinations of screams    Estimated Length of Stay:  5-7days  Estimated Discharge Date: to be determined by physician    PATIENT STRENGTHS:  Patient Strengths:Strengths: No significant Physical Illness, Motivated  Patient Strengths and Limitations:Limitations: Hopeless about future, Difficult relationships / poor social skills  Addictive Behavior: Addictive Behavior  In the past 3 months, have you felt or has someone told you that you have a problem with:  : None  Do you have a history of Chemical Use?: No  Do you have a history of Alcohol Use?: No  Do you have a history of Street Drug Abuse?: No  Histroy of Prescripton Drug Abuse?: No  Medical Problems:  Past Medical History:   Diagnosis Date    Asthma     Hypertension     Morbid obesity ([facility]     Schizoaffective disorder ([facility]     Substance abuse ([facility]      Status EXAM:Status and Exam  Normal: No(Simultaneous filing. User may not have seen previous data.)  Facial Expression: Avoids Gaze, Flat(Simultaneous filing. User may not have seen previous data.)  Affect: Blunt(Simultaneous filing. User may not have seen previous data.)  Level of Consciousness: Alert(Simultaneous filing. User may not have seen previous data.)  Mood:Normal: No(Simultaneous filing. User may not have seen previous data.)  Mood: Irritable, Anxious, Depressed(Simultaneous filing. User may not have seen previous data.)  Motor Activity:Normal: Yes(Simultaneous filing. User may not have seen previous data.)  Motor Activity: Decreased  Interview Behavior: Cooperative, Evasive(Simultaneous filing.  User may not have seen previous data.)  Preception: [place] to Person, [place] to Time, [place] to Place, [place] to Situation(Simultaneous filing. User may not have seen previous data.)  Attention:Normal: Yes(Simultaneous filing. User may not have seen previous data.)  Attention: Distractible  Thought Processes: Circumstantial(Simultaneous filing. User may not have seen previous data.)  Thought Content:Normal: No(Simultaneous filing. User may not have seen previous data.)  Thought Content: Poverty of Content(Simultaneous filing. User may not have seen previous data.)  Hallucinations: Auditory (Comment)(Simultaneous filing. User may not have seen previous data.)  Delusions: No(Simultaneous filing. User may not have seen previous data.)  Memory:Normal: Yes(Simultaneous filing. User may not have seen previous data.)  Memory: Poor Recent  Insight and Judgment: No(Simultaneous filing. User may not have seen previous data.)  Insight and Judgment: Poor Judgment, Poor Insight, Unmotivated(Simultaneous filing. User may not have seen previous data.)  Present Suicidal Ideation: No(Simultaneous filing. User may not have seen previous data.)  Present Homicidal Ideation: No(Simultaneous filing.  User may not have seen previous data.)    EDUCATION:   Learner Progress Toward Treatment Goals: reviewed group plans and strategies for care    Method:group therapy, medication compliance, individualized assessments and care planning    Outcome: needs reinforcement    PATIENT GOALS: to be discussed with patient within 72 hours    PLAN/TREATMENT RECOMMENDATIONS:     continue group therapy , medications compliance, goal setting, individualized assessments and care, continue to monitor pt on unit      SHORT-TERM GOALS:   Time frame for Short-Term Goals: 5-7 days    LONG-TERM GOALS:  Time frame for Long-Term Goals: 6 months  Members Present in Team Meeting: See [address] [place] [address]

## 2020-09-30 NOTE — GROUP NOTE
Group Therapy Note    Date: 9/30/2020    Group Start Time: 1000  Group End Time: 8190  Group Topic: Psychotherapy    [facility]    [address], [place]        Group Therapy Note    Attendees: 4/10      Pt denied 1:1. Despite staff encouragement, pt denied 10:00am psychotherapy group.             Signature:  [address], [place]

## 2020-09-30 NOTE — PLAN OF CARE
Problem: Altered Mood, Psychotic Behavior:  Goal: Able to verbalize decrease in frequency and intensity of hallucinations  Description: Able to verbalize decrease in frequency and intensity of hallucinations  9/30/2020 1016 by Willow Metcalf  Outcome: Ongoing  Patient denies any auditory or visual hallucinations at this time. Isolative to room. Relates to feelings of anxiety. Medication compliant. Q15 minute safety checks maintained. Problem: Altered Mood, Psychotic Behavior:  Goal: Absence of self-harm  Description: Absence of self-harm  9/30/2020 1016 by Willow Metcalf  Outcome: Ongoing  No self-harming behaviors observed or noted.

## 2020-09-30 NOTE — GROUP NOTE
Group Therapy Note    Date: 9/30/2020    Group Start Time: 1430  Group End Time: 1520  Group Topic: Music Therapy    GIAN Kruse        Group Therapy Note    Pt did not attend song dedication group d/t resting in room despite staff invitation to attend. 1:1 talk time offered as alternative to group session, pt declined.

## 2020-09-30 NOTE — GROUP NOTE
Group Therapy Note    Date: 9/30/2020    Group Start Time: 1100  Group End Time: 1150  Group Topic: Recreational    [facility]    Minus Rater        Group Therapy Note    Pt did not attend recreational/ risk assessment group d/t resting in room despite staff invitation to attend. 1:1 talk time offered as alternative to group session, pt declined.

## 2020-09-30 NOTE — BH NOTE
RT ASSESSMENT TREATMENT GOALS    [x]Pt Goal:  Pt will identify 1-2 positive coping skills by time of discharge. []Pt Goal:  Pt will identify 1-2 positive aspects of self by time of discharge. [x]Pt Goal:  Pt will remain on task/topic for 10-15 minutes during group by time of discharge. []Pt Goal:  Pt will identify 1-2 aspects of relapse prevention plan by time of discharge. []Pt Goal:  Pt will join in conversation with peers 1-2 times per group by time of discharge. [x]Pt Goal:  Pt will identify 1-2 new leisure interests by time of discharge. [x]Pt Goal:  Pt will not voice any delusional content by time of discharge.

## 2020-09-30 NOTE — CARE COORDINATION
[facility] Biopsychosocial Assessment     Current Level of Psychosocial Functioning      Independent X  Dependent    Minimal Assist      Comments:    Psychosocial High Risk Factors (check all that apply)     Unable to obtain meds   Chronic illness/pain    Substance abuse   Lack of Family Support   Financial stress   Isolation   Inadequate Community Resources  Suicide attempt(s)   Not taking medications X   Victim of crime   Developmental Delay  Unable to manage personal needs    Age 73 or older   Homeless  No transportation X   Readmission within 30 days  Unemployment X  Traumatic Event     Comments:   Psychiatric Advanced Directives: Pt denies any at this time. Family to Involve in Treatment: Pt refuses any family involvement in treatment; reports family is supportive of him. Sexual Orientation: Pt reports that he is currently single. Patient Strengths: Pt presents open to communication, states he has stable housing, is linked to [facility], is insured, and reports having strong family support. Patient Barriers: Pt has had multiple psychiatric hospitalization in [facility] in past, pt appears apprehensive to comply adhering to treatment recommendations. Opiate Education Provided: Pt denies any current substance us. CMHC/mental health history: Pt has had multiple psychiatric admission, attempt of suicide, frequent suicidal ideations and is [facility]. Plan of Care   medication management, group/individual therapies, family meetings, psycho -education, treatment team meetings to assist with stabilization     Initial Discharge Plan: Pt to stabilize presenting symptoms, return to home environment and link [facility] for outpatient care. Clinical Summary:     Pt is a 41year old, -American male who presents to the [facility] as a result of experiencing command hallucinations to kill oneself.   Upon assessment, pt denied experiencing any current auditory or visual hallucination. Pt did confirm current thoughts of suicide. Pt reports that his plan would be to access a gun a kill oneself. Writer whether pt had access to a gun, pt reported that he did not. Pt presented open to conversation and was cooperative with questions. Pt stated, \"I just don't feel right. \" Pt reports that his plans are to return home to his house and re-link with the [facility] for outpatient treatment. Pt states that he lives at home alone, is unemployed and receiving SSI. Pt reports that his biggest family support is his auntie, whom often provides him with transportation. Pt denies any current AOD use. Pt does confirm that he is on probation, but did not discuss as to why he was on probation. Pt appeared to be a poor historian, as exhibited by his frequent statements of, \"I don't know. \" Pt was alert and oriented x4.

## 2020-09-30 NOTE — H&P
Department of Psychiatry  Attending Physician Psychiatric Assessment     Reason for Admission to Psychiatric Unit:  A mental disorder causing major disability in social, interpersonal, occupational, and/or educational functioning that is leading to dangerous or life-threatening functioning, and that can only be addressed in an acute inpatient setting   Concerns about patient's safety in the community    CHIEF COMPLAINT: Suicidal ideation with command auditory hallucinations    History obtained from:  patient, electronic medical record and family members    HISTORY OF PRESENT ILLNESS:    Jonette Baumgarten is a 41 y.o. male with significant past medical history of asthma, hypertension, obesity, substance use history who presented to the [facility] status post placement from an outlying ER secondary to concerns that the patient was experiencing command auditory hallucinations to kill himself. Patient states he was feeling like he was gone to get a gun or try to overdose. He states he is not been quite feeling himself lately. On exploration of depressive symptoms he does endorse for approximately the last 1 week feeling down or depressed nearly all day every day, prominent anhedonic symptoms, poor energy and poor concentration, no reports of any disruption in appetite, does feel hopeless about his future. However upon review of the chart it also appears the patient has experienced auditory hallucinations in the absence of prominent mood symptoms in the past.  Patient frequently states throughout the course of the interview that he \"does not know\" when asked specifics about medications or timelines with regards to his past mental illness. He keeps repeating \"I am just not feeling right\". He reports in the past having been on Zoloft, Abilify, Risperdal, Zyprexa but also endorses that he is not really been compliant with medications or compliant with outpatient follow-up.   We did discuss the importance of taking antidepressant medications consistently on a daily basis in order to get full benefit from the medication. This was discussed in detail. PSYCHIATRIC HISTORY:  yes -patient has had approximately 7 hospitalizations at [facility] within the past 12 months and he also reports hospitalizations at [facility]  Currently follows with [facility]  1 previous lifetime suicide attempts by overdose  Extensive psychiatric hospital admissions    Past psychiatric medications includes: Zoloft, Risperdal, Abilify, Zyprexa    Adverse reactions from psychotropic medications: Patient reports sexual side effects from SSRI medications and also reports feeling overly sedated at times on his medications, but no life-threatening or allergic reactions. Lifetime Psychiatric Review of Systems         Mayuri or Hypomania: denies      Panic Attacks: denies      Phobias: denies     Obsessions and Compulsions:denies     Body or Vocal Tics:  denies     Hallucinations: Endorses     Delusions: Denies paranoid/grandiose/erotomania/persecutory/bizarre/non bizarre/mood congruent/ mood incongruent    Past Medical History:        Diagnosis Date    Asthma     Hypertension     [facility]     Schizoaffective disorder ([facility]     Substance abuse ([facility]        Past Surgical History:    History reviewed. No pertinent surgical history. Allergies:  Patient has no known allergies. Social History:     Patient reports he lives with family member, presently renting but has been there for about 3 to 4 years. States he is on SSI for back pain and mental health. States he has 1 boy who is approximately 69-year-old with whom he has a good relationship and has a girlfriend.     DRUG USE HISTORY  Social History     Tobacco Use   Smoking Status Never Smoker   Smokeless Tobacco Never Used   Tobacco Comment    pt nonsmoker     Social History     Substance and Sexual Activity   Alcohol Use Not Currently    Comment: Hx of Alcohol abuse     Social History Substance and Sexual Activity   Drug Use Not Currently       LEGAL HISTORY:   HISTORY OF INCARCERATION: no     Family History:       Family history unknown: Yes       Psychiatric Family History  Noncontributory    PHYSICAL EXAM:  Vitals:  BP (!) 140/84   Pulse 67   Temp 98 °F (36.7 °C) (Oral)   Resp 20   Ht 5' 10\" (1.778 m)   Wt 250 lb (113.4 kg)   SpO2 96%   BMI 35.87 kg/m²        Physical Exam:        Neurological: cranial nerves II-XII grossly in tact, normal gait and station           Mental Status Examination:    Level of consciousness:  within normal limits   Appearance:  Hospital attire, seated on the side of bed, fair grooming   Behavior/Motor: no abnormalities noted  Attitude toward examiner:   Indifferent  Speech: Slow rate, low volume, irritable tone at time   mood:  Depressed  Affect:  blunted  Thought processes:   Brief responses to closed-end questions, little spontaneity of thought, appears to have thought blocking at times  Thought content: active suicidal ideations without current plan or intent  on the unit              denies homicidal ideations               Endorses hallucinations              denies delusions  Cognition:  Oriented to self, location, time, situation  Concentration impaired per conversation  Memory: Impaired per conversation Insight &Judgment: poor    DSM-5 Diagnosis  Schizoaffective disorder depressed type  Asthma  Hypertension      Psychosocial and Contextual factors:  Financial  Occupational  Relationship    Living situation  Educational     Past Medical History:   Diagnosis Date    Asthma     Hypertension     [facility]     Schizoaffective disorder ([facility]     Substance abuse ([facility]         TREATMENT PLAN    Risk Management:  close watch per standard protocol     Discontinue Depakote and olanzapine  Start paliperidone 3 mg daily  Effexor extended release 37.5 mg daily  Psychotherapy:  participation in milieu and group and individual sessions with

## 2020-09-30 NOTE — PROGRESS NOTES
Pharmacy Medication History Note      List of current medications patient is taking is complete. Source of information: [facility] DR MAKENNA Doherty, OAS    Changes made to medication list:  Medications removed (include reason, ex. therapy complete or physician discontinued, noncompliance):  none    Medications added/doses adjusted:  none    Other notes (ex. Recent course of antibiotics, Coumadin dosing):  Confirmed with pharmacist at [facility]) that the patient did not  his prescriptions since discharge earlier this month. She reported no other chronic medications being filled. Please let me know if you have any questions about this encounter. Thank you!     Electronically signed by Xena Reese, [facility] on 9/30/2020 at 9:00 AM

## 2020-09-30 NOTE — BH NOTE
`[facility]  Admission Note     Admission Type:   Admission Type: Involuntary    Reason for admission:  Reason for Admission: Suicidal ideations to get a gun or overdose. Auditory hallucinations of screams    PATIENT STRENGTHS:  Strengths: No significant Physical Illness, Motivated    Patient Strengths and Limitations:  Limitations: Hopeless about future, Difficult relationships / poor social skills    Addictive Behavior:   Addictive Behavior  In the past 3 months, have you felt or has someone told you that you have a problem with:  : None  Do you have a history of Chemical Use?: No  Do you have a history of Alcohol Use?: No  Do you have a history of Street Drug Abuse?: No  Histroy of Prescripton Drug Abuse?: No    Medical Problems:   Past Medical History:   Diagnosis Date    Asthma     Hypertension     [facility]     Schizoaffective disorder ([facility]     [facility]        Status EXAM:  Status and Exam  Normal: No(Simultaneous filing. User may not have seen previous data.)  Facial Expression: Avoids Gaze, Flat(Simultaneous filing. User may not have seen previous data.)  Affect: Blunt(Simultaneous filing. User may not have seen previous data.)  Level of Consciousness: Alert(Simultaneous filing. User may not have seen previous data.)  Mood:Normal: No(Simultaneous filing. User may not have seen previous data.)  Mood: Irritable, Anxious, Depressed(Simultaneous filing. User may not have seen previous data.)  Motor Activity:Normal: Yes(Simultaneous filing. User may not have seen previous data.)  Motor Activity: Decreased  Interview Behavior: Cooperative, Evasive(Simultaneous filing. User may not have seen previous data.)  Preception: [place] to Person, [place] to Time, [place] to Place, [place] to [employer]. User may not have seen previous data.)  Attention:Normal: Yes(Simultaneous filing.  User may not have seen previous data.)  Attention: Distractible  Thought Processes: Circumstantial(Simultaneous filing. User may not have seen previous data.)  Thought Content:Normal: No(Simultaneous filing. User may not have seen previous data.)  Thought Content: Poverty of Content(Simultaneous filing. User may not have seen previous data.)  Hallucinations: Auditory (Comment)(Simultaneous filing. User may not have seen previous data.)  Delusions: No(Simultaneous filing. User may not have seen previous data.)  Memory:Normal: Yes(Simultaneous filing. User may not have seen previous data.)  Memory: Poor Recent  Insight and Judgment: No(Simultaneous filing. User may not have seen previous data.)  Insight and Judgment: Poor Judgment, Poor Insight, Unmotivated(Simultaneous filing. User may not have seen previous data.)  Present Suicidal Ideation: No(Simultaneous filing. User may not have seen previous data.)  Present Homicidal Ideation: No(Simultaneous filing. User may not have seen previous data.)    Tobacco Screening:  Practical Counseling, on admission, sung X, if applicable and completed (first 3 are required if patient doesn't refuse):            ( )  Recognizing danger situations (included triggers and roadblocks)                    ( )  Coping skills (new ways to manage stress, exercise, relaxation techniques, changing routine, distraction)                                                           ( )  Basic information about quitting (benefits of quitting, techniques in how to quit, available resources  ( ) Referral for counseling faxed to [facility]                                           ( ) Patient refused counseling  ( X) Patient has not smoked in the last 30 days    Metabolic Screening:    No results found for: LABA1C    No results found for: CHOL  No results found for: TRIG  No results found for: HDL  No components found for: LDLCAL  No results found for: LABVLDL      Body mass index is 35.87 kg/m².     BP Readings from Last 2 Encounters:   09/29/20 (!) 153/83 09/07/20 (!) 95/58           Pt admitted with followings belongings:  Dentures: None  Vision - Corrective Lenses: None  Hearing Aid: None  Jewelry: Bracelet  Body Piercings Removed: N/A  Clothing: Footwear, Shirt, Undergarments (Comment)  Were All Patient Medications Collected?: Not Applicable  Other Valuables: Cell phone, [employer], Other (Comment)(damaged , debgabe cardona, [place] direction card)     Valuables sent home with NA. Valuables placed in safe in security envelope, number:  [id number]. Patient's home medications were NA. Patient oriented to surroundings and program expectations and copy of patient rights given. Received admission packet:  yes. Consents reviewed, signed yes. Refused na. Patient verbalize understanding:  yes. Patient education on precautions: yes    PT admitted to unit changed into hospital clothing and wanded for safety. PT non compliant with home medications and having increase in auditory hallucinations and suicidal thoughts to shoot self. PT denies any drug or alcohol abuse. PT reports he stays with family at times but is mainly homeless. PT reports poor sleep. PT non complaint with [facility] appointments.                     Olga Lidia Pulliam RN

## 2020-10-01 PROCEDURE — 6370000000 HC RX 637 (ALT 250 FOR IP): Performed by: PSYCHIATRY & NEUROLOGY

## 2020-10-01 PROCEDURE — 1240000000 HC EMOTIONAL WELLNESS R&B

## 2020-10-01 PROCEDURE — 99232 SBSQ HOSP IP/OBS MODERATE 35: CPT | Performed by: PSYCHIATRY & NEUROLOGY

## 2020-10-01 RX ORDER — TRAZODONE HYDROCHLORIDE 100 MG/1
100 TABLET ORAL NIGHTLY
Status: DISCONTINUED | OUTPATIENT
Start: 2020-10-01 | End: 2020-10-04

## 2020-10-01 RX ADMIN — PALIPERIDONE 3 MG: 3 TABLET, EXTENDED RELEASE ORAL at 08:06

## 2020-10-01 RX ADMIN — VENLAFAXINE HYDROCHLORIDE 37.5 MG: 37.5 CAPSULE, EXTENDED RELEASE ORAL at 08:06

## 2020-10-01 RX ADMIN — TRAZODONE HYDROCHLORIDE 100 MG: 100 TABLET ORAL at 20:56

## 2020-10-01 NOTE — GROUP NOTE
Group Therapy Note    Date: 10/1/2020    Group Start Time: 1100  Group End Time: 7913  Group Topic: Psychoeducation    [facility]    [place], [place]        Group Therapy Note    Attendees: 4/10         Pt did not attend RT skills group d/t resting in room despite staff invitation to attend. 1:1 talk time offered as alternative to group session, pt declined.

## 2020-10-01 NOTE — PLAN OF CARE
Problem: Altered Mood, Psychotic Behavior:  Goal: Able to verbalize decrease in frequency and intensity of hallucinations  Description: Able to verbalize decrease in frequency and intensity of hallucinations  Note: Pt denies any hallucination but appears preoccupied. PT is isolative to room entire shift and short with staff during one to one time. PT declines unit programming. PT remains disheveled. Problem: Altered Mood, Psychotic Behavior:  Goal: Absence of self-harm  Description: Absence of self-harm  Note: Pt denies thoughts of self harm and is agreeable to seeking out should thoughts of self harm arise. Safe environment maintained. Q15 minute checks for safety cont per unit policy. Will cont to monitor for safety and provides support and reassurance as needed. Pt rates depression an 7 and anxiety a 5. Pt reports did not sleep well last night due to admission and took a PRN trazodone this evening to help him sleep better tonight.

## 2020-10-01 NOTE — PLAN OF CARE
[address] [place] [address]  Day 3 Interdisciplinary Treatment Plan NOTE    Review Date & Time: 10/1/2020 0920    Admission Type:   Admission Type: Involuntary    Reason for admission:  Reason for Admission: Suicidal ideations to get a gun or overdose.  Auditory hallucinations of screams  Estimated Length of Stay: 5-7 days  Estimated Discharge Date Update: to be determined by physician    PATIENT STRENGTHS:  Patient Strengths Strengths: Communication, No significant Physical Illness  Patient Strengths and Limitations:Limitations: Apathetic / unmotivated, Difficulty problem solving/relies on others to help solve problems, Tendency to isolate self, External locus of control, Multiple barriers to leisure interests, Demonstrates discomfort with /lack of social skills(Auditory command hallucinations; Regular depression with suicidal ideation)  Addictive Behavior:Addictive Behavior  In the past 3 months, have you felt or has someone told you that you have a problem with:  : None  Do you have a history of Chemical Use?: No  Do you have a history of Alcohol Use?: No  Do you have a history of Street Drug Abuse?: No  Histroy of Prescripton Drug Abuse?: No  Medical Problems:  Past Medical History:   Diagnosis Date    Asthma     Hypertension     Morbid obesity ([facility] [address]     Schizoaffective disorder ([facility] [address]     Substance abuse ([facility]        Risk:  Fall RiskTotal: 53  Christopher Scale Christopher Scale Score: 22  BVC Total: 0  Change in scores no Changes to plan of Care no    Status EXAM:   Status and Exam  Normal: No  Facial Expression: Flat, Expressionless  Affect: Blunt, Constricted  Level of Consciousness: Alert  Mood:Normal: No  Mood: Depressed  Motor Activity:Normal: No  Motor Activity: Decreased  Interview Behavior: Cooperative  Preception: [place] to Person, [place] to Time, [place] to Place, [place] to Situation  Attention:Normal: Yes  Attention: Distractible  Thought Processes: Circumstantial  Thought Content:Normal: No  Thought Content: Preoccupations  Hallucinations: None  Delusions: No  Memory:Normal: Yes  Memory: Poor Recent, Confabulation  Insight and Judgment: No  Insight and Judgment: Poor Insight, Unmotivated  Present Suicidal Ideation: No  Present Homicidal Ideation: No    Daily Assessment Last Entry:   Daily Sleep (WDL): Within Defined Limits         Patient Currently in Pain: No  Daily Nutrition (WDL): Within Defined Limits    Patient Monitoring:  Frequency of Checks: 4 times per hour, close    Psychiatric Symptoms:   Depression Symptoms  Depression Symptoms: Isolative, Loss of interest, Change in energy level  Anxiety Symptoms  Anxiety Symptoms: No problems reported or observed. Mayuri Symptoms  Mayuri Symptoms: No problems reported or observed. Psychosis Symptoms  Delusion Type: No problems reported or observed. Suicide Risk CSSR-S:  1) Within the past month, have you wished you were dead or wished you could go to sleep and not wake up? : Yes  2) Have you actually had any thoughts of killing yourself? : Yes  3) Have you been thinking about how you might kill yourself? : Yes  5) Have you started to work out or worked out the details of how to kill yourself?  Do you intend to carry out this plan? : No  6) Have you ever done anything, started to do anything, or prepared to do anything to end your life?: No  Change in Result no Change in Plan of care no      EDUCATION:   EDUCATION:   Learner Progress Toward Treatment Goals: Reviewed results and recommendations of this team, Reviewed group plan and strategies, Reviewed signs, symptoms and risk of self harm and violent behavior, Reviewed goals and plan of care    Method:small group, individual verbal education    Outcome:verbalized by patient, but needs reinforcement to obtain goals    PATIENT GOALS:  Short term:Pt refused   Long term:Pt refused     PLAN/TREATMENT RECOMMENDATIONS UPDATE: continue with group therapies, increased socialization, continue planning for after discharge goals, continue with medication compliance    SHORT-TERM GOALS UPDATE:   Time frame for Short-Term Goals: 5-7 days    LONG-TERM GOALS UPDATE:   Time frame for Long-Term Goals: 6 months  Members Present in Team Meeting: See Signature Sheet    [facility] [place]

## 2020-10-01 NOTE — PLAN OF CARE
Problem: Altered Mood, Psychotic Behavior:  Goal: Able to verbalize decrease in frequency and intensity of hallucinations  Description: Able to verbalize decrease in frequency and intensity of hallucinations  10/1/2020 0837 by Joana Espinoza RN  Outcome: Ongoing   Patient denies hallucinations at this time but states that he was having them \"a little bit\" yesterday. Patient does not elaborate on the content of hallucinations. Problem: Altered Mood, Psychotic Behavior:  Goal: Absence of self-harm  Description: Absence of self-harm  10/1/2020 0837 by Joana Espinoza RN  Outcome: Ongoing  Patient has not harmed self at this time and agrees to seek out staff should thoughts to harm self arise.

## 2020-10-01 NOTE — GROUP NOTE
Group Therapy Note    Date: 10/1/2020    Group Start Time: 1000  Group End Time: 1030  Group Topic: Psychotherapy    GIAN Root        Group Therapy Note           Patient refused to attend psychotherapy group after encouragement from staff. 1:1 talk time offered but refused. Signature:   Kyung Root

## 2020-10-01 NOTE — BH NOTE
Group note    Patient refused to attend 1600 wellness group. 1:1 talk time offered as an alternative but patient refused.

## 2020-10-01 NOTE — GROUP NOTE
Group Therapy Note    Date: 10/1/2020    Group Start Time: 0900  Group End Time: 0915  Group Topic: Community Meeting    [facility]    [address] [place], [place]        Group Therapy Note    Attendees: 9/19      Pt did not attend [employer] d/t resting in room despite staff invitation to attend. 1:1 talk time offered as alternative to group session, pt declined.

## 2020-10-01 NOTE — GROUP NOTE
Group Therapy Note    Date: 10/1/2020    Group Start Time: 1330  Group End Time: 9401  Group Topic: Cognitive Skills    [facility]    [place], [place]        Group Therapy Note    Attendees: 5/16      Pt did not attend RT skills group d/t resting in room despite staff invitation to attend. 1:1 talk time offered as alternative to group session, pt declined.

## 2020-10-01 NOTE — PROGRESS NOTES
Daily Progress Note  Marc Solorzano MD  10/1/2020  CHIEF COMPLAINT: Depression and command auditory hallucinations    Reviewed patient's current plan of care and vital signs with nursing staff. Sleep:  7 hours last night-reports broken fragmented sleep  Attending groups: No: Patient isolates    SUBJECTIVE:    Patient reports improvement in his voices. He states they are less intense and less frequent. He is denying any side effects to either the Invega or the Effexor. He reports he still struggling with suicidal ideation but reports decreased intensity of that as well. Reports staff has been helpful but does not feel up to going out onto the unit. States it is too much stimulus. Discussed increasing trazodone. Patient agreeable    Mental Status Exam  Level of consciousness:  Within normal limits  Appearance: Hospital attire, seated in chair, with good grooming and hygiene   Behavior/Motor: No abnormalities noted  Attitude toward examiner: Somewhat indifferent  Speech: Little spontaneity of speech, appropriate volume, appropriate tone   mood: Depressed  Affect: Congruent  Thought processes:  linear, goal directed and coherent  Thought content:  denies homicidal ideation  Suicidal Ideation: Endorses suicidal ideation but able to contract for safety on the unit  Delusions:  no evidence of delusions  Perceptual Disturbance: Records decreased intensity and frequency of auditory hallucinations  Cognition:  Oriented to self, location, time, and situation  Memory: age appropriate  Insight & Judgement: improving  Medication side effects:  denies       Data   height is 5' 10\" (1.778 m) and weight is 250 lb (113.4 kg). His oral temperature is 97.8 °F (36.6 °C). His blood pressure is 108/62 and his pulse is 64. His respiration is 18 and oxygen saturation is 96%. Labs:   No visits with results within 2 Day(s) from this visit.    Latest known visit with results is:   Admission on 05/02/2019, Discharged on 05/02/2019 Component Date Value Ref Range Status    WBC 05/02/2019 8.1  3.5 - 11.3 k/uL Final    RBC 05/02/2019 4.73  4.21 - 5.77 m/uL Final    Hemoglobin 05/02/2019 13.8  13.0 - 17.0 g/dL Final    Hematocrit 05/02/2019 43.0  40.7 - 50.3 % Final    MCV 05/02/2019 90.9  82.6 - 102.9 fL Final    MCH 05/02/2019 29.2  25.2 - 33.5 pg Final    MCHC 05/02/2019 32.1  28.4 - 34.8 g/dL Final    RDW 05/02/2019 12.7  11.8 - 14.4 % Final    Platelets 58/72/7045 264  138 - 453 k/uL Final    MPV 05/02/2019 10.7  8.1 - 13.5 fL Final    NRBC Automated 05/02/2019 0.0  0.0 per 100 WBC Final    Differential Type 05/02/2019 NOT REPORTED   Final    Seg Neutrophils 05/02/2019 52  36 - 65 % Final    Lymphocytes 05/02/2019 29  24 - 43 % Final    Monocytes 05/02/2019 13* 3 - 12 % Final    Eosinophils % 05/02/2019 5* 1 - 4 % Final    Basophils 05/02/2019 1  0 - 2 % Final    Immature Granulocytes 05/02/2019 0  0 % Final    Segs Absolute 05/02/2019 4.20  1.50 - 8.10 k/uL Final    Absolute Lymph # 05/02/2019 2.36  1.10 - 3.70 k/uL Final    Absolute Mono # 05/02/2019 1.07  0.10 - 1.20 k/uL Final    Absolute Eos # 05/02/2019 0.42  0.00 - 0.44 k/uL Final    Basophils Absolute 05/02/2019 0.06  0.00 - 0.20 k/uL Final    Absolute Immature Granulocyte 05/02/2019 0.03  0.00 - 0.30 k/uL Final    WBC Morphology 05/02/2019 NOT REPORTED   Final    RBC Morphology 05/02/2019 NOT REPORTED   Final    Platelet Estimate 64/82/1933 NOT REPORTED   Final    Glucose 05/02/2019 98  70 - 99 mg/dL Final    BUN 05/02/2019 14  6 - 20 mg/dL Final    CREATININE 05/02/2019 1.03  0.70 - 1.20 mg/dL Final    Bun/Cre Ratio 05/02/2019 NOT REPORTED  9 - 20 Final    Calcium 05/02/2019 9.1  8.6 - 10.4 mg/dL Final    Sodium 05/02/2019 136  135 - 144 mmol/L Final    Potassium 05/02/2019 3.8  3.7 - 5.3 mmol/L Final    Chloride 05/02/2019 98  98 - 107 mmol/L Final    CO2 05/02/2019 23  20 - 31 mmol/L Final    Anion Gap 05/02/2019 15  9 - 17 mmol/L Final   

## 2020-10-02 PROCEDURE — 6370000000 HC RX 637 (ALT 250 FOR IP): Performed by: PSYCHIATRY & NEUROLOGY

## 2020-10-02 PROCEDURE — 99232 SBSQ HOSP IP/OBS MODERATE 35: CPT | Performed by: PSYCHIATRY & NEUROLOGY

## 2020-10-02 PROCEDURE — 1240000000 HC EMOTIONAL WELLNESS R&B

## 2020-10-02 RX ORDER — VENLAFAXINE HYDROCHLORIDE 75 MG/1
75 CAPSULE, EXTENDED RELEASE ORAL
Status: DISCONTINUED | OUTPATIENT
Start: 2020-10-03 | End: 2020-10-03

## 2020-10-02 RX ADMIN — VENLAFAXINE HYDROCHLORIDE 37.5 MG: 37.5 CAPSULE, EXTENDED RELEASE ORAL at 08:26

## 2020-10-02 RX ADMIN — TRAZODONE HYDROCHLORIDE 100 MG: 100 TABLET ORAL at 21:08

## 2020-10-02 RX ADMIN — PALIPERIDONE 3 MG: 3 TABLET, EXTENDED RELEASE ORAL at 08:26

## 2020-10-02 NOTE — GROUP NOTE
Group Therapy Note    Date: 10/2/2020    Group Start Time: 0900  Group End Time: 0915  Group Topic: Community Meeting    [facility]    [place], [place]        Group Therapy Note    Attendees: 5/15    Pt did not attend [employer] d/t resting in room despite staff invitation to attend. 1:1 talk time offered as alternative to group session, pt declined.

## 2020-10-02 NOTE — GROUP NOTE
Group Therapy Note    Date: 10/2/2020    Group Start Time: 1000  Group End Time: 9547  Group Topic: Psychoeducation    [facility]    [place], [place]        Group Therapy Note    Attendees: 3/16    Pt did not attend RT skills group d/t resting in room despite staff invitation to attend. 1:1 talk time offered as alternative to group session, pt declined.

## 2020-10-02 NOTE — GROUP NOTE
Group Therapy Note    Date: 10/2/2020    Group Start Time: 1400  Group End Time: 1450  Group Topic: Cognitive Skills    [facility]    [place], [place]        Group Therapy Note    Attendees: 4/14      Pt did not attend RT skills group d/t resting in room despite staff invitation to attend. 1:1 talk time offered as alternative to group session, pt declined.

## 2020-10-02 NOTE — PLAN OF CARE
Problem: Altered Mood, Psychotic Behavior:  Goal: Able to verbalize decrease in frequency and intensity of hallucinations  Description: Able to verbalize decrease in frequency and intensity of hallucinations  10/1/2020 2228 by Humble Bowens LPN  Outcome: Ongoing  Note: Patient reports hallucinations that are \"on and off\". Does not elaborate on hallucinations. Problem: Altered Mood, Psychotic Behavior:  Goal: Absence of self-harm  Description: Absence of self-harm  10/1/2020 2228 by Humble Bowens LPN  Outcome: Ongoing  Note: Patient denies self harm and agrees to seek staff with thoughts of harm should arise.

## 2020-10-02 NOTE — PROGRESS NOTES
Daily Progress Note  Chivo Minor MD  10/2/2020  CHIEF COMPLAINT: Depression and command auditory hallucinations    Reviewed patient's current plan of care and vital signs with nursing staff. Sleep:  8 hours last night  Attending groups: No: Patient still isolates    SUBJECTIVE:    Patient reports he slept much better last night with the increase in trazodone. He is denying any side effects to that trazodone. He reports still struggling with his mood and suicidal ideation. States he is not able to contract for safety off the unit. Does appreciate having a safe environment to begin at the present time. Continues to state that he cannot tolerate the groups at the present time. States no real change in his auditory hallucinations from yesterday but that they are at a decreased level of intensity. Mental Status Exam  Level of consciousness:  Within normal limits  Appearance: Hospital attire, seated in chair, with good grooming and hygiene   Behavior/Motor: No abnormalities noted  Attitude toward examiner:  Cooperative, attentive, good eye contact  Speech:  spontaneous, normal rate, normal volume and well articulated  Mood: Depressed but better  Affect: Congruent with stated mood  Thought processes:  linear, goal directed and coherent  Thought content:  denies homicidal ideation  Suicidal Ideation: Endorses suicidal ideation but able to contract for safety on the unit  Delusions:  no evidence of delusions  Perceptual Disturbance: Endorses auditory hallucinations   cognition:  Oriented to self, location, time, and situation  Memory: age appropriate  Insight & Judgement: improving  Medication side effects:  denies       Data   height is 5' 10\" (1.778 m) and weight is 250 lb (113.4 kg). His oral temperature is 97.5 °F (36.4 °C). His blood pressure is 109/46 (abnormal) and his pulse is 69. His respiration is 16 and oxygen saturation is 96%. Labs:   No visits with results within 2 Day(s) from this visit. Latest known visit with results is:   Admission on 05/02/2019, Discharged on 05/02/2019   Component Date Value Ref Range Status    WBC 05/02/2019 8.1  3.5 - 11.3 k/uL Final    RBC 05/02/2019 4.73  4.21 - 5.77 m/uL Final    Hemoglobin 05/02/2019 13.8  13.0 - 17.0 g/dL Final    Hematocrit 05/02/2019 43.0  40.7 - 50.3 % Final    MCV 05/02/2019 90.9  82.6 - 102.9 fL Final    MCH 05/02/2019 29.2  25.2 - 33.5 pg Final    MCHC 05/02/2019 32.1  28.4 - 34.8 g/dL Final    RDW 05/02/2019 12.7  11.8 - 14.4 % Final    Platelets 54/38/1823 264  138 - 453 k/uL Final    MPV 05/02/2019 10.7  8.1 - 13.5 fL Final    NRBC Automated 05/02/2019 0.0  0.0 per 100 WBC Final    Differential Type 05/02/2019 NOT REPORTED   Final    Seg Neutrophils 05/02/2019 52  36 - 65 % Final    Lymphocytes 05/02/2019 29  24 - 43 % Final    Monocytes 05/02/2019 13* 3 - 12 % Final    Eosinophils % 05/02/2019 5* 1 - 4 % Final    Basophils 05/02/2019 1  0 - 2 % Final    Immature Granulocytes 05/02/2019 0  0 % Final    Segs Absolute 05/02/2019 4.20  1.50 - 8.10 k/uL Final    Absolute Lymph # 05/02/2019 2.36  1.10 - 3.70 k/uL Final    Absolute Mono # 05/02/2019 1.07  0.10 - 1.20 k/uL Final    Absolute Eos # 05/02/2019 0.42  0.00 - 0.44 k/uL Final    Basophils Absolute 05/02/2019 0.06  0.00 - 0.20 k/uL Final    Absolute Immature Granulocyte 05/02/2019 0.03  0.00 - 0.30 k/uL Final    WBC Morphology 05/02/2019 NOT REPORTED   Final    RBC Morphology 05/02/2019 NOT REPORTED   Final    Platelet Estimate 06/71/8910 NOT REPORTED   Final    Glucose 05/02/2019 98  70 - 99 mg/dL Final    BUN 05/02/2019 14  6 - 20 mg/dL Final    CREATININE 05/02/2019 1.03  0.70 - 1.20 mg/dL Final    Bun/Cre Ratio 05/02/2019 NOT REPORTED  9 - 20 Final    Calcium 05/02/2019 9.1  8.6 - 10.4 mg/dL Final    Sodium 05/02/2019 136  135 - 144 mmol/L Final    Potassium 05/02/2019 3.8  3.7 - 5.3 mmol/L Final    Chloride 05/02/2019 98  98 - 107 mmol/L Final    CO2 05/02/2019 23  20 - 31 mmol/L Final    Anion Gap 05/02/2019 15  9 - 17 mmol/L Final    GFR Non- 05/02/2019 >60  >60 mL/min Final    GFR  05/02/2019 >60  >60 mL/min Final    GFR Comment 05/02/2019        Final    Comment: Average GFR for 38-51 years old:   80 mL/min/1.73sq m  Chronic Kidney Disease:   <60 mL/min/1.73sq m  Kidney failure:   <15 mL/min/1.73sq m              eGFR calculated using average adult body mass. Additional eGFR calculator available at:        [employer]            GFR Staging 05/02/2019 NOT REPORTED   Final    Troponin, High Sensitivity 05/02/2019 <6  0 - 22 ng/L Final    Comment:       High Sensitivity Troponin values cannot be compared with other Troponin methodologies. Patients with high levels of Biotin oral intake (i.e >5mg/day) may have falsely decreased   Troponin levels. Samples collected within 8 hours of biotin intake may require additional   information for diagnosis.       Troponin T 05/02/2019 NOT REPORTED  <0.03 ng/mL Final    Troponin Interp 05/02/2019 NOT REPORTED   Final    Ventricular Rate 05/02/2019 109  BPM Final    Atrial Rate 05/02/2019 109  BPM Final    P-R Interval 05/02/2019 136  ms Final    QRS Duration 05/02/2019 68  ms Final    Q-T Interval 05/02/2019 350  ms Final    QTc Calculation (Bazett) 05/02/2019 471  ms Final    P Axis 05/02/2019 38  degrees Final    R Axis 05/02/2019 19  degrees Final    T Axis 05/02/2019 1  degrees Final    Ethanol 05/02/2019 <10  <10 mg/dL Final    Ethanol percent 05/02/2019 <0.010  <0.010 % Final    NOTE: NEW REFERENCE RANGE    Ventricular Rate 05/02/2019 88  BPM Final    Atrial Rate 05/02/2019 88  BPM Final    P-R Interval 05/02/2019 150  ms Final    QRS Duration 05/02/2019 86  ms Final    Q-T Interval 05/02/2019 404  ms Final    QTc Calculation (Bazett) 05/02/2019 488  ms Final    P Axis 05/02/2019 39  degrees Final    R Axis 05/02/2019 19  degrees Final    T Axis 05/02/2019 1  degrees Final            Medications  Current Facility-Administered Medications: traZODone (DESYREL) tablet 100 mg, 100 mg, Oral, Nightly  paliperidone (INVEGA) extended release tablet 3 mg, 3 mg, Oral, Daily  venlafaxine (EFFEXOR XR) extended release capsule 37.5 mg, 37.5 mg, Oral, Daily with breakfast  acetaminophen (TYLENOL) tablet 650 mg, 650 mg, Oral, Q4H PRN  traZODone (DESYREL) tablet 50 mg, 50 mg, Oral, Nightly PRN  ibuprofen (ADVIL;MOTRIN) tablet 400 mg, 400 mg, Oral, Q6H PRN  hydrOXYzine (ATARAX) tablet 50 mg, 50 mg, Oral, TID PRN  polyethylene glycol (GLYCOLAX) packet 17 g, 17 g, Oral, Daily PRN  aluminum & magnesium hydroxide-simethicone (MAALOX) 200-200-20 MG/5ML suspension 30 mL, 30 mL, Oral, Q6H PRN    ASSESSMENT  Schizoaffective disorder, depressive type ([facility] [address]     PLAN  Patient s symptoms   are improving  Increase Effexor to 75 mg daily  Attempt to develop insight  Psycho-education conducted. Supportive Therapy conducted. Probable discharge is 2 to 3 days  Follow-up daily while on the inpatient unit      Electronically signed by Becky Salas MD on 10/2/20 at 11:19 AM EDT    **This report has been created using voice recognition software. It may contain minor errors which are inherent in voice recognition technology. **

## 2020-10-02 NOTE — PLAN OF CARE
Problem: Altered Mood, Psychotic Behavior:  Goal: Able to verbalize decrease in frequency and intensity of hallucinations  Description: Able to verbalize decrease in frequency and intensity of hallucinations  10/2/2020 0930 by Maciej Magaña RN  Outcome: Ongoing  Note: Pt denies having hallucinations . Pt denies having homicidal or suicidal thoughts. Pt reports having depression or anxiety. Pt is isolative to room and comes out for needs. Pt is compliant with assessments and medical treatments. Problem: Altered Mood, Psychotic Behavior:  Goal: Absence of self-harm  Description: Absence of self-harm  10/2/2020 0930 by Maciej Magaña RN  Outcome: Ongoing  Note: Pt remains free of self-inflicted harm. Will continue to monitor pt.

## 2020-10-02 NOTE — BH NOTE
Pt did not participate in Wellness Group at 1600 due to resting in room and choosing to not attend. 1:1 talk time offered and declined by Pt.

## 2020-10-03 PROCEDURE — 1240000000 HC EMOTIONAL WELLNESS R&B

## 2020-10-03 PROCEDURE — 99232 SBSQ HOSP IP/OBS MODERATE 35: CPT | Performed by: PSYCHIATRY & NEUROLOGY

## 2020-10-03 PROCEDURE — 6370000000 HC RX 637 (ALT 250 FOR IP): Performed by: PSYCHIATRY & NEUROLOGY

## 2020-10-03 RX ORDER — DULOXETIN HYDROCHLORIDE 20 MG/1
20 CAPSULE, DELAYED RELEASE ORAL DAILY
Status: DISCONTINUED | OUTPATIENT
Start: 2020-10-04 | End: 2020-10-04

## 2020-10-03 RX ADMIN — VENLAFAXINE HYDROCHLORIDE 75 MG: 75 CAPSULE, EXTENDED RELEASE ORAL at 08:55

## 2020-10-03 RX ADMIN — PALIPERIDONE 3 MG: 3 TABLET, EXTENDED RELEASE ORAL at 08:55

## 2020-10-03 NOTE — PLAN OF CARE
Problem: Altered Mood, Psychotic Behavior:  Goal: Able to verbalize decrease in frequency and intensity of hallucinations  Description: Able to verbalize decrease in frequency and intensity of hallucinations  Outcome: Ongoing  Note: Pt denies hallucinations at this time. Pt states his depression and anxiety are getting better. Pt is flat and isolative to his room. Goal: Absence of self-harm  Description: Absence of self-harm  Outcome: Ongoing  Note: Pt remains free from self harm. Pt denies any thoughts of suicide at this time. Pt feels safe in the hospital and agrees to seek out staff if these thoughts return or he no longer feels safe. Pt remains safe on unit. Every 15 min checks for safety continue.

## 2020-10-03 NOTE — GROUP NOTE
Group Therapy Note    Date: 10/3/2020    Group Start Time: 1330  Group End Time: 1400  Group Topic: Recreational    [address] [facility], [facility]    Patient refused to attend Recreational Therapy Group at 1330 after encouragement from staff. 1:1 talk time offered.     Signature:  Fern Neal

## 2020-10-03 NOTE — PROGRESS NOTES
Charting done this shift reviewed by Electronically signed by Jackie Barakat RN on 10/3/2020 at 3:37 AM

## 2020-10-03 NOTE — GROUP NOTE
Group Therapy Note    Date: 10/3/2020    Group Start Time: 0900  Group End Time: 0915  Group Topic: Community Meeting    [facility] [place], [place]    Patient refused to attend Goal Setting / Community Meeting Group at 0900 after encouragement from staff. 1:1 talk time offered.     Signature:  Juancarlos Faith

## 2020-10-03 NOTE — PROGRESS NOTES
Daily Progress Note  Joan Barrera CNP  10/3/2020       CHIEF COMPLAINT: Depression and command auditory hallucinations    Reviewed patient's current plan of care and vital signs with nursing staff. Sleep: Several hours of sleep last night   attending groups: No: Patient still isolates    SUBJECTIVE:    Patient continues to endorse appropriate sleep. He reports concern with the increase in his venlafaxine. He says that he is experiencing visual hallucinations, he describes them as red dots or black in bold colored letters. He reports that this started after the change in his medication. He also continues to endorse a poor mood. He continues to eat well. He is not attending groups. He does report suicidal ideation, says that it remains unchanged. He verbally contracts for safety while on the unit. He has not required any as needed medication. Staff reports that he remains medication compliant. Mental Status Exam  Level of consciousness: Resting, easily arousable to verbal stimulus  Appearance: Hospital attire, laying in bed, with fair grooming and hygiene   Behavior/Motor: No abnormalities noted  Attitude toward examiner:  Cooperative, intermittent eye contact  Speech:  spontaneous, normal rate, normal volume and well articulated  Mood: Depressed, concerned about medication change  Affect: Congruent with stated mood  Thought processes:  linear, goal directed and coherent  Thought content:  denies homicidal ideation  Suicidal Ideation: Endorses suicidal ideation but able to contract for safety on the unit  Delusions:  no evidence of delusions  Perceptual Disturbance: Endorses visual hallucinations  cognition:  Oriented to self, location, time, and situation  Memory: age appropriate  Insight & Judgement: improving  Medication side effects:  denies       Data   height is 5' 10\" (1.778 m) and weight is 250 lb (113.4 kg). His oral temperature is 97.8 °F (36.6 °C).  His blood pressure is 98/49 (abnormal) and his pulse is 60. His respiration is 14 and oxygen saturation is 96%. Labs:   No visits with results within 2 Day(s) from this visit.    Latest known visit with results is:   Admission on 05/02/2019, Discharged on 05/02/2019   Component Date Value Ref Range Status    WBC 05/02/2019 8.1  3.5 - 11.3 k/uL Final    RBC 05/02/2019 4.73  4.21 - 5.77 m/uL Final    Hemoglobin 05/02/2019 13.8  13.0 - 17.0 g/dL Final    Hematocrit 05/02/2019 43.0  40.7 - 50.3 % Final    MCV 05/02/2019 90.9  82.6 - 102.9 fL Final    MCH 05/02/2019 29.2  25.2 - 33.5 pg Final    MCHC 05/02/2019 32.1  28.4 - 34.8 g/dL Final    RDW 05/02/2019 12.7  11.8 - 14.4 % Final    Platelets 61/57/5426 264  138 - 453 k/uL Final    MPV 05/02/2019 10.7  8.1 - 13.5 fL Final    NRBC Automated 05/02/2019 0.0  0.0 per 100 WBC Final    Differential Type 05/02/2019 NOT REPORTED   Final    Seg Neutrophils 05/02/2019 52  36 - 65 % Final    Lymphocytes 05/02/2019 29  24 - 43 % Final    Monocytes 05/02/2019 13* 3 - 12 % Final    Eosinophils % 05/02/2019 5* 1 - 4 % Final    Basophils 05/02/2019 1  0 - 2 % Final    Immature Granulocytes 05/02/2019 0  0 % Final    Segs Absolute 05/02/2019 4.20  1.50 - 8.10 k/uL Final    Absolute Lymph # 05/02/2019 2.36  1.10 - 3.70 k/uL Final    Absolute Mono # 05/02/2019 1.07  0.10 - 1.20 k/uL Final    Absolute Eos # 05/02/2019 0.42  0.00 - 0.44 k/uL Final    Basophils Absolute 05/02/2019 0.06  0.00 - 0.20 k/uL Final    Absolute Immature Granulocyte 05/02/2019 0.03  0.00 - 0.30 k/uL Final    WBC Morphology 05/02/2019 NOT REPORTED   Final    RBC Morphology 05/02/2019 NOT REPORTED   Final    Platelet Estimate 33/06/1245 NOT REPORTED   Final    Glucose 05/02/2019 98  70 - 99 mg/dL Final    BUN 05/02/2019 14  6 - 20 mg/dL Final    CREATININE 05/02/2019 1.03  0.70 - 1.20 mg/dL Final    Bun/Cre Ratio 05/02/2019 NOT REPORTED  9 - 20 Final    Calcium 05/02/2019 9.1  8.6 - 10.4 mg/dL Final    Sodium 05/02/2019 136  135 - 144 mmol/L Final    Potassium 05/02/2019 3.8  3.7 - 5.3 mmol/L Final    Chloride 05/02/2019 98  98 - 107 mmol/L Final    CO2 05/02/2019 23  20 - 31 mmol/L Final    Anion Gap 05/02/2019 15  9 - 17 mmol/L Final    GFR Non- 05/02/2019 >60  >60 mL/min Final    GFR  05/02/2019 >60  >60 mL/min Final    GFR Comment 05/02/2019        Final    Comment: Average GFR for 38-51 years old:   80 mL/min/1.73sq m  Chronic Kidney Disease:   <60 mL/min/1.73sq m  Kidney failure:   <15 mL/min/1.73sq m              eGFR calculated using average adult body mass. Additional eGFR calculator available at:        [employer]            GFR Staging 05/02/2019 NOT REPORTED   Final    Troponin, High Sensitivity 05/02/2019 <6  0 - 22 ng/L Final    Comment:       High Sensitivity Troponin values cannot be compared with other Troponin methodologies. Patients with high levels of Biotin oral intake (i.e >5mg/day) may have falsely decreased   Troponin levels. Samples collected within 8 hours of biotin intake may require additional   information for diagnosis.       Troponin T 05/02/2019 NOT REPORTED  <0.03 ng/mL Final    Troponin Interp 05/02/2019 NOT REPORTED   Final    Ventricular Rate 05/02/2019 109  BPM Final    Atrial Rate 05/02/2019 109  BPM Final    P-R Interval 05/02/2019 136  ms Final    QRS Duration 05/02/2019 68  ms Final    Q-T Interval 05/02/2019 350  ms Final    QTc Calculation (Bazett) 05/02/2019 471  ms Final    P Axis 05/02/2019 38  degrees Final    R Axis 05/02/2019 19  degrees Final    T Axis 05/02/2019 1  degrees Final    Ethanol 05/02/2019 <10  <10 mg/dL Final    Ethanol percent 05/02/2019 <0.010  <0.010 % Final    NOTE: NEW REFERENCE RANGE    Ventricular Rate 05/02/2019 88  BPM Final    Atrial Rate 05/02/2019 88  BPM Final    P-R Interval 05/02/2019 150  ms Final    QRS Duration 05/02/2019 86  ms Final    Q-T Interval 05/02/2019 404  ms Final    QTc Calculation (Bazett) 05/02/2019 488  ms Final    P Axis 05/02/2019 39  degrees Final    R Axis 05/02/2019 19  degrees Final    T Axis 05/02/2019 1  degrees Final            Medications  Current Facility-Administered Medications: venlafaxine (EFFEXOR XR) extended release capsule 75 mg, 75 mg, Oral, Daily with breakfast  traZODone (DESYREL) tablet 100 mg, 100 mg, Oral, Nightly  paliperidone (INVEGA) extended release tablet 3 mg, 3 mg, Oral, Daily  acetaminophen (TYLENOL) tablet 650 mg, 650 mg, Oral, Q4H PRN  traZODone (DESYREL) tablet 50 mg, 50 mg, Oral, Nightly PRN  ibuprofen (ADVIL;MOTRIN) tablet 400 mg, 400 mg, Oral, Q6H PRN  hydrOXYzine (ATARAX) tablet 50 mg, 50 mg, Oral, TID PRN  polyethylene glycol (GLYCOLAX) packet 17 g, 17 g, Oral, Daily PRN  aluminum & magnesium hydroxide-simethicone (MAALOX) 200-200-20 MG/5ML suspension 30 mL, 30 mL, Oral, Q6H PRN    ASSESSMENT  Schizoaffective disorder, depressive type (HCC)     PLAN  Discussed with Dr. Denisse Mehta. Discontinue venlafaxine  Start Cymbalta 20 mg daily    I independently saw and evaluated the patient. I reviewed the nurse practitioners documentation above. Any additional comments or changes to the nurse practitioners documentation are stated below otherwise agree with assessment. Plan will be as follows:    PLAN  Patient s symptoms   are worsening  Discontinue Effexor and transition to Cymbalta  Attempt to develop insight  Psycho-education conducted. Supportive Therapy conducted. Probable discharge is undetermined at this time  Follow-up daily while on inpatient unit            **This report has been created using voice recognition software. It may contain minor errors which are inherent in voice recognition technology. **

## 2020-10-04 PROCEDURE — 99232 SBSQ HOSP IP/OBS MODERATE 35: CPT | Performed by: PSYCHIATRY & NEUROLOGY

## 2020-10-04 PROCEDURE — 6370000000 HC RX 637 (ALT 250 FOR IP): Performed by: NURSE PRACTITIONER

## 2020-10-04 PROCEDURE — 1240000000 HC EMOTIONAL WELLNESS R&B

## 2020-10-04 PROCEDURE — 6370000000 HC RX 637 (ALT 250 FOR IP): Performed by: PSYCHIATRY & NEUROLOGY

## 2020-10-04 RX ORDER — TRAZODONE HYDROCHLORIDE 50 MG/1
50 TABLET ORAL NIGHTLY
Status: DISCONTINUED | OUTPATIENT
Start: 2020-10-04 | End: 2020-10-09 | Stop reason: HOSPADM

## 2020-10-04 RX ORDER — DULOXETIN HYDROCHLORIDE 30 MG/1
30 CAPSULE, DELAYED RELEASE ORAL DAILY
Status: DISCONTINUED | OUTPATIENT
Start: 2020-10-05 | End: 2020-10-09 | Stop reason: HOSPADM

## 2020-10-04 RX ADMIN — DULOXETINE 20 MG: 20 CAPSULE, DELAYED RELEASE ORAL at 08:35

## 2020-10-04 RX ADMIN — PALIPERIDONE 3 MG: 3 TABLET, EXTENDED RELEASE ORAL at 08:35

## 2020-10-04 RX ADMIN — TRAZODONE HYDROCHLORIDE 50 MG: 50 TABLET ORAL at 22:04

## 2020-10-04 NOTE — GROUP NOTE
Group Therapy Note    Date: 10/4/2020    Group Start Time: 1330  Group End Time: 5886  Group Topic: Recreational    [address] [facility], [facility]    Patient refused to attend Recreational Therapy Group at 1330 after encouragement from staff. 1:1 talk time offered.     Signature:  Artemus Kocher

## 2020-10-04 NOTE — PLAN OF CARE
Problem: Altered Mood, Psychotic Behavior:  Goal: Able to verbalize decrease in frequency and intensity of hallucinations  Description: Able to verbalize decrease in frequency and intensity of hallucinations  Outcome: Ongoing   Patient endorses visual hallucinations of red spots. Patient reports depression and anxiety. Patient is isolative to room throughout shift. Patient does not attend group. Problem: Altered Mood, Psychotic Behavior:  Goal: Absence of self-harm  Description: Absence of self-harm  Outcome: Ongoing  Patient reports fleeting suicidal ideations. Patient denies a plan or intent. Patient agrees to seek out staff if symptoms worsen and patient no longer feels he can be safe. Patient remains safe on unit. Patient safety maintained q15 minute checks.

## 2020-10-04 NOTE — GROUP NOTE
Group Therapy Note    Date: 10/4/2020    Group Start Time: 1600  Group End Time: 6252  Group Topic: Healthy Living/Wellness    GIAN Soler        Group Therapy Note    Attendees: 8/16       Patient's Goal:  Participate in discussion    Notes:  Discuss positive exercise and movement    Status After Intervention:  Unchanged    Participation Level: Active Listener and Interactive    Participation Quality: Appropriate and Attentive      Speech:  normal      Thought Process/Content: Logical      Affective Functioning: Congruent      Mood: stable      Level of consciousness:  Alert and Attentive      Response to Learning: Able to verbalize current knowledge/experience and Progressing to goal      Endings: None Reported    Modes of Intervention: Socialization      Discipline Responsible: Behavorial Health Tech      Signature:   Humble Maurice

## 2020-10-04 NOTE — BH NOTE
Pt declined to participate in Open Rec time. Pt resting in room, in bed. Pt offered 1:1 talk time which he also declined.

## 2020-10-04 NOTE — GROUP NOTE
Group Therapy Note    Date: 10/4/2020    Group Start Time: 0900  Group End Time: 8189  Group Topic: Community Meeting    [address] [place] [address]    Patient refused to attend Goal Setting / Community Meeting Group at 0900 after encouragement from staff. 1:1 talk time offered.     Signature:  Fauzia Abad

## 2020-10-04 NOTE — PLAN OF CARE
Problem: Altered Mood, Psychotic Behavior:  Goal: Able to verbalize decrease in frequency and intensity of hallucinations  Description: Able to verbalize decrease in frequency and intensity of hallucinations  10/4/2020 1145 by Radha Negro  Outcome: Ongoing   Pt isolates in room for long periods of time. Med compliant. Not attending groups. Out brief intervals and watches tv briefly. Pt denies hallucinations this morning. Did ask to do laundry and was appropriate. Irritable at times. Problem: Altered Mood, Psychotic Behavior:  Goal: Absence of self-harm  Description: Absence of self-harm  10/4/2020 1145 by Radha Negro  Outcome: Ongoing  Pt relates he is having suicidal thoughts but he denies any plan here. Pt does say if he was out and had a lethal weapon he would try to hurt himself. Pt. Remains safe on the unit. Q 15 minute checks for safety maintained.

## 2020-10-04 NOTE — GROUP NOTE
Group Therapy Note    Date: 10/4/2020    Group Start Time: 1000  Group End Time: 5403  Group Topic: Psychoeducation    [address], MSW, LSW        Group Therapy Note    Attendees: 4    Pt declined to attend psychotherapy at 1000 am despite encouragement. Pt offered 1:1 and refused.

## 2020-10-04 NOTE — PROGRESS NOTES
able to contract for safety on the unit  Delusions:  no evidence of delusions  Perceptual Disturbance: Endorses improvement in visual hallucinations  cognition:  Oriented to self, location, time, and situation  Memory: age appropriate  Insight & Judgement: improving  Medication side effects:  denies       Data   height is 5' 10\" (1.778 m) and weight is 250 lb (113.4 kg). His oral temperature is 98 °F (36.7 °C). His blood pressure is 102/64 and his pulse is 57. His respiration is 14 and oxygen saturation is 96%. Labs:   No visits with results within 2 Day(s) from this visit.    Latest known visit with results is:   Admission on 05/02/2019, Discharged on 05/02/2019   Component Date Value Ref Range Status    WBC 05/02/2019 8.1  3.5 - 11.3 k/uL Final    RBC 05/02/2019 4.73  4.21 - 5.77 m/uL Final    Hemoglobin 05/02/2019 13.8  13.0 - 17.0 g/dL Final    Hematocrit 05/02/2019 43.0  40.7 - 50.3 % Final    MCV 05/02/2019 90.9  82.6 - 102.9 fL Final    MCH 05/02/2019 29.2  25.2 - 33.5 pg Final    MCHC 05/02/2019 32.1  28.4 - 34.8 g/dL Final    RDW 05/02/2019 12.7  11.8 - 14.4 % Final    Platelets 03/19/3389 264  138 - 453 k/uL Final    MPV 05/02/2019 10.7  8.1 - 13.5 fL Final    NRBC Automated 05/02/2019 0.0  0.0 per 100 WBC Final    Differential Type 05/02/2019 NOT REPORTED   Final    Seg Neutrophils 05/02/2019 52  36 - 65 % Final    Lymphocytes 05/02/2019 29  24 - 43 % Final    Monocytes 05/02/2019 13* 3 - 12 % Final    Eosinophils % 05/02/2019 5* 1 - 4 % Final    Basophils 05/02/2019 1  0 - 2 % Final    Immature Granulocytes 05/02/2019 0  0 % Final    Segs Absolute 05/02/2019 4.20  1.50 - 8.10 k/uL Final    Absolute Lymph # 05/02/2019 2.36  1.10 - 3.70 k/uL Final    Absolute Mono # 05/02/2019 1.07  0.10 - 1.20 k/uL Final    Absolute Eos # 05/02/2019 0.42  0.00 - 0.44 k/uL Final    Basophils Absolute 05/02/2019 0.06  0.00 - 0.20 k/uL Final    Absolute Immature Granulocyte 05/02/2019 0.03  0.00 - 0.30 k/uL Final    WBC Morphology 05/02/2019 NOT REPORTED   Final    RBC Morphology 05/02/2019 NOT REPORTED   Final    Platelet Estimate 48/53/6731 NOT REPORTED   Final    Glucose 05/02/2019 98  70 - 99 mg/dL Final    BUN 05/02/2019 14  6 - 20 mg/dL Final    CREATININE 05/02/2019 1.03  0.70 - 1.20 mg/dL Final    Bun/Cre Ratio 05/02/2019 NOT REPORTED  9 - 20 Final    Calcium 05/02/2019 9.1  8.6 - 10.4 mg/dL Final    Sodium 05/02/2019 136  135 - 144 mmol/L Final    Potassium 05/02/2019 3.8  3.7 - 5.3 mmol/L Final    Chloride 05/02/2019 98  98 - 107 mmol/L Final    CO2 05/02/2019 23  20 - 31 mmol/L Final    Anion Gap 05/02/2019 15  9 - 17 mmol/L Final    GFR Non- 05/02/2019 >60  >60 mL/min Final    GFR  05/02/2019 >60  >60 mL/min Final    GFR Comment 05/02/2019        Final    Comment: Average GFR for 38-51 years old:   80 mL/min/1.73sq m  Chronic Kidney Disease:   <60 mL/min/1.73sq m  Kidney failure:   <15 mL/min/1.73sq m              eGFR calculated using average adult body mass. Additional eGFR calculator available at:        [employer]            GFR Staging 05/02/2019 NOT REPORTED   Final    Troponin, High Sensitivity 05/02/2019 <6  0 - 22 ng/L Final    Comment:       High Sensitivity Troponin values cannot be compared with other Troponin methodologies. Patients with high levels of Biotin oral intake (i.e >5mg/day) may have falsely decreased   Troponin levels. Samples collected within 8 hours of biotin intake may require additional   information for diagnosis.       Troponin T 05/02/2019 NOT REPORTED  <0.03 ng/mL Final    Troponin Interp 05/02/2019 NOT REPORTED   Final    Ventricular Rate 05/02/2019 109  BPM Final    Atrial Rate 05/02/2019 109  BPM Final    P-R Interval 05/02/2019 136  ms Final    QRS Duration 05/02/2019 68  ms Final    Q-T Interval 05/02/2019 350  ms Final    QTc Calculation (Bazett) 05/02/2019 471  ms Final    P Axis 05/02/2019 38  degrees Final    R Axis 05/02/2019 19  degrees Final    T Axis 05/02/2019 1  degrees Final    Ethanol 05/02/2019 <10  <10 mg/dL Final    Ethanol percent 05/02/2019 <0.010  <0.010 % Final    NOTE: NEW REFERENCE RANGE    Ventricular Rate 05/02/2019 88  BPM Final    Atrial Rate 05/02/2019 88  BPM Final    P-R Interval 05/02/2019 150  ms Final    QRS Duration 05/02/2019 86  ms Final    Q-T Interval 05/02/2019 404  ms Final    QTc Calculation (Bazett) 05/02/2019 488  ms Final    P Axis 05/02/2019 39  degrees Final    R Axis 05/02/2019 19  degrees Final    T Axis 05/02/2019 1  degrees Final            Medications  Current Facility-Administered Medications: DULoxetine (CYMBALTA) extended release capsule 20 mg, 20 mg, Oral, Daily  traZODone (DESYREL) tablet 100 mg, 100 mg, Oral, Nightly  paliperidone (INVEGA) extended release tablet 3 mg, 3 mg, Oral, Daily  acetaminophen (TYLENOL) tablet 650 mg, 650 mg, Oral, Q4H PRN  traZODone (DESYREL) tablet 50 mg, 50 mg, Oral, Nightly PRN  ibuprofen (ADVIL;MOTRIN) tablet 400 mg, 400 mg, Oral, Q6H PRN  hydrOXYzine (ATARAX) tablet 50 mg, 50 mg, Oral, TID PRN  polyethylene glycol (GLYCOLAX) packet 17 g, 17 g, Oral, Daily PRN  aluminum & magnesium hydroxide-simethicone (MAALOX) 200-200-20 MG/5ML suspension 30 mL, 30 mL, Oral, Q6H PRN    ASSESSMENT  Schizoaffective disorder, depressive type (HCC)     PLAN  Discussed with Dr. Saniya Carroll. I independently saw and evaluated the patient. I reviewed the nurse practitioners documentation above. Any additional comments or changes to the nurse practitioners documentation are stated below otherwise agree with assessment. Plan will be as follows:  Patient still endorsed with me prominent depressive symptoms. States he still feeling hopeless and suicidal and is not able to contract off the unit for safety.     PLAN  Patient s symptoms   show no change  Increase Cymbalta to 30 mg  Reduce trazodone to 50 mg by mouth at bedtime scheduled  Attempt to develop insight  Psycho-education conducted. Supportive Therapy conducted. Probable discharge is undetermined at this time  Follow-up daily while on inpatient unit          **This report has been created using voice recognition software. It may contain minor errors which are inherent in voice recognition technology. **

## 2020-10-05 PROCEDURE — 1240000000 HC EMOTIONAL WELLNESS R&B

## 2020-10-05 PROCEDURE — 6370000000 HC RX 637 (ALT 250 FOR IP): Performed by: PSYCHIATRY & NEUROLOGY

## 2020-10-05 PROCEDURE — 6370000000 HC RX 637 (ALT 250 FOR IP): Performed by: NURSE PRACTITIONER

## 2020-10-05 PROCEDURE — 99232 SBSQ HOSP IP/OBS MODERATE 35: CPT | Performed by: PSYCHIATRY & NEUROLOGY

## 2020-10-05 RX ADMIN — PALIPERIDONE 3 MG: 3 TABLET, EXTENDED RELEASE ORAL at 07:55

## 2020-10-05 RX ADMIN — DULOXETINE 30 MG: 30 CAPSULE, DELAYED RELEASE ORAL at 07:55

## 2020-10-05 RX ADMIN — TRAZODONE HYDROCHLORIDE 50 MG: 50 TABLET ORAL at 21:21

## 2020-10-05 NOTE — PLAN OF CARE
Problem: Altered Mood, Psychotic Behavior:  Goal: Able to verbalize decrease in frequency and intensity of hallucinations  Description: Able to verbalize decrease in frequency and intensity of hallucinations  Outcome: Ongoing   Pt is seclusive to his room aloof of staff and peers but is pleasant and cooperative when approached. He does eat at snack and accepts all medication. Problem: Altered Mood, Psychotic Behavior:  Goal: Absence of self-harm  Description: Absence of self-harm  Outcome: Ongoing   Pt denies thoughts of harming themself and verbally agrees to remain safe while on the unit.  No self harming behaviors are noted this shift

## 2020-10-05 NOTE — GROUP NOTE
Group Therapy Note    Date: 10/5/2020    Group Start Time: 1430  Group End Time: 7719  Group Topic: Recreational    STCZ STEPH Ahuja, CTRS    Patient refused to attend Recreational Therapy Group at 1430 after encouragement from staff. 1:1 talk time offered.     Signature:  Kenny Rosenthal

## 2020-10-05 NOTE — GROUP NOTE
Group Therapy Note    Date: 10/5/2020    Group Start Time: 1330  Group End Time: 1400  Group Topic: Psychoeducation    GIAN Frausto, CTRS        Group Therapy Note    Attendees: 3    Pt did not attend Therapeutic Recreation at 1330 d/t resting in room despite staff invitation to attend. 1:1 talk time offered as alternative to group session, pt declined.

## 2020-10-05 NOTE — GROUP NOTE
Group Therapy Note    Date: 10/5/2020    Group Start Time: 0900  Group End Time: 9648  Group Topic: [place] [facility] [address], [address] [place] [address] Therapy Note    Attendees: 5  Pt did not attend Community Meeting at 0900 d/t resting in room despite staff invitation to attend. 1:1 talk time offered as alternative to group session, pt declined.

## 2020-10-05 NOTE — PROGRESS NOTES
Daily Progress Note  Maurice Reyna MD  10/5/2020  CHIEF COMPLAINT: Depression and command auditory hallucinations to harm himself    Reviewed patient's current plan of care and vital signs with nursing staff. Sleep:  7 hours last night  Attending groups: No: Isolates    SUBJECTIVE:    Jamie Medrano reports no side effects to medication. He states he slept fairly well last night. He states he tried to come out to watch a football game last night but quickly got agitated by another patient. Reports that he still has 7 out of 10 intensity (10 is the highest 1 is the lowest) of suicidal ideation. He does not feel safe to go off the unit. We discussed his past medications. Also discussed failure of multiple her medications and that he is not really ever achieved sustained remission or improvement in his mood. Discussed treatment alternatives, discussed the possibility of ECT. Patient states that he would be willing to have further ECT education. Mental Status Exam  Level of consciousness:  Within normal limits  Appearance: Hospital attire, seated in chair, with good grooming and hygiene   Behavior/Motor: No abnormalities noted  Attitude toward examiner:  Cooperative, attentive, good eye contact  Speech: Little spontaneity of speech, slow rate and low volume though well articulated  Mood: Depressed  Affect: Congruent  Thought processes:  linear, goal directed and coherent  Thought content:  denies homicidal ideation  Suicidal Ideation: Endorses suicidal ideation, remains able to contract for safety on the unit but unable to contract for safety off the unit  Delusions:  no evidence of delusions  Perceptual Disturbance:  denies any perceptual disturbance  Cognition:  Oriented to self, location, time, and situation  Memory: age appropriate  Insight & Judgement: improving  Medication side effects:  denies       Data   height is 5' 10\" (1.778 m) and weight is 250 lb (113.4 kg). His oral temperature is 97.9 °F (36.6 °C). His blood pressure is 98/51 (abnormal) and his pulse is 57. His respiration is 14 and oxygen saturation is 96%. Labs:   No visits with results within 2 Day(s) from this visit.    Latest known visit with results is:   Admission on 05/02/2019, Discharged on 05/02/2019   Component Date Value Ref Range Status    WBC 05/02/2019 8.1  3.5 - 11.3 k/uL Final    RBC 05/02/2019 4.73  4.21 - 5.77 m/uL Final    Hemoglobin 05/02/2019 13.8  13.0 - 17.0 g/dL Final    Hematocrit 05/02/2019 43.0  40.7 - 50.3 % Final    MCV 05/02/2019 90.9  82.6 - 102.9 fL Final    MCH 05/02/2019 29.2  25.2 - 33.5 pg Final    MCHC 05/02/2019 32.1  28.4 - 34.8 g/dL Final    RDW 05/02/2019 12.7  11.8 - 14.4 % Final    Platelets 60/00/1120 264  138 - 453 k/uL Final    MPV 05/02/2019 10.7  8.1 - 13.5 fL Final    NRBC Automated 05/02/2019 0.0  0.0 per 100 WBC Final    Differential Type 05/02/2019 NOT REPORTED   Final    Seg Neutrophils 05/02/2019 52  36 - 65 % Final    Lymphocytes 05/02/2019 29  24 - 43 % Final    Monocytes 05/02/2019 13* 3 - 12 % Final    Eosinophils % 05/02/2019 5* 1 - 4 % Final    Basophils 05/02/2019 1  0 - 2 % Final    Immature Granulocytes 05/02/2019 0  0 % Final    Segs Absolute 05/02/2019 4.20  1.50 - 8.10 k/uL Final    Absolute Lymph # 05/02/2019 2.36  1.10 - 3.70 k/uL Final    Absolute Mono # 05/02/2019 1.07  0.10 - 1.20 k/uL Final    Absolute Eos # 05/02/2019 0.42  0.00 - 0.44 k/uL Final    Basophils Absolute 05/02/2019 0.06  0.00 - 0.20 k/uL Final    Absolute Immature Granulocyte 05/02/2019 0.03  0.00 - 0.30 k/uL Final    WBC Morphology 05/02/2019 NOT REPORTED   Final    RBC Morphology 05/02/2019 NOT REPORTED   Final    Platelet Estimate 48/56/7451 NOT REPORTED   Final    Glucose 05/02/2019 98  70 - 99 mg/dL Final    BUN 05/02/2019 14  6 - 20 mg/dL Final    CREATININE 05/02/2019 1.03  0.70 - 1.20 mg/dL Final    Bun/Cre Ratio 05/02/2019 NOT REPORTED  9 - 20 Final    Calcium 05/02/2019 9.1  8.6 - 10.4 mg/dL Final    Sodium 05/02/2019 136  135 - 144 mmol/L Final    Potassium 05/02/2019 3.8  3.7 - 5.3 mmol/L Final    Chloride 05/02/2019 98  98 - 107 mmol/L Final    CO2 05/02/2019 23  20 - 31 mmol/L Final    Anion Gap 05/02/2019 15  9 - 17 mmol/L Final    GFR Non- 05/02/2019 >60  >60 mL/min Final    GFR  05/02/2019 >60  >60 mL/min Final    GFR Comment 05/02/2019        Final    Comment: Average GFR for 38-51 years old:   80 mL/min/1.73sq m  Chronic Kidney Disease:   <60 mL/min/1.73sq m  Kidney failure:   <15 mL/min/1.73sq m              eGFR calculated using average adult body mass. Additional eGFR calculator available at:        [employer]            GFR Staging 05/02/2019 NOT REPORTED   Final    Troponin, High Sensitivity 05/02/2019 <6  0 - 22 ng/L Final    Comment:       High Sensitivity Troponin values cannot be compared with other Troponin methodologies. Patients with high levels of Biotin oral intake (i.e >5mg/day) may have falsely decreased   Troponin levels. Samples collected within 8 hours of biotin intake may require additional   information for diagnosis.       Troponin T 05/02/2019 NOT REPORTED  <0.03 ng/mL Final    Troponin Interp 05/02/2019 NOT REPORTED   Final    Ventricular Rate 05/02/2019 109  BPM Final    Atrial Rate 05/02/2019 109  BPM Final    P-R Interval 05/02/2019 136  ms Final    QRS Duration 05/02/2019 68  ms Final    Q-T Interval 05/02/2019 350  ms Final    QTc Calculation ([place]) 05/02/2019 471  ms Final    P Axis 05/02/2019 38  degrees Final    R Axis 05/02/2019 19  degrees Final    T Axis 05/02/2019 1  degrees Final    Ethanol 05/02/2019 <10  <10 mg/dL Final    Ethanol percent 05/02/2019 <0.010  <0.010 % Final    NOTE: NEW REFERENCE RANGE    Ventricular Rate 05/02/2019 88  BPM Final    Atrial Rate 05/02/2019 88  BPM Final    P-R Interval 05/02/2019 150  ms Final    QRS Duration 05/02/2019 86  ms Final    Q-T Interval 05/02/2019 404  ms Final    QTc Calculation ([place]) 05/02/2019 488  ms Final    P Axis 05/02/2019 39  degrees Final    R Axis 05/02/2019 19  degrees Final    T Axis 05/02/2019 1  degrees Final            Medications  Current Facility-Administered Medications: traZODone (DESYREL) tablet 50 mg, 50 mg, Oral, Nightly  DULoxetine (CYMBALTA) extended release capsule 30 mg, 30 mg, Oral, Daily  paliperidone (INVEGA) extended release tablet 3 mg, 3 mg, Oral, Daily  acetaminophen (TYLENOL) tablet 650 mg, 650 mg, Oral, Q4H PRN  traZODone (DESYREL) tablet 50 mg, 50 mg, Oral, Nightly PRN  ibuprofen (ADVIL;MOTRIN) tablet 400 mg, 400 mg, Oral, Q6H PRN  hydrOXYzine (ATARAX) tablet 50 mg, 50 mg, Oral, TID PRN  polyethylene glycol (GLYCOLAX) packet 17 g, 17 g, Oral, Daily PRN  aluminum & magnesium hydroxide-simethicone (MAALOX) 200-200-20 MG/5ML suspension 30 mL, 30 mL, Oral, Q6H PRN    ASSESSMENT  Schizoaffective disorder, depressive type ([facility] [address]     PLAN  Patient s symptoms   show no change  Increase Invega to 6 mg daily  ECT education  Attempt to develop insight  Psycho-education conducted. Supportive Therapy conducted. Probable discharge is undetermined at this time  Follow-up daily while on the inpatient unit        Electronically signed by Javier Riley MD on 10/5/20 at 1:25 PM EDT    **This report has been created using voice recognition software. It may contain minor errors which are inherent in voice recognition technology. **

## 2020-10-05 NOTE — GROUP NOTE
Group Therapy Note    Date: 10/5/2020    Group Start Time: 1100  Group End Time: 5459  Group Topic: Recreational    [address] [facility], [facility]    Patient refused to attend Recreational Therapy Group at 1100 after encouragement from staff. 1:1 talk time offered.     Signature:  Reji Coleman

## 2020-10-06 PROCEDURE — 1240000000 HC EMOTIONAL WELLNESS R&B

## 2020-10-06 PROCEDURE — 99232 SBSQ HOSP IP/OBS MODERATE 35: CPT | Performed by: PSYCHIATRY & NEUROLOGY

## 2020-10-06 PROCEDURE — 6370000000 HC RX 637 (ALT 250 FOR IP): Performed by: NURSE PRACTITIONER

## 2020-10-06 PROCEDURE — 6370000000 HC RX 637 (ALT 250 FOR IP): Performed by: PSYCHIATRY & NEUROLOGY

## 2020-10-06 RX ORDER — PALIPERIDONE 3 MG/1
3 TABLET, EXTENDED RELEASE ORAL DAILY
Status: COMPLETED | OUTPATIENT
Start: 2020-10-06 | End: 2020-10-06

## 2020-10-06 RX ORDER — MIRTAZAPINE 15 MG/1
7.5 TABLET, FILM COATED ORAL NIGHTLY
Status: DISCONTINUED | OUTPATIENT
Start: 2020-10-06 | End: 2020-10-07

## 2020-10-06 RX ORDER — PALIPERIDONE 6 MG/1
6 TABLET, EXTENDED RELEASE ORAL DAILY
Status: DISCONTINUED | OUTPATIENT
Start: 2020-10-07 | End: 2020-10-09 | Stop reason: HOSPADM

## 2020-10-06 RX ADMIN — DULOXETINE 30 MG: 30 CAPSULE, DELAYED RELEASE ORAL at 07:59

## 2020-10-06 RX ADMIN — PALIPERIDONE 3 MG: 3 TABLET, EXTENDED RELEASE ORAL at 07:59

## 2020-10-06 RX ADMIN — TRAZODONE HYDROCHLORIDE 50 MG: 50 TABLET ORAL at 21:54

## 2020-10-06 RX ADMIN — PALIPERIDONE 3 MG: 3 TABLET, EXTENDED RELEASE ORAL at 10:40

## 2020-10-06 RX ADMIN — MIRTAZAPINE 7.5 MG: 15 TABLET, FILM COATED ORAL at 21:54

## 2020-10-06 NOTE — GROUP NOTE
Group Therapy Note    Date: 10/5/2020    Group Start Time: 2030  Group End Time: 2118  Group Topic: Wrap-Up    GIAN Nino        Group Therapy Note    Attendees: 9         Patient's Goal:  Pt left early    Notes:  No paricipation    Status After Intervention:  Unchanged    Participation Level: None    Participation Quality: Resistant      Speech:  mute      Thought Process/Content: Perseverating      Affective Functioning: Constricted/Restricted      Mood: depressed      Level of consciousness:  Preoccupied      Response to Learning: Resistant      Endings: None Reported    Modes of Intervention: Problem-solving      Discipline Responsible: Behavorial Health Tech      Signature:  Millie Joaquin

## 2020-10-06 NOTE — GROUP NOTE
Group Therapy Note    Date: 10/6/2020    Group Start Time: 6129  Group End Time: 4013  Group Topic: Healthy Living/Wellness    [facility] Lorenso Burkitt, RN        Group Therapy Note    Attendees: 7    Patient refused to attend wellness group at 1600 after encouragement from staff. 1:1 talk time offered as alternative to group session and patient refused.       Signature:  Lorenso Burkitt, RN

## 2020-10-06 NOTE — PLAN OF CARE
Problem: Altered Mood, Psychotic Behavior:  Goal: Absence of self-harm  Description: Absence of self-harm  10/6/2020 1053 by Shayan Campbell RN  Outcome: Ongoing   Patient is free of self harm this shift and will continue to be provided a safe environment. Patient reports fleeting suicidal ideations without a plan and contracts for safety. Patient agrees to seek out staff. Patient denies homicidal ideations. Patient reports depression and anxiety reporting it's a little better. Patient is accepting of nourishment from staff. Remains behavioral control and med compliant. Despite encouragement from staff patient refused shower. Q15 minute checks maintained. Problem: Altered Mood, Psychotic Behavior:  Goal: Able to verbalize decrease in frequency and intensity of hallucinations  Description: Able to verbalize decrease in frequency and intensity of hallucinations  10/6/2020 1053 by Shayan Campbell RN  Outcome: Ongoing    Patient denies auditory hallucinations and visual hallucinations.

## 2020-10-06 NOTE — GROUP NOTE
Group Therapy Note    Date: 10/6/2020    Group Start Time: 0900  Group End Time: 0915  Group Topic: Community Meeting    Esau Wallace        Group Therapy Note    Attendees: 3/13    Pt did not attend [employer] d/t resting in room despite staff invitation to attend. 1:1 talk time offered as alternative to group session, pt declined.

## 2020-10-06 NOTE — GROUP NOTE
Group Therapy Note    Date: 10/6/2020    Group Start Time: 1100  Group End Time: 5800  Group Topic: Psychoeducation    GIAN Diaz, [address]        Group Therapy Note    Attendees: 3/11      Pt did not attend RT skills group d/t resting in room despite staff invitation to attend. 1:1 talk time offered as alternative to group session, pt declined.

## 2020-10-06 NOTE — GROUP NOTE
Group Therapy Note    Date: 10/6/2020    Group Start Time: 1000  Group End Time: 1824  Group Topic: Psychotherapy    [facility]    BATSHEVA Martinez        Group Therapy Note    Attendees: 3/13      Pt declined to attend psychotherapy at 1000 am despite encouragement. Pt offered 1:1 and accepted/refused.          Pt refused        Signature:  BATSHEVA Martinez

## 2020-10-06 NOTE — PROGRESS NOTES
Daily Progress Note  Tosha Dsouza MD  10/6/2020  CHIEF COMPLAINT: Depression and command auditory hallucinations to hurt himself    Reviewed patient's current plan of care and vital signs with nursing staff. Sleep:  7 hours last night  Attending groups: No: Patient continues to isolate    SUBJECTIVE:    Nick Walton reports no problems with medication. He states that he is still sleeping a little better but not great. He reports that his suicidal ideation is about a 6 out of 10 intensity where 10 is the worst.  He is endorsing auditory hallucinations but does state that they are less frequent and less intense than they were before. He states his mood is getting a little bit better. He states he is not safe off the unit but is able to contract for safety on the unit. He stated he had a discussion with nurse practitioner yesterday with regards to ECT and watched the video. He states that scared him a little bit secondary to seeing somebody \"twitching\". Discussed additional medication options. After discussion of risk benefits and alternatives patient was agreeable to trial of Remeron at bedtime.     Mental Status Exam  Level of consciousness:  Within normal limits  Appearance: Hospital attire, seated in chair, with good grooming and hygiene   Behavior/Motor: No abnormalities noted  Attitude toward examiner:  Cooperative, attentive, good eye contact  Speech:  spontaneous, normal rate, normal volume and well articulated  Mood: Depressed  Affect: Congruent  Thought processes:  linear, goal directed and coherent  Thought content:  denies homicidal ideation  Suicidal Ideation: Endorses suicidal ideation but contracts for safety on the unit  Delusions:  no evidence of delusions  Perceptual Disturbance:  denies any perceptual disturbance  Cognition:  Oriented to self, location, time, and situation  Memory: age appropriate  Insight & Judgement: improving  Medication side effects:  denies       Data   height is 5' 10\" (1.778 m) and weight is 250 lb (113.4 kg). His temperature is 98.3 °F (36.8 °C). His blood pressure is 107/69 and his pulse is 70. His respiration is 14 and oxygen saturation is 96%. Labs:   No visits with results within 2 Day(s) from this visit.    Latest known visit with results is:   Admission on 05/02/2019, Discharged on 05/02/2019   Component Date Value Ref Range Status    WBC 05/02/2019 8.1  3.5 - 11.3 k/uL Final    RBC 05/02/2019 4.73  4.21 - 5.77 m/uL Final    Hemoglobin 05/02/2019 13.8  13.0 - 17.0 g/dL Final    Hematocrit 05/02/2019 43.0  40.7 - 50.3 % Final    MCV 05/02/2019 90.9  82.6 - 102.9 fL Final    MCH 05/02/2019 29.2  25.2 - 33.5 pg Final    MCHC 05/02/2019 32.1  28.4 - 34.8 g/dL Final    RDW 05/02/2019 12.7  11.8 - 14.4 % Final    Platelets 05/45/4656 264  138 - 453 k/uL Final    MPV 05/02/2019 10.7  8.1 - 13.5 fL Final    NRBC Automated 05/02/2019 0.0  0.0 per 100 WBC Final    Differential Type 05/02/2019 NOT REPORTED   Final    Seg Neutrophils 05/02/2019 52  36 - 65 % Final    Lymphocytes 05/02/2019 29  24 - 43 % Final    Monocytes 05/02/2019 13* 3 - 12 % Final    Eosinophils % 05/02/2019 5* 1 - 4 % Final    Basophils 05/02/2019 1  0 - 2 % Final    Immature Granulocytes 05/02/2019 0  0 % Final    Segs Absolute 05/02/2019 4.20  1.50 - 8.10 k/uL Final    Absolute Lymph # 05/02/2019 2.36  1.10 - 3.70 k/uL Final    Absolute Mono # 05/02/2019 1.07  0.10 - 1.20 k/uL Final    Absolute Eos # 05/02/2019 0.42  0.00 - 0.44 k/uL Final    Basophils Absolute 05/02/2019 0.06  0.00 - 0.20 k/uL Final    Absolute Immature Granulocyte 05/02/2019 0.03  0.00 - 0.30 k/uL Final    WBC Morphology 05/02/2019 NOT REPORTED   Final    RBC Morphology 05/02/2019 NOT REPORTED   Final    Platelet Estimate 74/42/6477 NOT REPORTED   Final    Glucose 05/02/2019 98  70 - 99 mg/dL Final    BUN 05/02/2019 14  6 - 20 mg/dL Final    CREATININE 05/02/2019 1.03  0.70 - 1.20 mg/dL Final    Bun/Cre Ratio 05/02/2019 NOT REPORTED  9 - 20 Final    Calcium 05/02/2019 9.1  8.6 - 10.4 mg/dL Final    Sodium 05/02/2019 136  135 - 144 mmol/L Final    Potassium 05/02/2019 3.8  3.7 - 5.3 mmol/L Final    Chloride 05/02/2019 98  98 - 107 mmol/L Final    CO2 05/02/2019 23  20 - 31 mmol/L Final    Anion Gap 05/02/2019 15  9 - 17 mmol/L Final    GFR Non- 05/02/2019 >60  >60 mL/min Final    GFR  05/02/2019 >60  >60 mL/min Final    GFR Comment 05/02/2019        Final    Comment: Average GFR for 38-51 years old:   80 mL/min/1.73sq m  Chronic Kidney Disease:   <60 mL/min/1.73sq m  Kidney failure:   <15 mL/min/1.73sq m              eGFR calculated using average adult body mass. Additional eGFR calculator available at:        [employer]            GFR Staging 05/02/2019 NOT REPORTED   Final    Troponin, High Sensitivity 05/02/2019 <6  0 - 22 ng/L Final    Comment:       High Sensitivity Troponin values cannot be compared with other Troponin methodologies. Patients with high levels of Biotin oral intake (i.e >5mg/day) may have falsely decreased   Troponin levels. Samples collected within 8 hours of biotin intake may require additional   information for diagnosis.       Troponin T 05/02/2019 NOT REPORTED  <0.03 ng/mL Final    Troponin Interp 05/02/2019 NOT REPORTED   Final    Ventricular Rate 05/02/2019 109  BPM Final    Atrial Rate 05/02/2019 109  BPM Final    P-R Interval 05/02/2019 136  ms Final    QRS Duration 05/02/2019 68  ms Final    Q-T Interval 05/02/2019 350  ms Final    QTc Calculation ([place]ett) 05/02/2019 471  ms Final    P Axis 05/02/2019 38  degrees Final    R Axis 05/02/2019 19  degrees Final    T Axis 05/02/2019 1  degrees Final    Ethanol 05/02/2019 <10  <10 mg/dL Final    Ethanol percent 05/02/2019 <0.010  <0.010 % Final    NOTE: NEW REFERENCE RANGE    Ventricular Rate 05/02/2019 88  BPM Final    Atrial Rate 05/02/2019 88 BPM Final    P-R Interval 05/02/2019 150  ms Final    QRS Duration 05/02/2019 86  ms Final    Q-T Interval 05/02/2019 404  ms Final    QTc Calculation ([place]ett) 05/02/2019 488  ms Final    P Axis 05/02/2019 39  degrees Final    R Axis 05/02/2019 19  degrees Final    T Axis 05/02/2019 1  degrees Final            Medications  Current Facility-Administered Medications: traZODone (DESYREL) tablet 50 mg, 50 mg, Oral, Nightly  DULoxetine (CYMBALTA) extended release capsule 30 mg, 30 mg, Oral, Daily  paliperidone (INVEGA) extended release tablet 3 mg, 3 mg, Oral, Daily  acetaminophen (TYLENOL) tablet 650 mg, 650 mg, Oral, Q4H PRN  traZODone (DESYREL) tablet 50 mg, 50 mg, Oral, Nightly PRN  ibuprofen (ADVIL;MOTRIN) tablet 400 mg, 400 mg, Oral, Q6H PRN  hydrOXYzine (ATARAX) tablet 50 mg, 50 mg, Oral, TID PRN  polyethylene glycol (GLYCOLAX) packet 17 g, 17 g, Oral, Daily PRN  aluminum & magnesium hydroxide-simethicone (MAALOX) 200-200-20 MG/5ML suspension 30 mL, 30 mL, Oral, Q6H PRN    ASSESSMENT  Schizoaffective disorder, depressive type ([facility] [address]     PLAN  Patient s symptoms   show no change from yesterday  Add Remeron 7.5 mg by mouth at bedtime  Attempt to develop insight  Psycho-education conducted. Supportive Therapy conducted. Probable discharge is undetermined at this time  Follow-up daily while on the inpatient unit        Electronically signed by Madhav Sanders MD on 10/6/20 at 7:59 AM EDT    **This report has been created using voice recognition software. It may contain minor errors which are inherent in voice recognition technology. **

## 2020-10-06 NOTE — GROUP NOTE
Group Therapy Note    Date: 10/6/2020    Group Start Time: 1330  Group End Time: 5772  Group Topic: Cognitive Skills    [facility]    [place], [place]        Group Therapy Note    Attendees: 4/11    Pt did not attend RT skills group d/t resting in room despite staff invitation to attend. 1:1 talk time offered as alternative to group session, pt declined.

## 2020-10-07 PROCEDURE — 6370000000 HC RX 637 (ALT 250 FOR IP): Performed by: NURSE PRACTITIONER

## 2020-10-07 PROCEDURE — 1240000000 HC EMOTIONAL WELLNESS R&B

## 2020-10-07 PROCEDURE — 6370000000 HC RX 637 (ALT 250 FOR IP): Performed by: PSYCHIATRY & NEUROLOGY

## 2020-10-07 PROCEDURE — 99232 SBSQ HOSP IP/OBS MODERATE 35: CPT | Performed by: PSYCHIATRY & NEUROLOGY

## 2020-10-07 RX ORDER — MIRTAZAPINE 15 MG/1
15 TABLET, FILM COATED ORAL NIGHTLY
Status: DISCONTINUED | OUTPATIENT
Start: 2020-10-07 | End: 2020-10-09 | Stop reason: HOSPADM

## 2020-10-07 RX ADMIN — DULOXETINE 30 MG: 30 CAPSULE, DELAYED RELEASE ORAL at 09:00

## 2020-10-07 RX ADMIN — PALIPERIDONE 6 MG: 6 TABLET, EXTENDED RELEASE ORAL at 09:00

## 2020-10-07 RX ADMIN — TRAZODONE HYDROCHLORIDE 50 MG: 50 TABLET ORAL at 22:17

## 2020-10-07 RX ADMIN — MIRTAZAPINE 15 MG: 15 TABLET, FILM COATED ORAL at 22:17

## 2020-10-07 NOTE — GROUP NOTE
Group Therapy Note    Date: 10/7/2020    Group Start Time: 0900  Group End Time: 0915  Group Topic: Community Meeting    GIAN Diaz, [address]        Group Therapy Note    Attendees: 5/13    Pt did not attend [employer] d/t resting in room despite staff invitation to attend. 1:1 talk time offered as alternative to group session, pt declined.

## 2020-10-07 NOTE — GROUP NOTE
Group Therapy Note    Date: 10/7/2020    Group Start Time: 1430  Group End Time: 9267  Group Topic: Cognitive Skills    STELVIRA OTTOI A    [place] [facility]        Group Therapy Note    Attendees: 9/15    Pt did not attend RT skills group d/t resting in room despite staff invitation to attend. 1:1 talk time offered as alternative to group session, pt declined.

## 2020-10-07 NOTE — GROUP NOTE
Group Therapy Note    Date: 10/7/2020    Group Start Time: 1000  Group End Time: 0454  Group Topic: Psychotherapy    GIAN Lind, MSW, LSW        Group Therapy Note    Attendees: 8    Pt declined to attend psychotherapy at 1000 am despite encouragement. Pt offered 1:1 and refused.

## 2020-10-07 NOTE — PLAN OF CARE
Problem: Altered Mood, Psychotic Behavior:  Goal: Absence of self-harm  Description: Absence of self-harm  10/6/2020 2041 by Percy Wise RN  Outcome: Ongoing   Denies wanting to harm self this evening & behaviors reflect same.

## 2020-10-07 NOTE — PROGRESS NOTES
Daily Progress Note  Lokesh Barbosa, JAVIER  10/7/2020       CHIEF COMPLAINT: Depression and command auditory hallucinations to hurt himself    Reviewed patient's current plan of care and vital signs with nursing staff. Sleep:  7 hours last night  Attending groups: No: Patient continues to isolate    SUBJECTIVE:    Staff reports no overnight events, patient remains medication compliant. He was interviewed this morning while laying in his room. He was resting and easily arousable to verbal stimulus. He continues to endorse depression, though feels that it is improving. He rates his mood 5 out of 10, with 10 being the best mood. He denies any auditory hallucinations. He reports that his visual hallucinations continue but feels that they are also less frequent and intense. He continues to endorse fleeting suicidal ideation, verbally continues to contract for safety while on the inpatient unit. He is isolating to his room with the exception of meals, he reports a good appetite. He denies any side effects to his current medication regimen.       Mental Status Exam  Level of consciousness: Awake and alert   appearance: Hospital attire, lying in bed, with good grooming and hygiene   Behavior/Motor: No abnormalities noted  Attitude toward examiner:  Cooperative, attentive, good eye contact  Speech:  spontaneous, normal rate, normal volume and well articulated  Mood: Depressed  Affect: Congruent  Thought processes:  linear, goal directed and coherent  Thought content:  denies homicidal ideation  Suicidal Ideation: Endorses improving suicidal ideation but contracts for safety on the unit  Delusions:  no evidence of delusions  Perceptual Disturbance: Endorses improving visual hallucinations, reports that they are less intense  Cognition:  Oriented to self, location, time, and situation  Memory: age appropriate  Insight & Judgement: improving  Medication side effects:  denies       Data   height is 5' 10\" (1.778 m) and weight is 250 lb (113.4 kg). His oral temperature is 97.9 °F (36.6 °C). His blood pressure is 122/55 (abnormal) and his pulse is 62. His respiration is 20 and oxygen saturation is 96%. Labs:   No visits with results within 2 Day(s) from this visit.    Latest known visit with results is:   Admission on 05/02/2019, Discharged on 05/02/2019   Component Date Value Ref Range Status    WBC 05/02/2019 8.1  3.5 - 11.3 k/uL Final    RBC 05/02/2019 4.73  4.21 - 5.77 m/uL Final    Hemoglobin 05/02/2019 13.8  13.0 - 17.0 g/dL Final    Hematocrit 05/02/2019 43.0  40.7 - 50.3 % Final    MCV 05/02/2019 90.9  82.6 - 102.9 fL Final    MCH 05/02/2019 29.2  25.2 - 33.5 pg Final    MCHC 05/02/2019 32.1  28.4 - 34.8 g/dL Final    RDW 05/02/2019 12.7  11.8 - 14.4 % Final    Platelets 29/62/3662 264  138 - 453 k/uL Final    MPV 05/02/2019 10.7  8.1 - 13.5 fL Final    NRBC Automated 05/02/2019 0.0  0.0 per 100 WBC Final    Differential Type 05/02/2019 NOT REPORTED   Final    Seg Neutrophils 05/02/2019 52  36 - 65 % Final    Lymphocytes 05/02/2019 29  24 - 43 % Final    Monocytes 05/02/2019 13* 3 - 12 % Final    Eosinophils % 05/02/2019 5* 1 - 4 % Final    Basophils 05/02/2019 1  0 - 2 % Final    Immature Granulocytes 05/02/2019 0  0 % Final    Segs Absolute 05/02/2019 4.20  1.50 - 8.10 k/uL Final    Absolute Lymph # 05/02/2019 2.36  1.10 - 3.70 k/uL Final    Absolute Mono # 05/02/2019 1.07  0.10 - 1.20 k/uL Final    Absolute Eos # 05/02/2019 0.42  0.00 - 0.44 k/uL Final    Basophils Absolute 05/02/2019 0.06  0.00 - 0.20 k/uL Final    Absolute Immature Granulocyte 05/02/2019 0.03  0.00 - 0.30 k/uL Final    WBC Morphology 05/02/2019 NOT REPORTED   Final    RBC Morphology 05/02/2019 NOT REPORTED   Final    Platelet Estimate 67/78/0762 NOT REPORTED   Final    Glucose 05/02/2019 98  70 - 99 mg/dL Final    BUN 05/02/2019 14  6 - 20 mg/dL Final    CREATININE 05/02/2019 1.03  0.70 - 1.20 mg/dL Final    Bun/Cre Ratio 05/02/2019 NOT REPORTED  9 - 20 Final    Calcium 05/02/2019 9.1  8.6 - 10.4 mg/dL Final    Sodium 05/02/2019 136  135 - 144 mmol/L Final    Potassium 05/02/2019 3.8  3.7 - 5.3 mmol/L Final    Chloride 05/02/2019 98  98 - 107 mmol/L Final    CO2 05/02/2019 23  20 - 31 mmol/L Final    Anion Gap 05/02/2019 15  9 - 17 mmol/L Final    GFR Non- 05/02/2019 >60  >60 mL/min Final    GFR  05/02/2019 >60  >60 mL/min Final    GFR Comment 05/02/2019        Final    Comment: Average GFR for 38-51 years old:   [address] mL/min/1.73sq m  Chronic Kidney Disease:   <60 mL/min/1.73sq m  Kidney failure:   <15 mL/min/1.73sq m              eGFR calculated using average adult body mass. Additional eGFR calculator available at:        [employer]            GFR Staging 05/02/2019 NOT REPORTED   Final    Troponin, High Sensitivity 05/02/2019 <6  0 - 22 ng/L Final    Comment:       High Sensitivity Troponin values cannot be compared with other Troponin methodologies. Patients with high levels of Biotin oral intake (i.e >5mg/day) may have falsely decreased   Troponin levels. Samples collected within 8 hours of biotin intake may require additional   information for diagnosis.       Troponin T 05/02/2019 NOT REPORTED  <0.03 ng/mL Final    Troponin Interp 05/02/2019 NOT REPORTED   Final    Ventricular Rate 05/02/2019 109  BPM Final    Atrial Rate 05/02/2019 109  BPM Final    P-R Interval 05/02/2019 136  ms Final    QRS Duration 05/02/2019 68  ms Final    Q-T Interval 05/02/2019 350  ms Final    QTc Calculation (Bazett) 05/02/2019 471  ms Final    P Axis 05/02/2019 38  degrees Final    R Axis 05/02/2019 19  degrees Final    T Axis 05/02/2019 1  degrees Final    Ethanol 05/02/2019 <10  <10 mg/dL Final    Ethanol percent 05/02/2019 <0.010  <0.010 % Final    NOTE: NEW REFERENCE RANGE    Ventricular Rate 05/02/2019 88  BPM Final    Atrial Rate 05/02/2019 88 BPM Final    P-R Interval 05/02/2019 150  ms Final    QRS Duration 05/02/2019 86  ms Final    Q-T Interval 05/02/2019 404  ms Final    QTc Calculation (Bazett) 05/02/2019 488  ms Final    P Axis 05/02/2019 39  degrees Final    R Axis 05/02/2019 19  degrees Final    T Axis 05/02/2019 1  degrees Final            Medications  Current Facility-Administered Medications: mirtazapine (REMERON) tablet 7.5 mg, 7.5 mg, Oral, Nightly  paliperidone (INVEGA) extended release tablet 6 mg, 6 mg, Oral, Daily  traZODone (DESYREL) tablet 50 mg, 50 mg, Oral, Nightly  DULoxetine (CYMBALTA) extended release capsule 30 mg, 30 mg, Oral, Daily  acetaminophen (TYLENOL) tablet 650 mg, 650 mg, Oral, Q4H PRN  traZODone (DESYREL) tablet 50 mg, 50 mg, Oral, Nightly PRN  ibuprofen (ADVIL;MOTRIN) tablet 400 mg, 400 mg, Oral, Q6H PRN  hydrOXYzine (ATARAX) tablet 50 mg, 50 mg, Oral, TID PRN  polyethylene glycol (GLYCOLAX) packet 17 g, 17 g, Oral, Daily PRN  aluminum & magnesium hydroxide-simethicone (MAALOX) 200-200-20 MG/5ML suspension 30 mL, 30 mL, Oral, Q6H PRN    ASSESSMENT  Schizoaffective disorder, depressive type (HCC)     PLAN  Discussed with Dr. Mathew Hamilton. I independently saw and evaluated the patient. I reviewed the nurse practitioners documentation above. Any additional comments or changes to the nurse practitioners documentation are stated below otherwise agree with assessment. Plan will be as follows:    PLAN  Patient s symptoms   are improving  Increase Remeron to 15 mg by mouth at bedtime  Attempt to develop insight  Psycho-education conducted. Supportive Therapy conducted. Probable discharge is likely Friday with continued improvement  Follow-up daily while on inpatient unit            **This report has been created using voice recognition software. It may contain minor errors which are inherent in voice recognition technology. **

## 2020-10-07 NOTE — GROUP NOTE
Group Therapy Note    Date: 10/7/2020    Group Start Time: 1100  Group End Time: 1421  Group Topic: Psychoeducation    [facility]    [place], [place]        Group Therapy Note    Attendees: 5/14      Pt did not attend RT skills group d/t resting in room despite staff invitation to attend. 1:1 talk time offered as alternative to group session, pt declined.

## 2020-10-07 NOTE — GROUP NOTE
Group Therapy Note    Date: 10/7/2020    Group Start Time: 1330  Group End Time: 2595  Group Topic: Music Therapy    [facility]    [place]        Group Therapy Note    Pt did not attend music therapy group d/t resting in room despite staff invitation to attend. 1:1 talk time offered as alternative to group session, pt declined.

## 2020-10-07 NOTE — PLAN OF CARE
[facility] Interdisciplinary Treatment Plan Note     Review Date & Time: 10/7/2020 0932    Admission Type:   Admission Type: Involuntary    Reason for admission:  Reason for Admission: Suicidal ideations to get a gun or overdose. Auditory hallucinations of screams    Estimated Length of Stay :  5-7 days  Estimated Discharge Date Update: to be determined by physician    PATIENT STRENGTHS:  Patient Strengths:Strengths: Communication, No significant Physical Illness  Patient Strengths and Limitations:Limitations: Apathetic / unmotivated, Difficulty problem solving/relies on others to help solve problems, Tendency to isolate self, External locus of control, Multiple barriers to leisure interests, Demonstrates discomfort with [place] of social skills(Auditory command hallucinations; Regular depression with suicidal ideation)  Addictive Behavior:Addictive Behavior  In the past 3 months, have you felt or has someone told you that you have a problem with:  : None  Do you have a history of Chemical Use?: No  Do you have a history of Alcohol Use?: No  Do you have a history of Street Drug Abuse?: No  Histroy of Prescripton Drug Abuse?: No  Medical Problems:   Past Medical History:   Diagnosis Date    Asthma     Hypertension     Morbid obesity ([facility]     Schizoaffective disorder ([facility] [address]     Substance abuse ([facility]        Risk:  Fall RiskTotal: 53  Christopher Scale Christopher Scale Score: 22  BVC Total: 0    Change in scores no.  Changes to plan of Care no    Status EXAM:   Status and Exam  Normal: No  Facial Expression: Avoids Gaze, Expressionless, Flat  Affect: Blunt  Level of Consciousness: Alert  Mood:Normal: No  Mood: Other (Comment)(unable to assess)  Motor Activity:Normal: Yes  Motor Activity: Decreased  Interview Behavior: Evasive, Cooperative  Preception: [place] to Person, [place] to Time, [place] to Place, [place] to Situation  Attention:Normal: No  Attention: Distractible  Thought Processes: therapies, education of coping skills   Continue to monitor patient on unit. Medications provided/ medication compliance by patient. Continue for plans to obtain long term goals after discharge.     SHORT-TERM GOALS UPDATE:  Time frame for Short-Term Goals: 8-14days     LONG-TERM GOALS UPDATE:  Time frame for Long-Term Goals: 6 months  Members Present in Team Meeting: See Signature Sheet    Joe [address]

## 2020-10-08 PROCEDURE — 6370000000 HC RX 637 (ALT 250 FOR IP): Performed by: PSYCHIATRY & NEUROLOGY

## 2020-10-08 PROCEDURE — 6370000000 HC RX 637 (ALT 250 FOR IP): Performed by: NURSE PRACTITIONER

## 2020-10-08 PROCEDURE — 1240000000 HC EMOTIONAL WELLNESS R&B

## 2020-10-08 PROCEDURE — 99232 SBSQ HOSP IP/OBS MODERATE 35: CPT | Performed by: PSYCHIATRY & NEUROLOGY

## 2020-10-08 RX ADMIN — TRAZODONE HYDROCHLORIDE 50 MG: 50 TABLET ORAL at 20:47

## 2020-10-08 RX ADMIN — DULOXETINE 30 MG: 30 CAPSULE, DELAYED RELEASE ORAL at 09:30

## 2020-10-08 RX ADMIN — PALIPERIDONE 6 MG: 6 TABLET, EXTENDED RELEASE ORAL at 09:30

## 2020-10-08 RX ADMIN — MIRTAZAPINE 15 MG: 15 TABLET, FILM COATED ORAL at 20:47

## 2020-10-08 NOTE — GROUP NOTE
Group Therapy Note    Date: 10/8/2020    Group Start Time: 0900  Group End Time: 4191  Group Topic: Community Meeting    [address] [place] [address]    Patient refused to attend Goal Setting / Community Meeting Group at 0900 after encouragement from staff. 1:1 talk time offered.     Signature:  Mikayla Deleon

## 2020-10-08 NOTE — GROUP NOTE
Group Therapy Note    Date: 10/8/2020    Group Start Time: 1100  Group End Time: 9357  Group Topic: Psychotherapy    GIAN Nolasco, TYREE        Group Therapy Note    Patient declined to attend Psychotherapy group at 1100 am despite encouragement. Patient was offered a 1:1 time to meet after group or alternative activity to do and patient refused.      Signature:  Dandre Nolasco MRC, CRC, TYREE

## 2020-10-08 NOTE — GROUP NOTE
Group Therapy Note    Date: 10/8/2020    Group Start Time: 1330  Group End Time: 9650  Group Topic: Psychoeducation    GIAN Lucio, CTRS    Patient refused to attend Psychoeducation Group at 1330 after encouragement from staff. 1:1 talk time offered.     Signature:  Jennifer Elise

## 2020-10-08 NOTE — PLAN OF CARE
Geoffreynasim Gandhi currently denies any thoughts of self harm. He's somewhat irritable upon approach. He's medication compliant and his behavior is well controlled. He's currently resting in his bed.

## 2020-10-08 NOTE — PLAN OF CARE
Problem: Altered Mood, Psychotic Behavior:  Goal: Able to verbalize decrease in frequency and intensity of hallucinations  Description: Able to verbalize decrease in frequency and intensity of hallucinations  10/7/2020 2013 by Davy Frederick RN  Outcome: Ongoing   Denies hallucinations without attending behaviors.

## 2020-10-08 NOTE — BH NOTE
Pt. Declined to attend [address] [facility] group. Pt. Offered 1:1 talk gema as an alternative to group. Pt. Declined.

## 2020-10-08 NOTE — PROGRESS NOTES
Daily Progress Note  Pilar Sharp MD  10/8/2020  CHIEF COMPLAINT: Depression with command auditory hallucinations to kill himself    Reviewed patient's current plan of care and vital signs with nursing staff. Sleep:  8 hours last night  Attending groups: No: Still isolates    SUBJECTIVE:    No events overnight per staff. Patient remains medication compliant. He states that he continues to sleep well. He reports even better sleep last night and states that last night was the best night sleep that he is had so far. He is denying any side effects to medication. He continues to endorse being free of auditory hallucinations and today reports no visual hallucinations. States thus far today he has not had any suicidal ideation though did have suicidal ideations yesterday. Reports staff is treating him well. Today he expresses feeling hopeful about possible discharge and feeling ready to go. We discussed if his symptoms  were stable and he remained free of suicidal ideation and hallucinations through the rest of the day today and into tomorrow we may discharge tomorrow.   Patient was agreeable    Mental Status Exam  Level of consciousness:  Within normal limits  Appearance: Hospital attire, seated in chair, with good grooming and hygiene   Behavior/Motor: No abnormalities noted  Attitude toward examiner:  Cooperative, attentive, good eye contact  Speech:  spontaneous, normal rate, normal volume and well articulated  Mood: \"Better\"  Affect: Still somewhat withdrawn but improved  Thought processes:  linear, goal directed and coherent  Thought content:  denies homicidal ideation  Suicidal Ideation: Denies suicidal ideation  Delusions:  no evidence of delusions  Perceptual Disturbance:  denies any perceptual disturbance  Cognition:  Oriented to self, location, time, and situation  Memory: age appropriate  Insight & Judgement: improving  Medication side effects:  denies       Data   height is 5' 10\" (1.778 m) and weight is 250 lb (113.4 kg). His oral temperature is 97.8 °F (36.6 °C). His blood pressure is 118/50 (abnormal) and his pulse is 65. His respiration is 20 and oxygen saturation is 96%. Labs:   No visits with results within 2 Day(s) from this visit.    Latest known visit with results is:   Admission on 05/02/2019, Discharged on 05/02/2019   Component Date Value Ref Range Status    WBC 05/02/2019 8.1  3.5 - 11.3 k/uL Final    RBC 05/02/2019 4.73  4.21 - 5.77 m/uL Final    Hemoglobin 05/02/2019 13.8  13.0 - 17.0 g/dL Final    Hematocrit 05/02/2019 43.0  40.7 - 50.3 % Final    MCV 05/02/2019 90.9  82.6 - 102.9 fL Final    MCH 05/02/2019 29.2  25.2 - 33.5 pg Final    MCHC 05/02/2019 32.1  28.4 - 34.8 g/dL Final    RDW 05/02/2019 12.7  11.8 - 14.4 % Final    Platelets 17/25/9672 264  138 - 453 k/uL Final    MPV 05/02/2019 10.7  8.1 - 13.5 fL Final    NRBC Automated 05/02/2019 0.0  0.0 per 100 WBC Final    Differential Type 05/02/2019 NOT REPORTED   Final    Seg Neutrophils 05/02/2019 52  36 - 65 % Final    Lymphocytes 05/02/2019 29  24 - 43 % Final    Monocytes 05/02/2019 13* 3 - 12 % Final    Eosinophils % 05/02/2019 5* 1 - 4 % Final    Basophils 05/02/2019 1  0 - 2 % Final    Immature Granulocytes 05/02/2019 0  0 % Final    Segs Absolute 05/02/2019 4.20  1.50 - 8.10 k/uL Final    Absolute Lymph # 05/02/2019 2.36  1.10 - 3.70 k/uL Final    Absolute Mono # 05/02/2019 1.07  0.10 - 1.20 k/uL Final    Absolute Eos # 05/02/2019 0.42  0.00 - 0.44 k/uL Final    Basophils Absolute 05/02/2019 0.06  0.00 - 0.20 k/uL Final    Absolute Immature Granulocyte 05/02/2019 0.03  0.00 - 0.30 k/uL Final    WBC Morphology 05/02/2019 NOT REPORTED   Final    RBC Morphology 05/02/2019 NOT REPORTED   Final    Platelet Estimate 36/11/2893 NOT REPORTED   Final    Glucose 05/02/2019 98  70 - 99 mg/dL Final    BUN 05/02/2019 14  6 - 20 mg/dL Final    CREATININE 05/02/2019 1.03  0.70 - 1.20 mg/dL Final    Bun/Cre Ratio 05/02/2019 NOT REPORTED  9 - 20 Final    Calcium 05/02/2019 9.1  8.6 - 10.4 mg/dL Final    Sodium 05/02/2019 136  135 - 144 mmol/L Final    Potassium 05/02/2019 3.8  3.7 - 5.3 mmol/L Final    Chloride 05/02/2019 98  98 - 107 mmol/L Final    CO2 05/02/2019 23  20 - 31 mmol/L Final    Anion Gap 05/02/2019 15  9 - 17 mmol/L Final    GFR Non- 05/02/2019 >60  >60 mL/min Final    GFR  05/02/2019 >60  >60 mL/min Final    GFR Comment 05/02/2019        Final    Comment: Average GFR for 38-51 years old:   80 mL/min/1.73sq m  Chronic Kidney Disease:   <60 mL/min/1.73sq m  Kidney failure:   <15 mL/min/1.73sq m              eGFR calculated using average adult body mass. Additional eGFR calculator available at:        [employer]            GFR Staging 05/02/2019 NOT REPORTED   Final    Troponin, High Sensitivity 05/02/2019 <6  0 - 22 ng/L Final    Comment:       High Sensitivity Troponin values cannot be compared with other Troponin methodologies. Patients with high levels of Biotin oral intake (i.e >5mg/day) may have falsely decreased   Troponin levels. Samples collected within 8 hours of biotin intake may require additional   information for diagnosis.       Troponin T 05/02/2019 NOT REPORTED  <0.03 ng/mL Final    Troponin Interp 05/02/2019 NOT REPORTED   Final    Ventricular Rate 05/02/2019 109  BPM Final    Atrial Rate 05/02/2019 109  BPM Final    P-R Interval 05/02/2019 136  ms Final    QRS Duration 05/02/2019 68  ms Final    Q-T Interval 05/02/2019 350  ms Final    QTc Calculation (Bazett) 05/02/2019 471  ms Final    P Axis 05/02/2019 38  degrees Final    R Axis 05/02/2019 19  degrees Final    T Axis 05/02/2019 1  degrees Final    Ethanol 05/02/2019 <10  <10 mg/dL Final    Ethanol percent 05/02/2019 <0.010  <0.010 % Final    NOTE: NEW REFERENCE RANGE    Ventricular Rate 05/02/2019 88  BPM Final    Atrial Rate 05/02/2019 88

## 2020-10-09 VITALS
SYSTOLIC BLOOD PRESSURE: 120 MMHG | TEMPERATURE: 97.8 F | WEIGHT: 250 LBS | OXYGEN SATURATION: 96 % | BODY MASS INDEX: 35.79 KG/M2 | RESPIRATION RATE: 14 BRPM | DIASTOLIC BLOOD PRESSURE: 68 MMHG | HEIGHT: 70 IN | HEART RATE: 65 BPM

## 2020-10-09 PROCEDURE — 99239 HOSP IP/OBS DSCHRG MGMT >30: CPT | Performed by: PSYCHIATRY & NEUROLOGY

## 2020-10-09 PROCEDURE — 6370000000 HC RX 637 (ALT 250 FOR IP): Performed by: PSYCHIATRY & NEUROLOGY

## 2020-10-09 RX ORDER — DULOXETIN HYDROCHLORIDE 30 MG/1
30 CAPSULE, DELAYED RELEASE ORAL DAILY
Qty: 30 CAPSULE | Refills: 0 | Status: ON HOLD | OUTPATIENT
Start: 2020-10-09 | End: 2021-09-02

## 2020-10-09 RX ORDER — MIRTAZAPINE 15 MG/1
15 TABLET, FILM COATED ORAL NIGHTLY
Qty: 30 TABLET | Refills: 0 | Status: ON HOLD | OUTPATIENT
Start: 2020-10-09 | End: 2021-09-02

## 2020-10-09 RX ORDER — TRAZODONE HYDROCHLORIDE 50 MG/1
50 TABLET ORAL NIGHTLY
Qty: 30 TABLET | Refills: 0 | Status: ON HOLD | OUTPATIENT
Start: 2020-10-09 | End: 2021-09-02

## 2020-10-09 RX ORDER — PALIPERIDONE 6 MG/1
6 TABLET, EXTENDED RELEASE ORAL DAILY
Qty: 30 TABLET | Refills: 0 | Status: ON HOLD | OUTPATIENT
Start: 2020-10-09 | End: 2021-09-02

## 2020-10-09 RX ADMIN — PALIPERIDONE 6 MG: 6 TABLET, EXTENDED RELEASE ORAL at 07:58

## 2020-10-09 RX ADMIN — DULOXETINE 30 MG: 30 CAPSULE, DELAYED RELEASE ORAL at 07:58

## 2020-10-09 NOTE — GROUP NOTE
Group Therapy Note    Date: 10/9/2020    Group Start Time: 1100  Group End Time: 1007  Group Topic: Psychotherapy    GIAN Gonzalez, MSW, LSW        Group Therapy Note    Attendees: 8/14    Pt declined to attend psychotherapy at 1100 am despite encouragement. Pt offered 1:1 and refused.

## 2020-10-09 NOTE — GROUP NOTE
Group Therapy Note    Date: 10/9/2020    Group Start Time: 1129  Group End Time: 7700  Group Topic: [address], [address]    Patient refused to attend Goal Setting / Community Meeting Group at 2645 after encouragement from staff. 1:1 talk time offered.     Signature:  Santiago Mccray

## 2020-10-09 NOTE — PROGRESS NOTES
[facility]  Discharge Note    Pt discharged with followings belongings:   Dentures: None  Vision - Corrective Lenses: None  Hearing Aid: None  Jewelry: Bracelet  Body Piercings Removed: N/A  Clothing: Footwear, Shirt, Undergarments (Comment)  Were All Patient Medications Collected?: Not Applicable  Other Valuables: Cell phone, Damian Ro, Other (Comment)(damaged , debit cartd, [place] direction card)   Valuables sent home with pt. Valuables returned to patient. Patient education on aftercare instructions: yes  [place] [facility] [place] Patient verbalize understanding of AVS:  yes. Status EXAM upon discharge:  Status and Exam  Normal: No  Facial Expression: Flat  Affect: Appropriate  Level of Consciousness: Alert  Mood:Normal: Yes  Mood: Suspicious  Motor Activity:Normal: Yes  Motor Activity: Decreased  Interview Behavior: Cooperative  Preception: [place] to Person, [place]ika Grime to Time, [place] to Place, [place] to Situation  Attention:Normal: Yes  Attention: Distractible  Thought Processes: Circumstantial  Thought Content:Normal: Yes  Thought Content: Preoccupations  Hallucinations: None  Delusions: No  Memory:Normal: Yes  Memory: Poor Recent  Insight and Judgment: No  Insight and Judgment: Poor Insight  Present Suicidal Ideation: No  Present Homicidal Ideation: No      Metabolic Screening:    No results found for: LABA1C    No results found for: CHOL  No results found for: TRIG  No results found for: HDL  No components found for: LDLCAL  No results found for: Jerrod Sanchez RN    Pt discharged to home. All valuables sent with pt along with medications. Pt verbalizes all discharge instructions.

## 2020-10-09 NOTE — PLAN OF CARE
Problem: Altered Mood, Psychotic Behavior:  Goal: Able to verbalize decrease in frequency and intensity of hallucinations  Description: Able to verbalize decrease in frequency and intensity of hallucinations  Note: PT currently denies any hallucinations. Problem: Altered Mood, Psychotic Behavior:  Goal: Absence of self-harm  Description: Absence of self-harm  Note: Pt denies thoughts of self harm and is agreeable to seeking out should thoughts of self harm arise. Safe environment maintained. Q15 minute checks for safety cont per unit policy. Will cont to monitor for safety and provides support and reassurance as needed. PT remains flat and guarded during one to one time and isolative to self on unit. PT does states that he thinks he will be discharged in the morning.

## 2020-10-09 NOTE — GROUP NOTE
Group Therapy Note    Date: 10/9/2020    Group Start Time: 1000  Group End Time: 9483  Group Topic: Recreational    [address] [facility]    Patient refused to attend Recreational Therapy Group at 1000 after encouragement from staff. 1:1 talk time offered.     Signature:  Christie Benson

## 2020-10-09 NOTE — H&P
HISTORY and Tre[address] ALMA ROSA [address]       NAME:  Kartik Fraser  MRN: [id number]   YOB: 1975   Date: 10/9/2020   Age: 41 y.o. Gender: male     COMPLAINT AND PRESENT HISTORY:      Kartik Fraser is 41 y.o.,  male, admitted because of Schizoaffective Disorder/ Schizophrenia. Patient has auditory hallucinations, patient hears command voices to kill self, but not others. According to ED notes, pt admitted from [facility], due to [address] to shoot self and AH. I saw the patient today at the bedside , he was very cooperative during the H&P. Pt denies suicidal / homicidal ideation , no visual or auditory hallucination. Patient  States, that he has been on and compliant with psychiatric medications . Patient denies any current alcohol or substance abuse , no smoking. Patient admits to  Good sleep/ appetite and good mood. Patient denies any somatic complaints. No chest pain or shortness of breath. No fever/chills. No abdominal pain , no N/V/D. Please see patient's psychiatric hx for more information. DIAGNOSTIC RESULTS   Labs:  CBC: No results for input(s): WBC, HGB, PLT in the last 72 hours. BMP:  No results for input(s): NA, K, CL, CO2, BUN, CREATININE, GLUCOSE in the last 72 hours. Hepatic: No results for input(s): AST, ALT, ALB, BILITOT, ALKPHOS in the last 72 hours. Lipids: No results for input(s): CHOL, HDL in the last 72 hours.     Invalid input(s): LDLCALCU  U/A:  Lab Results   Component Value Date    COLORU YELLOW 07/23/2018    WBCUA None 07/23/2018    RBCUA None 07/23/2018    MUCUS NOT REPORTED 07/23/2018    BACTERIA NOT REPORTED 07/23/2018    SPECGRAV 1.008 07/23/2018    LEUKOCYTESUR NEGATIVE 07/23/2018    GLUCOSEU NEGATIVE 07/23/2018    AMORPHOUS NOT REPORTED 07/23/2018       PAST MEDICAL HISTORY     Past Medical History:   Diagnosis Date    Asthma     Hypertension     Morbid obesity ([address]     [facility]     Substance abuse ([facility]        Pt denies any history of Diabetes mellitus type 2, hypertension, stroke, heart disease, COPD, Asthma, GERD, HLD, Cancer, Seizures,Thyroid disease, Kidney Disease, Hepatitis, TB.    SURGICAL HISTORY     History reviewed. No pertinent surgical history. FAMILY HISTORY       Family History   Family history unknown: Yes       SOCIAL HISTORY       Social History     Socioeconomic History    Marital status: Single     Spouse name: None    Number of children: None    Years of education: None    Highest education level: None   Occupational History    None   Social Needs    Financial resource strain: None    Food insecurity     Worry: None     Inability: None    Transportation needs     Medical: None     Non-medical: None   Tobacco Use    Smoking status: Never Smoker    Smokeless tobacco: Never Used    Tobacco comment: pt nonsmoker   Substance and Sexual Activity    Alcohol use: Not Currently     Comment: Hx of Alcohol abuse    Drug use: Not Currently    Sexual activity: Not Currently   Lifestyle    Physical activity     Days per week: None     Minutes per session: None    Stress: None   Relationships    Social connections     Talks on phone: None     Gets together: None     Attends Tenriism service: None     Active member of club or organization: None     Attends meetings of clubs or organizations: None     Relationship status: None    Intimate partner violence     Fear of current or ex partner: None     Emotionally abused: None     Physically abused: None     Forced sexual activity: None   Other Topics Concern    None   Social History Narrative    None           REVIEW OF SYSTEMS      No Known Allergies    No current facility-administered medications on file prior to encounter. No current outpatient medications on file prior to encounter. General health:  Fairly good. No fever or chills. Skin:  No itching, redness or rash.       Head, eyes, ears, nose, throat: No headache, epistaxis, rhinorrhea hearing loss or sore throat. Neck:  No pain, stiffness or masses. Cardiovascular/Respiratory system:  No chest pain, palpitation, shortness of breath, coughing or expectoration. Gastrointestinal tract: No abdominal pain, nausea, vomiting, dysphagia, diarrhea or constipation. Genitourinary:  No burning on micturition. No hesitancy, urgency, frequency or discoloration of urine. Locomotor:  No bone or joint pains. No swelling or deformities. Neuropsychiatric:  See HPI. GENERAL PHYSICAL EXAM:     Vitals: /68   Pulse 65   Temp 97.8 °F (36.6 °C) (Oral)   Resp 14   Ht 5' 10\" (1.778 m)   Wt 250 lb (113.4 kg)   SpO2 96%   BMI 35.87 kg/m²  Body mass index is 35.87 kg/m². Pt was examined with a nurse present in the room. GENERAL APPEARANCE:  Martin Diaz is 41 y.o.,  male, mildly obese, nourished, conscious, alert. Does not appear to be distress or pain at this time. SKIN:  Warm, dry, no cyanosis or jaundice. HEAD:  Normocephalic, atraumatic, no swelling or tenderness. EYES:  Pupils equal, reactive to light, Conjunctiva is clear, EOMs intact steven. eyelids WNL. EARS:  No discharge, no marked hearing loss. NOSE:  No rhinorrhea, epistaxis or septal deformity. THROAT:  Not congested. No ulceration bleeding or discharge. NECK:  No stiffness, trachea central.  No palpable masses or L.N.      CHEST:  Symmetrical and equal on expansion. HEART:  Regular rate and rhythm. S1 > S2, No audible murmurs or gallops. LUNGS:  Equal on expansion, normal breath sounds. No adventitious sounds. ABDOMEN:  Obese. Soft on palpation. No localized tenderness. No guarding or rigidity. No palpable organomegaly. LYMPHATICS:  No palpable cervical Lymphadenopathy. LOCOMOTOR, BACK AND SPINE:  No tenderness or deformities. EXTREMITIES:  Symmetrical, no pretibial edema.   Hailys sign negative. No discoloration or ulcerations. NEUROLOGIC:  The patient is conscious, alert, oriented,Cranial nerve II-XII intact, taste and smell were not examined. No apparent focal sensory or motor deficits. Muscle strength equal Jasson. No facial droop, tongue protrudes centrally, no slurring of the speech. PROVISIONAL DIAGNOSES:      Principal Problem:    Schizoaffective disorder, depressive type ([facility]  Active Problems:    MDD (major depressive disorder), single episode  Resolved Problems:    * No resolved hospital problems.  *      MARIA D Cabrera CNP on 10/9/2020 at 10:35 AM

## 2020-10-09 NOTE — PROGRESS NOTES
CLINICAL PHARMACY NOTE: MEDS TO [address] Select Patient?: No  Total # of Prescriptions Filled: 4   The following medications were delivered to the patient:  · Paliperidone  · Mirtazapine  · Trazodone  · Duloxetine  ·   Total # of Interventions Completed: 0  Time Spent (min): 30    Additional Documentation:

## 2020-10-09 NOTE — DISCHARGE SUMMARY
Provider Discharge Summary     Patient ID:  Milvia Fuentes  [id number]  29 y.o.  1975    Admit date: 9/29/2020    Discharge date and time: 10/9/2020  9:18 AM     Admitting Physician: Melanie Alberts MD     Discharge Physician: Kaycee Kingston MD    Admission Diagnoses: MDD (major depressive disorder), single episode [F32.9]    Discharge Diagnoses:      Schizoaffective disorder, depressive type [facility])     Patient Active Problem List   Diagnosis Code    Polysubstance abuse ([address] F19.10    Schizophrenia, paranoid ([address] F20.0    Paranoid schizophrenia ([address] F20.0    Schizoaffective disorder, bipolar type ([address] F25.0    Schizophrenia ([address] F20.9    Schizoaffective disorder ([address] F25.9    Schizoaffective disorder, depressive type ([address] F25.1    MDD (major depressive disorder), single episode F32.9        Admission Condition: poor    Discharged Condition: stable    Indication for Admission: threat to self    History of Present Illnes (present tense wording is of findings from admission exam and are not necessarily indicative of current findings):   Milvia Fuentes is a 41 y.o. male with significant past medical history of asthma, hypertension, obesity, substance use history who presented to the [facility] status post placement from an outlying ER secondary to concerns that the patient was experiencing command auditory hallucinations to kill himself. Patient states he was feeling like he was gone to get a gun or try to overdose. He states he is not been quite feeling himself lately. On exploration of depressive symptoms he does endorse for approximately the last 1 week feeling down or depressed nearly all day every day, prominent anhedonic symptoms, poor energy and poor concentration, no reports of any disruption in appetite, does feel hopeless about his future.   However upon review of the chart it also appears the patient has experienced auditory hallucinations in the absence of prominent mood symptoms in the past. Patient frequently states throughout the course of the interview that he \"does not know\" when asked specifics about medications or timelines with regards to his past mental illness. He keeps repeating \"I am just not feeling right\". He reports in the past having been on Zoloft, Abilify, Risperdal, Zyprexa but also endorses that he is not really been compliant with medications or compliant with outpatient follow-up. We did discuss the importance of taking antidepressant medications consistently on a daily basis in order to get full benefit from the medication. This was discussed in detail.     Hospital Course:   Upon admission, Vinay Brothers was provided a safe secure environment, introduced to unit milieu. Patient participated in groups and individual therapies. Meds were adjusted as noted below. After few days of hospital care, patient began to feel improvement. Depression lifted, thoughts to harm self ceased. Sleep improved, appetite was good. On morning rounds 10/9/2020, Vinay Brothers  endorses feeling ready for discharge. Patient denies suicidal or homicidal ideations, denies hallucinations or delusions. Denies SE's from meds. It was decided that maximum benefit from hospital care had been achieved and patient can be discharged. Consults:   Internal medicine for medical evaluation-no acute findings    Significant Diagnostic Studies: Routine labs and diagnostics    Treatments: Psychotropic medications, therapy with group, milieu, and 1:1 with nurses, social workers, PA-C/CNP, and Attending physician.       Discharge Medications:  Current Discharge Medication List      START taking these medications    Details   DULoxetine (CYMBALTA) 30 MG extended release capsule Take 1 capsule by mouth daily  Qty: 30 capsule, Refills: 0      mirtazapine (REMERON) 15 MG tablet Take 1 tablet by mouth nightly  Qty: 30 tablet, Refills: 0      paliperidone (INVEGA) 6 MG extended release tablet Take 1 tablet by mouth daily  Qty: 30 tablet, Refills: 0         CONTINUE these medications which have CHANGED    Details   traZODone (DESYREL) 50 MG tablet Take 1 tablet by mouth nightly  Qty: 30 tablet, Refills: 0         STOP taking these medications       divalproex (DEPAKOTE ER) 500 MG extended release tablet Comments:   Reason for Stopping:         OLANZapine (ZYPREXA) 10 MG tablet Comments:   Reason for Stopping:              Patient was not discharged on 2 or more antipsychotics      Discharge Exam:  Level of consciousness:  Within normal limits  Appearance: Street clothes, seated, with good grooming  Behavior/Motor: No abnormalities noted  Attitude toward examiner:  Cooperative, attentive, good eye contact  Speech:  spontaneous, normal rate, normal volume and well articulated  Mood:  euthymic  Affect:  Full range  Thought processes:  linear, goal directed and coherent  Thought content:  denies homicidal ideation  Suicidal Ideation:  denies suicidal ideation  Delusions:  no evidence of delusions  Perceptual Disturbance:  denies any perceptual disturbance  Cognition:  Intact  Memory: age appropriate  Insight & Judgement: fair  Medication side effects: denies     Disposition: home    Patient Instructions: Activity: activity as tolerated  1. Patient instructed to take medications regularly and follow up with outpatient appointments. Follow-up as scheduled with [facility]      Signed:    Electronically signed by Becky Salas MD on 10/9/20 at 9:18 AM EDT    Time Spent on discharge is more than 30 minutes in the examination, evaluation, counseling and review of medications and discharge plan.

## 2020-10-29 NOTE — ED NOTES
Awaiting urinalysis results for discharge. Pt standing in room, swinging arms, singing. No distress noted. Guards remain at bedside.  Will continue to monitor,      Natali Carroll RN  07/23/18 8087 Detail Level: Zone Plan: Ciclopirox shampoo in reserve Continue Regimen: Head and shoulders

## 2021-08-31 ENCOUNTER — HOSPITAL ENCOUNTER (EMERGENCY)
Age: 46
Discharge: HOME | DRG: 885 | End: 2021-08-31
Attending: EMERGENCY MEDICINE
Payer: MEDICARE

## 2021-08-31 VITALS
BODY MASS INDEX: 32.51 KG/M2 | HEIGHT: 72 IN | OXYGEN SATURATION: 98 % | RESPIRATION RATE: 18 BRPM | TEMPERATURE: 97.9 F | SYSTOLIC BLOOD PRESSURE: 153 MMHG | WEIGHT: 240 LBS | HEART RATE: 84 BPM | DIASTOLIC BLOOD PRESSURE: 97 MMHG

## 2021-08-31 DIAGNOSIS — Z00.8 MEDICAL CLEARANCE FOR INCARCERATION: Primary | ICD-10-CM

## 2021-08-31 PROCEDURE — 99282 EMERGENCY DEPT VISIT SF MDM: CPT

## 2021-08-31 NOTE — ED NOTES
Provisional Diagnosis:    Patient presented to ED for a medical screening exam    Psychosocial and Contextual Factors:     Patient just released from [facility]    C-SSRS Summary:    Patient denies    Patient: X  Family:   Agency:     Substance Abuse:   Patient denies    Present Suicidal Behavior:    Patient denies    Verbal:     Attempt:    Past Suicidal Behavior:   Patient denies    Verbal:    Attempt:    Self-Injurious/Self-Mutilation:  Patient denies    Trauma Identified:    Patient denies    Protective Factors:    Patient denied to provide information    Risk Factors:    Patient has history of aggressive and violent behavior. Patient recently released from [facility]. Clinical Summary:    Patient presented to ED via Law Enforcement after being released from custody for a medical screening exam.  Patient was placed on an application for emergency admission by [facility] staff stating \"Mr. Carlos Nuñez is scheduled to be released from the custody of [facility]. Mr. Carlos Nuñez was on the list for an inpatient [facility] bed. Mr. Carlos Nuñez appears to have difficulty taking care of his basic needs and presents himself as aggressive. Mr. Carlos Nuñez would benefit from mental health hospitalization. \"    Per patient, officers accompanying patient and application for emergency admission, patient does not meet criteria for inpatient hospitalization. Patient does have history of aggressive and violent behavior but that is not a reason for inpatient hospitalization. Patient denies SI, patient denies HI, patient denies hallucinations. Patient to be discharged. Level of Care Disposition:    Patient to be discharged.

## 2021-08-31 NOTE — ED PROVIDER NOTES
[address] ED  eMERGENCY dEPARTMENT eNCOUnter      Pt Name: Ryder Anthony  MRN: [id number]  YOB: 1975  Date of evaluation: 8/31/21  PCP: No primary care provider on file. CHIEF COMPLAINT:   Chief Complaint   Patient presents with    Psychiatric Evaluation     Officers state pt was in custody, no longer in custody but has pink slip. Per pink slip pt on the list of an inpatient [facility] bed. Per pink slip \"Mr Derian Lilly appears to have difficulty taking care of his basic needs and presents himself as aggressive. Mr Derian Lilly would benefit from mental health hospitalization.'     HISTORY OF PRESENT ILLNESS    Marco Lilly is a 40 y.o. male who presents with no chief complaints. Patient apparently was brought here by police. He was placed on a pink slip however pink slip stated that he is supposed to go to [facility] and is waiting for bed there. He denies any complaints. He has no thoughts of suicide or homicide. Denies any drug or alcohol use. He states that he needs to leave because he is going on vacation. Will not tell me where he is going. He has no complaints. No pain anywhere. Symptoms are acute. Symptoms are mild. Nothing seems make symptoms better or worse. Patient has no other complaints at this time. REVIEW OF SYSTEMS       Constitutional: Denies recent fever, chills. Neck: No neck pain. Respiratory: Denies recent shortness of breath. Cardiac:  Denies recent chest pain. GI: Denies any recent abdominal pain nausea or vomiting. : Denies dysuria. Musculoskeletal: Denies focal weakness. Neurologic: Denies headache or focal weakness. Skin:  Denies any rash. Psychiatric: Denies suicidal or homicidal ideations    Negative in 10 essential Systems except as mentioned above and in the HPI. PAST MEDICAL HISTORY   PMH:  has a past medical history of Asthma, Hypertension, Morbid obesity ([address] Schizoaffective disorder ([address] and Substance abuse ([facility] [facility]   Surgical History:  has no past surgical history on file. Social History:  reports that he has never smoked. He has never used smokeless tobacco. He reports previous alcohol use. He reports previous drug use. Family History: Noncontributory at this time  Psychiatric History: See PMH  Allergies:has No Known Allergies. PHYSICAL EXAM     INITIAL VITALS: BP: (!) 153/97  Pulse: 84  Resp: 18  Temp: 97.9 °F (36.6 °C) SpO2: 98 %     Constitutional:  Well developed, no acute distress   Eyes:  Pupils equal and readily reactive to light  HENT:  Atraumatic, external ears normal, nose normal, oropharynx moist. Neck- supple    Respiratory:  Clear to auscultation bilaterally with good air exchange  Cardiovascular:  RRR with normal S1 and S2  GI:  Soft, nondistended and nontender   Musculoskeletal:  No edema, no tenderness, no deformities  Integument:  No rash  Neurologic:  Alert & oriented x 4, no focal deficits noted   Psychiatric: Normal mood and affect, cooperative      EMERGENCY DEPARTMENT COURSE     43-year-old male presents on a pink slip by police. He is afebrile, nontoxic, normal vital signs. No acute distress. He has no complaints. Reviewed patient's pink slip with . The pink slip stated that the patient is supposed to go to [facility]. He is not suicidal not homicidal.  After discussion with  this pink slip is on valid. He has no thoughts of suicide or homicide. We do not have a reason to keep the patient here or repink slip the patient here. We will discharge him in custody of police. He is medically cleared was given a medical clearance exam.  He has no complaints and has nothing that he wants to be seen here for. FINAL IMPRESSION     1.  Medical clearance for incarceration          DISPOSITION:  DISPOSITION Decision To Discharge 08/31/2021 07:45:24 PM        PATIENT REFERRED TO:  [facility] ED  [address]  [address] [place] [address]  [phone]    As needed, If symptoms worsen      DISCHARGE MEDICATIONS:  Current Discharge Medication List          (Please note that portions of this note were completed with a voice recognition program. Efforts were made to edit the dictations but occasionally words are mis-transcribed.  Whenever words are used in this note in any gender, they shall be construed as though they were used in the gender appropriate to the circumstances; and whenever words are used in this note in the singular or plural form, they shall be construed as though they were used in the form appropriate to the circumstances.)    Angeles Hills DO  Attending Emergency Medicine Physician        Angeles Hills DO  08/31/21 2000

## 2021-09-01 ENCOUNTER — HOSPITAL ENCOUNTER (INPATIENT)
Age: 46
LOS: 7 days | Discharge: HOME | DRG: 885 | End: 2021-09-08
Attending: PSYCHIATRY & NEUROLOGY | Admitting: PSYCHIATRY & NEUROLOGY
Payer: MEDICARE

## 2021-09-01 PROCEDURE — 1240000000 HC EMOTIONAL WELLNESS R&B

## 2021-09-02 PROCEDURE — 6370000000 HC RX 637 (ALT 250 FOR IP): Performed by: NURSE PRACTITIONER

## 2021-09-02 PROCEDURE — 99223 1ST HOSP IP/OBS HIGH 75: CPT | Performed by: PSYCHIATRY & NEUROLOGY

## 2021-09-02 PROCEDURE — 1240000000 HC EMOTIONAL WELLNESS R&B

## 2021-09-02 RX ORDER — OLANZAPINE 5 MG/1
5 TABLET ORAL 2 TIMES DAILY
Status: DISCONTINUED | OUTPATIENT
Start: 2021-09-02 | End: 2021-09-03

## 2021-09-02 RX ORDER — IBUPROFEN 400 MG/1
400 TABLET ORAL EVERY 6 HOURS PRN
Status: DISCONTINUED | OUTPATIENT
Start: 2021-09-02 | End: 2021-09-08 | Stop reason: HOSPADM

## 2021-09-02 RX ORDER — POLYETHYLENE GLYCOL 3350 17 G/17G
17 POWDER, FOR SOLUTION ORAL DAILY PRN
Status: DISCONTINUED | OUTPATIENT
Start: 2021-09-02 | End: 2021-09-08 | Stop reason: HOSPADM

## 2021-09-02 RX ORDER — DIPHENHYDRAMINE HYDROCHLORIDE 50 MG/ML
50 INJECTION INTRAMUSCULAR; INTRAVENOUS EVERY 4 HOURS PRN
Status: DISCONTINUED | OUTPATIENT
Start: 2021-09-02 | End: 2021-09-08 | Stop reason: HOSPADM

## 2021-09-02 RX ORDER — TRAZODONE HYDROCHLORIDE 50 MG/1
50 TABLET ORAL NIGHTLY PRN
Status: DISCONTINUED | OUTPATIENT
Start: 2021-09-02 | End: 2021-09-08 | Stop reason: HOSPADM

## 2021-09-02 RX ORDER — HALOPERIDOL 5 MG/ML
5 INJECTION INTRAMUSCULAR EVERY 4 HOURS PRN
Status: DISCONTINUED | OUTPATIENT
Start: 2021-09-02 | End: 2021-09-08 | Stop reason: HOSPADM

## 2021-09-02 RX ORDER — HALOPERIDOL 5 MG
5 TABLET ORAL EVERY 4 HOURS PRN
Status: DISCONTINUED | OUTPATIENT
Start: 2021-09-02 | End: 2021-09-08 | Stop reason: HOSPADM

## 2021-09-02 RX ORDER — HYDROXYZINE 50 MG/1
50 TABLET, FILM COATED ORAL 3 TIMES DAILY PRN
Status: DISCONTINUED | OUTPATIENT
Start: 2021-09-02 | End: 2021-09-08 | Stop reason: HOSPADM

## 2021-09-02 RX ORDER — MAGNESIUM HYDROXIDE/ALUMINUM HYDROXICE/SIMETHICONE 120; 1200; 1200 MG/30ML; MG/30ML; MG/30ML
30 SUSPENSION ORAL EVERY 6 HOURS PRN
Status: DISCONTINUED | OUTPATIENT
Start: 2021-09-02 | End: 2021-09-08 | Stop reason: HOSPADM

## 2021-09-02 RX ORDER — LORAZEPAM 1 MG/1
2 TABLET ORAL EVERY 4 HOURS PRN
Status: DISCONTINUED | OUTPATIENT
Start: 2021-09-02 | End: 2021-09-08 | Stop reason: HOSPADM

## 2021-09-02 RX ORDER — LORAZEPAM 2 MG/ML
2 INJECTION INTRAMUSCULAR EVERY 4 HOURS PRN
Status: DISCONTINUED | OUTPATIENT
Start: 2021-09-02 | End: 2021-09-08 | Stop reason: HOSPADM

## 2021-09-02 RX ORDER — ACETAMINOPHEN 325 MG/1
650 TABLET ORAL EVERY 4 HOURS PRN
Status: DISCONTINUED | OUTPATIENT
Start: 2021-09-02 | End: 2021-09-08 | Stop reason: HOSPADM

## 2021-09-02 RX ADMIN — OLANZAPINE 5 MG: 5 TABLET, FILM COATED ORAL at 20:42

## 2021-09-02 RX ADMIN — ALUMINUM HYDROXIDE, MAGNESIUM HYDROXIDE, AND SIMETHICONE 30 ML: 200; 200; 20 SUSPENSION ORAL at 08:56

## 2021-09-02 RX ADMIN — TRAZODONE HYDROCHLORIDE 50 MG: 50 TABLET ORAL at 20:42

## 2021-09-02 RX ADMIN — HYDROXYZINE HYDROCHLORIDE 50 MG: 50 TABLET, FILM COATED ORAL at 20:42

## 2021-09-02 RX ADMIN — OLANZAPINE 5 MG: 5 TABLET, FILM COATED ORAL at 12:16

## 2021-09-02 ASSESSMENT — SLEEP AND FATIGUE QUESTIONNAIRES
DO YOU HAVE DIFFICULTY SLEEPING: YES
SLEEP PATTERN: DIFFICULTY FALLING ASLEEP
DIFFICULTY ARISING: YES
DIFFICULTY STAYING ASLEEP: NO
DO YOU USE A SLEEP AID: NO
AVERAGE NUMBER OF SLEEP HOURS: 6
DIFFICULTY FALLING ASLEEP: YES
RESTFUL SLEEP: NO

## 2021-09-02 ASSESSMENT — LIFESTYLE VARIABLES: HISTORY_ALCOHOL_USE: NO

## 2021-09-02 ASSESSMENT — PAIN - FUNCTIONAL ASSESSMENT: PAIN_FUNCTIONAL_ASSESSMENT: 0-10

## 2021-09-02 NOTE — BH NOTE
[facility]  Admission Note     Admission Type:   Admission Type: Involuntary (pink slipped)    Reason for admission:  Reason for Admission: suicidal thoughts, no plan    PATIENT STRENGTHS:  Strengths: No significant Physical Illness    Patient Strengths and Limitations:  Limitations: Lacks leisure interests    Addictive Behavior:   Addictive Behavior  In the past 3 months, have you felt or has someone told you that you have a problem with:  : None  Do you have a history of Chemical Use?: No (patient denies)  Do you have a history of Alcohol Use?: No (patient denies)  Do you have a history of Street Drug Abuse?: No (patient denies)  Histroy of Prescripton Drug Abuse?: No (patient denies)    Medical Problems:   Past Medical History:   Diagnosis Date    Asthma     Hypertension     Morbid [facility] ([facility]     Schizoaffective disorder ([facility]     Substance abuse ([facility]        Status EXAM:  Status and Exam  Normal: No  Facial Expression: Sad, Worried, Flat  Affect: Appropriate  Level of Consciousness: Alert  Mood:Normal: No  Mood: Depressed, Anxious  Motor Activity:Normal: Yes  Interview Behavior: Cooperative  Preception: [place] to Time, [place] to Place, [place] to Situation, [place] to Person  Attention:Normal: No  Attention: Distractible  Thought Processes: Circumstantial  Thought Content:Normal: No  Thought Content: Preoccupations  Hallucinations:  Auditory (Comment), Visual (Comment)  Delusions: No  Memory:Normal: Yes  Insight and Judgment: No  Insight and Judgment: Poor Judgment, Poor Insight  Present Suicidal Ideation: Yes  Present Homicidal Ideation: No    Tobacco Screening:  Practical Counseling, on admission, sung X, if applicable and completed (first 3 are required if patient doesn't refuse):            ( )  Recognizing danger situations (included triggers and roadblocks)                    ( )  Coping skills (new ways to manage stress, exercise, relaxation techniques, changing routine, distraction)                                                           ( )  Basic information about quitting (benefits of quitting, techniques in how to quit, available resources  ( ) Referral for counseling faxed to [facility]                                           ( ) Patient refused counseling  ( ) Patient has not smoked in the last 30 days    Metabolic Screening:    No results found for: LABA1C    No results found for: CHOL  No results found for: TRIG  No results found for: HDL  No components found for: LDLCAL  No results found for: LABVLDL      Body mass index is 32.55 kg/m². BP Readings from Last 2 Encounters:   09/02/21 129/66   08/31/21 (!) 153/97           Pt admitted with followings belongings:  Dentures: None  Vision - Corrective Lenses: None  Hearing Aid: None  Jewelry: Bracelet  Clothing: Footwear, Pants, Shirt, Socks, Undergarments (Comment)  Were All Patient Medications Collected?: Not Applicable  Other Valuables: Cell phone, Rianna Mcdonough      Patient oriented to surroundings and program expectations and copy of patient rights given. Received admission packet:  yes. Consents reviewed, signed none. Refused all. Patient verbalize understanding:  yes. Patient education on precautions: yes. Patient was pinked at [facility] after reporting auditory hallucinations and suicidal thoughts with no plan. Patient admitted this also upon admission.                    Natalia Adames RN

## 2021-09-02 NOTE — PLAN OF CARE
Problem: Altered Mood, Depressive Behavior:  Goal: Ability to disclose and discuss suicidal ideas will improve  Description: Ability to disclose and discuss suicidal ideas will improve  9/2/2021 1250 by Jacqueline Vera RN  Outcome: Ongoing  Note: Patient denies any thoughts of self harm or hallucinations. Medication compliant and behavior controlled. Out for meals, not attending groups.

## 2021-09-02 NOTE — H&P
Department of Psychiatry  Attending Physician Psychiatric Assessment     Reason for Admission to Psychiatric Unit:  Threat to self requiring 24 hour professional observation  Threat to others requiring 24 hour professional observation  Command hallucinations directing harm to self or others; risk of the patient taking action  A mental disorder causing major disability in social, interpersonal, occupational, and/or educational functioning that is leading to dangerous or life-threatening functioning, and that can only be addressed in an acute inpatient setting   Concerns about patient's safety in the community    CHIEF COMPLAINT: Schizoaffective disorder    History obtained from:  patient, electronic medical record and family members    HISTORY OF PRESENT ILLNESS:    Elyse Nissen is a 40 y.o. male with significant past medical history of schizoaffective disorder, asthma,'s polysubstance abuse, hypertension and obesity who presented to the [facility] ED with suicidal and homicidal thoughts. Patient was pink slipped. Per ED note patient endorsed feeling suicidal and homicidal at times. Endorses auditory hallucinations. He had been off his medications for approximately a week. He had been at [facility] and taken to [facility] emergency room yesterday, per pink slip yesterday from police patient was placed on a waiting list for a bed at [facility]. Patient was not admitted yesterday, he left and went to [facility] emergency room complaining of suicidal and homicidal ideation. Current medications:  Zyprexa 10 mg twice a day  Trazodone 100 mg at bedtime  Cogentin 1 mg daily  Flexeril 4 mg as needed for muscle pain  PDMP shows no activity    Upon admission to unit staff report patient is irritable. Patient does have a history of violence against staff, has been taking behavioral control at [facility] emergency room and on the unit. Patient was last at [facility] in September 2020 for suicidal ideation.     Rafael Manjarrez was seen for his initial intake. He was guarded, irritable and slightly evasive in answering questions. He does [facility] but would not elaborate on his reason for being incarcerated. He states that he has not taken his medications \"in over a week\", does report that they worked when he was taking them. He states that he went to his aunts house yesterday after being denied admission through the [facility] emergency room, his aunt brought him into [facility] when he was telling her he was suicidal and homicidal. He endorses chronic daily depression over 2 weeks, depression symptoms include anhedonia, feelings of guilt/worthlessness, fatigue, psychomotor retardation and suicidal ideation without a specific plan. He endorses homicidal ideation, states that this comes and goes and he has no specific person he is homicidal towards. He endorses auditory hallucinations telling him to kill himself and to hurt other people. He endorses feeling paranoid, believes the police are watching him. He denies anxiety, panic attacks, or PTSD symptoms. He does have a past history of substance abuse per medical documentation, patient currently denies any illicit drug or alcohol use. Reviewed labs at [facility] urine drug screen negative and ethanol was negative.     Reviewed labs from [facility]:  CBC within normal limits  BMP within normal limits  Liver battery within normal limits  Vital signs normal    PSYCHIATRIC HISTORY:  yes   Currently follows with [facility]  1 previous lifetime suicide attempts by overdose  Multiple extensive psychiatric hospital admissions     Past psychiatric medications includes: Zoloft, Risperdal, Abilify, Zyprexa     Adverse reactions from psychotropic medications: Patient reports sexual side effects from SSRI medications and also reports feeling overly sedated at times on his medications, but no life-threatening or allergic reactions.     Lifetime Psychiatric Review of Systems          Mayuri or Hypomania: denies      Panic Attacks: denies      Phobias: denies     Obsessions and Compulsions:denies     Body or Vocal Tics:  denies     Hallucinations: Endorses     Delusions: Paranoid    Past Medical History:        Diagnosis Date    Asthma     Hypertension     Morbid [facility] ([facility]     Schizoaffective disorder ([facility]     [facility]        Past Surgical History:    History reviewed. No pertinent surgical history. Allergies:  Patient has no known allergies. Social History:     BORN IN in [address] [place] [address]. LEVEL OF EDUCATION: High school diploma  MARITAL STATUS: Single never . CHILDREN: 1 adult son  OCCUPATION: Receives SSD  RESIDENCE: Currently lives family, but states he is homeless    DRUG USE HISTORY  Social History     Tobacco Use   Smoking Status Never Smoker   Smokeless Tobacco Never Used   Tobacco Comment    pt nonsmoker     Social History     Substance and Sexual Activity   Alcohol Use Not Currently    Comment: Hx of Alcohol abuse     Social History     Substance and Sexual Activity   Drug Use Not Currently     Yes past history of polysubstance abuse none currently  LEGAL HISTORY:   HISTORY OF INCARCERATION: Yes     Family History:       Family history unknown: Yes       Psychiatric Family History  Denies any family history of mental illness, substance abuse or suicides    PHYSICAL EXAM:  Vitals:  /66   Pulse 71   Temp 98 °F (36.7 °C) (Oral)   Resp 14   Ht 6' (1.829 m)   Wt 240 lb (108.9 kg)   BMI 32.55 kg/m²      Review of Systems   Constitutional: Negative for chills and weight loss. HENT: Negative for ear pain and nosebleeds. Eyes: Negative for blurred vision and photophobia. Respiratory: Negative for cough, shortness of breath and wheezing. Cardiovascular: Negative for chest pain and palpitations. Gastrointestinal: Negative for abdominal pain, diarrhea and vomiting. Genitourinary: Negative for dysuria and urgency.    Musculoskeletal: Negative for falls and joint pain. Skin: Negative for itching and rash. Neurological: Negative for tremors, seizures and weakness. Endo/Heme/Allergies: Does not bruise/bleed easily. Physical Exam:      Constitutional:  Appears well-developed and well-nourished, no acute distress  HENT:   Head: Normocephalic and atraumatic. Eyes: Conjunctivae are normal. Right eye exhibits no discharge. Left eye exhibits no discharge. No scleral icterus. Neck: Normal range of motion. Neck supple. Pulmonary/Chest:  No respiratory distress or accessory muscle use, no wheezing. Abdominal: Soft. Exhibits no distension. Musculoskeletal: Normal range of motion. Exhibits no edema. Neurological: cranial nerves II-XII grossly in tact, normal gait and station  Skin: Skin is warm and dry. Patient is not diaphoretic. No erythema.          Mental Status Examination:    Level of consciousness:  Awake and alert  Appearance:  Hospital attire, lying in bed, fair grooming and hygiene  Behavior/Motor:  Irritable, no psychomotor abnormality  Attitude toward examiner:   Minimally cooperative, angry with poor eye contact  Speech: Irritable tone, normal rate and volume, good articulation  Mood:  Depressed  Affect:   Mood congruent  Thought processes:   Linear responses to questions asked  Thought content: active suicidal ideations without current plan or intent               Endorses homicidal ideations               Endorses auditory hallucinations to kill self hurt others              Paranoid delusions  Cognition:  Oriented to self, location, time, situation  Concentration clinically adequate  Memory: Age-appropriate  Insight &Judgment: poor    DSM-5 Diagnosis  Schizoaffective disorder    Psychosocial and Contextual factors:  Financial  Occupational  Relationship x  Legal x  Living situation x  Educational     Past Medical History:   Diagnosis Date    Asthma     Hypertension     [facility]bid [facility]     [facility]     Substance abuse ([facility]         TREATMENT PLAN  Restart Zyprexa at 5 mg twice a day, titrate to effect as tolerated  Restart trazodone 50 mg at bedtime  Hold Cogentin for now  Stop Flexeril    Risk Management:  close watch per standard protocol      Psychotherapy:  participation in milieu and group and individual sessions with Attending Physician,  and Physician Assistant/CNP      Estimated length of stay:  2-14 days      GENERAL PATIENT/FAMILY EDUCATION  Patient will understand basic signs and symptoms, Patient will understand benefits/risks and potential side effects from proposed meds and Patient will understand their role in recovery. Family is  active in patient's care. Patient assets that may be helpful during treatment include: Intent to participate and engage in treatment, sufficient fund of knowledge and intellect to understand and utilize treatments. Goals:    Remission of suicidal ideation while inpatient  Remission of psychotic symptoms while inpatient  2 to 3 days stable symptoms prior to discharge         Physicians Signature:  Electronically signed by MARIA D Hummel CNP on 9/2/21 at 12:52 AM EDT    I independently saw and evaluated the patient. I reviewed the nurse practioner's documentation above. Any additional comments or changes to the    documentation are stated below otherwise agree with assessment. The patient has a documented history of schizoaffective disorder and was admitted to [facility] where he presented with suicidal ideation without a plan. The patient also reported thoughts of hurting other people. He said he was depressed and anxious and was having visual hallucinations. Patient was seen at bedside. He was woken up from sleep. The patient was irritable. He wanted me to tell him the exact number of past anxiety wanted to ask him. I told him I could not tell him to depend on how much information he gave me.   The patient was hostile and limited in engagement. He admits to auditory and visual hallucinations. He appears to be paranoid and guarded. PLAN  We will continue Zyprexa 5 mg twice daily. Will titrate up as tolerated   Attempt to develop insight  Psycho-education conducted. Supportive Therapy conducted.   Probable discharge is 5 to 9 days  Follow-up daily while on inpatient unit    Electronically signed by Jamaica Germain MD on 9/2/21 at 5:16 PM EDT

## 2021-09-02 NOTE — PROGRESS NOTES

## 2021-09-02 NOTE — BH NOTE
Patient was asked if he wanted to get numbers out of his phone before it was sent to security. Patient refused.

## 2021-09-02 NOTE — PLAN OF CARE
[facility]  Initial Interdisciplinary Treatment Plan NOTE      Original treatment plan Date & Time: 9/2/2021                842am    Admission Type:  Admission Type: Involuntary    Reason for admission:   Reason for Admission: SI no plan    Estimated Length of Stay:  5-7days  Estimated Discharge Date: to be determined by physician    PATIENT STRENGTHS:  Patient Strengths:Strengths: Positive Support, No significant Physical Illness  Patient Strengths and Limitations:Limitations: Tendency to isolate self, Lacks leisure interests, Difficulty problem solving/relies on others to help solve problems, Hopeless about future, Multiple barriers to leisure interests, Inappropriate/potentially harmful leisure interests (depression substance abuse anxiety poor coping skills)  Addictive Behavior: Addictive Behavior  In the past 3 months, have you felt or has someone told you that you have a problem with:  : None  Do you have a history of Chemical Use?: No  Do you have a history of Alcohol Use?: No  Do you have a history of Street Drug Abuse?: Yes  Histroy of Prescripton Drug Abuse?: No  Medical Problems:No past medical history on file.   Status EXAM:Status and Exam  Normal: No  Facial Expression: Elevated  Affect: Inappropriate  Level of Consciousness: Alert  Mood:Normal: No  Mood: Depressed, Anxious  Motor Activity:Normal: No  Motor Activity: Decreased  Interview Behavior: Cooperative  Preception: [place] to Person, [place] to Time, [place] to Place, [place] to Situation  Attention:Normal: Yes  Attention: Distractible  Thought Processes: Circumstantial  Thought Content:Normal: Yes  Thought Content: Preoccupations  Hallucinations: None  Delusions: No  Memory:Normal: Yes  Memory: Poor Recent, Confabulation  Insight and Judgment: No  Insight and Judgment: Unmotivated  Present Suicidal Ideation: No  Present Homicidal Ideation: No    EDUCATION:   Learner Progress Toward Treatment Goals: reviewed group plans and strategies for care    Method:group therapy, medication compliance, individualized assessments and care planning    Outcome: needs reinforcement    PATIENT GOALS: to be discussed with patient within 72 hours    PLAN/TREATMENT RECOMMENDATIONS:     continue group therapy , medications compliance, goal setting, individualized assessments and care, continue to monitor pt on unit      SHORT-TERM GOALS:   Time frame for Short-Term Goals: 5-7 days    LONG-TERM GOALS:  Time frame for Long-Term Goals: 6 months  Members Present in Team Meeting: See Signature Sheet    Melissa Hardin

## 2021-09-02 NOTE — PROGRESS NOTES
Pharmacy Medication History Note      List of current medications patient is taking is complete. Source of information: [address] [facility]), Torie DIANA    Changes made to medication list:  Medications removed (include reason, ex. therapy complete or physician discontinued, noncompliance):  Duloxetine, Mirtazapine, Paliperidone, Trazodone - patient has not refilled since last discharge from [facility] October 2020. Medications added/doses adjusted:  none    Other notes (ex. Recent course of antibiotics, Coumadin dosing): The patient has prescriptions for Olanzapine 15 mg nightly and Divalproex  twice daily sent to pharmacy in January 2021 but were never picked up. Please let me know if you have any questions about this encounter. Thank you!     Electronically signed by Marychuy Ruggiero, [facility] on 9/2/2021 at 9:20 AM

## 2021-09-02 NOTE — BH NOTE
stabilization      Initial Discharge Plan:  Stabilize mood and medications; explore social support systems within community to increase socialization; provide 24/7 local and national hotline numbers for additional support; safety plan to be completed; disclose/discuss suicidal ideas will improve, decrease depressive symptoms, absence of self-harm; discuss location at time of discharge and if to address on face-sheet; [employer] service; follow-up with Randa Sesay Summary:  Keily Rocha is a 40 y.o. male with significant past medical history of schizoaffective disorder, bipolar type and last [facility] admission was on 9/29/2020 for a total of 10 days. Prior to transfer from [facility], client was brought to ED by Jhonathan Magaña on 8/31/2021 after being released from custody for medical clearance. ED staff was told client was on the \"list for a bed [facility]\" but client did not meet criteria for [facility] admission and released. Client then presented at his grandmother's house and she took him to [facility] and they sent him to [facility] on a [place] for SI/HI. Client did sign in voluntarily at time of admission to [facility]. and they did not admit himClient was taken to [facility] ED by his grandmother for a psychiatric evaluation and reporting SI/HI. Client denied plan or person and was then sent to [facility] to be further evaluated. Client has a long history of violent behaviors and not staying on his medications. Client would not state why or how long he was incarcerated during this assessment. Client was unwilling to participate in most of the assessment on this date but did endorse command and auditory hallucinations, but did not identify what he was seeing or hearing. Client is a poor historian and becomes irritated and agitated with this writer for asking questions. Writer ended the assessment and will attempt to meet with him later.   Client has a history of being linked with the [facility], struggles with stable housing and stays between his aunt and grandmother's homes. Client is on probation. Per Care Everywhere client was at [facility] on 4/20/2021 and requested to be transferred to [facility].  Client was sent to [facility] but was denied admission. Per [facility], client born and raised in [place], raised mostly by biological mother and step-father but spent a lot of time with grandmother, single and never , 1 adult son and limited contact, 1 sister and they are estranged, linked with [facility] denies any substance abuse, and has not reported any childhood abuse or trauma.

## 2021-09-02 NOTE — BH NOTE
Patient given tobacco quitline number 40714197091 at this time, refusing to call at this time, states \" I just dont want to quit now\"- patient given information as to the dangers of long term tobacco use. Continue to reinforce the importance of tobacco cessation.

## 2021-09-02 NOTE — GROUP NOTE
Group Therapy Note    Date: 9/2/2021    Group Start Time: 1100  Group End Time: 2472  Group Topic: Cognitive Skills    GIAN Neri, CTRS    Pt did not attend 1100 cognitive skills group d/t resting in room despite staff invitation to attend. 1:1 talk time offered as alternative to group session, pt declined.             Signature:  Kenyatta Wilder

## 2021-09-02 NOTE — GROUP NOTE
Group Therapy Note    Date: 9/2/2021    Group Start Time: 1000  Group End Time: 1030  Group Topic: Psychotherapy    GIAN Ritter        Group Therapy Note           Patient refused to attend psychotherapy group after encouragement from staff. 1:1 talk time offered but refused. Signature:   Roxana Ritter

## 2021-09-02 NOTE — PROGRESS NOTES
Behavioral Services  Medicare Certification Upon Admission    I certify that this patient's inpatient psychiatric hospital admission is medically necessary for:    [x] (1) Treatment which could reasonably be expected to improve this patient's condition,       [x] (2) Or for diagnostic study;     AND     [x](2) The inpatient psychiatric services are provided while the individual is under the care of a physician and are included in the individualized plan of care.     Estimated length of stay/service 5 to 9 days    Plan for post-hospital care outpatient care    Electronically signed by Carloz Green MD on 9/2/2021 at 5:15 PM

## 2021-09-02 NOTE — GROUP NOTE
Group Therapy Note    Date: 9/2/2021    Group Start Time: 1430  Group End Time: 3508  Group Topic: Cognitive Skills    GIAN Werner, CTRS        Group Therapy Note    Attendees: 5/15         Pt did not participate in Cognitive Skills Group at 1430 when encouraged by RT due to resting in room. Pt was offered talk time as an alternative to group but declined.          Discipline Responsible: Psychoeducational Specialist        Signature:  Ramin Burkett

## 2021-09-03 PROCEDURE — 6370000000 HC RX 637 (ALT 250 FOR IP): Performed by: NURSE PRACTITIONER

## 2021-09-03 PROCEDURE — APPSS30 APP SPLIT SHARED TIME 16-30 MINUTES: Performed by: NURSE PRACTITIONER

## 2021-09-03 PROCEDURE — 1240000000 HC EMOTIONAL WELLNESS R&B

## 2021-09-03 PROCEDURE — 99232 SBSQ HOSP IP/OBS MODERATE 35: CPT | Performed by: PSYCHIATRY & NEUROLOGY

## 2021-09-03 RX ORDER — OLANZAPINE 7.5 MG/1
7.5 TABLET ORAL 2 TIMES DAILY
Status: DISCONTINUED | OUTPATIENT
Start: 2021-09-03 | End: 2021-09-05

## 2021-09-03 RX ADMIN — OLANZAPINE 7.5 MG: 7.5 TABLET, FILM COATED ORAL at 20:35

## 2021-09-03 NOTE — PLAN OF CARE
Problem: Altered Mood, Depressive Behavior:  Goal: Ability to disclose and discuss suicidal ideas will improve  Description: Ability to disclose and discuss suicidal ideas will improve  Outcome: Ongoing     Problem: Altered Mood, Psychotic Behavior:  Goal: Able to verbalize decrease in frequency and intensity of hallucinations  Description: Able to verbalize decrease in frequency and intensity of hallucinations  Outcome: Ongoing   Patient remains isolative to room majority of shift. Patient is irritable with staff and declined scheduled medications this morning. Patient uncooperative with shift assessment and yelled at staff that he is sleeping.

## 2021-09-03 NOTE — GROUP NOTE
Group Therapy Note    Date: 9/3/2021    Group Start Time: 1430  Group End Time: 9081  Group Topic: Cognitive Skills    GIAN Romano, CTRS        Group Therapy Note    Attendees: 9/15         Pt did not participate in Cognitive Skills Group at 1430 when encouraged by RT due to resting in room. Pt was offered talk time as an alternative to group but declined.        Discipline Responsible: Psychoeducational Specialist      Signature:  Stephen Blackburn

## 2021-09-03 NOTE — BH NOTE
Group Note    Patient did not attend 1330 wellness group. 1:1 talk time offered but patient refused.

## 2021-09-03 NOTE — PLAN OF CARE
Homicidal Ideation: No    Daily Assessment Last Entry:   Daily Sleep (WDL): Within Defined Limits         Patient Currently in Pain: No  Daily Nutrition (WDL): Within Defined Limits    Patient Monitoring:  Frequency of Checks: 4 times per hour, close    Psychiatric Symptoms:   Depression Symptoms  Depression Symptoms: Isolative, Loss of interest  Anxiety Symptoms  Anxiety Symptoms: Generalized  Mayuri Symptoms  Mayuri Symptoms: No problems reported or observed. Psychosis Symptoms  Delusion Type: No problems reported or observed.     Suicide Risk CSSR-S:  Have you wished you were dead or wished you could go to sleep and not wake up? : NO  Have you actually had any thoughts of killing yourself? : NO  Have you ever done anything, started to do anything, or prepared to do anything to end your life?: NO  Change in Result                 no  Change in Plan of care          no      EDUCATION:   EDUCATION:   Learner Progress Toward Treatment Goals: Reviewed results and recommendations of this team, Reviewed group plan and strategies, Reviewed signs, symptoms and risk of self harm and violent behavior, Reviewed goals and plan of care    Method:small group, individual verbal education    Outcome:verbalized by patient, but needs reinforcement to obtain goals    PATIENT GOALS:  Short term: pt refused to meet with team and did not develop a short term goal  Long term:pt did not develop a long term goal    PLAN/TREATMENT RECOMMENDATIONS UPDATE: continue with group therapies, increased socialization, continue planning for after discharge goals, continue with medication compliance    SHORT-TERM GOALS UPDATE:   Time frame for Short-Term Goals: 5-7 days    LONG-TERM GOALS UPDATE:   Time frame for Long-Term Goals: 6 months  Members Present in Team Meeting: See Signature Sheet

## 2021-09-03 NOTE — GROUP NOTE
Group Therapy Note    Date: 9/3/2021    Group Start Time: 1100  Group End Time: 9668  Group Topic: Cognitive Skills    GIAN Wilks, CTRS        Group Therapy Note    Attendees: 4/16         Pt did not participate in Cognitive Skills Group at 1100am when encouraged by RT due to resting in room. Pt was offered talk time as an alternative to group but declined.          Discipline Responsible: Psychoeducational Specialist        Signature:  Aaron Lance

## 2021-09-03 NOTE — GROUP NOTE
HS Wrap Up Group  Date: September 2, 2021  Patient did not participate in HS Wrap Up Group. 1:1 talk time was offered as an alternative to group.   Signature: MERCEDES Lane

## 2021-09-03 NOTE — PLAN OF CARE
Problem: Altered Mood, Depressive Behavior:  Goal: Ability to disclose and discuss suicidal ideas will improve  Description: Ability to disclose and discuss suicidal ideas will improve  9/2/2021 2118 by Luis Longo RN  Outcome: Ongoing     Problem: Altered Mood, Psychotic Behavior:  Goal: Able to verbalize decrease in frequency and intensity of hallucinations  Description: Able to verbalize decrease in frequency and intensity of hallucinations  9/2/2021 2118 by Luis Longo RN  Outcome: Ongoing     Patient reported still having thoughts to hurt himself with no plan or intent. He also stated that he has some thoughts to hurt others but not anyone in particular, and no plan or intent. Patient reported some anxiety and depression. Patient stayed in his room during the shift. He took his medication as prescribed. Patient was evasive and blunt when talked to. Safety precautions in place and Q 15 minute checks completed.

## 2021-09-03 NOTE — PROGRESS NOTES
Daily Progress Note  9/3/2021    Patient Name: Natacha Cabrales    CHIEF COMPLAINT:   Schizoaffective disorder         PRNs: No emergency medications administered since admission     Scheduled medications: not adherent this morning     SW discharge plan: follow up with [facility]    SUBJECTIVE:     Patient seen for follow-up assessment. He is selectively compliant with medications and denied his olanzapine this morning. He is irritable throughout discussion and demands that this writer leave him alone so that he can sleep. Actively turns over in his bed to avoid making eye contact with this writer. Per review of staff documentation he did allow for physical assessments and all vitals are within appropriate range. He is withdrawn to his room and isolates self from others. He is not attending groups. He appears disheveled with poor hygiene and grooming. Does not provide responses to any questions. He does voice that \"I cannot think straight right now, just leave me alone. \"  He has not required emergency medications but is utilizing Atarax as needed for anxiety. He appears to be responding to internal stimuli though does not confirm or deny during discussion. He has yet to demonstrate stability and requires continued inpatient hospitalization. Appetite:  [x] Normal/Adequate/Unchanged  [] Increased  [] Decreased      Sleep:       [x] Normal/Adequate/Unchanged  [] Fair  [] Poor      Group Attendance on Unit:   [] Yes  [] Selectively    [x] No    Medication Side Effects: Denies         Mental Status Exam  Level of consciousness: Alert and awake, resting, arousable to verbal stimuli  Appearance: Appropriate attire for setting, resting in bed, with poor grooming and hygiene   Behavior/Motor: Approachable, no psychomotor abnormalities   Attitude toward examiner: Noncooperative, irritable  Speech: Selectively mute, delayed, mumbled  Mood:  \"Tired\"  Affect: Blunted  Thought processes: linear   Thought content: Would not respond to questions regarding homicidal ideation  Suicidal Ideation: Would not respond to questions regarding suicidal ideation  Delusions: Paranoid  Perceptual Disturbance: Responding to auditory hallucinations  Cognition: Oriented to self, location, time, and situation  Memory: intact  Insight & Judgement: poor     Data   height is 6' (1.829 m) and weight is 240 lb (108.9 kg). His oral temperature is 97.4 °F (36.3 °C). His blood pressure is 125/82 and his pulse is 70. His respiration is 14. Labs:   No visits with results within 2 Day(s) from this visit.    Latest known visit with results is:   Admission on 05/02/2019, Discharged on 05/02/2019   Component Date Value Ref Range Status    WBC 05/02/2019 8.1  3.5 - 11.3 k/uL Final    RBC 05/02/2019 4.73  4.21 - 5.77 m/uL Final    Hemoglobin 05/02/2019 13.8  13.0 - 17.0 g/dL Final    Hematocrit 05/02/2019 43.0  40.7 - 50.3 % Final    MCV 05/02/2019 90.9  82.6 - 102.9 fL Final    MCH 05/02/2019 29.2  25.2 - 33.5 pg Final    MCHC 05/02/2019 32.1  28.4 - 34.8 g/dL Final    RDW 05/02/2019 12.7  11.8 - 14.4 % Final    Platelets 66/15/7714 264  138 - 453 k/uL Final    MPV 05/02/2019 10.7  8.1 - 13.5 fL Final    NRBC Automated 05/02/2019 0.0  0.0 per 100 WBC Final    Differential Type 05/02/2019 NOT REPORTED   Final    Seg Neutrophils 05/02/2019 52  36 - 65 % Final    Lymphocytes 05/02/2019 29  24 - 43 % Final    Monocytes 05/02/2019 13* 3 - 12 % Final    Eosinophils % 05/02/2019 5* 1 - 4 % Final    Basophils 05/02/2019 1  0 - 2 % Final    Immature Granulocytes 05/02/2019 0  0 % Final    Segs Absolute 05/02/2019 4.20  1.50 - 8.10 k/uL Final    Absolute Lymph # 05/02/2019 2.36  1.10 - 3.70 k/uL Final    Absolute Mono # 05/02/2019 1.07  0.10 - 1.20 k/uL Final    Absolute Eos # 05/02/2019 0.42  0.00 - 0.44 k/uL Final    Basophils Absolute 05/02/2019 0.06  0.00 - 0.20 k/uL Final    Absolute Immature Granulocyte 05/02/2019 0.03  0.00 - 0.30 k/uL Final    WBC Morphology 05/02/2019 NOT REPORTED   Final    RBC Morphology 05/02/2019 NOT REPORTED   Final    Platelet Estimate 94/96/3531 NOT REPORTED   Final    Glucose 05/02/2019 98  70 - 99 mg/dL Final    BUN 05/02/2019 14  6 - 20 mg/dL Final    CREATININE 05/02/2019 1.03  0.70 - 1.20 mg/dL Final    Bun/Cre Ratio 05/02/2019 NOT REPORTED  9 - 20 Final    Calcium 05/02/2019 9.1  8.6 - 10.4 mg/dL Final    Sodium 05/02/2019 136  135 - 144 mmol/L Final    Potassium 05/02/2019 3.8  3.7 - 5.3 mmol/L Final    Chloride 05/02/2019 98  98 - 107 mmol/L Final    CO2 05/02/2019 23  20 - 31 mmol/L Final    Anion Gap 05/02/2019 15  9 - 17 mmol/L Final    GFR Non- 05/02/2019 >60  >60 mL/min Final    GFR  05/02/2019 >60  >60 mL/min Final    GFR Comment 05/02/2019        Final    Comment: Average GFR for 38-51 years old:   80 mL/min/1.73sq m  Chronic Kidney Disease:   <60 mL/min/1.73sq m  Kidney failure:   <15 mL/min/1.73sq m              eGFR calculated using average adult body mass. Additional eGFR calculator available at:        [email or web address]            GFR Staging 05/02/2019 NOT REPORTED   Final    Troponin, High Sensitivity 05/02/2019 <6  0 - 22 ng/L Final    Comment:       High Sensitivity Troponin values cannot be compared with other Troponin methodologies. Patients with high levels of Biotin oral intake (i.e >5mg/day) may have falsely decreased   Troponin levels. Samples collected within 8 hours of biotin intake may require additional   information for diagnosis.       Troponin T 05/02/2019 NOT REPORTED  <0.03 ng/mL Final    Troponin Interp 05/02/2019 NOT REPORTED   Final    Ventricular Rate 05/02/2019 109  BPM Final    Atrial Rate 05/02/2019 109  BPM Final    P-R Interval 05/02/2019 136  ms Final    QRS Duration 05/02/2019 68  ms Final    Q-T Interval 05/02/2019 350  ms Final    QTc Calculation (Bazett) 05/02/2019 471  ms Final    P [place] 05/02/2019 38  degrees Final    R Axis 05/02/2019 19  degrees Final    T Axis 05/02/2019 1  degrees Final    Ethanol 05/02/2019 <10  <10 mg/dL Final    Ethanol percent 05/02/2019 <0.010  <0.010 % Final    NOTE: NEW REFERENCE RANGE    Ventricular Rate 05/02/2019 88  BPM Final    Atrial Rate 05/02/2019 88  BPM Final    P-R Interval 05/02/2019 150  ms Final    QRS Duration 05/02/2019 86  ms Final    Q-T Interval 05/02/2019 404  ms Final    QTc Calculation (Bazett) 05/02/2019 488  ms Final    P Axis 05/02/2019 39  degrees Final    R Axis 05/02/2019 19  degrees Final    T Axis 05/02/2019 1  degrees Final         Reviewed patient's current plan of care and vital signs with nursing staff.     Labs reviewed: [x] Yes  Last EKG in EMR reviewed: [x] Yes    Medications  Current Facility-Administered Medications: OLANZapine (ZYPREXA) tablet 7.5 mg, 7.5 mg, Oral, BID  haloperidol (HALDOL) tablet 5 mg, 5 mg, Oral, Q4H PRN **AND** LORazepam (ATIVAN) tablet 2 mg, 2 mg, Oral, Q4H PRN  haloperidol lactate (HALDOL) injection 5 mg, 5 mg, IntraMUSCular, Q4H PRN **AND** LORazepam (ATIVAN) injection 2 mg, 2 mg, IntraMUSCular, Q4H PRN **AND** diphenhydrAMINE (BENADRYL) injection 50 mg, 50 mg, IntraMUSCular, Q4H PRN  acetaminophen (TYLENOL) tablet 650 mg, 650 mg, Oral, Q4H PRN  aluminum & magnesium hydroxide-simethicone (MAALOX) 200-200-20 MG/5ML suspension 30 mL, 30 mL, Oral, Q6H PRN  hydrOXYzine (ATARAX) tablet 50 mg, 50 mg, Oral, TID PRN  ibuprofen (ADVIL;MOTRIN) tablet 400 mg, 400 mg, Oral, Q6H PRN  nicotine polacrilex (NICORETTE) gum 2 mg, 2 mg, Oral, PRN  polyethylene glycol (GLYCOLAX) packet 17 g, 17 g, Oral, Daily PRN  traZODone (DESYREL) tablet 50 mg, 50 mg, Oral, Nightly PRN    ASSESSMENT  Schizoaffective disorder, bipolar type ([facility] [address]         PLAN  Patient symptoms are: Remains Unstable  Medication changes: Titrate Zyprexa to 7.5 mg twice daily  Monitor need and frequency of PRN medications  Encourage participation in groups and milieu  Attempt to develop insight  Psycho-education conducted  Supportive Therapy conducted  Probable discharge: Per attending MD  Follow-up daily while inpatient    Patient continues to be monitored in the inpatient psychiatric facility at [facility] for safety and stabilization. Patient continues to need, on a daily basis, active treatment furnished directly by or requiring the supervision of inpatient psychiatric personnel. Electronically signed by MARIA D Hadley CNP on 9/3/2021 at 1:18 PM    **This report has been created using voice recognition software. It may contain minor errors which are inherent in voice recognition technology. **  I independently saw and evaluated the patient. I reviewed the nurse practioner's documentation above. Any additional comments or changes to the    documentation are stated below otherwise agree with assessment.      -The patient's mental status unchanged. He continues to be irritable and difficult to engage. He is loud and dismissive. I have noted that the patient has been compliant with olanzapine and the dose of olanzapine will be increased to 7.20 nightly    PLAN  Medications as noted above  Attempt to develop insight  Psycho-education conducted. Supportive Therapy conducted.   Probable discharge is 5 to 9 days  Follow-up daily while on inpatient unit    Electronically signed by Carloz Green MD on 9/3/21 at 4:51 PM EDT

## 2021-09-04 PROCEDURE — 99232 SBSQ HOSP IP/OBS MODERATE 35: CPT

## 2021-09-04 PROCEDURE — 6370000000 HC RX 637 (ALT 250 FOR IP): Performed by: NURSE PRACTITIONER

## 2021-09-04 PROCEDURE — 1240000000 HC EMOTIONAL WELLNESS R&B

## 2021-09-04 RX ADMIN — OLANZAPINE 7.5 MG: 7.5 TABLET, FILM COATED ORAL at 11:35

## 2021-09-04 RX ADMIN — OLANZAPINE 7.5 MG: 7.5 TABLET, FILM COATED ORAL at 20:50

## 2021-09-04 NOTE — GROUP NOTE
Group Therapy Note    Date: 9/4/2021    Group Start Time: 3482  Group End Time: 0992  Group Topic: Psychoeducation    GIAN Hwang        Group Therapy Note             Patient refused to attend psychotherapy group after encouragement from staff. 1:1 talk time offered but refused. Signature:   Jorge Hwang

## 2021-09-04 NOTE — PROGRESS NOTES
Daily Progress Note  9/4/2021    Patient Name: Mitali Verdugo    CHIEF COMPLAINT: Suicidal ideation per pink slip from [facility]       SUBJECTIVE:      Patient is seen today for a follow up assessment. Patient is compliant with scheduled olanzapine. Patient has not needed emergency medications today. Patient continues to be irritable and isolate to his room. Patient states this writer April Hill many questions do have to ask? \"  He reports his depression is a 7 (010 scale 0 being none and 10 being the worst). He reports that he is not feeling anxious today. He endorses adequate restorative sleep and a normal appetite. Patient endorses active suicidal ideation without current plan or intent. He denies homicidal ideation. He is able to contract for safety on this unit with this writer. Patient endorses auditory hallucinations that he reports are both male and female in nature. When asked what the voices tell him patient states \"all types of stuff. \"  Patient denies visual hallucinations. Patient denies paranoia but appears paranoid to this writer. Patient denies medication side effects or medical concerns at this time. Writer encouraged patient to attend groups on the unit. At this time, the patient is not appropriate for a lower level of care. There is risk of decompensation and patient warrants further hospitalization for safety and stabilization. Appetite:  [x] Normal/Adequate/Unchanged  [] Increased  [] Decreased      Sleep:       [x] Normal/Adequate/Unchanged  [] Fair  [] Poor      Group Attendance on Unit:   [] Yes  [] Selectively    [x] No    Medication Side Effects: Patient denies any medication side effects at the time of assessment. Mental Status Exam  Level of consciousness: Alert and awake. Appearance: Appropriate attire for setting, resting in bed, with poor grooming and hygiene.    Behavior/Motor: Approachable, psychomotor agitation  Attitude toward examiner: semi-Cooperative, poor eye contact, irritated  Speech: Normal rate, normal volume, normal tone. Mood:  Patient reports \"okay\". Affect: Irritated   Thought processes: Linear and coherent. Thought content: Denies homicidal ideation. Suicidal Ideation: Active suicidal ideations, without current plan or intent, contracts for safety on the unit. Delusions: No evidence of delusions. Denies paranoia but presents as paranoid. Perceptual Disturbance: Patient does not appear to be responding to internal stimuli. Endorses auditory hallucinations. Denies visual hallucinations. Cognition: Oriented to self, location, time, and situation. Memory: Intact. Insight & Judgement: Poor. Data   height is 6' (1.829 m) and weight is 240 lb (108.9 kg). His oral temperature is 97.8 °F (36.6 °C). His blood pressure is 110/55 (abnormal) and his pulse is 73. His respiration is 14. Labs:   No visits with results within 2 Day(s) from this visit.    Latest known visit with results is:   Admission on 05/02/2019, Discharged on 05/02/2019   Component Date Value Ref Range Status    WBC 05/02/2019 8.1  3.5 - 11.3 k/uL Final    RBC 05/02/2019 4.73  4.21 - 5.77 m/uL Final    Hemoglobin 05/02/2019 13.8  13.0 - 17.0 g/dL Final    Hematocrit 05/02/2019 43.0  40.7 - 50.3 % Final    MCV 05/02/2019 90.9  82.6 - 102.9 fL Final    MCH 05/02/2019 29.2  25.2 - 33.5 pg Final    MCHC 05/02/2019 32.1  28.4 - 34.8 g/dL Final    RDW 05/02/2019 12.7  11.8 - 14.4 % Final    Platelets 07/49/1982 264  138 - 453 k/uL Final    MPV 05/02/2019 10.7  8.1 - 13.5 fL Final    NRBC Automated 05/02/2019 0.0  0.0 per 100 WBC Final    Differential Type 05/02/2019 NOT REPORTED   Final    Seg Neutrophils 05/02/2019 52  36 - 65 % Final    Lymphocytes 05/02/2019 29  24 - 43 % Final    Monocytes 05/02/2019 13* 3 - 12 % Final    Eosinophils % 05/02/2019 5* 1 - 4 % Final    Basophils 05/02/2019 1  0 - 2 % Final    Immature Granulocytes 05/02/2019 0  0 % Final    Segs Absolute 05/02/2019 4.20  1.50 - 8.10 k/uL Final    Absolute Lymph # 05/02/2019 2.36  1.10 - 3.70 k/uL Final    Absolute Mono # 05/02/2019 1.07  0.10 - 1.20 k/uL Final    Absolute Eos # 05/02/2019 0.42  0.00 - 0.44 k/uL Final    Basophils Absolute 05/02/2019 0.06  0.00 - 0.20 k/uL Final    Absolute Immature Granulocyte 05/02/2019 0.03  0.00 - 0.30 k/uL Final    WBC Morphology 05/02/2019 NOT REPORTED   Final    RBC Morphology 05/02/2019 NOT REPORTED   Final    Platelet Estimate 60/96/2520 NOT REPORTED   Final    Glucose 05/02/2019 98  70 - 99 mg/dL Final    BUN 05/02/2019 14  6 - 20 mg/dL Final    CREATININE 05/02/2019 1.03  0.70 - 1.20 mg/dL Final    Bun/Cre Ratio 05/02/2019 NOT REPORTED  9 - 20 Final    Calcium 05/02/2019 9.1  8.6 - 10.4 mg/dL Final    Sodium 05/02/2019 136  135 - 144 mmol/L Final    Potassium 05/02/2019 3.8  3.7 - 5.3 mmol/L Final    Chloride 05/02/2019 98  98 - 107 mmol/L Final    CO2 05/02/2019 23  20 - 31 mmol/L Final    Anion Gap 05/02/2019 15  9 - 17 mmol/L Final    GFR Non- 05/02/2019 >60  >60 mL/min Final    GFR  05/02/2019 >60  >60 mL/min Final    GFR Comment 05/02/2019        Final    Comment: Average GFR for 38-51 years old:   80 mL/min/1.73sq m  Chronic Kidney Disease:   <60 mL/min/1.73sq m  Kidney failure:   <15 mL/min/1.73sq m              eGFR calculated using average adult body mass. Additional eGFR calculator available at:        [employer]            GFR Staging 05/02/2019 NOT REPORTED   Final    Troponin, High Sensitivity 05/02/2019 <6  0 - 22 ng/L Final    Comment:       High Sensitivity Troponin values cannot be compared with other Troponin methodologies. Patients with high levels of Biotin oral intake (i.e >5mg/day) may have falsely decreased   Troponin levels. Samples collected within 8 hours of biotin intake may require additional   information for diagnosis.       Troponin T 05/02/2019 NOT REPORTED  <0.03 ng/mL Final    Troponin Interp 05/02/2019 NOT REPORTED   Final    Ventricular Rate 05/02/2019 109  BPM Final    Atrial Rate 05/02/2019 109  BPM Final    P-R Interval 05/02/2019 136  ms Final    QRS Duration 05/02/2019 68  ms Final    Q-T Interval 05/02/2019 350  ms Final    QTc Calculation (Bazett) 05/02/2019 471  ms Final    P Axis 05/02/2019 38  degrees Final    R Axis 05/02/2019 19  degrees Final    T Axis 05/02/2019 1  degrees Final    Ethanol 05/02/2019 <10  <10 mg/dL Final    Ethanol percent 05/02/2019 <0.010  <0.010 % Final    NOTE: NEW REFERENCE RANGE    Ventricular Rate 05/02/2019 88  BPM Final    Atrial Rate 05/02/2019 88  BPM Final    P-R Interval 05/02/2019 150  ms Final    QRS Duration 05/02/2019 86  ms Final    Q-T Interval 05/02/2019 404  ms Final    QTc Calculation (Bazett) 05/02/2019 488  ms Final    P Axis 05/02/2019 39  degrees Final    R Axis 05/02/2019 19  degrees Final    T Axis 05/02/2019 1  degrees Final         Reviewed patient's current plan of care and vital signs with nursing staff.     Labs reviewed: [x] Yes in physical chart     Medications  Current Facility-Administered Medications: OLANZapine (ZYPREXA) tablet 7.5 mg, 7.5 mg, Oral, BID  haloperidol (HALDOL) tablet 5 mg, 5 mg, Oral, Q4H PRN **AND** LORazepam (ATIVAN) tablet 2 mg, 2 mg, Oral, Q4H PRN  haloperidol lactate (HALDOL) injection 5 mg, 5 mg, IntraMUSCular, Q4H PRN **AND** LORazepam (ATIVAN) injection 2 mg, 2 mg, IntraMUSCular, Q4H PRN **AND** diphenhydrAMINE (BENADRYL) injection 50 mg, 50 mg, IntraMUSCular, Q4H PRN  acetaminophen (TYLENOL) tablet 650 mg, 650 mg, Oral, Q4H PRN  aluminum & magnesium hydroxide-simethicone (MAALOX) 200-200-20 MG/5ML suspension 30 mL, 30 mL, Oral, Q6H PRN  hydrOXYzine (ATARAX) tablet 50 mg, 50 mg, Oral, TID PRN  ibuprofen (ADVIL;MOTRIN) tablet 400 mg, 400 mg, Oral, Q6H PRN  nicotine polacrilex (NICORETTE) gum 2 mg, 2 mg, Oral, PRN  polyethylene glycol (GLYCOLAX) packet 17 g, 17 g, Oral, Daily PRN  traZODone (DESYREL) tablet 50 mg, 50 mg, Oral, Nightly PRN    ASSESSMENT  Schizoaffective disorder, bipolar type ([facility] [address]         PLAN  Patient symptoms are: Remains Unstable   Will get CBC with diff, CMP and valproic acid level tomorrow. Continue current medication regimen. Consider addition of Depakote 500 mg BID tomorrow  Monitor need and frequency of PRN medications. Encourage participation in groups and milieu. Attempt to develop insight. Psycho-education conducted. Supportive Therapy conducted. Probable discharge is to be determined by MD.   Follow-up daily while inpatient. Patient continues to be monitored in the inpatient psychiatric facility at [facility] for safety and stabilization. Patient continues to need, on a daily basis, active treatment furnished directly by or requiring the supervision of inpatient psychiatric personnel. Electronically signed by MARIA D Murillo CNP on 9/4/2021 at 2:46 PM    **This report has been created using voice recognition software. It may contain minor errors which are inherent in voice recognition technology. **

## 2021-09-04 NOTE — PLAN OF CARE
Problem: Altered Mood, Depressive Behavior:  Goal: Ability to disclose and discuss suicidal ideas will improve  Description: Ability to disclose and discuss suicidal ideas will improve  9/3/2021 2224 by Mary Lou Armando LPN  Outcome: Ongoing  Note:  Patient denies suicidal ideations at this time. Patient agrees to seek staff at anytime he feels any urges to harm self would arise. Patient monitored every 15 minutes with  environmental safety checks. Problem: Altered Mood, Psychotic Behavior:  Goal: Able to verbalize decrease in frequency and intensity of hallucinations  Description: Able to verbalize decrease in frequency and intensity of hallucinations  9/3/2021 2224 by Mary Lou Armando LPN  Outcome: Ongoing  Note: Patient denies hallucinations at this time. Patient educated and agrees to come to staff if any hallucinations occur. Patient monitored every 15 minutes with environmental safety checks.

## 2021-09-04 NOTE — PLAN OF CARE
Problem: Altered Mood, Depressive Behavior:  Goal: Ability to disclose and discuss suicidal ideas will improve  Description: Ability to disclose and discuss suicidal ideas will improve  9/4/2021 1157 by Jacqueline Vera RN  Outcome: Ongoing  Note: Patient denies any thoughts of self harm or hallucinations. Out for meals only. Medication compliant and behavior controlled.

## 2021-09-05 PROCEDURE — 99232 SBSQ HOSP IP/OBS MODERATE 35: CPT

## 2021-09-05 PROCEDURE — 1240000000 HC EMOTIONAL WELLNESS R&B

## 2021-09-05 PROCEDURE — 6370000000 HC RX 637 (ALT 250 FOR IP): Performed by: NURSE PRACTITIONER

## 2021-09-05 RX ORDER — OLANZAPINE 10 MG/1
10 TABLET ORAL 2 TIMES DAILY
Status: DISCONTINUED | OUTPATIENT
Start: 2021-09-05 | End: 2021-09-08 | Stop reason: HOSPADM

## 2021-09-05 RX ADMIN — OLANZAPINE 7.5 MG: 7.5 TABLET, FILM COATED ORAL at 08:52

## 2021-09-05 RX ADMIN — HYDROXYZINE HYDROCHLORIDE 50 MG: 50 TABLET, FILM COATED ORAL at 08:52

## 2021-09-05 ASSESSMENT — PAIN SCALES - GENERAL: PAINLEVEL_OUTOF10: 0

## 2021-09-05 NOTE — PLAN OF CARE
Problem: Altered Mood, Depressive Behavior:  Goal: Ability to disclose and discuss suicidal ideas will improve  Description: Ability to disclose and discuss suicidal ideas will improve  Outcome: Ongoing  Note: Patient admitted to having fleeting suicidal thoughts without plan throughout the day and agreed to seek out staff if feeling unsafe or overwhelmed. Patient isolative to room most of shift coming out for needs only. Safe environment maintained. Q15 minute checks for safety continued per unit policy. Will continue to monitor for safety and provide support and reassurance as needed. Problem: Altered Mood, Psychotic Behavior:  Goal: Able to verbalize decrease in frequency and intensity of hallucinations  Description: Able to verbalize decrease in frequency and intensity of hallucinations  Outcome: Ongoing  Note: Patient denies hallucinations.

## 2021-09-05 NOTE — PROGRESS NOTES
Daily Progress Note  9/5/2021    Patient Name: Lula Milton    CHIEF COMPLAINT: Suicidal ideation per pink slip from [facility]         SUBJECTIVE:      Patient is seen today for a follow up assessment. Patient is compliant with scheduled olanzapine. Patient has not needed emergency medications today. Patient continues to be irritable and gets upset whenever somebody tries to speak with him in his room. Upon assessment today he has this writer \"how you questions you have\" asleep. \"  Patient is isolative to his room and per nursing staff only comes out for meals. He refused ordered lab work this morning. He endorses depression and anxiety but does not elaborate and just answers \"a little bit. \"    When asked about suicidal ideation patient again states \"a little bit. \"  He denies homicidal ideation. He is able to contract for safety on this unit with this writer. Patient continues to endorse auditory hallucinations but does not elaborate on them. He denies visual hallucinations. Patient denies paranoia. This writer asked patient if he had been on Depakote in the past and he states yes but states \"I just want what they are giving me now. \"  He denies side effects to his medications at this time. He denies medical concerns. Patient continues to isolate to his room and not attend groups. Patient does not seem to be attending to hygiene needs. Writer encouraged patient to attend groups on the unit. At this time, the patient is not appropriate for a lower level of care. There is risk of decompensation and patient warrants further hospitalization for safety and stabilization. Appetite:  [x] Normal/Adequate/Unchanged  [] Increased  [] Decreased      Sleep:       [x] Normal/Adequate/Unchanged  [] Fair  [] Poor      Group Attendance on Unit:   [] Yes  [] Selectively    [x] No    Medication Side Effects: Patient denies any medication side effects at the time of assessment.          Mental Status Exam  Level of consciousness: Alert and awake. Appearance: Appropriate attire for setting, resting in bed, with poor grooming and hygiene. Behavior/Motor: Approachable, psychomotor agitation  Attitude toward examiner: semi-Cooperative, poor eye contact, irritable, turns away from this writer  Speech: Normal rate, normal volume, irritable tone. Mood:  Patient reports \"tired\". Affect: Irritated   Thought processes: Linear and coherent. Thought content: Denies homicidal ideation. Suicidal Ideation: Fleeting suicidal ideations, without current plan or intent, contracts for safety on the unit. Delusions: No evidence of delusions. Denies paranoia. Perceptual Disturbance: Patient does not appear to be responding to internal stimuli. Endorses auditory hallucinations. Denies visual hallucinations. Cognition: Oriented to self, location, time, and situation. Memory: Intact. Insight & Judgement: Poor. Data   height is 6' (1.829 m) and weight is 240 lb (108.9 kg). His oral temperature is 97.5 °F (36.4 °C). His blood pressure is 119/62 and his pulse is 65. His respiration is 14. Labs:   No visits with results within 2 Day(s) from this visit.    Latest known visit with results is:   Admission on 05/02/2019, Discharged on 05/02/2019   Component Date Value Ref Range Status    WBC 05/02/2019 8.1  3.5 - 11.3 k/uL Final    RBC 05/02/2019 4.73  4.21 - 5.77 m/uL Final    Hemoglobin 05/02/2019 13.8  13.0 - 17.0 g/dL Final    Hematocrit 05/02/2019 43.0  40.7 - 50.3 % Final    MCV 05/02/2019 90.9  82.6 - 102.9 fL Final    MCH 05/02/2019 29.2  25.2 - 33.5 pg Final    MCHC 05/02/2019 32.1  28.4 - 34.8 g/dL Final    RDW 05/02/2019 12.7  11.8 - 14.4 % Final    Platelets 74/82/8730 264  138 - 453 k/uL Final    MPV 05/02/2019 10.7  8.1 - 13.5 fL Final    NRBC Automated 05/02/2019 0.0  0.0 per 100 WBC Final    Differential Type 05/02/2019 NOT REPORTED   Final    Seg Neutrophils 05/02/2019 52  36 - 65 % Final    Lymphocytes 05/02/2019 29  24 - 43 % Final    Monocytes 05/02/2019 13* 3 - 12 % Final    Eosinophils % 05/02/2019 5* 1 - 4 % Final    Basophils 05/02/2019 1  0 - 2 % Final    Immature Granulocytes 05/02/2019 0  0 % Final    Segs Absolute 05/02/2019 4.20  1.50 - 8.10 k/uL Final    Absolute Lymph # 05/02/2019 2.36  1.10 - 3.70 k/uL Final    Absolute Mono # 05/02/2019 1.07  0.10 - 1.20 k/uL Final    Absolute Eos # 05/02/2019 0.42  0.00 - 0.44 k/uL Final    Basophils Absolute 05/02/2019 0.06  0.00 - 0.20 k/uL Final    Absolute Immature Granulocyte 05/02/2019 0.03  0.00 - 0.30 k/uL Final    WBC Morphology 05/02/2019 NOT REPORTED   Final    RBC Morphology 05/02/2019 NOT REPORTED   Final    Platelet Estimate 40/91/3418 NOT REPORTED   Final    Glucose 05/02/2019 98  70 - 99 mg/dL Final    BUN 05/02/2019 14  6 - 20 mg/dL Final    CREATININE 05/02/2019 1.03  0.70 - 1.20 mg/dL Final    Bun/Cre Ratio 05/02/2019 NOT REPORTED  9 - 20 Final    Calcium 05/02/2019 9.1  8.6 - 10.4 mg/dL Final    Sodium 05/02/2019 136  135 - 144 mmol/L Final    Potassium 05/02/2019 3.8  3.7 - 5.3 mmol/L Final    Chloride 05/02/2019 98  98 - 107 mmol/L Final    CO2 05/02/2019 23  20 - 31 mmol/L Final    Anion Gap 05/02/2019 15  9 - 17 mmol/L Final    GFR Non- 05/02/2019 >60  >60 mL/min Final    GFR  05/02/2019 >60  >60 mL/min Final    GFR Comment 05/02/2019        Final    Comment: Average GFR for 38-51 years old:   80 mL/min/1.73sq m  Chronic Kidney Disease:   <60 mL/min/1.73sq m  Kidney failure:   <15 mL/min/1.73sq m              eGFR calculated using average adult body mass. Additional eGFR calculator available at:        [email or web address]            GFR Staging 05/02/2019 NOT REPORTED   Final    Troponin, High Sensitivity 05/02/2019 <6  0 - 22 ng/L Final    Comment:       High Sensitivity Troponin values cannot be compared with other Troponin methodologies. Patients with high levels of Biotin oral intake (i.e >5mg/day) may have falsely decreased   Troponin levels. Samples collected within 8 hours of biotin intake may require additional   information for diagnosis.  Troponin T 05/02/2019 NOT REPORTED  <0.03 ng/mL Final    Troponin Interp 05/02/2019 NOT REPORTED   Final    Ventricular Rate 05/02/2019 109  BPM Final    Atrial Rate 05/02/2019 109  BPM Final    P-R Interval 05/02/2019 136  ms Final    QRS Duration 05/02/2019 68  ms Final    Q-T Interval 05/02/2019 350  ms Final    QTc Calculation (Bazett) 05/02/2019 471  ms Final    P Axis 05/02/2019 38  degrees Final    R Axis 05/02/2019 19  degrees Final    T Axis 05/02/2019 1  degrees Final    Ethanol 05/02/2019 <10  <10 mg/dL Final    Ethanol percent 05/02/2019 <0.010  <0.010 % Final    NOTE: NEW REFERENCE RANGE    Ventricular Rate 05/02/2019 88  BPM Final    Atrial Rate 05/02/2019 88  BPM Final    P-R Interval 05/02/2019 150  ms Final    QRS Duration 05/02/2019 86  ms Final    Q-T Interval 05/02/2019 404  ms Final    QTc Calculation (Bazett) 05/02/2019 488  ms Final    P Axis 05/02/2019 39  degrees Final    R Axis 05/02/2019 19  degrees Final    T Axis 05/02/2019 1  degrees Final         Reviewed patient's current plan of care and vital signs with nursing staff.     Labs reviewed: [x] Yes in physical chart     Medications  Current Facility-Administered Medications: OLANZapine (ZYPREXA) tablet 7.5 mg, 7.5 mg, Oral, BID  haloperidol (HALDOL) tablet 5 mg, 5 mg, Oral, Q4H PRN **AND** LORazepam (ATIVAN) tablet 2 mg, 2 mg, Oral, Q4H PRN  haloperidol lactate (HALDOL) injection 5 mg, 5 mg, IntraMUSCular, Q4H PRN **AND** LORazepam (ATIVAN) injection 2 mg, 2 mg, IntraMUSCular, Q4H PRN **AND** diphenhydrAMINE (BENADRYL) injection 50 mg, 50 mg, IntraMUSCular, Q4H PRN  acetaminophen (TYLENOL) tablet 650 mg, 650 mg, Oral, Q4H PRN  aluminum & magnesium hydroxide-simethicone (MAALOX) 698-802-79 MG/5ML suspension 30 mL, 30 mL, Oral, Q6H PRN  hydrOXYzine (ATARAX) tablet 50 mg, 50 mg, Oral, TID PRN  ibuprofen (ADVIL;MOTRIN) tablet 400 mg, 400 mg, Oral, Q6H PRN  nicotine polacrilex (NICORETTE) gum 2 mg, 2 mg, Oral, PRN  polyethylene glycol (GLYCOLAX) packet 17 g, 17 g, Oral, Daily PRN  traZODone (DESYREL) tablet 50 mg, 50 mg, Oral, Nightly PRN    ASSESSMENT  [facility]         PLAN  Patient symptoms are: Remains Unstable   Continue current medication regimen. Will increase Zyprexa to 10 mg twice daily  Monitor need and frequency of PRN medications. Encourage participation in groups and milieu. Attempt to develop insight. Psycho-education conducted. Supportive Therapy conducted. Probable discharge is to be determined by MD.   Follow-up daily while inpatient. Patient continues to be monitored in the inpatient psychiatric facility at [facility] for safety and stabilization. Patient continues to need, on a daily basis, active treatment furnished directly by or requiring the supervision of inpatient psychiatric personnel. Electronically signed by MARIA D Emery CNP on 9/5/2021 at 12:05 PM    **This report has been created using voice recognition software. It may contain minor errors which are inherent in voice recognition technology. **

## 2021-09-05 NOTE — BH NOTE
Patient refused lab draw at this time. Patient stated \"dont be waking me up, you gonna have to wait til morning when he wakes up. \"

## 2021-09-05 NOTE — GROUP NOTE
Group Therapy Note    Date: 9/5/2021    Group Start Time: 0900  Group End Time: 0920  Group Topic: Community Meeting    [facility]    Patient refused to attend community meeting and goal setting group at 0900 after encouragement from staff. 1:1 talk time offered by staff as alternative to group session.

## 2021-09-05 NOTE — GROUP NOTE
Group Therapy Note    Date: 9/5/2021    Group Start Time: 1499  Group End Time: 1450  Group Topic: Cognitive Skills    GIAN B[facility] D    Laura Harvey, CTRS        Group Therapy Note    Attendees: 9/20       Pt did not participate in Cognitive Skills Group at [phone] when encouraged by RT due to resting in room. Pt was offered talk time as an alternative to group but declined.          Discipline Responsible: Psychoeducational Specialist        Signature:  Chelsea Murphy

## 2021-09-05 NOTE — PLAN OF CARE
Problem: Altered Mood, Depressive Behavior:  Goal: Ability to disclose and discuss suicidal ideas will improve  Description: Ability to disclose and discuss suicidal ideas will improve  9/4/2021 2229 by Chiquis Stiles LPN  Outcome: Ongoing  Note: Patient denies suicidal ideations at this time. Patient is isolative to room coming out for snack and medications during shift. Patient agrees to seek staff at anytime he feels any urges to harm self would arise. Patient monitored every 15 minutes with environmental safety checks. Problem: Altered Mood, Psychotic Behavior:  Goal: Able to verbalize decrease in frequency and intensity of hallucinations  Description: Able to verbalize decrease in frequency and intensity of hallucinations  Outcome: Ongoing  Note:   Patient denies hallucinations at this time. Patient educated and agrees to come to staff if any hallucinations occur.  Patient monitored every 15 minutes with environmental safety checks

## 2021-09-05 NOTE — GROUP NOTE
Group Therapy Note    Date: 9/5/2021    Group Start Time: 5269  Group End Time: 4274  Group Topic: Psychoeducation    GIAN Phillip        Group Therapy Note         Patient refused to attend psychotherapy group after encouragement from staff. 1:1 talk time offered but refused. Signature:   [place]ia Phillip

## 2021-09-06 PROCEDURE — 1240000000 HC EMOTIONAL WELLNESS R&B

## 2021-09-06 PROCEDURE — 99232 SBSQ HOSP IP/OBS MODERATE 35: CPT | Performed by: PSYCHIATRY & NEUROLOGY

## 2021-09-06 PROCEDURE — APPSS30 APP SPLIT SHARED TIME 16-30 MINUTES

## 2021-09-06 PROCEDURE — 6370000000 HC RX 637 (ALT 250 FOR IP)

## 2021-09-06 RX ADMIN — OLANZAPINE 10 MG: 10 TABLET, FILM COATED ORAL at 07:57

## 2021-09-06 NOTE — GROUP NOTE
Group Therapy Note    Date: 9/6/2021    Group Start Time: 1330  Group End Time: 6164  Group Topic: Cognitive Skills    GIAN Malik, CTRS    Pt did not attend 1330 cognitive skills group d/t resting in room despite staff invitation to attend. 1:1 talk time offered as alternative to group session, pt declined.               Signature:  Rajiv Mcgovern

## 2021-09-06 NOTE — PROGRESS NOTES
Daily Progress Note  9/6/2021    Patient Name: Lucrecia Auhja    CHIEF COMPLAINT: Suicidal ideation per pink slip from [facility]         SUBJECTIVE:      Patient is seen today for a follow up assessment. Patient is compliant with scheduled olanzapine. Patient has not needed emergency medications in the past 24 hours. Patient is agreeable to assessment in his room. Patient is slightly less irritable upon assessment today. He does ask this writer what day it is and this writer informed patient that it is Monday, September 6. PatienT is oriented to self and situation. Patient endorses depression today rating it is a 7 (010 scale 0 being none and 10 being the worst). When asked about anxiety patient states \"I got a little bit. \"  Patient endorses adequate restorative sleep last night and adequate appetite. Patient did deny lab from obtaining lab work this morning. Patient endorses fleeting suicidal ideation without current plan or intent. He denies homicidal ideation. He does state that he feels safe in the hospital and can contract for safety with this writer. He reports improvement in auditory hallucinations but does not elaborate on the content. He denies visual hallucinations. He denies paranoia. He denies medication side effects at this time. Writer encouraged patient to attend groups on the unit. At this time, the patient is not appropriate for a lower level of care. There is risk of decompensation and patient warrants further hospitalization for safety and stabilization. Appetite:  [x] Normal/Adequate/Unchanged  [] Increased  [] Decreased      Sleep:       [x] Normal/Adequate/Unchanged  [] Fair  [] Poor      Group Attendance on Unit:   [] Yes  [x] Selectively    [] No    Medication Side Effects: Patient denies any medication side effects at the time of assessment. Mental Status Exam  Level of consciousness: Alert and awake.    Appearance: Appropriate attire for setting, resting in bed, with poor grooming and hygiene. Behavior/Motor: Approachable, no psychomotor issues noted  Attitude toward examiner:Cooperative, poor eye contact, somewhat irritable  Speech: Normal rate, normal volume, irritable tone. Mood:  Patient reports \"okay\". Affect: Depressed, somewhat irritable  Thought processes: Linear and coherent. Thought content: Denies homicidal ideation. Suicidal Ideation: Fleeting suicidal ideations, without current plan or intent, contracts for safety on the unit. Delusions: No evidence of delusions. Denies paranoia. Perceptual Disturbance: Patient does not appear to be responding to internal stimuli. Reports improvement in auditory hallucinations. Denies visual hallucinations. Cognition: Oriented to self, location, time, and situation. Memory: Intact. Insight & Judgement: Poor. Data   height is 6' (1.829 m) and weight is 240 lb (108.9 kg). His oral temperature is 97.5 °F (36.4 °C). His blood pressure is 119/62 and his pulse is 65. His respiration is 14. Labs:   No visits with results within 2 Day(s) from this visit.    Latest known visit with results is:   Admission on 05/02/2019, Discharged on 05/02/2019   Component Date Value Ref Range Status    WBC 05/02/2019 8.1  3.5 - 11.3 k/uL Final    RBC 05/02/2019 4.73  4.21 - 5.77 m/uL Final    Hemoglobin 05/02/2019 13.8  13.0 - 17.0 g/dL Final    Hematocrit 05/02/2019 43.0  40.7 - 50.3 % Final    MCV 05/02/2019 90.9  82.6 - 102.9 fL Final    MCH 05/02/2019 29.2  25.2 - 33.5 pg Final    MCHC 05/02/2019 32.1  28.4 - 34.8 g/dL Final    RDW 05/02/2019 12.7  11.8 - 14.4 % Final    Platelets 81/49/0511 264  138 - 453 k/uL Final    MPV 05/02/2019 10.7  8.1 - 13.5 fL Final    NRBC Automated 05/02/2019 0.0  0.0 per 100 WBC Final    Differential Type 05/02/2019 NOT REPORTED   Final    Seg Neutrophils 05/02/2019 52  36 - 65 % Final    Lymphocytes 05/02/2019 29  24 - 43 % Final    Monocytes 05/02/2019 13* 3 - 12 % Final    Eosinophils % 05/02/2019 5* 1 - 4 % Final    Basophils 05/02/2019 1  0 - 2 % Final    Immature Granulocytes 05/02/2019 0  0 % Final    Segs Absolute 05/02/2019 4.20  1.50 - 8.10 k/uL Final    Absolute Lymph # 05/02/2019 2.36  1.10 - 3.70 k/uL Final    Absolute Mono # 05/02/2019 1.07  0.10 - 1.20 k/uL Final    Absolute Eos # 05/02/2019 0.42  0.00 - 0.44 k/uL Final    Basophils Absolute 05/02/2019 0.06  0.00 - 0.20 k/uL Final    Absolute Immature Granulocyte 05/02/2019 0.03  0.00 - 0.30 k/uL Final    WBC Morphology 05/02/2019 NOT REPORTED   Final    RBC Morphology 05/02/2019 NOT REPORTED   Final    Platelet Estimate 46/82/6208 NOT REPORTED   Final    Glucose 05/02/2019 98  70 - 99 mg/dL Final    BUN 05/02/2019 14  6 - 20 mg/dL Final    CREATININE 05/02/2019 1.03  0.70 - 1.20 mg/dL Final    Bun/Cre Ratio 05/02/2019 NOT REPORTED  9 - 20 Final    Calcium 05/02/2019 9.1  8.6 - 10.4 mg/dL Final    Sodium 05/02/2019 136  135 - 144 mmol/L Final    Potassium 05/02/2019 3.8  3.7 - 5.3 mmol/L Final    Chloride 05/02/2019 98  98 - 107 mmol/L Final    CO2 05/02/2019 23  20 - 31 mmol/L Final    Anion Gap 05/02/2019 15  9 - 17 mmol/L Final    GFR Non- 05/02/2019 >60  >60 mL/min Final    GFR  05/02/2019 >60  >60 mL/min Final    GFR Comment 05/02/2019        Final    Comment: Average GFR for 38-51 years old:   80 mL/min/1.73sq m  Chronic Kidney Disease:   <60 mL/min/1.73sq m  Kidney failure:   <15 mL/min/1.73sq m              eGFR calculated using average adult body mass. Additional eGFR calculator available at:        [employer]            GFR Staging 05/02/2019 NOT REPORTED   Final    Troponin, High Sensitivity 05/02/2019 <6  0 - 22 ng/L Final    Comment:       High Sensitivity Troponin values cannot be compared with other Troponin methodologies.         Patients with high levels of Biotin oral intake (i.e >5mg/day) may have falsely decreased   Troponin levels. Samples collected within 8 hours of biotin intake may require additional   information for diagnosis.  Troponin T 05/02/2019 NOT REPORTED  <0.03 ng/mL Final    Troponin Interp 05/02/2019 NOT REPORTED   Final    Ventricular Rate 05/02/2019 109  BPM Final    Atrial Rate 05/02/2019 109  BPM Final    P-R Interval 05/02/2019 136  ms Final    QRS Duration 05/02/2019 68  ms Final    Q-T Interval 05/02/2019 350  ms Final    QTc Calculation (Bazett) 05/02/2019 471  ms Final    P Axis 05/02/2019 38  degrees Final    R Axis 05/02/2019 19  degrees Final    T Axis 05/02/2019 1  degrees Final    Ethanol 05/02/2019 <10  <10 mg/dL Final    Ethanol percent 05/02/2019 <0.010  <0.010 % Final    NOTE: NEW REFERENCE RANGE    Ventricular Rate 05/02/2019 88  BPM Final    Atrial Rate 05/02/2019 88  BPM Final    P-R Interval 05/02/2019 150  ms Final    QRS Duration 05/02/2019 86  ms Final    Q-T Interval 05/02/2019 404  ms Final    QTc Calculation (Bazett) 05/02/2019 488  ms Final    P Axis 05/02/2019 39  degrees Final    R Axis 05/02/2019 19  degrees Final    T Axis 05/02/2019 1  degrees Final         Reviewed patient's current plan of care and vital signs with nursing staff.     Labs reviewed: [x] Yes in physical chart     Medications  Current Facility-Administered Medications: OLANZapine (ZYPREXA) tablet 10 mg, 10 mg, Oral, BID  haloperidol (HALDOL) tablet 5 mg, 5 mg, Oral, Q4H PRN **AND** LORazepam (ATIVAN) tablet 2 mg, 2 mg, Oral, Q4H PRN  haloperidol lactate (HALDOL) injection 5 mg, 5 mg, IntraMUSCular, Q4H PRN **AND** LORazepam (ATIVAN) injection 2 mg, 2 mg, IntraMUSCular, Q4H PRN **AND** diphenhydrAMINE (BENADRYL) injection 50 mg, 50 mg, IntraMUSCular, Q4H PRN  acetaminophen (TYLENOL) tablet 650 mg, 650 mg, Oral, Q4H PRN  aluminum & magnesium hydroxide-simethicone (MAALOX) 200-200-20 MG/5ML suspension 30 mL, 30 mL, Oral, Q6H PRN  hydrOXYzine (ATARAX) tablet 50 mg, 50 mg, Oral, TID PRN  ibuprofen (ADVIL;MOTRIN) tablet 400 mg, 400 mg, Oral, Q6H PRN  nicotine polacrilex (NICORETTE) gum 2 mg, 2 mg, Oral, PRN  polyethylene glycol (GLYCOLAX) packet 17 g, 17 g, Oral, Daily PRN  traZODone (DESYREL) tablet 50 mg, 50 mg, Oral, Nightly PRN    ASSESSMENT  Schizoaffective disorder, bipolar type ([facility]         PLAN  Patient symptoms are: Modestly Improving   Continue current medication regimen. Monitor need and frequency of PRN medications. Encourage participation in groups and milieu. Attempt to develop insight. Psycho-education conducted. Supportive Therapy conducted. Probable discharge is to be determined by MD.   Follow-up daily while inpatient. Patient continues to be monitored in the inpatient psychiatric facility at [facility] [place] for safety and stabilization. Patient continues to need, on a daily basis, active treatment furnished directly by or requiring the supervision of inpatient psychiatric personnel. Electronically signed by MARIA D Hawley CNP on 9/6/2021 at 1:20 PM    **This report has been created using voice recognition software. It may contain minor errors which are inherent in voice recognition technology. **  I independently saw and evaluated the patient. I reviewed the nurse practioner's documentation above. Any additional comments or changes to the    documentation are stated below otherwise agree with assessment.       -Noted that the patient has been isolative and irritable over the weekend. His dose of Zyprexa has been increased to 10 mg twice daily. He has also reported suicidal ideation over the weekend   Patient continues to be abrupt in manner when seen at bedside. He is reluctant to engage in the interview. He states he has been taking his medication and it is helping him. He is denying any side effects from the medication. He is discharged focused       PLAN  Medications as noted above  Attempt to develop insight  Psycho-education conducted.   Supportive Therapy conducted.   Probable discharge is in 3 to 6 days  Follow-up daily while on inpatient unit     Electronically signed by Sandrita Tate MD on 9/6/21 at 12:26 PM EDT

## 2021-09-06 NOTE — PLAN OF CARE
Problem: Altered Mood, Depressive Behavior:  Goal: Ability to disclose and discuss suicidal ideas will improve  Description: Ability to disclose and discuss suicidal ideas will improve  9/5/2021 2238 by [id number]  Outcome: Ongoing  9/5/2021 1333 by Yinka Funez LPN  Outcome: Ongoing  Note: Patient admitted to having fleeting suicidal thoughts without plan throughout the day and agreed to seek out staff if feeling unsafe or overwhelmed. Patient isolative to room most of shift coming out for needs only. Safe environment maintained. Q15 minute checks for safety continued per unit policy. Will continue to monitor for safety and provide support and reassurance as needed. Patient admits to fleeting suicidal thoughts without a plan. Problem: Altered Mood, Psychotic Behavior:  Goal: Able to verbalize decrease in frequency and intensity of hallucinations  Description: Able to verbalize decrease in frequency and intensity of hallucinations  9/5/2021 2238 by Anita Suarez  Outcome: Ongoing  9/5/2021 1333 by Yinka Funez LPN  Outcome: Ongoing  Note: Patient denies hallucinations.      Patient denies hallucinations at this time

## 2021-09-06 NOTE — PLAN OF CARE
Problem: Altered Mood, Depressive Behavior:  Goal: Ability to disclose and discuss suicidal ideas will improve  Description: Ability to disclose and discuss suicidal ideas will improve  9/6/2021 0950 by William Faith LPN  Outcome: Ongoing   Patient is able to tell staff of suicidal ideations. Patient contracts for safety. Problem: Altered Mood, Psychotic Behavior:  Goal: Able to verbalize decrease in frequency and intensity of hallucinations  Description: Able to verbalize decrease in frequency and intensity of hallucinations  9/6/2021 0950 by William Faith LPN  Outcome: Ongoing   Every 15 min checks maintains for pt safety.

## 2021-09-07 PROCEDURE — 1240000000 HC EMOTIONAL WELLNESS R&B

## 2021-09-07 PROCEDURE — 6370000000 HC RX 637 (ALT 250 FOR IP)

## 2021-09-07 PROCEDURE — 6370000000 HC RX 637 (ALT 250 FOR IP): Performed by: NURSE PRACTITIONER

## 2021-09-07 PROCEDURE — APPSS30 APP SPLIT SHARED TIME 16-30 MINUTES

## 2021-09-07 PROCEDURE — 99232 SBSQ HOSP IP/OBS MODERATE 35: CPT | Performed by: PSYCHIATRY & NEUROLOGY

## 2021-09-07 RX ADMIN — TRAZODONE HYDROCHLORIDE 50 MG: 50 TABLET ORAL at 20:34

## 2021-09-07 RX ADMIN — HYDROXYZINE HYDROCHLORIDE 50 MG: 50 TABLET, FILM COATED ORAL at 20:34

## 2021-09-07 RX ADMIN — OLANZAPINE 10 MG: 10 TABLET, FILM COATED ORAL at 09:07

## 2021-09-07 RX ADMIN — HYDROXYZINE HYDROCHLORIDE 50 MG: 50 TABLET, FILM COATED ORAL at 09:07

## 2021-09-07 RX ADMIN — OLANZAPINE 10 MG: 10 TABLET, FILM COATED ORAL at 20:34

## 2021-09-07 NOTE — PLAN OF CARE
Problem: Altered Mood, Depressive Behavior:  Goal: Ability to disclose and discuss suicidal ideas will improve  Description: Ability to disclose and discuss suicidal ideas will improve  Outcome: Ongoing  Note: Patient admitted to fleeting thoughts of suicide without plan or intent. Patient contracts for safety and agreed to seek out staff should these thoughts become overwhelming. Safe environment maintained. Q15 minute checks for safety continued per unit policy. Will continue to monitor for safety and provide support and reassurance as needed. Problem: Altered Mood, Psychotic Behavior:  Goal: Able to verbalize decrease in frequency and intensity of hallucinations  Description: Able to verbalize decrease in frequency and intensity of hallucinations  Outcome: Ongoing  Note: Patient evasive and irritable during 1:1 time. Patient is isolative to room and out for needs only. Patient admits to auditory hallucinations that are not commanding. Encouraged to attend groups and socialize with peers. Compliant with meds and behavior is controlled.

## 2021-09-07 NOTE — SUICIDE SAFETY PLAN
SAFETY PLAN    A suicide Safety Plan is a document that supports someone when they are having thoughts of suicide. Warning Signs that indicate a suicidal crisis may be developing: What (situations, thoughts, feelings, body sensations, behaviors, etc.) do you experience that lets you know you are beginning to think about suicide? 1. Go off medications  2. Mood is depressed and start to feel sad, hopeless, helpless, guilty, decline in self-esteem, excess worry, no interest in doing any pleasurable activities, unable to concentrate  3. Begin to cry over the smallest of things  4. Not eating or sleeping as normal  5. Relationship issues start happening  6. I become angry and start a fight  7. When I dont listen or respond to people in a good, positive way  8. Increase drug use      Internal Coping Strategies:  What things can I do (relaxation techniques, hobbies, physical activities, etc.) to take my mind off my problems without contacting another person? 1. Go to hospital discharge appointments and follow-up with community mental health counseling  2. Talk with other people  3. Learn to identify and control your emotions by new ways  4. Think before you speak or act; walk away from the situation  5. Join a support group in person or on Social Media  6. Take a time-out  7. Take deep breaths; use relaxation techniques  8. Get some exercise; go for a walk  9. Read; listen to music; watch a funny movie    10. Coping skills/ strategies  journal/ listen to music/ go for a walk/ read a book/ watch a funny movie/show / crafts / video game   11.  Grounding techniques- eat a sour candy or hot cinnamon candy / focus on colors, sounds, smells, textures on things around you / drink some herbal tea / eat a piece of dark chocolate / take a hot bath or shower / essential oils for smelling / meditate / color / arts and crafts    People whom I can ask for help: Who can I call when I need help - for example, friends, family, clergy, someone else? 1. Mother, Tamar Johnson at [phone]  2. Friends and family members    Professionals or [address] [place] [address] I can contact during a crisis: Who can I call for help - for example, my doctor, my psychiatrist, my psychologist, a mental health provider, a suicide hotline? 1.  Staff at [facility]  2. Suicide Prevention Lifeline: 0-816-784-GMHL (9638)  3. [facility] Crisis [facility] Team, face-to-face services, call [phone] (1723)  4. [facility] [employer]: 2-1-1, 278.902.9347 or 0-875.574.5455  5. [employer] (Crisis Intervention Team - CIT), 868.435.1983 or 9-1-1  6. [address] [place] [address], [phone]  7. National Association of Mental Illness, 4-191.483.5379  8. [facility] Substance Abuse National Helpline, 3-771-279-HELP (7858)  9. Crisis Text Line, Text 4HOPE to 282095 to connect with a crisis counselor  10. [address] [place] [address], [phone]  11. MILA ([place], [address]), 4-728.946.3816  93. [employer] (Alcohol / Drug help)  13. Call the Recovery Helpline at 03 190 296 (24 hours a day - 7 days a week)  14. COVID-19 Emotional Support Line: [address] [facility] Emergency Services - for example, [address] Evonne Patel, [facility] suicide hotline,   1. [place] [address], 1-006-977-278.861.2638  2. [place] line at 852-456-CARE (3242) for 24/7 to help anyone having a mental crisis or thoughts of self-harm. The Crisis CARE number will also determine in a face-to-face screening needs to be done as well as the safest place. Once this is determined, the Crisis [facility] EMERGENCY \[facility] [place] [facility]\"" Team will be sent out to meet with the patient directly if required. 3. For [facility] Crisis Number [phone] ([address] [facility] [place]  4. [facility] at 6-486.282.3418  5. Monet Holland, [facility] at [phone]  6.  [facility] [phone]           7. [address] Abernathy, Radha, Delaney, Ham, Darlin flynn, [facility] - [phone]  8. Isaiah Espinoza, [address], [address] [phone]  9. Jorge Costello, [place], [place], [address] [facility] [phone] ([phone])      Making the environment safe: How can I make my environment (house/apartment/living space) safer? For example, can I remove guns, medications, and other items? 1. Remove unsafe objects  2. Keep Medications in safe and secure location  3.  Plan daily goals to help remember to stay on specific medications

## 2021-09-07 NOTE — GROUP NOTE
Group Therapy Note    Date: 9/7/2021    Group Start Time: 1000  Group End Time: 3120  Group Topic: Psychotherapy    [facility] CHRISTO Yuen, [facility]        Group Therapy Note    Attendees: Patient declined to attend psychotherapy group at 10 am despite encouragement by staff. 1:1 was offered as an alternative.             Signature:  CHRISTO Rivers, [facility]

## 2021-09-07 NOTE — PLAN OF CARE
Problem: Altered Mood, Depressive Behavior:  Goal: Ability to disclose and discuss suicidal ideas will improve  Description: Ability to disclose and discuss suicidal ideas will improve  9/6/2021 2244 by Devin Mota RN  Outcome: Ongoing     Problem: Altered Mood, Psychotic Behavior:  Goal: Able to verbalize decrease in frequency and intensity of hallucinations  Description: Able to verbalize decrease in frequency and intensity of hallucinations  9/6/2021 2244 by Devin Mota RN  Outcome: Ongoing     RN was unable to assess patient as patient refused to talk to RN or take his medications. He stayed in his room except for coming to get a snack. Safety precautions in place and Q 15 minute checks completed.

## 2021-09-07 NOTE — GROUP NOTE
Group Therapy Note    Date: 9/7/2021    Group Start Time: 1100  Group End Time: 0780  Group Topic: Cognitive Skills    GIAN Escalante, CTRS    Pt did not attend [address] [place] [address] skills group d/t resting in room despite staff invitation to attend. 1:1 talk time offered as alternative to group session, pt declined.               Signature:  Blue Bingham

## 2021-09-07 NOTE — GROUP NOTE
Group Therapy Note    Date: 9/7/2021    Group Start Time: 1430  Group End Time: 1520  Group Topic: Cognitive Skills    GIAN Almodovar Anger, CTRS        Group Therapy Note    Attendees: 7/19         Pt did not participate in Cognitive Skills Group at 1430 when encouraged by RT due to resting in room. Pt was offered talk time as an alternative to group but declined.          Discipline Responsible: Psychoeducational Specialist        Signature:  Ector Rivera

## 2021-09-07 NOTE — PROGRESS NOTES
Daily Progress Note  9/7/2021    Patient Name: Natasha Sanders    CHIEF COMPLAINT: Suicidal ideation per pink slip from [facility]         SUBJECTIVE:      Patient is seen today for a follow up assessment. Patient is selectively compliant with scheduled Zyprexa. It is noted that patient did not take his evening dose of Zyprexa but was compliant with his morning dose today. Patient has not needed emergency medications in the past 24 hours. Patient endorses depression today rating it as a 7 (010 scale 0 being none and 10 being the worst). When patient asked about anxiety patient stated \"a little bit. \"  Patient reports that he slept well and has an adequate appetite. Patient continues to isolate to his room, not come out for group, and appears minimally engaged in his treatment. He is often irritable upon assessment. Patient continues to endorse fleeting suicidal ideation without current plan or intent. He denies homicidal ideation. He is able to contract for safety on this unit. He reports improvement in auditory hallucinations. He does not elaborate on the hallucinations. He denies visual hallucinations at this time. He denies paranoia. He denies medication side effects or medical concerns at this time. This writer asked patient his plans once he is discharged from the hospital and patient states \"to go home I live with my mom. \"    Writer encouraged patient to attend groups on the unit. At this time, the patient is not appropriate for a lower level of care. There is risk of decompensation and patient warrants further hospitalization for safety and stabilization. Appetite:  [x] Normal/Adequate/Unchanged  [] Increased  [] Decreased      Sleep:       [x] Normal/Adequate/Unchanged  [] Fair  [] Poor      Group Attendance on Unit:   [] Yes  [] Selectively    [x] No    Medication Side Effects: Patient denies any medication side effects at the time of assessment.          Mental Status Exam  Level of consciousness: Somnolent but responds to verbal stimuli  Appearance: Appropriate attire for setting, resting in bed, with poor grooming and hygiene. Behavior/Motor: Approachable, no psychomotor issues noted  Attitude toward examiner: Semi-cooperative, poor eye contact, evasive  Speech: Normal rate, normal volume, irritable tone. Mood:  Patient reports \"fine\". Affect: Depressed, guarded  Thought processes: Linear and coherent. Thought content: Denies homicidal ideation. Suicidal Ideation: Fleeting suicidal ideations, without current plan or intent, contracts for safety on the unit. Delusions: No evidence of delusions. Denies paranoia. Perceptual Disturbance: Patient does not appear to be responding to internal stimuli. Reports improvement in auditory hallucinations. Denies visual hallucinations. Cognition: Oriented to self, location, time, and situation. Memory: Intact. Insight & Judgement: Poor. Data   height is 6' (1.829 m) and weight is 240 lb (108.9 kg). His oral temperature is 98.3 °F (36.8 °C). His blood pressure is 100/45 (abnormal) and his pulse is 70. His respiration is 14. Labs:   No visits with results within 2 Day(s) from this visit.    Latest known visit with results is:   Admission on 05/02/2019, Discharged on 05/02/2019   Component Date Value Ref Range Status    WBC 05/02/2019 8.1  3.5 - 11.3 k/uL Final    RBC 05/02/2019 4.73  4.21 - 5.77 m/uL Final    Hemoglobin 05/02/2019 13.8  13.0 - 17.0 g/dL Final    Hematocrit 05/02/2019 43.0  40.7 - 50.3 % Final    MCV 05/02/2019 90.9  82.6 - 102.9 fL Final    MCH 05/02/2019 29.2  25.2 - 33.5 pg Final    MCHC 05/02/2019 32.1  28.4 - 34.8 g/dL Final    RDW 05/02/2019 12.7  11.8 - 14.4 % Final    Platelets 08/08/9861 264  138 - 453 k/uL Final    MPV 05/02/2019 10.7  8.1 - 13.5 fL Final    NRBC Automated 05/02/2019 0.0  0.0 per 100 WBC Final    Differential Type 05/02/2019 NOT REPORTED   Final    Seg Neutrophils 05/02/2019 52  36 - 65 % Final    Lymphocytes 05/02/2019 29  24 - 43 % Final    Monocytes 05/02/2019 13* 3 - 12 % Final    Eosinophils % 05/02/2019 5* 1 - 4 % Final    Basophils 05/02/2019 1  0 - 2 % Final    Immature Granulocytes 05/02/2019 0  0 % Final    Segs Absolute 05/02/2019 4.20  1.50 - 8.10 k/uL Final    Absolute Lymph # 05/02/2019 2.36  1.10 - 3.70 k/uL Final    Absolute Mono # 05/02/2019 1.07  0.10 - 1.20 k/uL Final    Absolute Eos # 05/02/2019 0.42  0.00 - 0.44 k/uL Final    Basophils Absolute 05/02/2019 0.06  0.00 - 0.20 k/uL Final    Absolute Immature Granulocyte 05/02/2019 0.03  0.00 - 0.30 k/uL Final    WBC Morphology 05/02/2019 NOT REPORTED   Final    RBC Morphology 05/02/2019 NOT REPORTED   Final    Platelet Estimate 35/67/9467 NOT REPORTED   Final    Glucose 05/02/2019 98  70 - 99 mg/dL Final    BUN 05/02/2019 14  6 - 20 mg/dL Final    CREATININE 05/02/2019 1.03  0.70 - 1.20 mg/dL Final    Bun/Cre Ratio 05/02/2019 NOT REPORTED  9 - 20 Final    Calcium 05/02/2019 9.1  8.6 - 10.4 mg/dL Final    Sodium 05/02/2019 136  135 - 144 mmol/L Final    Potassium 05/02/2019 3.8  3.7 - 5.3 mmol/L Final    Chloride 05/02/2019 98  98 - 107 mmol/L Final    CO2 05/02/2019 23  20 - 31 mmol/L Final    Anion Gap 05/02/2019 15  9 - 17 mmol/L Final    GFR Non- 05/02/2019 >60  >60 mL/min Final    GFR  05/02/2019 >60  >60 mL/min Final    GFR Comment 05/02/2019        Final    Comment: Average GFR for 38-51 years old:   80 mL/min/1.73sq m  Chronic Kidney Disease:   <60 mL/min/1.73sq m  Kidney failure:   <15 mL/min/1.73sq m              eGFR calculated using average adult body mass.  Additional eGFR calculator available at:        [email or web address]            GFR Staging 05/02/2019 NOT REPORTED   Final    Troponin, High Sensitivity 05/02/2019 <6  0 - 22 ng/L Final    Comment:       High Sensitivity Troponin values cannot be compared with other Troponin methodologies. Patients with high levels of Biotin oral intake (i.e >5mg/day) may have falsely decreased   Troponin levels. Samples collected within 8 hours of biotin intake may require additional   information for diagnosis.  Troponin T 05/02/2019 NOT REPORTED  <0.03 ng/mL Final    Troponin Interp 05/02/2019 NOT REPORTED   Final    Ventricular Rate 05/02/2019 109  BPM Final    Atrial Rate 05/02/2019 109  BPM Final    P-R Interval 05/02/2019 136  ms Final    QRS Duration 05/02/2019 68  ms Final    Q-T Interval 05/02/2019 350  ms Final    QTc Calculation (Bazett) 05/02/2019 471  ms Final    P Axis 05/02/2019 38  degrees Final    R Axis 05/02/2019 19  degrees Final    T Axis 05/02/2019 1  degrees Final    Ethanol 05/02/2019 <10  <10 mg/dL Final    Ethanol percent 05/02/2019 <0.010  <0.010 % Final    NOTE: NEW REFERENCE RANGE    Ventricular Rate 05/02/2019 88  BPM Final    Atrial Rate 05/02/2019 88  BPM Final    P-R Interval 05/02/2019 150  ms Final    QRS Duration 05/02/2019 86  ms Final    Q-T Interval 05/02/2019 404  ms Final    QTc Calculation (Bazett) 05/02/2019 488  ms Final    P Axis 05/02/2019 39  degrees Final    R Axis 05/02/2019 19  degrees Final    T Axis 05/02/2019 1  degrees Final         Reviewed patient's current plan of care and vital signs with nursing staff.     Labs reviewed: [x] Yes in physical chart   Patient refused ordered lab work 2 days in a row    Medications  Current Facility-Administered Medications: OLANZapine (ZYPREXA) tablet 10 mg, 10 mg, Oral, BID  haloperidol (HALDOL) tablet 5 mg, 5 mg, Oral, Q4H PRN **AND** LORazepam (ATIVAN) tablet 2 mg, 2 mg, Oral, Q4H PRN  haloperidol lactate (HALDOL) injection 5 mg, 5 mg, IntraMUSCular, Q4H PRN **AND** LORazepam (ATIVAN) injection 2 mg, 2 mg, IntraMUSCular, Q4H PRN **AND** diphenhydrAMINE (BENADRYL) injection 50 mg, 50 mg, IntraMUSCular, Q4H PRN  acetaminophen (TYLENOL) tablet 650 mg, 650 mg, Oral, Q4H PRN  aluminum & magnesium hydroxide-simethicone (MAALOX) 200-200-20 MG/5ML suspension 30 mL, 30 mL, Oral, Q6H PRN  hydrOXYzine (ATARAX) tablet 50 mg, 50 mg, Oral, TID PRN  ibuprofen (ADVIL;MOTRIN) tablet 400 mg, 400 mg, Oral, Q6H PRN  nicotine polacrilex (NICORETTE) gum 2 mg, 2 mg, Oral, PRN  polyethylene glycol (GLYCOLAX) packet 17 g, 17 g, Oral, Daily PRN  traZODone (DESYREL) tablet 50 mg, 50 mg, Oral, Nightly PRN    ASSESSMENT  Schizoaffective disorder, bipolar type ([facility] [address]         PLAN  Patient symptoms are: Modestly Improving   Continue current medication regimen. Monitor need and frequency of PRN medications. Encourage participation in groups and milieu. Attempt to develop insight. Psycho-education conducted. Supportive Therapy conducted. Probable discharge is to be determined by MD.   Follow-up daily while inpatient. Patient continues to be monitored in the inpatient psychiatric facility at [facility] for safety and stabilization. Patient continues to need, on a daily basis, active treatment furnished directly by or requiring the supervision of inpatient psychiatric personnel. Electronically signed by MARIA D Quintanilla CNP on 9/7/2021 at 1:26 PM    **This report has been created using voice recognition software. It may contain minor errors which are inherent in voice recognition technology. **     I independently saw and evaluated the patient. I reviewed the midlevel provider's documentation above. Any additional comments or changes to the midlevel provider's documentation are stated below otherwise agree with assessment. The patient was seen at bedside. He was irritable. He was discharged focused. He has not taken his nighttime medication though he took his medication this morning. The patient is hostile in demeanor and limited in his engagement. I have encouraged the patient to take his medication.       PLAN  Medications as noted above  Attempt to develop insight  Psycho-education conducted. Supportive Therapy conducted.   Probable discharge is 2-3 days  Follow-up daily while on inpatient unit    Electronically signed by Johnathon Bell MD on 9/7/21 at 6:40 PM EDT

## 2021-09-08 VITALS
TEMPERATURE: 98.6 F | RESPIRATION RATE: 14 BRPM | DIASTOLIC BLOOD PRESSURE: 82 MMHG | HEIGHT: 72 IN | HEART RATE: 95 BPM | SYSTOLIC BLOOD PRESSURE: 118 MMHG | BODY MASS INDEX: 32.51 KG/M2 | WEIGHT: 240 LBS

## 2021-09-08 PROCEDURE — 6370000000 HC RX 637 (ALT 250 FOR IP)

## 2021-09-08 PROCEDURE — 99239 HOSP IP/OBS DSCHRG MGMT >30: CPT | Performed by: PSYCHIATRY & NEUROLOGY

## 2021-09-08 PROCEDURE — 6370000000 HC RX 637 (ALT 250 FOR IP): Performed by: NURSE PRACTITIONER

## 2021-09-08 RX ORDER — TRAZODONE HYDROCHLORIDE 50 MG/1
50 TABLET ORAL NIGHTLY PRN
Qty: 30 TABLET | Refills: 0 | Status: ON HOLD | OUTPATIENT
Start: 2021-09-08 | End: 2022-06-15

## 2021-09-08 RX ORDER — OLANZAPINE 10 MG/1
10 TABLET ORAL 2 TIMES DAILY
Qty: 30 TABLET | Refills: 3 | Status: ON HOLD | OUTPATIENT
Start: 2021-09-08 | End: 2022-06-15

## 2021-09-08 RX ADMIN — HYDROXYZINE HYDROCHLORIDE 50 MG: 50 TABLET, FILM COATED ORAL at 08:46

## 2021-09-08 RX ADMIN — OLANZAPINE 10 MG: 10 TABLET, FILM COATED ORAL at 08:46

## 2021-09-08 NOTE — PLAN OF CARE
Problem: Altered Mood, Depressive Behavior:  Goal: Ability to disclose and discuss suicidal ideas will improve  Description: Ability to disclose and discuss suicidal ideas will improve  9/7/2021 2346 by Rashad Melton LPN  Outcome: Ongoing     Problem: Altered Mood, Psychotic Behavior:  Goal: Able to verbalize decrease in frequency and intensity of hallucinations  Description: Able to verbalize decrease in frequency and intensity of hallucinations  9/7/2021 2346 by Rashad Melton LPN  Outcome: Ongoing   Patient denies suicidal ideas at this time. Patient denies suicidal ideas at this time. Patient denies depressive symptoms at this time. Patient has been in his room, out for needs only. Patient is free of self harm at this time. Patient agrees to seek out staff if thoughts to harm self arise. Staff will provide support and reassurance as needed. Safety checks maintained every 15 minutes. Patient denies hallucinations at this time.

## 2021-09-08 NOTE — BH NOTE
Patient given tobacco quitline number 83568744402 at this time, refusing to call at this time, states \" I just dont want to quit now\"- patient given information as to the dangers of long term tobacco use. Continue to reinforce the importance of tobacco cessation.

## 2021-09-08 NOTE — BH NOTE
[facility]  Discharge Note    Pt discharged with followings belongings:   Dentures: None  Vision - Corrective Lenses: None  Hearing Aid: None  Jewelry: Bracelet  Clothing: Footwear, Pants, Shirt, Socks, Undergarments (Comment)  Were All Patient Medications Collected?: Not Applicable  Other Valuables: Cell phone, Tamia Harmon sent home with patient. Patient education on aftercare instructions: yes, medications and follow-up appointments  Information faxed to [facility] by nurse  at 1:28 PM .Patient verbalize understanding of AVS:  Yes. Discharged to private residence. Status EXAM upon discharge:  Status and Exam  Normal: No  Facial Expression: Flat  Affect: Blunt  Level of Consciousness: Alert  Mood:Normal: No  Mood: Depressed, Anxious, Irritable  Motor Activity:Normal: No  Motor Activity: Decreased  Interview Behavior: Evasive, Irritable, Cooperative  Preception: Vickery to Person, Verneta Putt to Time, Vickery to Place, Vickery to Situation  Attention:Normal: No  Attention: Distractible  Thought Processes: Blocking  Thought Content:Normal: No  Thought Content: Preoccupations, Poverty of Content  Hallucinations:  Auditory (Comment)  Delusions: No  Memory:Normal: No  Memory: Poor Recent, Poor Remote  Insight and Judgment: No  Insight and Judgment: Poor Judgment, Poor Insight, Unmotivated  Present Suicidal Ideation: Yes (+Fleeting SI, no plan, contracts)  Present Homicidal Ideation: No      Metabolic Screening:    No results found for: LABA1C    No results found for: CHOL  No results found for: TRIG  No results found for: HDL  No components found for: LDLCAL  No results found for: Giovanna Phillips LPN

## 2021-09-08 NOTE — GROUP NOTE
Group Therapy Note    Date: 9/8/2021    Group Start Time: 1100  Group End Time: 1130  Group Topic: Cognitive Skills    GIAN Ariza Brood, CTRS      Pt did not attend 1100 cognitive skills group d/t resting in room despite staff invitation to attend. 1:1 talk time offered as alternative to group session, pt declined.           Signature:  Best Donald

## 2021-09-08 NOTE — GROUP NOTE
Group Therapy Note    Date: 9/8/2021    Group Start Time: 1000  Group End Time: 6731  Group Topic: Psychotherapy    [facility] D Hetty Epley        Group Therapy Note       Patient refused to attend psychotherapy group after encouragement from staff. 1:1 talk time offered but refused. Signature:   Hetty Epley

## 2021-09-08 NOTE — DISCHARGE SUMMARY
DISCHARGE SUMMARY      Patient ID:  Demetrios Mcburney  [id number]  65 y.o.  1975    Admit date: 9/1/2021    Discharge date and time: 9/8/2021    Disposition: Home     Admitting Physician: Shantel Longoria MD     Discharge Physician: Dr Anu Mesa MD    Admission Diagnoses: Schizoaffective disorder [facility]) [F25.9]    Admission Condition: poor    Discharged Condition: stable    Admission Berhane Goff is a 40 y.o. male with significant past medical history of schizoaffective disorder, asthma,'s polysubstance abuse, hypertension and obesity who presented to the [facility] ED with suicidal and homicidal thoughts. Patient was pink slipped. Per ED note patient endorsed feeling suicidal and homicidal at times. Endorses auditory hallucinations. He had been off his medications for approximately a week. He had been at [facility] and taken to [facility] emergency room yesterday, per pink slip yesterday from police patient was placed on a waiting list for a bed at [facility]. Patient was not admitted yesterday, he left and went to [facility] emergency room complaining of suicidal and homicidal ideation.     Current medications:  Zyprexa 10 mg twice a day  Trazodone 100 mg at bedtime  Cogentin 1 mg daily  Flexeril 4 mg as needed for muscle pain  PDMP shows no activity     Upon admission to unit staff report patient is irritable. Patient does have a history of violence against staff, has been taking behavioral control at [facility] emergency room and on the unit. Patient was last at [facility] in September 2020 for suicidal ideation.  Danielito Kennedy was seen for his initial intake. He was guarded, irritable and slightly evasive in answering questions. He does endorse that he was at [facility] but would not elaborate on his reason for being incarcerated. He states that he has not taken his medications \"in over a week\", does report that they worked when he was taking them.  He states that he went to his aunts house yesterday Not on file     Social Determinants of Health     Financial Resource Strain:     Difficulty of Paying Living Expenses:    Food Insecurity:     Worried About Running Out of Food in the Last Year:     [address] in the Last Year:    Transportation Needs:     Lack of Transportation (Medical):      Lack of Transportation (Non-Medical):    Physical Activity:     Days of Exercise per Week:     Minutes of Exercise per Session:    Stress:     Feeling of Stress :    Social Connections:     Frequency of Communication with Friends and Family:     Frequency of Social Gatherings with Friends and Family:     Attends Taoist Services:     Active Member of Clubs or Organizations:     Attends Club or Organization Meetings:     Marital Status:    Intimate Partner Violence:     Fear of Current or Ex-Partner:     Emotionally Abused:     Physically Abused:     Sexually Abused:        MEDICATIONS:    Current Facility-Administered Medications:     OLANZapine (ZYPREXA) tablet 10 mg, 10 mg, Oral, BID, Frieda Sheppard, APRN - CNP, 10 mg at 09/08/21 0846    haloperidol (HALDOL) tablet 5 mg, 5 mg, Oral, Q4H PRN **AND** LORazepam (ATIVAN) tablet 2 mg, 2 mg, Oral, Q4H PRN, Yossi Schwartz MD    haloperidol lactate (HALDOL) injection 5 mg, 5 mg, IntraMUSCular, Q4H PRN **AND** LORazepam (ATIVAN) injection 2 mg, 2 mg, IntraMUSCular, Q4H PRN **AND** diphenhydrAMINE (BENADRYL) injection 50 mg, 50 mg, IntraMUSCular, Q4H PRN, Yossi Schwartz MD    acetaminophen (TYLENOL) tablet 650 mg, 650 mg, Oral, Q4H PRN, Cristi Sabot, APRN - CNP    aluminum & magnesium hydroxide-simethicone (MAALOX) 200-200-20 MG/5ML suspension 30 mL, 30 mL, Oral, Q6H PRN, Vola Sabot, APRN - CNP, 30 mL at 09/02/21 0856    hydrOXYzine (ATARAX) tablet 50 mg, 50 mg, Oral, TID PRN, Vola Sabot, APRN - CNP, 50 mg at 09/08/21 0846    ibuprofen (ADVIL;MOTRIN) tablet 400 mg, 400 mg, Oral, Q6H PRN, Vola Sabot, APRN - CNP    nicotine polacrilex (NICORETTE) gum 2 mg, 2 mg, Oral, PRN, Lorrine Bicker, APRN - CNP    polyethylene glycol (GLYCOLAX) packet 17 g, 17 g, Oral, Daily PRN, Lorrine Bicker, APRN - CNP    traZODone (DESYREL) tablet 50 mg, 50 mg, Oral, Nightly PRN, Lorrine Bicker, APRN - CNP, 50 mg at 09/07/21 2034    Examination:  /82   Pulse 95   Temp 98.6 °F (37 °C)   Resp 14   Ht 6' (1.829 m)   Wt 240 lb (108.9 kg)   BMI 32.55 kg/m²   Gait - steady    HOSPITAL COURSE[de-identified]  Following admission to the hospital, patient had a complete physical exam and blood work up, which was unremarkable. Patient was monitored closely with suicide precaution  Patient was started on olanzapine which he tolerated well. The patient was mostly compliant with medications though he missed some doses. The patient was irritable and abrupt in his manner in the early part of his admission though this improved towards the end of his hospital stay. The patient remained isolative throughout and did not participate in groups. Was encouraged to participate in group and other milieu activity  Patient started to feel better with this combination of treatment. Significant progress in the symptoms since admission. Mood is improved  The patient denies AVH or paranoid thoughts  The patient denies any hopelessness or worthlessness  No active SI/HI  Appetite:  [x] Normal  [] Increased  [] Decreased    Sleep:       [x] Normal  [] Fair       [] Poor            Energy:    [x] Normal  [] Increased  [] Decreased     SI [] Present  [x] Absent  HI  []Present  [x] Absent   Aggression:  [] yes  [] no  Patient is [x] able  [] unable to CONTRACT FOR SAFETY   Medication side effects(SE):  [x] None(Psych.  Meds.) [] Other      Mental Status Examination on discharge:    Level of consciousness:  within normal limits   Appearance:  well-appearing  Behavior/Motor:  no abnormalities noted  Attitude toward examiner:  attentive and good eye contact  Speech:  spontaneous, normal rate and normal volume   Mood: irritable  Affect:  mood congruent  Thought processes:  linear, goal directed and coherent   Thought content:  Suicidal Ideation:  denies suicidal ideation  Delusions:  no evidence of delusions  Perceptual Disturbance:  denies any perceptual disturbance  Cognition:  oriented to person, place, and time   Concentration intact  Memory intact  Insight good   Judgement fair   Fund of Knowledge adequate      ASSESSMENT:  Patient symptoms are:  [x] Well controlled  [x] Improving  [] Worsening  [] No change      Diagnosis:  Principal Problem:    Schizoaffective disorder, bipolar type ([facility]  Resolved Problems:    * No resolved hospital problems. *      LABS:    No results for input(s): WBC, HGB, PLT in the last 72 hours. No results for input(s): NA, K, CL, CO2, BUN, CREATININE, GLUCOSE in the last 72 hours. No results for input(s): BILITOT, ALKPHOS, AST, ALT in the last 72 hours. Lab Results   Component Value Date    BARBSCNU NEGATIVE 07/23/2018    LABBENZ NEGATIVE 07/23/2018    LABBENZ NEGATIVE 10/13/2011    LABMETH NEGATIVE 07/23/2018    PPXUR NOT REPORTED 07/23/2018     No results found for: TSH, FREET4  No results found for: LITHIUM  Lab Results   Component Value Date    VALPROATE 57 09/13/2012       RISK ASSESSMENT AT DISCHARGE: Low risk for suicide and homicide. Treatment Plan:  Reviewed current Medications with the patient. Education provided on the complaince with treatment. Risks, benefits, side effects, drug-to-drug interactions and alternatives to treatment were discussed. Encourage patient to attend outpatient follow up appointment and therapy. Patient was advised to call the outpatient provider, visit the nearest ED or call 911 if symptoms are not manageable.            Medication List      START taking these medications    OLANZapine 10 MG tablet  Commonly known as: ZYPREXA  Take 1 tablet by mouth 2 times daily     traZODone 50 MG tablet  Commonly known as: DESYREL  Take 1 tablet by mouth nightly as needed for Sleep           Where to Get Your Medications      These medications were sent to [address] [place] [address] (), [facility]  [address] [place] [address] () [address]    Phone: [phone]   · OLANZapine 10 MG tablet  · traZODone 50 MG tablet           Core Measures statement:   Not applicable      TIME SPENT - 28 MINUTES TO COMPLETE THE EVALUATION, DISCHARGE SUMMARY, MEDICATION RECONCILIATION AND FOLLOW UP CARE                                         Marco Solorio is a 40 y.o. male being evaluated Trung Andrade MD on 9/8/2021 at 9:43 AM    An electronic signature was used to authenticate this note. **This report has been created using voice recognition software. It may contain minor errors which are inherent in voice recognition technology. **

## 2021-09-10 PROBLEM — R45.851 DEPRESSION WITH SUICIDAL IDEATION: Status: ACTIVE | Noted: 2021-09-10

## 2021-09-10 PROBLEM — F32.A DEPRESSION WITH SUICIDAL IDEATION: Status: ACTIVE | Noted: 2021-09-10

## 2021-09-11 ENCOUNTER — HOSPITAL ENCOUNTER (INPATIENT)
Age: 46
LOS: 6 days | Discharge: HOME | DRG: 885 | End: 2021-09-17
Attending: PSYCHIATRY & NEUROLOGY | Admitting: PSYCHIATRY & NEUROLOGY
Payer: MEDICARE

## 2021-09-11 PROCEDURE — 99223 1ST HOSP IP/OBS HIGH 75: CPT | Performed by: PSYCHIATRY & NEUROLOGY

## 2021-09-11 PROCEDURE — 6370000000 HC RX 637 (ALT 250 FOR IP): Performed by: PSYCHIATRY & NEUROLOGY

## 2021-09-11 PROCEDURE — 1240000000 HC EMOTIONAL WELLNESS R&B

## 2021-09-11 RX ORDER — ACETAMINOPHEN 325 MG/1
650 TABLET ORAL EVERY 4 HOURS PRN
Status: DISCONTINUED | OUTPATIENT
Start: 2021-09-11 | End: 2021-09-17 | Stop reason: HOSPADM

## 2021-09-11 RX ORDER — HYDROXYZINE 50 MG/1
50 TABLET, FILM COATED ORAL 3 TIMES DAILY PRN
Status: DISCONTINUED | OUTPATIENT
Start: 2021-09-11 | End: 2021-09-17 | Stop reason: HOSPADM

## 2021-09-11 RX ORDER — OLANZAPINE 10 MG/1
10 TABLET ORAL 2 TIMES DAILY
Status: DISCONTINUED | OUTPATIENT
Start: 2021-09-11 | End: 2021-09-17 | Stop reason: HOSPADM

## 2021-09-11 RX ORDER — LORAZEPAM 1 MG/1
2 TABLET ORAL EVERY 6 HOURS PRN
Status: DISCONTINUED | OUTPATIENT
Start: 2021-09-11 | End: 2021-09-17 | Stop reason: HOSPADM

## 2021-09-11 RX ORDER — HALOPERIDOL 5 MG/ML
5 INJECTION INTRAMUSCULAR EVERY 6 HOURS PRN
Status: DISCONTINUED | OUTPATIENT
Start: 2021-09-11 | End: 2021-09-17 | Stop reason: HOSPADM

## 2021-09-11 RX ORDER — MAGNESIUM HYDROXIDE/ALUMINUM HYDROXICE/SIMETHICONE 120; 1200; 1200 MG/30ML; MG/30ML; MG/30ML
30 SUSPENSION ORAL EVERY 6 HOURS PRN
Status: DISCONTINUED | OUTPATIENT
Start: 2021-09-11 | End: 2021-09-17 | Stop reason: HOSPADM

## 2021-09-11 RX ORDER — HALOPERIDOL 5 MG
5 TABLET ORAL EVERY 6 HOURS PRN
Status: DISCONTINUED | OUTPATIENT
Start: 2021-09-11 | End: 2021-09-17 | Stop reason: HOSPADM

## 2021-09-11 RX ORDER — IBUPROFEN 400 MG/1
400 TABLET ORAL EVERY 6 HOURS PRN
Status: DISCONTINUED | OUTPATIENT
Start: 2021-09-11 | End: 2021-09-17 | Stop reason: HOSPADM

## 2021-09-11 RX ORDER — LORAZEPAM 2 MG/ML
2 INJECTION INTRAMUSCULAR EVERY 6 HOURS PRN
Status: DISCONTINUED | OUTPATIENT
Start: 2021-09-11 | End: 2021-09-17 | Stop reason: HOSPADM

## 2021-09-11 RX ORDER — DIPHENHYDRAMINE HYDROCHLORIDE 50 MG/ML
50 INJECTION INTRAMUSCULAR; INTRAVENOUS EVERY 6 HOURS PRN
Status: DISCONTINUED | OUTPATIENT
Start: 2021-09-11 | End: 2021-09-17 | Stop reason: HOSPADM

## 2021-09-11 RX ORDER — TRAZODONE HYDROCHLORIDE 50 MG/1
50 TABLET ORAL NIGHTLY PRN
Status: DISCONTINUED | OUTPATIENT
Start: 2021-09-11 | End: 2021-09-17 | Stop reason: HOSPADM

## 2021-09-11 RX ORDER — POLYETHYLENE GLYCOL 3350 17 G/17G
17 POWDER, FOR SOLUTION ORAL DAILY PRN
Status: DISCONTINUED | OUTPATIENT
Start: 2021-09-11 | End: 2021-09-17 | Stop reason: HOSPADM

## 2021-09-11 RX ADMIN — OLANZAPINE 10 MG: 10 TABLET, FILM COATED ORAL at 21:05

## 2021-09-11 ASSESSMENT — PAIN SCALES - GENERAL: PAINLEVEL_OUTOF10: 0

## 2021-09-11 ASSESSMENT — LIFESTYLE VARIABLES: HISTORY_ALCOHOL_USE: NO

## 2021-09-11 ASSESSMENT — SLEEP AND FATIGUE QUESTIONNAIRES
RESTFUL SLEEP: NO
DIFFICULTY FALLING ASLEEP: YES
DO YOU USE A SLEEP AID: NO
AVERAGE NUMBER OF SLEEP HOURS: 6
DIFFICULTY ARISING: YES
DIFFICULTY STAYING ASLEEP: NO
SLEEP PATTERN: DIFFICULTY FALLING ASLEEP
DO YOU HAVE DIFFICULTY SLEEPING: YES

## 2021-09-11 NOTE — BH NOTE
BHI Biopsychosocial Assessment     Current Level of Psychosocial Functioning      Independent   Dependent  X  Minimal Assist      Comments:  Client has a principle diagnosis of Schizoaffective disorder, bipolar type, chronic, which is the condition established to be chiefly responsible for the admission of the client on this date. Client documentation provides prior diagnosis of Paranoid Schizophrenia     Psychosocial High Risk Factors (check all that apply)     Unable to obtain meds   Chronic illness/pain    Substance abuse   Lack of Family Support   Financial stress   Isolation   Inadequate Community Resources X  Suicide attempt(s)   Not taking medications X   Victim of crime   Developmental Delay  Unable to manage personal needs  X  Age 72 or older   Homeless X  No transportation X   Readmission within 30 days  Unemployment X  Traumatic Event      Psychiatric Advanced Directives:   Nothing reported     Family to Involve in Treatment:   Client states family is not involved in treatment, refuses to sign family DEAN. Client lists mother on face-sheet as emergency contact.       Sexual Orientation:   Single male     Patient Strengths:   Medicare/[place] Medicaid; SSI income; [facility]; family support       Patient Barriers:   Non-compliant with outpatient [facility] treatment; multiple inpatient psychiatric admissions at [facility], [address] [place] [address] and [facility] via [address]; history of violence and agitation towards staff and other clients; poor use of coping skills, lacks judgment and insight of severe mental illness and importance of staying on medications; history of being incarcerated and currently on probation; struggles with homelessness        Opiate/AOD Referral and/or Education Provided:   No substance abuse reported     CMHC/mental health history:  HX with [address]:  Medication management, group/individual therapies, family meetings, psychoeducation, 1:1 counseling, treatment team meetings to assist with stabilization      Initial Discharge Plan:  Stabilize mood and medications; explore social support systems within community to increase socialization; provide 24/7 local and national hotline numbers for additional support; safety plan to be completed; disclose/discuss suicidal ideas will improve, decrease depressive symptoms, absence of self-harm; discuss location at time of discharge and if to address on face-sheet; [employer]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 on a [place]ip from [facility]. Client was most recently at the [facility] from 9/1/2021- 9/8/2021. Client reports he has been compliant on his medications. Client reports when he left the [facility] he got into a fight with his girlfriend. He refused to elaborate on the cause of the fight stating \"it is personal\". He states that he had homicidal ideations to kill his girlfriend and suicidal ideation to kill himself with a gun. He states he does not have access to a gun, but could purchase a gun \"easy\". SW will attempt to get the name of the girlfriend to do a \"duty to warn\". Client reports since left he has been depressed AEB fatigue and a loss of energy, feelings of guilt, worthlessness, poor appetite, irritable and having an increase in SI/HI. Client denies all hallucinations or delusions, no paranoia, no anxiety or PTSD symptoms. Client does confirm substance use as well as past and current legal issues. Client has a long history of violent behaviors and not staying on his medications. Client would not state why or how long he was incarcerated during this assessment. Client was unwilling to participate in most of the assessment on this date but did endorse being \"angry at his girlfriend\". Client is a poor historian and becomes irritated and agitated with this writer for asking questions. Writer ended the assessment and will attempt to meet with him later.   Client has a history of being linked with the [facility], struggles with stable housing and stays between his aunt and grandmother's homes. Client is on probation. Client has had multiple inpatient psychiatric hospitalizations between [facility], [address] [place] [address] and [facility] ED. Per Epic, client born and raised in [place], raised mostly by biological mother and step-father but spent a lot of time with grandmother, single and never  but reports he has a girlfriend, 1 adult son and limited contact, 1 sister and they are estranged, linked with [facility], denies any substance abuse, and has not reported any childhood abuse or trauma.

## 2021-09-11 NOTE — BH NOTE
Patient offered to retrieve numbers from cell phone before placement in safe. Patient denied needing numbers.

## 2021-09-11 NOTE — PLAN OF CARE
Problem: Anger Management/Homicidal Ideation:  Goal: Absence of angry outbursts  Description: Absence of angry outbursts  Outcome: Ongoing  Note: Patient irritable but remains free of angry outbursts during shift, will continue to monitor     Problem: Anger Management/Homicidal Ideation:  Goal: Absence of homicidal ideation  Description: Absence of homicidal ideation  Outcome: Ongoing  Note: Patient refuses to answer questions regarding homicidal ideations, will continue to monitor with Q15 minute safety checks

## 2021-09-11 NOTE — PROGRESS NOTES
Behavioral Services  Medicare Certification Upon Admission    I certify that this patient's inpatient psychiatric hospital admission is medically necessary for:    [x] (1) Treatment which could reasonably be expected to improve this patient's condition,       [x] (2) Or for diagnostic study;     AND     [x](2) The inpatient psychiatric services are provided while the individual is under the care of a physician and are included in the individualized plan of care.     Estimated length of stay/service -5 to 9 days-    Plan for post-hospital care -outpatient care    Electronically signed by Gordo Mclain MD on 9/11/2021 at 3:06 PM

## 2021-09-11 NOTE — H&P
Department of Psychiatry  Attending Physician Psychiatric Assessment     Reason for Admission to Psychiatric Unit:  Threat to self requiring 24 hour professional observation  Threat to others requiring 24 hour professional observation  Command hallucinations directing harm to self or others; risk of the patient taking action  A mental disorder causing major disability in social, interpersonal, occupational, and/or educational functioning that is leading to dangerous or life-threatening functioning, and that can only be addressed in an acute inpatient setting   Concerns about patient's safety in the community     CHIEF COMPLAINT: Schizoaffective disorder     History obtained from:  patient, electronic medical record and family members     HISTORY OF PRESENT ILLNESS:    Joy Mendieta is a 40 y.o. male with significant past medical history of schizoaffective disorder, asthma,'s polysubstance abuse, hypertension and obesity who presented to the [facility] ED with suicidal and homicidal thoughts. Patient was pink slipped. Per ED note patient endorsed feeling suicidal and homicidal.      Current medications:  Zyprexa 10 mg twice a day  Trazodone 50 mg at bedtime  PDMP shows no activity     Patient was last seen at [facility] and discharged September 2021.     Marco was seen for his initial intake. He reports that he was discharged and returned home, he got into a fight with his girlfriend. He refused to elaborate on the cause of the fight stating \"it is personal\". He states that he had homicidal ideations to kill his girlfriend and suicidal ideation to kill himself with a gun. He states he does not have access to a gun, but could purchase a gun \"easy\". He has been compliant with his medications since discharge. He endorses chronic daily depression over 2 weeks, depression symptoms include anhedonia, feelings of guilt/worthlessness, fatigue, psychomotor retardation and suicidal ideation.   Patient is able to contract for safety within the psychiatric inpatient unit, states he does not feel safe returning home. He endorses auditory hallucinations telling him to kill himself and to hurt other people. He endorses feeling paranoid, but states decrease in the intensity of these thoughts. He denies anxiety, panic attacks, or PTSD symptoms.     He does have a past history of substance abuse per medical documentation, patient currently denies any illicit drug or alcohol use. Reviewed labs at [facility] urine drug screen negative and ethanol was negative.     Reviewed labs from [facility]:  CBC within normal limits  BMP within normal limits  Liver battery within normal limits  Vital signs normal     PSYCHIATRIC HISTORY:  yes   Currently follows with [facility]  1 previous lifetime suicide attempts by overdose  Multiple extensive psychiatric hospital admissions     Past psychiatric medications includes: Zoloft, Risperdal, Abilify, Zyprexa and trazodone     Adverse reactions from psychotropic medications: Patient reports sexual side effects from SSRI medications and also reports feeling overly sedated at times on his medications, but no life-threatening or allergic reactions.     Lifetime Psychiatric Review of Systems          Mayuri or Hypomania: denies      Panic Attacks: denies      Phobias: denies     Obsessions and Compulsions:denies     Body or Vocal Tics:  denies     Hallucinations: Endorses     Delusions: Paranoid    Past Medical History:        Diagnosis Date    Asthma     Hypertension     [facility]     Schizoaffective disorder ([facility]     Substance abuse ([facility]        Past Surgical History:    History reviewed. No pertinent surgical history. Allergies:  Patient has no known allergies. Social History:     BORN IN in [address] [place] [address]. LEVEL OF EDUCATION: High school diploma  MARITAL STATUS: Has a girlfriend, never .  CHILDREN: 1 adult son  OCCUPATION: Receives SSD  RESIDENCE: Currently lives family, but states he is homeless    DRUG USE HISTORY  Social History     Tobacco Use   Smoking Status Never Smoker   Smokeless Tobacco Never Used   Tobacco Comment    pt nonsmoker     Social History     Substance and Sexual Activity   Alcohol Use Not Currently    Comment: Hx of Alcohol abuse     Social History     Substance and Sexual Activity   Drug Use Not Currently       LEGAL HISTORY:   HISTORY OF INCARCERATION: Yes     Family History:       Family history unknown: Yes       Psychiatric Family History  Denies any family history of mental illness, substance abuse or suicides    PHYSICAL EXAM:  Vitals:  BP (!) 142/84   Pulse 77   Temp 98.2 °F (36.8 °C) (Oral)   Resp 14   Ht 6' (1.829 m)   Wt 240 lb (108.9 kg)   BMI 32.55 kg/m²      Review of Systems   Constitutional: Negative for chills and weight loss. HENT: Negative for ear pain and nosebleeds. Eyes: Negative for blurred vision and photophobia. Respiratory: Negative for cough, shortness of breath and wheezing. Cardiovascular: Negative for chest pain and palpitations. Gastrointestinal: Negative for abdominal pain, diarrhea and vomiting. Genitourinary: Negative for dysuria and urgency. Musculoskeletal: Negative for falls and joint pain. Skin: Negative for itching and rash. Neurological: Negative for tremors, seizures and weakness. Endo/Heme/Allergies: Does not bruise/bleed easily. Physical Exam:      Constitutional:  Appears well-developed and well-nourished, no acute distress  HENT:   Head: Normocephalic and atraumatic. Eyes: Conjunctivae are normal. Right eye exhibits no discharge. Left eye exhibits no discharge. No scleral icterus. Neck: Normal range of motion. Neck supple. Pulmonary/Chest:  No respiratory distress or accessory muscle use, no wheezing. Abdominal: Soft. Exhibits no distension. Musculoskeletal: Normal range of motion. Exhibits no edema.    Neurological: cranial nerves II-XII grossly in tact, normal gait and station  Skin: Skin is warm and dry. Patient is not diaphoretic. No erythema. Mental Status Examination:    Level of consciousness:  Awake and alert  Appearance:  Hospital attire, lying in bed, fair grooming and hygiene  Behavior/Motor:  Irritable, no psychomotor abnormality  Attitude toward examiner:   Minimally cooperative, angry with poor eye contact  Speech: Irritable tone, normal rate and volume, good articulation  Mood:  Depressed  Affect:   Mood congruent  Thought processes:   Linear responses to questions asked  Thought content: active suicidal ideations without current plan or intent               Endorses homicidal ideations               Endorses auditory hallucinations to kill self hurt others              Paranoid delusions  Cognition:  Oriented to self, location, time, situation  Concentration clinically adequate  Memory: Age-appropriate  Insight &Judgment: poor    DSM-5 Diagnosis  Schizoaffective disorder, bipolar type Hello    Psychosocial and Contextual factors:  Financial  Occupational  Relationship x  Legal   Living situation  Educational     Past Medical History:   Diagnosis Date    Asthma     Hypertension     [facility]bid [facility] ([facility]     Schizoaffective disorder ([facility]     Substance abuse ([facility] [facility]         TREATMENT PLAN  Continue home medications    Risk Management:  close watch per standard protocol      Psychotherapy:  participation in milieu and group and individual sessions with Attending Physician,  and Physician Assistant/CNP      Estimated length of stay:  2-14 days      GENERAL PATIENT/FAMILY EDUCATION  Patient will understand basic signs and symptoms, Patient will understand benefits/risks and potential side effects from proposed meds and Patient will understand their role in recovery. Family is  active in patient's care.    Patient assets that may be helpful during treatment include: Intent to participate and engage in treatment, sufficient fund of knowledge and intellect to understand and utilize treatments. Goals:    Remission of suicidal ideation while inpatient  Remission of homicidal ideation while inpatient  Remission of depression symptoms while inpatient  2 to 3 days stable symptoms prior to discharge       Physicians Signature:  Electronically signed by MARIA D Todd CNP on 9/11/21 at 3:36 AM EDT  I independently saw and evaluated the patient. I reviewed the midlevel provider's documentation above. Any additional comments or changes to the midlevel provider's documentation are stated below otherwise agree with assessment. The patient reports that he was discharged from the hospital but the medication was not enough to keep his mood stable. He has had an argument with his partner. He has been hearing voices and getting increasingly depressed. He has got suicidal ideation. The patient denies using any recreational substances. We will start the patient on olanzapine 10 mg twice daily. Recommended titrating up. PLAN  Medications as noted above  Attempt to develop insight  Psycho-education conducted. Supportive Therapy conducted.   Probable discharge is 5-9 days  Follow-up daily while on inpatient unit    Electronically signed by Brigitte Ornelas MD on 9/11/21 at 3:09 PM EDT

## 2021-09-11 NOTE — BH NOTE
[facility]  Admission Note     Admission Type:   Admission Type: Voluntary    Reason for admission:  Reason for Admission: Homicidal ideations without a plan towards girlfriend after an argument. Suicidal ideations without a specific plan    PATIENT STRENGTHS:  Strengths: No significant Physical Illness    Patient Strengths and Limitations:  Limitations: Apathetic / unmotivated    Addictive Behavior:   Addictive Behavior  In the past 3 months, have you felt or has someone told you that you have a problem with:  : None  Do you have a history of Chemical Use?: No (patient denies)  Do you have a history of Alcohol Use?: No (patient denies)  Do you have a history of Street Drug Abuse?: No (patient denies)  Histroy of Prescripton Drug Abuse?: No (patient denies)    Medical Problems:   Past Medical History:   Diagnosis Date    Asthma     Hypertension     Morbid [facility] ([facility] [address] [facility]     Schizoaffective disorder ([facility] [address] [facility]     [facility]        Status EXAM:  Status and Exam  Normal: No  Facial Expression: Flat  Affect: Blunt  Level of Consciousness: Alert  Mood:Normal: No  Mood: Irritable  Motor Activity:Normal: No  Interview Behavior: Evasive, Irritable  Preception: [place] to Person, [place] to Time, [place] to Place, [place] to Situation  Attention:Normal: No  Attention: Distractible  Thought Content:Normal: No  Thought Content: Preoccupations  Hallucinations:  Auditory (Comment)  Delusions: No  Memory:Normal: No  Memory: Poor Recent, Poor Remote  Insight and Judgment: No  Insight and Judgment: Poor Judgment, Poor Insight  Present Suicidal Ideation: Yes  Present Homicidal Ideation: Yes    Tobacco Screening:  Practical Counseling, on admission, sung X, if applicable and completed (first 3 are required if patient doesn't refuse):            ( )  Recognizing danger situations (included triggers and roadblocks)                    ( )  Coping skills (new ways to manage stress, exercise, relaxation techniques, changing routine, distraction)                                                           ( )  Basic information about quitting (benefits of quitting, techniques in how to quit, available resources  ( ) Referral for counseling faxed to [facility]                                           ( ) Patient refused counseling  (x ) Patient has not smoked in the last 30 days    Metabolic Screening:    No results found for: LABA1C    No results found for: CHOL  No results found for: TRIG  No results found for: HDL  No components found for: LDLCAL  No results found for: LABVLDL      Body mass index is 32.55 kg/m². BP Readings from Last 2 Encounters:   09/11/21 (!) 142/84   09/07/21 118/82           Pt admitted with followings belongings:  Dentures: None  Vision - Corrective Lenses: None  Hearing Aid: None  Jewelry: Bracelet  Body Piercings Removed: N/A  Clothing: Footwear, Pants, Shirt, Jacket / coat, Undergarments (Comment)  Were All Patient Medications Collected?: Not Applicable  Other Valuables: Keys, Cell phone     Patient's home medications were need verified. Patient oriented to surroundings and program expectations and copy of patient rights given. Received admission packet. Consents reviewed, signed . Patient verbalize understanding. Patient education on precautions. Patient presented at [facility] homicidal thoughts towards girlfriend after argument. Suicidal to shoot self with gun, increased depression. Patient denies suicidal ideation on admit. Patients last discharged from unit on 8th.                   Richard Baeza RN

## 2021-09-12 PROCEDURE — 6370000000 HC RX 637 (ALT 250 FOR IP): Performed by: PSYCHIATRY & NEUROLOGY

## 2021-09-12 PROCEDURE — 1240000000 HC EMOTIONAL WELLNESS R&B

## 2021-09-12 PROCEDURE — 99232 SBSQ HOSP IP/OBS MODERATE 35: CPT | Performed by: PSYCHIATRY & NEUROLOGY

## 2021-09-12 RX ORDER — FLUOXETINE 10 MG/1
10 CAPSULE ORAL DAILY
Status: DISCONTINUED | OUTPATIENT
Start: 2021-09-12 | End: 2021-09-13

## 2021-09-12 RX ADMIN — OLANZAPINE 10 MG: 10 TABLET, FILM COATED ORAL at 09:10

## 2021-09-12 RX ADMIN — FLUOXETINE HYDROCHLORIDE 10 MG: 10 CAPSULE ORAL at 17:14

## 2021-09-12 RX ADMIN — OLANZAPINE 10 MG: 10 TABLET, FILM COATED ORAL at 21:43

## 2021-09-12 NOTE — GROUP NOTE
Group Therapy Note    Date: 9/12/2021    Group Start Time: 1330  Group End Time: 1119  Group Topic: Cognitive Skills    [facility]    Sonya Brown, SANAZS    Pt did not attend 1330 cognitive skills group d/t resting in room despite staff invitation to attend. 1:1 talk time offered as alternative to group session, pt declined.               Signature:  Felisha Daley

## 2021-09-12 NOTE — BH NOTE
Patient did not attend safety planning group at 1000 despite staff encouragement. Patient was offered 1:1 interaction instead of group but patient declined.

## 2021-09-12 NOTE — PLAN OF CARE
[facility]  Day 3 Interdisciplinary Treatment Plan NOTE    Review Date & Time: 9/12/2021. 1321    Admission Type:   Admission Type: Voluntary    Reason for admission:  Reason for Admission: Homicidal ideations without a plan towards girlfriend after an argument.  Suicidal ideations without a specific plan  Estimated Length of Stay: 5-7 days  Estimated Discharge Date Update: to be determined by physician    PATIENT STRENGTHS:  Patient Strengths Strengths: No significant Physical Illness  Patient Strengths and Limitations:Limitations: External locus of control  Addictive Behavior:Addictive Behavior  In the past 3 months, have you felt or has someone told you that you have a problem with:  : None  Do you have a history of Chemical Use?: No (patient denies)  Do you have a history of Alcohol Use?: No (patient denies)  Do you have a history of Street Drug Abuse?: No (patient denies)  Histroy of Prescripton Drug Abuse?: No (patient denies)  Medical Problems:  Past Medical History:   Diagnosis Date    Asthma     Hypertension     Morbid obesity ([facility] [address] [facility]     Schizoaffective disorder ([facility] [address]     Substance abuse ([facility] [address]        Risk:  Fall RiskTotal: 65  Christopher Scale Christopher Scale Score: 22  BVC Total: 1  Change in scores no Changes to plan of Care no    Status EXAM:   Status and Exam  Normal: Yes  Facial Expression: Flat  Affect: Blunt  Level of Consciousness: Alert  Mood:Normal: No  Mood: Irritable  Motor Activity:Normal: No  Motor Activity: Decreased  Interview Behavior: Evasive, Irritable  Preception: [place] to Person, [place] to Time, [place] to Place, [place] to Situation  Attention:Normal: No  Attention: Distractible  Thought Processes: Circumstantial  Thought Content:Normal: No  Thought Content: Preoccupations  Hallucinations: None  Delusions: No  Memory:Normal: No  Memory: Poor Remote, Poor Recent  Insight and Judgment: No  Insight and Judgment: Poor Judgment, Poor Insight  Present Suicidal Ideation: No  Present Homicidal Ideation: No    Daily Assessment Last Entry:   Daily Sleep (WDL): Within Defined Limits         Patient Currently in Pain: Denies  Daily Nutrition (WDL): Within Defined Limits    Patient Monitoring:  Frequency of Checks: 4 times per hour, close    Psychiatric Symptoms:   Depression Symptoms  Depression Symptoms: Change in energy level, Isolative, Loss of interest, Increased irritability  Anxiety Symptoms  Anxiety Symptoms: Generalized  Mayuri Symptoms  Mayuri Symptoms: No problems reported or observed. Psychosis Symptoms  Delusion Type: No problems reported or observed. Suicide Risk CSSR-S:  1) Within the past month, have you wished you were dead or wished you could go to sleep and not wake up? : Yes  2) Have you actually had any thoughts of killing yourself? : No  6) Have you ever done anything, started to do anything, or prepared to do anything to end your life?: No  Change in Result no  Change in Plan of care no       EDUCATION:   EDUCATION:   Learner Progress Toward Treatment Goals: Reviewed results and recommendations of this team, Reviewed group plan and strategies, Reviewed signs, symptoms and risk of self harm and violent behavior, Reviewed goals and plan of care    Method:small group, individual verbal education    Outcome:verbalized by patient, but needs reinforcement to obtain goals    PATIENT GOALS:  Short term:pt refuses to attend tx planning to develop goals   Long term: pt refuses to attend tx planning to develop goals     PLAN/TREATMENT RECOMMENDATIONS UPDATE: continue with group therapies, increased socialization, continue planning for after discharge goals, continue with medication compliance    SHORT-TERM GOALS UPDATE:   Time frame for Short-Term Goals: 5-7 days    LONG-TERM GOALS UPDATE:   Time frame for Long-Term Goals: 6 months  Members Present in Team Meeting: See Signature Sheet    [facility] . td

## 2021-09-12 NOTE — PLAN OF CARE
Problem: Anger Management/Homicidal Ideation:  Goal: Absence of homicidal ideation  Description: Absence of homicidal ideation  9/11/2021 2027 by Fred Jones  Outcome: Ongoing  Note: Patient denies homicidal ideation at this time. Denies suicidal ideation and denies hallucinations. Isolative and evasive. Safety checks maintained q15min and irregular rounding. Problem: Anger Management/Homicidal Ideation:  Goal: Absence of angry outbursts  Description: Absence of angry outbursts  9/11/2021 2024 by Fred Jones  Outcome: Ongoing  Note: Patient remains free from angry outbursts during this shift. Remains in control of behavior. Irritable at times but isolative to room. Safety checks maintained q15 min and irregular rounding.

## 2021-09-12 NOTE — PROGRESS NOTES
BEHAVIORAL HEALTH FOLLOW-UP NOTE     9/12/2021     Patient was seen and examined in person, Chart reviewed   Patient's case discussed with staff/team   Patient attending groups: No   Patient compliant with medication: Yes    Chief Complaint: Homicidal and suicidal ideation    Interim History:   Staff report the patient has maintained medication adherence and has been without acute behavioral changes since admission. Patient has not utilized as needed or emergency medications. He is agreeable to assessment at bedside. Patient reports that his mood is \"all right \"and he continues to endorse fleeting suicidal ideation. He confirms that he had plans to kill his girlfriend and then himself. He is able to contract for safety currently. He denies side effects related to olanzapine is been started. Discussed his blood pressure and he denies symptoms of dizziness or unstable gait and agrees that he will maintain oral hydration and use care when transitioning from laying to sitting to standing. Patient denies visual hallucinations and endorses auditory that are instructing him to self and others. Elvira Mcdaniel He reports his sleep was fair and his appetite good. Patient has remained isolative going to the unit for nutrition and personal needs only. He continues to endorse paranoid thoughts that others are looking or talking about him. He confirms that he has been treated with respect and is encouraged to reach out to support team as needed. He is encouraged to consider groups and milieu participation. He denies having questions or concerns related to his current treatment plan.   Appetite: [x] Normal/Unchanged  [] Increased  [] Decreased      Sleep:       [] Normal/Unchanged  [x] Fair       [] Poor              Energy:    [] Normal/Unchanged  [] Increased  [x] Decreased        Aggression:  [] yes  [x] no    Patient is [x] able  [] unable to CONTRACT FOR SAFETY ON THE UNIT    PAST MEDICAL/PSYCHIATRIC HISTORY:   Past Medical Musculoskeletal  [] Skin/Breast  [] Neurological  [] Endocrine  [] Heme/Lymph  [] Allergic/Immunologic    Explanation:     MEDICATIONS:    Current Facility-Administered Medications:     OLANZapine (ZYPREXA) tablet 10 mg, 10 mg, Oral, BID, Yvette Amaya MD, 10 mg at 09/12/21 0910    acetaminophen (TYLENOL) tablet 650 mg, 650 mg, Oral, Q4H PRN, Bran Manzo, APRN - CNP    aluminum & magnesium hydroxide-simethicone (MAALOX) 200-200-20 MG/5ML suspension 30 mL, 30 mL, Oral, Q6H PRN, Bran Manzo, APRN - CNP    hydrOXYzine (ATARAX) tablet 50 mg, 50 mg, Oral, TID PRN, MARIA D Tucker - CNP    ibuprofen (ADVIL;MOTRIN) tablet 400 mg, 400 mg, Oral, Q6H PRN, Bran Manzo, APRN - CNP    haloperidol (HALDOL) tablet 5 mg, 5 mg, Oral, Q6H PRN **AND** LORazepam (ATIVAN) tablet 2 mg, 2 mg, Oral, Q6H PRN, Bran Manzo, APRN - CNP    nicotine polacrilex (NICORETTE) gum 2 mg, 2 mg, Oral, PRN, Bran Manzo, APRN - CNP    polyethylene glycol (GLYCOLAX) packet 17 g, 17 g, Oral, Daily PRN, Bran Manzo, APRN - CNP    traZODone (DESYREL) tablet 50 mg, 50 mg, Oral, Nightly PRN, Bran Manzo, APRN - CNP    haloperidol lactate (HALDOL) injection 5 mg, 5 mg, IntraMUSCular, Q6H PRN **AND** LORazepam (ATIVAN) injection 2 mg, 2 mg, IntraMUSCular, Q6H PRN **AND** diphenhydrAMINE (BENADRYL) injection 50 mg, 50 mg, IntraMUSCular, Q6H PRN, Bran Manzo, APRN - CNP      Examination:  BP (!) 98/51   Pulse 75   Temp 97.3 °F (36.3 °C) (Infrared)   Resp 16   Ht 6' (1.829 m)   Wt 240 lb (108.9 kg)   SpO2 100%   BMI 32.55 kg/m²   Gait - steady   Medication side effects(SE): Denies    Mental Status Examination:    Level of consciousness:  within normal limits   Appearance:  fair grooming and fair hygiene  Behavior/Motor: Approachable, no abnormalities noted  Attitude toward examiner:  cooperative and good eye contact  Speech:  normal rate and normal volume   Mood: depressed  Affect: Blunted  Thought processes:  linear and Kvng Dsouza CNP on 9/12/2021 at 3:32 PM    An electronic signature was used to authenticate this note. **This report has been created using voice recognition software. It may contain minor errors which are inherent in voice recognition technology. **

## 2021-09-13 PROCEDURE — 6370000000 HC RX 637 (ALT 250 FOR IP): Performed by: PSYCHIATRY & NEUROLOGY

## 2021-09-13 PROCEDURE — 6370000000 HC RX 637 (ALT 250 FOR IP): Performed by: NURSE PRACTITIONER

## 2021-09-13 PROCEDURE — APPSS30 APP SPLIT SHARED TIME 16-30 MINUTES: Performed by: PSYCHIATRY & NEUROLOGY

## 2021-09-13 PROCEDURE — 99232 SBSQ HOSP IP/OBS MODERATE 35: CPT | Performed by: PSYCHIATRY & NEUROLOGY

## 2021-09-13 PROCEDURE — 1240000000 HC EMOTIONAL WELLNESS R&B

## 2021-09-13 RX ORDER — FLUOXETINE HYDROCHLORIDE 20 MG/1
20 CAPSULE ORAL DAILY
Status: DISCONTINUED | OUTPATIENT
Start: 2021-09-14 | End: 2021-09-13

## 2021-09-13 RX ORDER — FLUOXETINE HYDROCHLORIDE 20 MG/1
40 CAPSULE ORAL DAILY
Status: DISCONTINUED | OUTPATIENT
Start: 2021-09-14 | End: 2021-09-15

## 2021-09-13 RX ADMIN — OLANZAPINE 10 MG: 10 TABLET, FILM COATED ORAL at 08:42

## 2021-09-13 RX ADMIN — HYDROXYZINE HYDROCHLORIDE 50 MG: 50 TABLET ORAL at 22:04

## 2021-09-13 RX ADMIN — OLANZAPINE 10 MG: 10 TABLET, FILM COATED ORAL at 22:04

## 2021-09-13 RX ADMIN — TRAZODONE HYDROCHLORIDE 50 MG: 50 TABLET ORAL at 22:04

## 2021-09-13 RX ADMIN — FLUOXETINE HYDROCHLORIDE 10 MG: 10 CAPSULE ORAL at 08:42

## 2021-09-13 NOTE — GROUP NOTE
Group Therapy Note    Date: 9/13/2021    Group Start Time: 1000  Group End Time: 4242  Group Topic: Group Therapy    CHRISTO Mora, BATSHEVA        Group Therapy Note  Pt declined to attend psychotherapy at 1000 am despite encouragement. Pt offered 1:1 and refused.     Attendees: 7/20           Signature:  CHRISTO Birch, BATSHEVA

## 2021-09-13 NOTE — GROUP NOTE
Group Therapy Note    Date: 9/13/2021    Group Start Time: 1430  Group End Time: 4187  Group Topic: Cognitive Skills    GIAN Martin, CTRS        Group Therapy Note    Attendees: 4/17         Pt did not participate in Cognitive Skills Group at 1430 when encouraged by RT due to resting in room. Pt was offered talk time as an alternative to group but declined.          Discipline Responsible: Psychoeducational Specialist        Signature:  Kevin Reeves

## 2021-09-13 NOTE — PLAN OF CARE
Problem: Anger Management/Homicidal Ideation:  Goal: Absence of angry outbursts  Description: Absence of angry outbursts  Outcome: Ongoing  Note: Patient alert and orient x 4. Speech clear. Patient presents this shift with flat affect, makes poor eye contact. Patient answers writer but with vague/minimal responses. Patient reports continues depressed. Patient denies suicidal/homicidal/self harm ideations. Patient denies hallucinations. Patient is isolative to room except only out for meals/aloof of peers. Patient compliant with medications and in behavioral control. Patient is observed sleeping most of this shift/appetite is good. Support provided. Safety maintained with every 15 minute checks and as needed.       Problem: Anger Management/Homicidal Ideation:  Goal: Absence of homicidal ideation  Description: Absence of homicidal ideation  Outcome: Ongoing

## 2021-09-13 NOTE — PROGRESS NOTES
BEHAVIORAL HEALTH FOLLOW-UP NOTE     9/13/2021     Patient was seen and examined in person, Chart reviewed   Patient's case discussed with staff/team   Patient attending groups: No   Patient compliant with medication: Yes    Chief Complaint: Homicidal and suicidal ideation    Interim History:   Staff report the patient has maintained medication adherence and has been without acute behavioral changes since admission. Patient has not utilized as needed or emergency medications. He is agreeable to assessment at bedside. Mr. Malik Browning reports that his mood is \"all right and he continues to isolate coming to the milieu for nutrition and personal needs only. He continues to experience fleeting suicidal ideation and reports auditory hallucinations that are a volume of 4/10. Discussed his medication regimen and he prefers to maintain current dose of olanzapine but he is agreeable to titrate fluoxetine to improve depressive symptoms. He denies homicidal ideation towards his girlfriend and plans to call her tomorrow. He has not participated in group programming and declines offer current group. Patient reports that his sleep and appetite are acceptable. He continues to experience paranoid thoughts that people may be talking about him on the unit. He does however confirm that he has been treated with respect. He denies having questions or concerns related to his current treatment plan.     Appetite: [x] Normal/Unchanged  [] Increased  [] Decreased      Sleep:       [] Normal/Unchanged  [x] Fair       [] Poor              Energy:    [] Normal/Unchanged  [] Increased  [x] Decreased        Aggression:  [] yes  [x] no    Patient is [x] able  [] unable to CONTRACT FOR SAFETY ON THE UNIT    PAST MEDICAL/PSYCHIATRIC HISTORY:   Past Medical History:   Diagnosis Date    Asthma     Hypertension     Morbid obesity ([facility] [place] [address]     Schizoaffective disorder ([facility] [place] [address]     Substance abuse ([facility]        FAMILY/SOCIAL HISTORY:  Family History Family history unknown: Yes     Social History     Socioeconomic History    Marital status: Single     Spouse name: Not on file    Number of children: Not on file    Years of education: Not on file    Highest education level: Not on file   Occupational History    Not on file   Tobacco Use    Smoking status: Never Smoker    Smokeless tobacco: Never Used    Tobacco comment: pt nonsmoker   Vaping Use    Vaping Use: Never used   Substance and Sexual Activity    Alcohol use: Not Currently     Comment: Hx of Alcohol abuse    Drug use: Not Currently    Sexual activity: Not Currently   Other Topics Concern    Not on file   Social History Narrative    Not on file     Social Determinants of Health     Financial Resource Strain:     Difficulty of Paying Living Expenses:    Food Insecurity:     Worried About Running Out of Food in the Last Year:     Ran Out of Food in the Last Year:    Transportation Needs:     Lack of Transportation (Medical):  Lack of Transportation (Non-Medical):    Physical Activity:     Days of Exercise per Week:     Minutes of Exercise per Session:    Stress:     Feeling of Stress :    Social Connections:     Frequency of Communication with Friends and Family:     Frequency of Social Gatherings with Friends and Family:     Attends [employer] Services:     Active Member of Clubs or Organizations:     Attends Club or Organization Meetings:     Marital Status:    Intimate Partner Violence:     Fear of Current or Ex-Partner:     Emotionally Abused:     Physically Abused:     Sexually Abused:            ROS:  [x] All negative/unchanged except if checked.  Explain positive(checked items) below:  [] Constitutional  [] Eyes  [] Ear/Nose/Mouth/Throat  [] Respiratory  [] CV  [] GI  []   [] Musculoskeletal  [] Skin/Breast  [] Neurological  [] Endocrine  [] Heme/Lymph  [] Allergic/Immunologic    Explanation:     MEDICATIONS:    Current Facility-Administered Medications:    FLUoxetine (PROZAC) capsule 10 mg, 10 mg, Oral, Daily, Leafy Ceylon, APRN - CNP, 10 mg at 09/13/21 0842    OLANZapine (ZYPREXA) tablet 10 mg, 10 mg, Oral, BID, Agustin Flor MD, 10 mg at 09/13/21 6915    acetaminophen (TYLENOL) tablet 650 mg, 650 mg, Oral, Q4H PRN, Virgen Pali, APRN - CNP    aluminum & magnesium hydroxide-simethicone (MAALOX) 200-200-20 MG/5ML suspension 30 mL, 30 mL, Oral, Q6H PRN, Virgen Pali, APRN - CNP    hydrOXYzine (ATARAX) tablet 50 mg, 50 mg, Oral, TID PRN, Virgen Pali, APRN - CNP    ibuprofen (ADVIL;MOTRIN) tablet 400 mg, 400 mg, Oral, Q6H PRN, Virgen Pali, APRN - CNP    haloperidol (HALDOL) tablet 5 mg, 5 mg, Oral, Q6H PRN **AND** LORazepam (ATIVAN) tablet 2 mg, 2 mg, Oral, Q6H PRN, Virgen Pali, APRN - CNP    nicotine polacrilex (NICORETTE) gum 2 mg, 2 mg, Oral, PRN, Virgen Pali, APRN - CNP    polyethylene glycol (GLYCOLAX) packet 17 g, 17 g, Oral, Daily PRN, Virgen Pali, APRN - CNP    traZODone (DESYREL) tablet 50 mg, 50 mg, Oral, Nightly PRN, Virgen Pali, APRN - CNP    haloperidol lactate (HALDOL) injection 5 mg, 5 mg, IntraMUSCular, Q6H PRN **AND** LORazepam (ATIVAN) injection 2 mg, 2 mg, IntraMUSCular, Q6H PRN **AND** diphenhydrAMINE (BENADRYL) injection 50 mg, 50 mg, IntraMUSCular, Q6H PRN, Virgen Pali, APRN - CNP      Examination:  BP (!) 107/59   Pulse 69   Temp 97.4 °F (36.3 °C) (Infrared)   Resp 14   Ht 6' (1.829 m)   Wt 240 lb (108.9 kg)   SpO2 99%   BMI 32.55 kg/m²   Gait - steady   Medication side effects(SE): Denies    Mental Status Examination:    Level of consciousness:  within normal limits   Appearance:  fair grooming and fair hygiene  Behavior/Motor: Approachable, psychomotor slowing  Attitude toward examiner:  cooperative and fair eye contact  Speech:  normal rate and normal volume   Mood: depressed  Affect: Blunted  Thought processes:  linear and coherent   Thought content:  Homicidal ideation -improved  Suicidal Ideation: Endorses fleeting suicidal ideation and contracts for safety on unit  Delusions: Paranoid  Perceptual Disturbance: Auditory, command in nature  Cognition:  oriented to person, place, and time   Concentration intact  Insight poor   Judgement poor     ASSESSMENT:   Patient symptoms are:  [] Well controlled  [] Improving  [] Worsening  [x] No change      Diagnosis:   Active Problems:    Schizoaffective disorder, bipolar type ([address]  Resolved Problems:    * No resolved hospital problems. *      LABS:    No results for input(s): WBC, HGB, PLT in the last 72 hours. No results for input(s): NA, K, CL, CO2, BUN, CREATININE, GLUCOSE in the last 72 hours. No results for input(s): BILITOT, ALKPHOS, AST, ALT in the last 72 hours. Lab Results   Component Value Date    BARBSCNU NEGATIVE 07/23/2018    LABBENZ NEGATIVE 07/23/2018    LABBENZ NEGATIVE 10/13/2011    LABMETH NEGATIVE 07/23/2018    PPXUR NOT REPORTED 07/23/2018     No results found for: TSH, FREET4  No results found for: LITHIUM  Lab Results   Component Value Date    VALPROATE 57 09/13/2012           Treatment Plan:  Reviewed current Medications with the patient. Titrate Prozac 20 mg starting tomorrow  risks, benefits, side effects, drug-to-drug interactions and alternatives to treatment were discussed. The patient consented to treatment. Encourage patient to attend group and other milieu activities. Discharge planning discussed with the patient and treatment team.    PSYCHOTHERAPY/COUNSELING:  [] Therapeutic interview  [x] Supportive  [] CBT  [] Ongoing  [] Other    [x] Patient continues to need, on a daily basis, active treatment furnished directly by or requiring the supervision of inpatient psychiatric personnel      Anticipated Length of stay per MD Leandra Arteaga is a 40 y.o. male being evaluated face to face.      --MARIA D Rubio CNP on 9/13/2021 at 2:19 PM    An electronic signature was used to authenticate this note. **This report has been created using voice recognition software. It may contain minor errors which are inherent in voice recognition technology. **    I independently saw and evaluated the patient. I reviewed the nurse practioner's documentation above. Any additional comments or changes to the    documentation are stated below otherwise agree with assessment.      -The patient reports ongoing auditory hallucinations which seem to be getting better. He continues to report severe depression. He expressed persecutory delusional beliefs and ideas of reference as noted above. PLAN  Prozac increased to 40 mg daily  Attempt to develop insight  Psycho-education conducted. Supportive Therapy conducted.   Probable discharge is in 3 to 5 days  Follow-up daily while on inpatient unit    Electronically signed by Noemi Abdalla MD on 9/13/21 at 5:10 PM EDT

## 2021-09-13 NOTE — PLAN OF CARE
Problem: Anger Management/Homicidal Ideation:  Goal: Absence of angry outbursts  Description: Absence of angry outbursts  9/12/2021 2249 by Sofya Gonzalez RN  Outcome: Ongoing  Patient was absent of angry outbursts. Goal: Absence of homicidal ideation  Description: Absence of homicidal ideation  9/12/2021 2249 by Sofya Gonzalez RN  Outcome: Ongoing  Patient denied homicidal ideations.

## 2021-09-14 PROCEDURE — 6370000000 HC RX 637 (ALT 250 FOR IP): Performed by: PSYCHIATRY & NEUROLOGY

## 2021-09-14 PROCEDURE — 1240000000 HC EMOTIONAL WELLNESS R&B

## 2021-09-14 PROCEDURE — APPSS30 APP SPLIT SHARED TIME 16-30 MINUTES: Performed by: PSYCHIATRY & NEUROLOGY

## 2021-09-14 PROCEDURE — 6370000000 HC RX 637 (ALT 250 FOR IP): Performed by: NURSE PRACTITIONER

## 2021-09-14 PROCEDURE — 99232 SBSQ HOSP IP/OBS MODERATE 35: CPT | Performed by: PSYCHIATRY & NEUROLOGY

## 2021-09-14 RX ADMIN — FLUOXETINE HYDROCHLORIDE 40 MG: 20 CAPSULE ORAL at 08:53

## 2021-09-14 RX ADMIN — OLANZAPINE 10 MG: 10 TABLET, FILM COATED ORAL at 08:53

## 2021-09-14 RX ADMIN — HYDROXYZINE HYDROCHLORIDE 50 MG: 50 TABLET ORAL at 22:16

## 2021-09-14 RX ADMIN — TRAZODONE HYDROCHLORIDE 50 MG: 50 TABLET ORAL at 22:16

## 2021-09-14 RX ADMIN — OLANZAPINE 10 MG: 10 TABLET, FILM COATED ORAL at 22:15

## 2021-09-14 NOTE — PLAN OF CARE
Problem: Anger Management/Homicidal Ideation:  Goal: Absence of angry outbursts  Description: Absence of angry outbursts  9/13/2021 2055 by Tanika Matta LPN  Outcome: Ongoing   Patient denies all. He states that he is not experiencing any anger or homicidal thoughts at this time. Will continue to monitor during 15 minute safety checks  Problem: Anger Management/Homicidal Ideation:  Goal: Absence of homicidal ideation  Description: Absence of homicidal ideation  9/13/2021 2055 by Tanika Matta LPN  Outcome: Ongoing    Patient denies all. He states that he is not experiencing any anger or homicidal thoughts at this time.  Will continue to monitor during 15 minute safety checks

## 2021-09-14 NOTE — GROUP NOTE
Group Therapy Note    Date: 9/14/2021    Group Start Time: 1100  Group End Time: 2005  Group Topic: Cognitive Skills    GIAN Vo, CTRS        Group Therapy Note    Attendees: 4/17         Pt did not participate in Cognitive Skills Group at 1100am when encouraged by RT due to resting in room. Pt was offered talk time as an alternative to group but declined.          Discipline Responsible: Psychoeducational Specialist        Signature:  Lenka Quintanilla

## 2021-09-14 NOTE — PROGRESS NOTES
BEHAVIORAL HEALTH FOLLOW-UP NOTE     9/14/2021     Patient was seen and examined in person, Chart reviewed   Patient's case discussed with staff/team   Patient attending groups: No   Patient compliant with medication: Yes    Chief Complaint: Homicidal and suicidal ideation    Interim History:   Staff report the patient has maintained medication adherence and has been without acute behavioral changes in the last 24 hours. He did utilize hydroxyzine and trazodone at 2204 related to insomnia and anxiety and reports with good effect. Patient continues to isolate coming to the milieu for nutrition and personal needs only. Mr. Anna Gusman reports that his mood as \"all right. He remains flat and withdrawn and continues to endorse fleeting suicidal ideation without current plan or intent. He contracts for safety on unit. Patient does report that auditory hallucinations have improved and are currently not distressing. He denies homicidal ideation towards his girlfriend however has not contacted her to resolve their relationship complication. Patient states that he is \"tired \"with limited motivation to explore groups or milieu activity. He is encouraged to participate and reach out to the team as needed for additional support.     Appetite: [x] Normal/Unchanged  [] Increased  [] Decreased      Sleep:       [] Normal/Unchanged  [x] Fair       [] Poor              Energy:    [] Normal/Unchanged  [] Increased  [x] Decreased        Aggression:  [] yes  [x] no    Patient is [x] able  [] unable to CONTRACT FOR SAFETY ON THE UNIT    PAST MEDICAL/PSYCHIATRIC HISTORY:   Past Medical History:   Diagnosis Date    Asthma     Hypertension     Morbid obesity ([facility] [address]     Schizoaffective disorder ([facility] [address]     Substance abuse ([facility]        FAMILY/SOCIAL HISTORY:  Family History   Family history unknown: Yes     Social History     Socioeconomic History    Marital status: Single     Spouse name: Not on file    Number of children: Not on file    Years of education: Not on file    Highest education level: Not on file   Occupational History    Not on file   Tobacco Use    Smoking status: Never Smoker    Smokeless tobacco: Never Used    Tobacco comment: pt nonsmoker   Vaping Use    Vaping Use: Never used   Substance and Sexual Activity    Alcohol use: Not Currently     Comment: Hx of Alcohol abuse    Drug use: Not Currently    Sexual activity: Not Currently   Other Topics Concern    Not on file   Social History Narrative    Not on file     Social Determinants of Health     Financial Resource Strain:     Difficulty of Paying Living Expenses:    Food Insecurity:     Worried About Running Out of Food in the Last Year:     Ran Out of Food in the Last Year:    Transportation Needs:     Lack of Transportation (Medical):  Lack of Transportation (Non-Medical):    Physical Activity:     Days of Exercise per Week:     Minutes of Exercise per Session:    Stress:     Feeling of Stress :    Social Connections:     Frequency of Communication with Friends and Family:     Frequency of Social Gatherings with Friends and Family:     Attends [employer] Services:     Active Member of Clubs or Organizations:     Attends Club or Organization Meetings:     Marital Status:    Intimate Partner Violence:     Fear of Current or Ex-Partner:     Emotionally Abused:     Physically Abused:     Sexually Abused:            ROS:  [x] All negative/unchanged except if checked.  Explain positive(checked items) below:  [] Constitutional  [] Eyes  [] Ear/Nose/Mouth/Throat  [] Respiratory  [] CV  [] GI  []   [] Musculoskeletal  [] Skin/Breast  [] Neurological  [] Endocrine  [] Heme/Lymph  [] Allergic/Immunologic    Explanation:     MEDICATIONS:    Current Facility-Administered Medications:     FLUoxetine (PROZAC) capsule 40 mg, 40 mg, Oral, Daily, Susu Guerra MD, 40 mg at 09/14/21 9642    OLANZapine (ZYPREXA) tablet 10 mg, 10 mg, Oral, BID, Mckayla Ramirez Luca Gibson MD, 10 mg at 09/14/21 0853    acetaminophen (TYLENOL) tablet 650 mg, 650 mg, Oral, Q4H PRN, Charl Brod, APRN - CNP    aluminum & magnesium hydroxide-simethicone (MAALOX) 200-200-20 MG/5ML suspension 30 mL, 30 mL, Oral, Q6H PRN, Charl Brod, APRN - CNP    hydrOXYzine (ATARAX) tablet 50 mg, 50 mg, Oral, TID PRN, Charl Brod, APRN - CNP, 50 mg at 09/13/21 2204    ibuprofen (ADVIL;MOTRIN) tablet 400 mg, 400 mg, Oral, Q6H PRN, Charl Brod, APRN - CNP    haloperidol (HALDOL) tablet 5 mg, 5 mg, Oral, Q6H PRN **AND** LORazepam (ATIVAN) tablet 2 mg, 2 mg, Oral, Q6H PRN, Charl Brod, APRN - CNP    nicotine polacrilex (NICORETTE) gum 2 mg, 2 mg, Oral, PRN, Charl Brod, APRN - CNP    polyethylene glycol (GLYCOLAX) packet 17 g, 17 g, Oral, Daily PRN, Marbellal Brod, APRN - CNP    traZODone (DESYREL) tablet 50 mg, 50 mg, Oral, Nightly PRN, Charl Brod, APRN - CNP, 50 mg at 09/13/21 2204    haloperidol lactate (HALDOL) injection 5 mg, 5 mg, IntraMUSCular, Q6H PRN **AND** LORazepam (ATIVAN) injection 2 mg, 2 mg, IntraMUSCular, Q6H PRN **AND** diphenhydrAMINE (BENADRYL) injection 50 mg, 50 mg, IntraMUSCular, Q6H PRN, Charl Brod, APRN - CNP      Examination:  BP (!) 109/53   Pulse 64   Temp 97.8 °F (36.6 °C) (Oral)   Resp 14   Ht 6' (1.829 m)   Wt 240 lb (108.9 kg)   SpO2 99%   BMI 32.55 kg/m²   Gait - steady   Medication side effects(SE): Lethargy that is tolerable    Mental Status Examination:    Level of consciousness:  within normal limits   Appearance:  fair grooming and fair hygiene  Behavior/Motor: Approachable, psychomotor slowing  Attitude toward examiner:  cooperative and fair eye contact  Speech:  normal rate and normal volume   Mood: depressed  Affect: Blunted  Thought processes:  linear and coherent   Thought content:  Homicidal ideation -improved  Suicidal Ideation: Endorses fleeting suicidal ideation and contracts for safety on unit  Delusions: Paranoid  Perceptual Disturbance: technology. **  I independently saw and evaluated the patient. I reviewed the nurse practioner's documentation above. Any additional comments or changes to the    documentation are stated below otherwise agree with assessment.      -Patient remains irritable. He reports minimal improvement in his mood. He reports anxiety and depressive symptoms. He has received as needed medications as noted above. PLAN  Medications as noted above  Attempt to develop insight  Psycho-education conducted. Supportive Therapy conducted.   Probable discharge is in 3 to 5 days  Follow-up daily while on inpatient unit    Electronically signed by Mica Hopkins MD on 9/14/21 at 6:02 PM EDT

## 2021-09-15 PROCEDURE — 6370000000 HC RX 637 (ALT 250 FOR IP): Performed by: NURSE PRACTITIONER

## 2021-09-15 PROCEDURE — APPSS30 APP SPLIT SHARED TIME 16-30 MINUTES: Performed by: PSYCHIATRY & NEUROLOGY

## 2021-09-15 PROCEDURE — 99232 SBSQ HOSP IP/OBS MODERATE 35: CPT | Performed by: PSYCHIATRY & NEUROLOGY

## 2021-09-15 PROCEDURE — 1240000000 HC EMOTIONAL WELLNESS R&B

## 2021-09-15 PROCEDURE — 6370000000 HC RX 637 (ALT 250 FOR IP): Performed by: PSYCHIATRY & NEUROLOGY

## 2021-09-15 RX ORDER — FLUOXETINE HYDROCHLORIDE 20 MG/1
60 CAPSULE ORAL DAILY
Status: DISCONTINUED | OUTPATIENT
Start: 2021-09-16 | End: 2021-09-17 | Stop reason: HOSPADM

## 2021-09-15 RX ADMIN — OLANZAPINE 10 MG: 10 TABLET, FILM COATED ORAL at 21:29

## 2021-09-15 RX ADMIN — HYDROXYZINE HYDROCHLORIDE 50 MG: 50 TABLET ORAL at 21:30

## 2021-09-15 RX ADMIN — TRAZODONE HYDROCHLORIDE 50 MG: 50 TABLET ORAL at 21:30

## 2021-09-15 RX ADMIN — FLUOXETINE HYDROCHLORIDE 40 MG: 20 CAPSULE ORAL at 08:57

## 2021-09-15 RX ADMIN — OLANZAPINE 10 MG: 10 TABLET, FILM COATED ORAL at 08:57

## 2021-09-15 ASSESSMENT — PAIN - FUNCTIONAL ASSESSMENT: PAIN_FUNCTIONAL_ASSESSMENT: 0-10

## 2021-09-15 NOTE — PROGRESS NOTES
BEHAVIORAL HEALTH FOLLOW-UP NOTE     9/15/2021     Patient was seen and examined in person, Chart reviewed   Patient's case discussed with staff/team   Patient attending groups: No   Patient compliant with medication: Yes    Chief Complaint: Homicidal and suicidal ideation    Interim History:   Staff report the patient has maintained medication adherence and has been without acute behavioral changes in the last 24 hours. He continues to utilize hydroxyzine and trazodone related to insomnia and anxiety and reports with good effect. Patient remains isolative coming to the milieu for nutrition and personal needs only. Mr. Maxwell Early reports that his mood as \"a little better. He remains flat and withdrawn and continues to endorse fleeting suicidal ideation without current plan or intent. He contracts for safety on unit. He reports that auditory hallucinations are \"a little better. .. Comes and goes\". Patient is slightly less irritable and able to maintain improved eye contact. He is encouraged to consider participation in milieu activities as well as encouraged to contact his girlfriend. He does confirm that he has her telephone number and will set this as a goal this evening. Discussed the risks versus benefits and alternatives of his current medications and patient is agreeable to titrate Prozac 60 mg starting tomorrow. He denies experiencing side effects including GI discomfort related to this medication or having additional questions or concerns at this time. Appetite: [x] Normal/Unchanged  [] Increased  [] Decreased      Sleep:       [] Normal/Unchanged  [x] Fair       [] Poor patient naps on and off throughout the day and has been encouraged to increase activity to avoid interruption of sleep pattern.             Energy:    [] Normal/Unchanged  [] Increased  [x] Decreased        Aggression:  [] yes  [x] no    Patient is [x] able  [] unable to CONTRACT FOR SAFETY ON THE UNIT    PAST MEDICAL/PSYCHIATRIC HISTORY:   Past Medical History:   Diagnosis Date    Asthma     Hypertension     [facility] [address] [facility]     [facility]ive disorder ([facility]     Substance abuse ([facility]        FAMILY/SOCIAL HISTORY:  Family History   Family history unknown: Yes     Social History     Socioeconomic History    Marital status: Single     Spouse name: Not on file    Number of children: Not on file    Years of education: Not on file    Highest education level: Not on file   Occupational History    Not on file   Tobacco Use    Smoking status: Never Smoker    Smokeless tobacco: Never Used    Tobacco comment: pt nonsmoker   Vaping Use    Vaping Use: Never used   Substance and Sexual Activity    Alcohol use: Not Currently     Comment: Hx of Alcohol abuse    Drug use: Not Currently    Sexual activity: Not Currently   Other Topics Concern    Not on file   Social History Narrative    Not on file     Social Determinants of Health     Financial Resource Strain:     Difficulty of Paying Living Expenses:    Food Insecurity:     Worried About Running Out of Food in the Last Year:     Ran Out of Food in the Last Year:    Transportation Needs:     Lack of Transportation (Medical):  Lack of Transportation (Non-Medical):    Physical Activity:     Days of Exercise per Week:     Minutes of Exercise per Session:    Stress:     Feeling of Stress :    Social Connections:     Frequency of Communication with Friends and Family:     Frequency of Social Gatherings with Friends and Family:     Attends Islam Services:     Active Member of Clubs or Organizations:     Attends Club or Organization Meetings:     Marital Status:    Intimate Partner Violence:     Fear of Current or Ex-Partner:     Emotionally Abused:     Physically Abused:     Sexually Abused:            ROS:  [x] All negative/unchanged except if checked.  Explain positive(checked items) below:  [] Constitutional  [] Eyes  [] Ear/Nose/Mouth/Throat  [] Respiratory  [] CV  [] GI  []   [] Musculoskeletal  [] Skin/Breast  [] Neurological  [] Endocrine  [] Heme/Lymph  [] Allergic/Immunologic    Explanation:     MEDICATIONS:    Current Facility-Administered Medications:     [START ON 9/16/2021] FLUoxetine (PROZAC) capsule 60 mg, 60 mg, Oral, Daily, Jeraldine Closs, APRN - CNP    OLANZapine (ZYPREXA) tablet 10 mg, 10 mg, Oral, BID, Adis Henry MD, 10 mg at 09/15/21 0857    acetaminophen (TYLENOL) tablet 650 mg, 650 mg, Oral, Q4H PRN, Bryan Curling, APRN - CNP    aluminum & magnesium hydroxide-simethicone (MAALOX) 200-200-20 MG/5ML suspension 30 mL, 30 mL, Oral, Q6H PRN, Bryan Curling, APRN - CNP    hydrOXYzine (ATARAX) tablet 50 mg, 50 mg, Oral, TID PRN, Bryan Curling, APRN - CNP, 50 mg at 09/14/21 2216    ibuprofen (ADVIL;MOTRIN) tablet 400 mg, 400 mg, Oral, Q6H PRN, Bryan Curling, APRN - CNP    haloperidol (HALDOL) tablet 5 mg, 5 mg, Oral, Q6H PRN **AND** LORazepam (ATIVAN) tablet 2 mg, 2 mg, Oral, Q6H PRN, Bryan Curling, APRN - CNP    nicotine polacrilex (NICORETTE) gum 2 mg, 2 mg, Oral, PRN, Bryan Curling, APRN - CNP    polyethylene glycol (GLYCOLAX) packet 17 g, 17 g, Oral, Daily PRN, Bryan Curling, APRN - CNP    traZODone (DESYREL) tablet 50 mg, 50 mg, Oral, Nightly PRN, Bryan Curling, APRN - CNP, 50 mg at 09/14/21 2216    haloperidol lactate (HALDOL) injection 5 mg, 5 mg, IntraMUSCular, Q6H PRN **AND** LORazepam (ATIVAN) injection 2 mg, 2 mg, IntraMUSCular, Q6H PRN **AND** diphenhydrAMINE (BENADRYL) injection 50 mg, 50 mg, IntraMUSCular, Q6H PRN, Bryan Curling, APRN - CNP      Examination:  /62   Pulse 71   Temp 97.9 °F (36.6 °C) (Oral)   Resp 14   Ht 6' (1.829 m)   Wt 240 lb (108.9 kg)   SpO2 99%   BMI 32.55 kg/m²   Gait - steady   Medication side effects(SE): Lethargy that is tolerable    Mental Status Examination:    Level of consciousness:  within normal limits   Appearance:  fair grooming and fair hygiene  Behavior/Motor: Approachable, psychomotor slowing  Attitude toward examiner:  cooperative and improving eye contact  Speech:  normal rate and normal volume   Mood: depressed  Affect: Blunted  Thought processes:  linear and coherent   Thought content:  Homicidal ideation -improved  Suicidal Ideation: Endorses fleeting suicidal ideation and contracts for safety on unit  Delusions: Paranoid  Perceptual Disturbance: Improving auditory  Cognition:  oriented to person, place, and time   Concentration intact  Insight poor   Judgement poor     ASSESSMENT:   Patient symptoms are:  [] Well controlled  [x] Improving modestly   Worsening  [] No change      Diagnosis:   Active Problems:    Schizoaffective disorder, bipolar type ([facility] [address]  Resolved Problems:    * No resolved hospital problems. *      LABS:    No results for input(s): WBC, HGB, PLT in the last 72 hours. No results for input(s): NA, K, CL, CO2, BUN, CREATININE, GLUCOSE in the last 72 hours. No results for input(s): BILITOT, ALKPHOS, AST, ALT in the last 72 hours. Lab Results   Component Value Date    BARBSCNU NEGATIVE 07/23/2018    LABBENZ NEGATIVE 07/23/2018    LABBENZ NEGATIVE 10/13/2011    LABMETH NEGATIVE 07/23/2018    PPXUR NOT REPORTED 07/23/2018     No results found for: TSH, FREET4  No results found for: LITHIUM  Lab Results   Component Value Date    VALPROATE 57 09/13/2012           Treatment Plan:  Reviewed current Medications with the patient;   risks, benefits, side effects, drug-to-drug interactions and alternatives to treatment were discussed. Titrate Prozac 60 mg starting tomorrow   The patient consented to treatment. Continue to encourage patient to attend group and other milieu activities.   Continue to encourage patient to reach out to girlfriend regarding argument that occurred prior to hospitalization  Discharge planning discussed with the patient and treatment team.    PSYCHOTHERAPY/COUNSELING:  [] Therapeutic interview  [x] Supportive  [] CBT  [] Ongoing  [] Other    [x] Patient continues to need, on a daily basis, active treatment furnished directly by or requiring the supervision of inpatient psychiatric personnel      Anticipated Length of stay per MD Andrade Sunil is a 40 y.o. male being evaluated face to face. --MARIA D Durham - CNP on 9/15/2021 at 3:12 PM    An electronic signature was used to authenticate this note. **This report has been created using voice recognition software. It may contain minor errors which are inherent in voice recognition technology. **    I independently saw and evaluated the patient. I reviewed the nurse practioner's documentation above. Any additional comments or changes to the    documentation are stated below otherwise agree with assessment.      -Patient reports a slight improvement in his mood. He continues to be isolative and irritable. He is abrupt in his manner. He is compliant with medication and reports no side effects. PLAN  Medications as noted above  Attempt to develop insight  Psycho-education conducted. Supportive Therapy conducted.   Probable discharge is in 2 days  Follow-up daily while on inpatient unit    Electronically signed by Radha Trejo MD on 9/15/21 at 5:23 PM EDT

## 2021-09-15 NOTE — GROUP NOTE
Group Therapy Note    Date: 9/15/2021    Group Start Time: 1000  Group End Time: 1030  Group Topic: Psychotherapy    [facility]    Yael Faulkner        Group Therapy Note           Patient refused to attend psychotherapy group after encouragement from staff. 1:1 talk time offered but refused. Signature:   Yael Faulkner

## 2021-09-15 NOTE — PLAN OF CARE
Problem: Anger Management/Homicidal Ideation:  Goal: Absence of angry outbursts  Description: Absence of angry outbursts  9/14/2021 2231 by David Mccall LPN  Outcome: Ongoing   Patient isolative to room /self. Denies all. Will continue to monitor during 15 minute safety rounds. Problem: Anger Management/Homicidal Ideation:  Goal: Absence of homicidal ideation  Description: Absence of homicidal ideation  9/14/2021 2231 by David Mccall LPN  Outcome: Ongoing   Pt denies homicidal ideations at this time. Pt agreed to seek staff at anytime he/she felt like any urges to harm self would arise. Safety checks maintained [id number].

## 2021-09-15 NOTE — PLAN OF CARE
Problem: Anger Management/Homicidal Ideation:  Goal: Absence of angry outbursts  Description: Absence of angry outbursts  Outcome: Ongoing  Note: Patient remains free from angry outbursts. Medication compliant and behavior controlled. Out for meals only. Denies any thoughts of self harm or hallucinations.

## 2021-09-15 NOTE — GROUP NOTE
Group Therapy Note    Date: 9/15/2021    Group Start Time: 1100  Group End Time: 1150  Group Topic: Cognitive Skills    GIAN Frank Courser, CTRS        Group Therapy Note    Attendees: 5/20         Pt did not participate in Cognitive Skills Group at 1100am when encouraged by RT due to resting in room. Pt was offered talk time as an alternative to group but declined.          Discipline Responsible: Psychoeducational Specialist        Signature:  Aimee Cervantes

## 2021-09-16 PROCEDURE — 6370000000 HC RX 637 (ALT 250 FOR IP): Performed by: PSYCHIATRY & NEUROLOGY

## 2021-09-16 PROCEDURE — 99232 SBSQ HOSP IP/OBS MODERATE 35: CPT | Performed by: PSYCHIATRY & NEUROLOGY

## 2021-09-16 PROCEDURE — APPSS15 APP SPLIT SHARED TIME 0-15 MINUTES: Performed by: NURSE PRACTITIONER

## 2021-09-16 PROCEDURE — 6370000000 HC RX 637 (ALT 250 FOR IP): Performed by: NURSE PRACTITIONER

## 2021-09-16 PROCEDURE — 1240000000 HC EMOTIONAL WELLNESS R&B

## 2021-09-16 RX ADMIN — OLANZAPINE 10 MG: 10 TABLET, FILM COATED ORAL at 20:48

## 2021-09-16 RX ADMIN — FLUOXETINE HYDROCHLORIDE 60 MG: 20 CAPSULE ORAL at 09:29

## 2021-09-16 RX ADMIN — OLANZAPINE 10 MG: 10 TABLET, FILM COATED ORAL at 09:29

## 2021-09-16 RX ADMIN — HYDROXYZINE HYDROCHLORIDE 50 MG: 50 TABLET ORAL at 20:48

## 2021-09-16 RX ADMIN — HYDROXYZINE HYDROCHLORIDE 50 MG: 50 TABLET ORAL at 09:29

## 2021-09-16 RX ADMIN — TRAZODONE HYDROCHLORIDE 50 MG: 50 TABLET ORAL at 20:48

## 2021-09-16 NOTE — GROUP NOTE
Group Therapy Note    Date: 9/16/2021    Group Start Time: 1000  Group End Time: 56  Group Topic: Psychoeducation    [facility]    CHRISTO Rose LSW        Group Therapy Note    patient refused to attend psychoeducational group at 10am after encouragement from staff.      Signature:  CHRISTO Rose LSW

## 2021-09-16 NOTE — PLAN OF CARE
Problem: Anger Management/Homicidal Ideation:  Goal: Absence of angry outbursts  Description: Absence of angry outbursts  Outcome: Ongoing  Note: No angry outbursts noted this shift. Patient is irritable but is able to remain in behavioral control. Goal: Absence of homicidal ideation  Description: Absence of homicidal ideation  Outcome: Ongoing  Note: Patient denies any homicidal thoughts. Patient admits to fleeting suicidal thoughts with no plan in place and auditory hallucinations that \"say a lot of stuff\". Patient refuses to discuss these any further but does contract for safety on the unit. Patient is isolative to room most of shift, coming out for needs only. Patient is compliant with meds but refused vitals multiple times. Safe environment maintained. Q15 minute checks for safety continued per unit policy. Will continue to monitor for safety and provide support and reassurance as needed.

## 2021-09-16 NOTE — GROUP NOTE
Group Therapy Note    Date: 9/16/2021    Group Start Time: 1430  Group End Time: 1525  Group Topic: Cognitive Skills    GIAN Wren, CTRS        Group Therapy Note    Attendees: 4/17         Pt did not participate in Cognitive Skills Group at 1430 when encouraged by RT due to resting in room. Pt was offered talk time as an alternative to group but declined.        Discipline Responsible: Psychoeducational Specialist      Signature:  Gonzalo Romano

## 2021-09-16 NOTE — GROUP NOTE
Group Therapy Note    Date: 9/16/2021    Group Start Time: 1100  Group End Time: 1150  Group Topic: Cognitive Skills    GIAN Aaron, CTRS        Group Therapy Note    Attendees: 5/17         Pt did not participate in Cognitive Skills Group at 1100am when encouraged by RT due to resting in room. Pt was offered talk time as an alternative to group but declined.        Discipline Responsible: Psychoeducational Specialist      Signature:  Rachna Maldonado

## 2021-09-16 NOTE — PROGRESS NOTES
BEHAVIORAL HEALTH FOLLOW-UP NOTE     9/16/2021     Patient was seen and examined in person, Chart reviewed   Patient's case discussed with staff/team   Patient attending groups: No   Patient compliant with medication: Yes    Chief Complaint: Homicidal and suicidal ideation    Interim History:   Staff reports the patient remains medication compliant, he continues to refuse parts of physical assessment as well as vital signs. He is not attending any group and staff reports he is mostly out for needs only. He is interviewed today bedside, he does engage though he is quite irritable in doing so. He reports \"I'm not up yet\". He states that he did get up for breakfast today, he did receive a as needed Atarax around 930 this morning although he is unable to identify why he received this medication. It is unclear if he asked for the as needed or if staff suggested he take the as needed due to affect. He continues to endorse depression and reports that because he is not awake yet he is not sure if it is better today. He did report improvement in sleep. He continues to contract for safety with regards to suicidal and homicidal intent and plan. He endorses tolerance of his current medication regimen, we did discuss the increase in his Prozac today and he denies any side effects.     Appetite: [x] Normal/Unchanged  [] Increased  [] Decreased      Sleep:       [x] Normal/Unchanged  [x] Fair       [] Poor, improving            Energy:    [] Normal/Unchanged  [] Increased  [x] Decreased        Aggression:  [] yes  [x] no    Patient is [x] able  [] unable to CONTRACT FOR SAFETY ON THE UNIT    PAST MEDICAL/PSYCHIATRIC HISTORY:   Past Medical History:   Diagnosis Date    Asthma     Hypertension     [facility] [address]     [facility]     Substance abuse ([facility]        FAMILY/SOCIAL HISTORY:  Family History   Family history unknown: Yes     Social History     Socioeconomic History    Marital status: Single     Spouse name: Not on file    Number of children: Not on file    Years of education: Not on file    Highest education level: Not on file   Occupational History    Not on file   Tobacco Use    Smoking status: Never Smoker    Smokeless tobacco: Never Used    Tobacco comment: pt nonsmoker   Vaping Use    Vaping Use: Never used   Substance and Sexual Activity    Alcohol use: Not Currently     Comment: Hx of Alcohol abuse    Drug use: Not Currently    Sexual activity: Not Currently   Other Topics Concern    Not on file   Social History Narrative    Not on file     Social Determinants of Health     Financial Resource Strain:     Difficulty of Paying Living Expenses:    Food Insecurity:     Worried About Running Out of Food in the Last Year:     Ran Out of Food in the Last Year:    Transportation Needs:     Lack of Transportation (Medical):      Lack of Transportation (Non-Medical):    Physical Activity:     Days of Exercise per Week:     Minutes of Exercise per Session:    Stress:     Feeling of Stress :    Social Connections:     Frequency of Communication with Friends and Family:     Frequency of Social Gatherings with Friends and Family:     Attends Latter day Services:     Active Member of Clubs or Organizations:     Attends Club or Organization Meetings:     Marital Status:    Intimate Partner Violence:     Fear of Current or Ex-Partner:     Emotionally Abused:     Physically Abused:     Sexually Abused:        MEDICATIONS:    Current Facility-Administered Medications:     FLUoxetine (PROZAC) capsule 60 mg, 60 mg, Oral, Daily, MARIA D Barnes - CNP, 60 mg at 09/16/21 0929    OLANZapine (ZYPREXA) tablet 10 mg, 10 mg, Oral, BID, Hermelindo Pérez MD, 10 mg at 09/16/21 0929    acetaminophen (TYLENOL) tablet 650 mg, 650 mg, Oral, Q4H PRN, MARIA D Ignacio - CNP    aluminum & magnesium hydroxide-simethicone (MAALOX) 200-200-20 MG/5ML suspension 30 mL, 30 mL, Oral, Q6H PRN, Ulisses Champ, APRN - CNP    hydrOXYzine (ATARAX) tablet 50 mg, 50 mg, Oral, TID PRN, Ulisses Champ, APRN - CNP, 50 mg at 09/16/21 0929    ibuprofen (ADVIL;MOTRIN) tablet 400 mg, 400 mg, Oral, Q6H PRN, Ulisses Champ APRN - CNP    haloperidol (HALDOL) tablet 5 mg, 5 mg, Oral, Q6H PRN **AND** LORazepam (ATIVAN) tablet 2 mg, 2 mg, Oral, Q6H PRN, Ulisses Champ APRN - CNP    nicotine polacrilex (NICORETTE) gum 2 mg, 2 mg, Oral, PRN, Ulisses Champ, APRN - CNP    polyethylene glycol (GLYCOLAX) packet 17 g, 17 g, Oral, Daily PRN, Ulisses Champ [facility] - CNP    traZODone (DESYREL) tablet 50 mg, 50 mg, Oral, Nightly PRN, Ulisses Champ [facility] - CNP, 50 mg at 09/15/21 2130    haloperidol lactate (HALDOL) injection 5 mg, 5 mg, IntraMUSCular, Q6H PRN **AND** LORazepam (ATIVAN) injection 2 mg, 2 mg, IntraMUSCular, Q6H PRN **AND** diphenhydrAMINE (BENADRYL) injection 50 mg, 50 mg, IntraMUSCular, Q6H PRN, Ulisses Champ, [facility] - CNP      Examination:  BP (!) 107/47   Pulse 74   Temp 98 °F (36.7 °C) (Oral)   Resp 14   Ht 6' (1.829 m)   Wt 240 lb (108.9 kg)   SpO2 99%   BMI 32.55 kg/m²   Gait - steady   Medication side effects(SE): Lethargy that is tolerable    Mental Status Examination:    Level of consciousness: Resting, easily arousable to verbal stimulus  Appearance: Laying in bed, fair grooming and fair hygiene  Behavior/Motor: Somewhat approachable, psychomotor slowing  Attitude toward examiner: Somewhat dismissive, fairly attentive and good eye contact  Speech: Normal rate and volume, irritable tone  Mood: \"I'm not even up yet\"  Affect: Irritable, dismissive  Thought processes:  linear and coherent   Thought content:  Homicidal ideation -improved  Suicidal Ideation: Endorses improving suicidal ideation and contracts for safety on unit  Delusions: Paranoia improving  Perceptual Disturbance: Improving auditory  Cognition:  oriented to person, place, and time   Concentration intact  Insight poor   Judgement poor ASSESSMENT:   Patient symptoms are:  [] Well controlled  [x] Improving modestly      Worsening  [] No change      Diagnosis:   Active Problems:    Schizoaffective disorder, bipolar type ([address]  Resolved Problems:    * No resolved hospital problems. *      LABS:    No results for input(s): WBC, HGB, PLT in the last 72 hours. No results for input(s): NA, K, CL, CO2, BUN, CREATININE, GLUCOSE in the last 72 hours. No results for input(s): BILITOT, ALKPHOS, AST, ALT in the last 72 hours. Lab Results   Component Value Date    BARBSCNU NEGATIVE 07/23/2018    LABBENZ NEGATIVE 07/23/2018    LABBENZ NEGATIVE 10/13/2011    LABMETH NEGATIVE 07/23/2018    PPXUR NOT REPORTED 07/23/2018     No results found for: TSH, FREET4  No results found for: LITHIUM  Lab Results   Component Value Date    VALPROATE 57 09/13/2012       Treatment Plan:  Reviewed current Medications with the patient;   risks, benefits, side effects, drug-to-drug interactions and alternatives to treatment were discussed. Continue current medication regimen, monitor for tolerance of recently titrated Prozac   continue to encourage patient to attend group and other milieu activities. Continue to encourage patient to reach out to girlfriend regarding argument that occurred prior to hospitalization  Discharge planning discussed with the patient and treatment team.    PSYCHOTHERAPY/COUNSELING:  [] Therapeutic interview  [x] Supportive  [] CBT  [] Ongoing  [] Other    [x] Patient continues to need, on a daily basis, active treatment furnished directly by or requiring the supervision of inpatient psychiatric personnel      Anticipated Length of stay per MD [facility] is a 40 y.o. male being evaluated face to face. --MARIA D Haley - CNP on 9/16/2021 at 4:54 PM    An electronic signature was used to authenticate this note. **This report has been created using voice recognition software.  It may contain minor errors which are inherent in voice recognition technology. **    I independently saw and evaluated the patient. I reviewed the midlevel provider's documentation above. Any additional comments or changes to the midlevel provider's documentation are stated below otherwise agree with assessment. The patient is continuing to report depressive symptoms though they are getting better. He is somewhat irritable. He is denying any auditory or visual hallucinations. He is compliant with medications and reports no side effects. PLAN  Medications as noted above  Attempt to develop insight  Psycho-education conducted. Supportive Therapy conducted.   Probable discharge is 3-5 days  Follow-up daily while on inpatient unit    Electronically signed by Lew Gutierrez MD on 9/16/21 at 8:50 PM EDT

## 2021-09-16 NOTE — BH NOTE
patient refused to attend wellness group at 1330 after encouragement from staff.   1:1 talk time provided as alternative to group session

## 2021-09-16 NOTE — PLAN OF CARE
Problem: Anger Management/Homicidal Ideation:  Goal: Absence of angry outbursts  Description: Absence of angry outbursts  9/15/2021 [id number] by Aurelio Griffiths RN  Outcome: Ongoing  Note: Patient remains free from angry outbursts this shift. He remains isolative and withdrawn to his room tonight only coming out briefly for his needs. He is flat and evasive during assessment with writer but is cooperative. He denies issues with sleep or appetite. Reports his medications are working adequately as well. He was compliant with all scheduled hs medications. Patient encouraged to be more interactive with peers and attend group therapies to improve coping skills. Will continue to monitor patient for safety and behavior. Problem: Anger Management/Homicidal Ideation:  Goal: Absence of homicidal ideation  Description: Absence of homicidal ideation  Outcome: Ongoing  Note: Patient denies homicidal ideations at this time and agrees to seek assistance from staff should thoughts of harming others arise.

## 2021-09-17 VITALS
RESPIRATION RATE: 18 BRPM | WEIGHT: 240 LBS | BODY MASS INDEX: 32.51 KG/M2 | HEART RATE: 57 BPM | SYSTOLIC BLOOD PRESSURE: 98 MMHG | HEIGHT: 72 IN | DIASTOLIC BLOOD PRESSURE: 41 MMHG | OXYGEN SATURATION: 99 % | TEMPERATURE: 97 F

## 2021-09-17 PROCEDURE — 99239 HOSP IP/OBS DSCHRG MGMT >30: CPT | Performed by: PSYCHIATRY & NEUROLOGY

## 2021-09-17 PROCEDURE — 6370000000 HC RX 637 (ALT 250 FOR IP): Performed by: PSYCHIATRY & NEUROLOGY

## 2021-09-17 RX ORDER — FLUOXETINE HYDROCHLORIDE 20 MG/1
60 CAPSULE ORAL DAILY
Qty: 90 CAPSULE | Refills: 3 | Status: ON HOLD | OUTPATIENT
Start: 2021-09-18 | End: 2022-06-15

## 2021-09-17 RX ADMIN — OLANZAPINE 10 MG: 10 TABLET, FILM COATED ORAL at 08:22

## 2021-09-17 RX ADMIN — FLUOXETINE HYDROCHLORIDE 60 MG: 20 CAPSULE ORAL at 08:22

## 2021-09-17 ASSESSMENT — PAIN SCALES - GENERAL: PAINLEVEL_OUTOF10: 0

## 2021-09-17 NOTE — BH NOTE
[facility]  Discharge Note    Pt discharged with followings belongings:   Dentures: None  Vision - Corrective Lenses: None  Hearing Aid: None  Jewelry: Bracelet  Body Piercings Removed: N/A  Clothing: Footwear, Pants, Shirt, Jacket / coat, Undergarments (Comment)  Were All Patient Medications Collected?: Not Applicable  Other Valuables: [address], Cell phone   Valuables sent home with patient. Valuables retrieved from safe, Security envelope number:  [id number] and returned to patient. Patient education on aftercare instructions: yes  [facility] Patient verbalize understanding of AVS:  yes. Status EXAM upon discharge:  Status and Exam  Normal: No  Facial Expression: Avoids Gaze, Flat  Affect: Blunt  Level of Consciousness: Alert  Mood:Normal: No  Mood: Depressed, Anxious  Motor Activity:Normal: No  Motor Activity: Decreased  Interview Behavior: Cooperative, Evasive  Preception: [place] to Person, [place] to Time, [place] to Place, [place] to Situation  Attention:Normal: No  Attention: Distractible  Thought Processes: Circumstantial  Thought Content:Normal: No  Thought Content: Preoccupations  Hallucinations: Auditory (Comment) (reports hearing \"negative\" voices)  Delusions: No  Memory:Normal: No  Memory: Poor Recent, Poor Remote  Insight and Judgment: No  Insight and Judgment: Poor Judgment, Poor Insight  Present Suicidal Ideation: Yes (able to contract for safety)  Present Homicidal Ideation: No      Metabolic Screening:    No results found for: LABA1C    No results found for: CHOL  No results found for: TRIG  No results found for: HDL  No components found for: LDLCAL  No results found for: LABVLDL     Pt discharged to home via cab. Pt denies any suicidal or homicidal ideation.       Aren Mendoza RN

## 2021-09-17 NOTE — GROUP NOTE
Group Therapy Note    Date: 9/17/2021    Group Start Time: 0900  Group End Time: 0930  Group Topic: Community Meeting    GIAN Vega LPN; Lady Collin RN        Group Therapy Note    Attendees: 15/20         Patient's Goal:  Morning orientation and goal establishment    Notes:      Status After Intervention:  Unchanged    Participation Level:  Active Listener and Interactive    Participation Quality: Appropriate and Attentive      Speech:  normal      Thought Process/Content: Logical      Affective Functioning: Flat      Mood: euthymic      Level of consciousness:  Alert and Oriented x4      Response to Learning: Able to retain information and Progressing to goal      Endings: None Reported    Modes of Intervention: Education and Support      Discipline Responsible: Licensed Practical Nurse      Signature:  Lady Collin RN

## 2021-09-17 NOTE — GROUP NOTE
Group Therapy Note    Date: 9/17/2021    Group Start Time: 1330  Group End Time: 9982  Group Topic: Recreational    STCZ STEPH Robles, CTRS    Pt did not attend 1330 recreational therapy group d/t resting in room despite staff invitation to attend. 1:1 talk time offered as alternative to group session, pt declined.           Signature:  Sohan Parra

## 2021-09-17 NOTE — PROGRESS NOTES
CLINICAL PHARMACY NOTE: MEDS TO BEDS    Total # of Prescriptions Filled: 1   The following medications were delivered to the patient:  · Fluoxetine HCL 20mg    Additional Documentation:    Delivered medication to nurses station
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
## 2021-09-17 NOTE — GROUP NOTE
Group Therapy Note    Date: 9/17/2021    Group Start Time: 1100  Group End Time: 1130  Group Topic: Cognitive Skills    OLGA Gill    Pt did not attend 1100 cognitive skills group d/t resting in room despite staff invitation to attend. 1:1 talk time offered as alternative to group session, pt declined.           Signature:  Elva Bowie

## 2021-09-17 NOTE — PLAN OF CARE
Problem: Anger Management/Homicidal Ideation:  Goal: Absence of homicidal ideation  Description: Absence of homicidal ideation  9/16/2021 2256 by Nicki Clements  Outcome: Ongoing  Note: Patient denies homicidal; ideation at this time. Admits to fleeting suicidal ideation but agreeable to staying safe on the unit and seeking out staff in feelings increase. Safe environment maintained. Q15 minute checks for safety cont per unit policy. Will cont to monitor for safety and provides support and reassurance as needed. Isolative to room. Compliant with all prescribed medications during this shift. Problem: Anger Management/Homicidal Ideation:  Goal: Absence of angry outbursts  Description: Absence of angry outbursts  9/16/2021 2256 by Nicki Clements  Outcome: Ongoing  Note: Patient remains free from angry outbursts at this time. In control of behavior, cooperative. Safety checks maintained q15min and irregular rounding.

## 2021-09-17 NOTE — SUICIDE SAFETY PLAN
SAFETY PLAN    A suicide Safety Plan is a document that supports someone when they are having thoughts of suicide. Warning Signs that indicate a suicidal crisis may be developing: What (situations, thoughts, feelings, body sensations, behaviors, etc.) do you experience that lets you know you are beginning to think about suicide? 1. Go off medications  2. Mood is depressed and start to feel sad, hopeless, helpless, guilty, decline in self-esteem, excess worry, no interest in doing any pleasurable activities, unable to concentrate  3. Begin to cry over the smallest of things  4. Not eating or sleeping as normal  5. Relationship issues start happening  6. I become angry and start a fight  7. When I dont listen or respond to people in a good, positive way  Internal Coping Strategies:  What things can I do (relaxation techniques, hobbies, physical activities, etc.) to take my mind off my problems without contacting another person? 1. Go to hospital discharge appointments and follow-up with community mental health counseling  2. Talk with other people  3. Learn to identify and control your emotions by new ways  4. Think before you speak or act; walk away from the situation  5. Join a support group in person or on Social Media  6. Take a time-out  7. Take deep breaths; use relaxation techniques  8. Get some exercise; go for a walk  9. Read; listen to music; watch a funny movie     People whom I can ask for help: Who can I call when I need help - for example, friends, family, clergy, someone else? [facility]  [address], [address]  [phone]  Professionals or [facility] I can contact during a crisis: Who can I call for help - for example, my doctor, my psychiatrist, my psychologist, a mental health provider, a suicide hotline?   Suicide Prevention Lifeline: 3-713-795-TALK (4567)  [facility] 2-1-7 (408) 290-1028 or (829) 155-5500  [place] (National Association of Mental Illness) groups and support, [facility] Yanira Liang  [address] (767) 525-6948    4. [address] [place] [address] Emergency Services -  for example, Community Mental           [address], [address] [facility]  Jed, Crisis line: [address] [facility] 02-XWOP Crisis Response Team (Crisis Intervention Team - [place]), [phone] or 9-1-1  [address] [facility], [place], [address] Association of Mental Illness, 2-082-319-859-460-4419  [facility] Substance [address] [facility], 3-386-921-HELP (4859)   Crisis Text Line, Text 4HOPE to 080661 to connect with a crisis counselor  [facility], 6-545.975.3137  Doretha Solorzano ([place]), [phone]  [facility], [address] [facility] [facility] [address] [facility] [address] [facility] [address]  [address] [facility], [address] [place] [address] [phone]  Monica Lambert[address] [place], [address] [place] [address]., [place], [address] [place] [address], [phone]    Making the environment safe: How can I make my environment (house/apartment/living space) safer? For example, can I remove guns, medications, and other items? 1. Throw away all medications not being taken  2.  Plan daily goals to help remember to stay on specific medications

## 2022-04-26 NOTE — GROUP NOTE
Group Therapy Note    Date: 10/2/2020    Group Start Time: 1100  Group End Time: 36  Group Topic: Psychotherapy    GIAN Castro, TYREE        Group Therapy Note      Patient declined to attend Psychotherapy group at 1100 am despite encouragement. Patient was offered a 1:1 time to meet after group or alternative activity to do and patient refused.      Signature:  Nikko Castro [facility], CRC, LPC Left detailed message for patient rescheduling appt from 3:30pm to 10:45am on 05/02/2022. Instructed the patient to call the office if this does not work.    Electronically signed by Angel Noel MA on 4/26/2022 at 11:22 AM

## 2022-04-28 NOTE — GROUP NOTE
Group Therapy Note    Date: 9/3/2020    Group Start Time: 1000  Group End Time: 1895  Group Topic: Psychotherapy    GIAN Gupta, MSW, LSW        Group Therapy Note    Attendees: 5    Pt declined to attend psychotherapy at 1000 am despite encouragement. Pt offered 1:1 and refused. Additional Notes: Pt stated since December he has had bad itching when nervous, hot, or stressed. No rash or other symptoms associated with it. Recommended to take a Claritin daily to see if that helps.\\nBrooke recommended to see an allergist. Referred to Dr. Vora\\nBeliwilfredo to be some sort of reaction.  Tried changing all meds, stopped all vitamins and supplements.  Stopped all statins x 1 month and no change. Render Risk Assessment In Note?: no Detail Level: Simple

## 2022-06-15 ENCOUNTER — HOSPITAL ENCOUNTER (INPATIENT)
Age: 47
LOS: 7 days | Discharge: HOME | DRG: 885 | End: 2022-06-22
Attending: PSYCHIATRY & NEUROLOGY | Admitting: PSYCHIATRY & NEUROLOGY
Payer: MEDICARE

## 2022-06-15 PROBLEM — F23 ACUTE PSYCHOSIS (HCC): Status: ACTIVE | Noted: 2022-06-15

## 2022-06-15 PROCEDURE — 99222 1ST HOSP IP/OBS MODERATE 55: CPT | Performed by: INTERNAL MEDICINE

## 2022-06-15 PROCEDURE — 6370000000 HC RX 637 (ALT 250 FOR IP): Performed by: PSYCHIATRY & NEUROLOGY

## 2022-06-15 PROCEDURE — APPSS45 APP SPLIT SHARED TIME 31-45 MINUTES: Performed by: PSYCHIATRY & NEUROLOGY

## 2022-06-15 PROCEDURE — 90792 PSYCH DIAG EVAL W/MED SRVCS: CPT | Performed by: PSYCHIATRY & NEUROLOGY

## 2022-06-15 PROCEDURE — 1240000000 HC EMOTIONAL WELLNESS R&B

## 2022-06-15 RX ORDER — HALOPERIDOL 5 MG
5 TABLET ORAL EVERY 4 HOURS PRN
Status: DISCONTINUED | OUTPATIENT
Start: 2022-06-15 | End: 2022-06-22 | Stop reason: HOSPADM

## 2022-06-15 RX ORDER — FLUOXETINE HYDROCHLORIDE 20 MG/1
60 CAPSULE ORAL DAILY
Status: DISCONTINUED | OUTPATIENT
Start: 2022-06-15 | End: 2022-06-22 | Stop reason: HOSPADM

## 2022-06-15 RX ORDER — DIPHENHYDRAMINE HYDROCHLORIDE 50 MG/ML
50 INJECTION INTRAMUSCULAR; INTRAVENOUS EVERY 4 HOURS PRN
Status: DISCONTINUED | OUTPATIENT
Start: 2022-06-15 | End: 2022-06-22 | Stop reason: HOSPADM

## 2022-06-15 RX ORDER — CYCLOBENZAPRINE HCL 10 MG
10 TABLET ORAL 3 TIMES DAILY PRN
Status: DISCONTINUED | OUTPATIENT
Start: 2022-06-15 | End: 2022-06-22 | Stop reason: HOSPADM

## 2022-06-15 RX ORDER — OLANZAPINE 10 MG/1
10 TABLET ORAL 2 TIMES DAILY
Status: DISCONTINUED | OUTPATIENT
Start: 2022-06-15 | End: 2022-06-20

## 2022-06-15 RX ORDER — LORAZEPAM 1 MG/1
2 TABLET ORAL EVERY 4 HOURS PRN
Status: DISCONTINUED | OUTPATIENT
Start: 2022-06-15 | End: 2022-06-22 | Stop reason: HOSPADM

## 2022-06-15 RX ORDER — IBUPROFEN 400 MG/1
400 TABLET ORAL EVERY 6 HOURS PRN
Status: DISCONTINUED | OUTPATIENT
Start: 2022-06-15 | End: 2022-06-22 | Stop reason: HOSPADM

## 2022-06-15 RX ORDER — TRAZODONE HYDROCHLORIDE 50 MG/1
50 TABLET ORAL NIGHTLY PRN
Status: DISCONTINUED | OUTPATIENT
Start: 2022-06-15 | End: 2022-06-22 | Stop reason: HOSPADM

## 2022-06-15 RX ORDER — HALOPERIDOL 5 MG/ML
5 INJECTION INTRAMUSCULAR EVERY 4 HOURS PRN
Status: DISCONTINUED | OUTPATIENT
Start: 2022-06-15 | End: 2022-06-22 | Stop reason: HOSPADM

## 2022-06-15 RX ORDER — ACETAMINOPHEN 325 MG/1
650 TABLET ORAL EVERY 4 HOURS PRN
Status: DISCONTINUED | OUTPATIENT
Start: 2022-06-15 | End: 2022-06-22 | Stop reason: HOSPADM

## 2022-06-15 RX ORDER — POLYETHYLENE GLYCOL 3350 17 G/17G
17 POWDER, FOR SOLUTION ORAL DAILY PRN
Status: DISCONTINUED | OUTPATIENT
Start: 2022-06-15 | End: 2022-06-22 | Stop reason: HOSPADM

## 2022-06-15 RX ORDER — LORAZEPAM 2 MG/ML
2 INJECTION INTRAMUSCULAR EVERY 4 HOURS PRN
Status: DISCONTINUED | OUTPATIENT
Start: 2022-06-15 | End: 2022-06-22 | Stop reason: HOSPADM

## 2022-06-15 RX ORDER — HYDROXYZINE 50 MG/1
50 TABLET, FILM COATED ORAL 3 TIMES DAILY PRN
Status: DISCONTINUED | OUTPATIENT
Start: 2022-06-15 | End: 2022-06-22 | Stop reason: HOSPADM

## 2022-06-15 RX ORDER — MAGNESIUM HYDROXIDE/ALUMINUM HYDROXICE/SIMETHICONE 120; 1200; 1200 MG/30ML; MG/30ML; MG/30ML
30 SUSPENSION ORAL EVERY 6 HOURS PRN
Status: DISCONTINUED | OUTPATIENT
Start: 2022-06-15 | End: 2022-06-22 | Stop reason: HOSPADM

## 2022-06-15 RX ADMIN — HYDROXYZINE HYDROCHLORIDE 50 MG: 50 TABLET, FILM COATED ORAL at 22:09

## 2022-06-15 RX ADMIN — FLUOXETINE 60 MG: 20 CAPSULE ORAL at 12:02

## 2022-06-15 RX ADMIN — OLANZAPINE 10 MG: 10 TABLET, FILM COATED ORAL at 12:02

## 2022-06-15 RX ADMIN — OLANZAPINE 10 MG: 10 TABLET, FILM COATED ORAL at 22:09

## 2022-06-15 RX ADMIN — TRAZODONE HYDROCHLORIDE 50 MG: 50 TABLET ORAL at 22:09

## 2022-06-15 ASSESSMENT — LIFESTYLE VARIABLES
HOW OFTEN DO YOU HAVE A DRINK CONTAINING ALCOHOL: MONTHLY OR LESS
HOW MANY STANDARD DRINKS CONTAINING ALCOHOL DO YOU HAVE ON A TYPICAL DAY: 1 OR 2
HOW MANY STANDARD DRINKS CONTAINING ALCOHOL DO YOU HAVE ON A TYPICAL DAY: 1 OR 2
HOW OFTEN DO YOU HAVE A DRINK CONTAINING ALCOHOL: NEVER

## 2022-06-15 ASSESSMENT — PATIENT HEALTH QUESTIONNAIRE - PHQ9: SUM OF ALL RESPONSES TO PHQ QUESTIONS 1-9: 14

## 2022-06-15 ASSESSMENT — PAIN SCALES - GENERAL: PAINLEVEL_OUTOF10: 0

## 2022-06-15 ASSESSMENT — SLEEP AND FATIGUE QUESTIONNAIRES
SLEEP PATTERN: DIFFICULTY FALLING ASLEEP;DIFFICULTY ARISING;DISTURBED/INTERRUPTED SLEEP
AVERAGE NUMBER OF SLEEP HOURS: 5
DO YOU HAVE DIFFICULTY SLEEPING: YES
DO YOU USE A SLEEP AID: NO

## 2022-06-15 NOTE — PROGRESS NOTES
Pharmacy Medication History Note      List of current medications patient is taking is complete. Source of information: [address] ([address]), [address] [facility], [address]    Changes made to medication list:  Medications removed (include reason, ex. therapy complete or physician discontinued, noncompliance):  Fluoxetine (list clean up), Olanzapine (list clean up), Trazodone (list clean up)    Medications added/doses adjusted:  none    Other notes (ex. Recent course of antibiotics, Coumadin dosing):  No 2022 fill history found for patient. Documentation from [facility] reports patient is linked with [facility] however [facility] reports patient is inactive at this time. Please let me know if you have any questions about this encounter. Thank you!     Electronically signed by Juan Barlow [facility] on 6/15/2022 at 9:20 AM

## 2022-06-15 NOTE — PLAN OF CARE
Problem: Psychosis  Goal: Will report no hallucinations or delusions  Description: INTERVENTIONS:  1. Administer medication as  ordered  2. Assist with reality testing to support increasing orientation  3. Assess if patient's hallucinations or delusions are encouraging self harm or harm to others and intervene as appropriate  6/15/2022 1213 by Omar Vital LPN  Outcome: Progressing, Patient denies any hallucination at this time. Patient need much encouraged with group therapy he's been isolative to his room out for meal only. 100 % meal and fluid  Intake. Problem: Involuntary Admit  Goal: Will cooperate with staff recommendations and doctor's orders and will demonstrate appropriate behavior  Description: INTERVENTIONS:  1. Treat underlying conditions and offer medication as ordered  2. Educate regarding involuntary admission procedures and rules  3. Contain excessive/inappropriate behavior per unit and hospital policies  3/71/3253 0627 by Omar Vital LPN  Outcome: Progressing, Patient behavioral has been will control thus far; medication complaint. Patient denied suicidal and homicidal ideations. 15 MIN safety checks.

## 2022-06-15 NOTE — H&P
taken his medication and \"months \". Endorsing symptoms of depression greater than 2-week timeframe including significant anhedonia and feelings of helplessness hopelessness, poor motivation and decreased appetite. He endorses that his life is currently not worth living but denies having specific plan to end his life at this time. He endorses homicidal ideation as it relates to command voices. He denies this is towards any specific person and contracts for safety not to harm those in the immediate area. Patient endorses concerns that people are watching or talking about him. He believes that law enforcement frequently targets him and he often does not feel safe. He denies symptoms of anxiety or experienced panic attacks. He denies symptoms of PTSD. Unable to elicit any symptoms of rodolfo including present or past.  He denies phobias fear of abandonment or utilizing self damaging behaviors as coping mechanisms. When asked about violence and history of incarceration patient states \"I am tired. ..gonna lay down\". History of head trauma: [] Yes [x] No    History of seizures: [] Yes [x] No    History of violence or aggression: [x] Yes [] No         PSYCHIATRIC HISTORY:  [] Yes [] No    Currently follows with no community provider. Previously was linked with [facility]  1 lifetime suicide attempt by overdose  Multiple psychiatric hospital admissions including at [facility] 9/11/2021, 9/1/2021, 9/29/2020, 8/29/2020, 7/20/2020    Home Medication Compliance: [] Yes [x] No    Past psychiatric medications includes:Zoloft, Risperdal, Abilify, Zyprexa and trazodone       Adverse reactions from psychotropic medications: [x] Yes [] No per historical documentation sexual side effects from SSRI medications and also reports feeling overly sedated at times on his medications, but no life-threatening or allergic reactions.          Lifetime Psychiatric Review of Systems         Depression: Endorses     Anxiety: Denies     Panic Attacks: Denies     Mayuri or Hypomania: Denies     Phobias: Denies     Obsessions and Compulsions: Denies     Body or Vocal Tics: Denies     Visual Hallucinations: Denies     Auditory Hallucinations: Endorses     Delusions/Paranoia: Endorses     PTSD: Denies    Past Medical History:        Diagnosis Date    Asthma     Hypertension     Morbid obesity ([address]     Schizoaffective disorder ([address]     Substance abuse ([address]        Past Surgical History:    No past surgical history on file. Allergies:  Patient has no known allergies. Social History:     Born in: [address]  Family: Patient declines discussion of his family although it should be noted that his emergency contact is \"mother Ching Coello at [phone]  Highest Level of Education: High school  Occupation: Receives SSDI  Marital Status: Single  Children: Reports 1 son grown  Residence: Patient states \"no place\". Stressors: Auditory hallucinations  Patient Assets/Supportive Factors: Willingness to ask for help         DRUG USE HISTORY  Social History     Tobacco Use   Smoking Status Never Smoker   Smokeless Tobacco Never Used   Tobacco Comment    pt nonsmoker     Social History     Substance and Sexual Activity   Alcohol Use Not Currently    Comment: Hx of Alcohol abuse     Social History     Substance and Sexual Activity   Drug Use Not Currently     Patient currently denies any alcohol or illicit drug use. Past historical documentation identifies polysubstance use  Urine toxicology provided hard copy by [facility] unremarkable  No available blood alcohol level         LEGAL HISTORY:   HISTORY OF INCARCERATION: [] Yes [] No currently unknown as patient is unwilling to discuss or explore topic of any  current legal concerns. Previous documentation identifies history of incarceration    Family History:       Family history unknown: Yes       Psychiatric Family History  Patient denies psychiatric family history.   Patient states\"don't need to talk bout all that\"    Suicides in family: [] Yes [x] No    Substance use in family: [] Yes [x] No         PHYSICAL EXAM:  Vitals:  /60   Pulse 80   Temp 97.9 °F (36.6 °C) (Temporal)   Resp 14   Ht 6' (1.829 m)   Wt 250 lb (113.4 kg)   BMI 33.91 kg/m²   Pain Level: Patient rates 6/10 back pain and is encouraged to utilize available analgesics    LABS:  Labs reviewed: [x] Yes hard copy provided by [facility] CBC with differential unremarkable, BMP unremarkable  Urine toxicology as noted unremarkable  No available blood alcohol level  Last EKG information per hard copy you [facility] reviewed: [x] Yes \"normal sinus rhythm, no evidence of PVCs, PACs or other ectopy \". Nonspecific ST changes. The T waves are flattened in 3-lead          Review of Systems   Constitutional: Negative for chills and weight loss. HENT: Negative for ear pain and nosebleeds. Eyes: Negative for blurred vision and photophobia. Respiratory: Negative for cough, shortness of breath and wheezing. Cardiovascular: Negative for chest pain and palpitations. Gastrointestinal: Negative for abdominal pain, diarrhea and vomiting. Genitourinary: Negative for dysuria and urgency. Musculoskeletal: Negative for falls reports chronic joint pain lumbar region of the spine. Skin: Negative for itching and rash. Neurological: Negative for tremors, seizures and weakness. Endo/Heme/Allergies: Does not bruise/bleed easily. Physical Exam:   Constitutional:   Patient is obese, no acute distress. HENT:   Head: Normocephalic and atraumatic. Eyes: Conjunctivae are normal. Right eye exhibits no discharge. Left eye exhibits no discharge. No scleral icterus. Neck: Normal range of motion. Neck supple. Pulmonary/Chest:  No respiratory distress or accessory muscle use, no wheezing. Cardiac: Regular rate and rhythm. Abdominal: Soft. Non-tender. Exhibits no distension. Musculoskeletal: Normal range of motion. Exhibits no edema.    Neurological: cranial nerves II-XII grossly in tact, slowed gait and station. Skin: Skin is warm and dry. Patient is not diaphoretic. No erythema. Mental Status Examination:    Level of consciousness:  Awake and alert  Appearance:  Hospital attire, lying in bed, fair grooming and hygiene  Behavior/Motor:  Irritable, no psychomotor abnormality  Attitude toward examiner:   Minimally cooperative, angry with poor eye contact  Speech: Irritable tone, normal rate and volume, fair articulation  Mood:  Depressed  Affect:   Mood congruent  Thought processes:   Linear responses overall  Thought content: active suicidal ideations without current plan or intent               Endorses homicidal ideations               Endorses auditory hallucinations to kill self and others              Paranoid delusions  Cognition:  Oriented to self, location, time, situation  Concentration clinically adequate  Memory: Age-appropriate  Insight &Judgment: poor         DSM-5 Diagnosis    Principal Problem: Schizoaffective disorder, depressive type ([facility]      Psychosocial and Contextual factors:  Financial   Occupational   Relationship   Legal   Living situation   Educational     Past Medical History:   Diagnosis Date    Asthma     Hypertension     Morbid obesity ([facility]     Schizoaffective disorder ([facility]     Substance abuse ([facility]         TREATMENT CONSIDERATIONS    Continue inpatient psychiatric treatment. Home medications reviewed. Medications per attending:  Consider restarting Zyprexa 10 mg twice daily  Trazodone 100 mg at hour of sleep  Monitor need and frequency of PRN medications. Attempt to develop insight. Follow-up daily while inpatient. Reviewed risks and benefits as well as potential side effects with patient.     CONSULT:  [x] Yes [] No  Internal medicine for medical management/medical H&P    Risk Management: close watch per standard protocol      Psychotherapy: participation in milieu and group and individual sessions with Attending Physician,  and Physician Assistant/CNP      Estimated length of stay:  2-14 days      GENERAL PATIENT/FAMILY EDUCATION  Patient will understand basic signs and symptoms, patient will understand benefits/risks and potential side effects from proposed medications, and patient will understand their role in recovery. Family is not currently active in patient's care. Patient assets that may be helpful during treatment include: Intent to participate and engage in treatment, sufficient fund of knowledge and intellect to understand and utilize treatments. Goals:    1) Remission of suicidal and homicidal ideation  2) Stabilization of symptoms prior to discharge. 3) Establish efficacy and tolerability of medications. Behavioral Services  Medicare Certification     Admission Day 1  I certify that this patient's inpatient psychiatric hospital admission is medically necessary for:    x (1) treatment which could reasonably be expected to improve this patient's condition, or    x (2) diagnostic study or its equivalent. Time Spent: 45 minutes    Sharyle Condon is a 40 y.o. male being evaluated face to face    --MARIA D Gutierrez CNP on 6/15/2022 at 12:12 PM    An electronic signature was used to authenticate this note. I independently saw and evaluated the patient. I reviewed the nurse practitioners documentation above. Any additional comments or changes to the nurse practitioners documentation are stated below otherwise agree with assessment. Plan will be as follows:  Patient with history of schizoaffective disorder depressive type presents with acute exacerbation. Command auditory hallucinations both suicidal commands as well as homicidal commands. Not able to contract for safety in the community. Has been noncompliant recently with his olanzapine and Prozac but states that its only been 3 to 4 days.   Discussed long-acting life of Prozac and that we could likely resume his 60 mg dose and he reassures this Eldonna Esters he had been taking it. Olanzapine seems to be more likely a more immediate impact on his mood and symptoms. Denied substance use and urine tox from outside hospital negative. We will restart Prozac at 60 mg and olanzapine 10 mg p.o. twice daily.     Electronically signed by Riana Hernadez MD on 6/15/2022 at 1:06 PM

## 2022-06-15 NOTE — GROUP NOTE
Group Therapy Note    Date: 6/15/2022    Group Start Time: 1100  Group End Time: 9621  Group Topic: Cognitive Skills    GIAN Vo, CTRS        Group Therapy Note    Attendees: 9/17         Pt did not participate in Cognitive Skills Group at 1100am when encouraged by RT due to resting in room. Pt was offered talk time as an alternative to group but declined.        Discipline Responsible: Psychoeducational Specialist      Signature:  Lenka Quintanilla

## 2022-06-15 NOTE — GROUP NOTE
Group Therapy Note    Date: 6/15/2022    Group Start Time: 1330  Group End Time: 1747  Group Topic: Cognitive Skills    GIAN Moulton, CTRS        Group Therapy Note    Attendees: 6/16         Pt did not participate in Cognitive Skills Group at 1330 when encouraged by RT due to resting in room. Pt was offered talk time as an alternative to group but declined.        Discipline Responsible: Psychoeducational Specialist      Signature:  Vale Martin

## 2022-06-15 NOTE — GROUP NOTE
Group Therapy Note    Date: 6/15/2022    Group Start Time: 1000  Group End Time: 0552  Group Topic: Psychotherapy    [address], LSW    patient refused to attend psychotherapy group at [facility] after encouragement from staff.   1:1 talk time provided as alternative to group session

## 2022-06-15 NOTE — PROGRESS NOTES
Behavioral Services  Medicare Certification Upon Admission    I certify that this patient's inpatient psychiatric hospital admission is medically necessary for:    [x] (1) Treatment which could reasonably be expected to improve this patient's condition,       [x] (2) Or for diagnostic study;     AND     [x](2) The inpatient psychiatric services are provided while the individual is under the care of a physician and are included in the individualized plan of care.     Estimated length of stay/service 4-7 days    Plan for post-hospital care home with outpatient [facility] f/u    Electronically signed by Madhav Sanders MD on 6/15/2022 at 11:29 AM

## 2022-06-15 NOTE — BH NOTE
[facility]  Admission Note     Admission Type:   Admission Type: Involuntary    Reason for admission:  Reason for Admission: Patient involuntary from [facility] with visual and command auditory hallucinations to hurt other people. He has increased paranoia and thinks people are following him and out to get him. PATIENT STRENGTHS:   Stable housing and support from organized community    Patient Strengths and Limitations:   Recreational/leisure/hobbies and medication noncompliance. Addictive Behavior:   Addictive Behavior  In the Past 3 Months, Have You Felt or Has Someone Told You That You Have a Problem With  : None    Medical Problems:   Past Medical History:   Diagnosis Date    Asthma     Hypertension     Morbid obesity ([facility]     Schizoaffective disorder ([facility]     Substance abuse ([facility]        Status EXAM:  Mental Status and Behavioral Exam  Normal: No  Level of Assistance: Independent/Self  Facial Expression: Avoids Gaze,Flat  Affect: Constricted  Level of Consciousness: Alert  Frequency of Checks: 4 times per hour, close  Mood:Normal: No  Mood: Anxious,Irritable  Motor Activity:Normal: Yes  Eye Contact: Poor  Observed Behavior: Cooperative,Guarded  Sexual Misconduct History: Current - no  Preception: [place] to person,[place] to time,[place] to place,[place] to situation  Attention:Normal: No  Attention: Distractible  Thought Processes: Circumstantial  Thought Content:Normal: No  Thought Content: Paranoia,Poverty of content,Preoccupations  Depression Symptoms: Increased irritability  Anxiety Symptoms: Generalized  Mayuri Symptoms: No problems reported or observed. Hallucinations:  Auditory (comment),Command (comment) (telling him to harm himself)  Delusions: Yes  Delusions: Paranoid  Memory:Normal: No  Memory: Poor recent,Poor remote  Insight and Judgment: No  Insight and Judgment: Poor judgment,Poor insight    Tobacco Screening:  Practical Counseling, on admission, sung X, if applicable and completed (first 3 are required if patient doesn't refuse):            ( )  Recognizing danger situations (included triggers and roadblocks)                    ( )  Coping skills (new ways to manage stress, exercise, relaxation techniques, changing routine, distraction)                                                           ( )  Basic information about quitting (benefits of quitting, techniques in how to quit, available resources  ( ) Referral for counseling faxed to [facility]                                           ( ) Patient refused counseling  (X ) Patient has not smoked in the last 30 days    Metabolic Screening:    No results found for: LABA1C    No results found for: CHOL  No results found for: TRIG  No results found for: HDL  No components found for: LDLCAL  No results found for: LABVLDL      Body mass index is 33.91 kg/m². BP Readings from Last 2 Encounters:   06/15/22 (!) 142/74   09/17/21 (!) 98/41           Pt admitted with followings belongings:  Dental Appliances: None  Vision - Corrective Lenses: None  Hearing Aid: None  Jewelry: Bracelet (silver and black )  Body Piercings Removed: N/A  Clothing: Footwear,Hat,Pants,Shirt,Socks,Undergarments  Other Valuables: Keys (key fob)     Patient's home medications need verified with pharmacy. Patient oriented to surroundings and program expectations and copy of patient rights given. Received admission packet:  yes. Consents reviewed, signed yes. Refused n/a. Patient verbalize understanding:  yes. Patient education on precautions: yes. Patient involuntary from [facility] with visual and command auditory hallucinations to harm others. Patient has been off his medications and has increased paranoia. Patient thinks people are following him and are out to get him. Patient is linked with [facility]. Patient was cooperative with admission process and signed all paperwork including voluntary admission form.                      Rigoberto Meléndez RN

## 2022-06-15 NOTE — PLAN OF CARE
insight    EDUCATION:   Learner Progress Toward Treatment Goals: reviewed group plans and strategies for care    Method:group therapy, medication compliance, individualized assessments and care planning    Outcome: needs reinforcement    PATIENT GOALS: to be discussed with patient within 72 hours    PLAN/TREATMENT RECOMMENDATIONS:     continue group therapy , medications compliance, goal setting, individualized assessments and care, continue to monitor pt on unit      SHORT-TERM GOALS:   Time frame for Short-Term Goals: 5-7 days    LONG-TERM GOALS:  Time frame for Long-Term Goals: 6 months  Members Present in Team Meeting: See Signature Sheet    Daisy Ramesh

## 2022-06-15 NOTE — H&P
History and Physical Service  [facility]    HISTORY AND PHYSICAL EXAMINATION            Date:   6/15/2022  Patient name:  Kartik Fraser  MRN:   [id number]  Account:  [de-identified]  YOB: 1975  PCP:    No primary care provider on file. Code Status:    Full Code    Chief Complaint:   hallucinations    History Obtained From:     Patient ,medical record ,nursing staff    History of Present [facility] Problems           Last Modified POA    * (Principal) Schizoaffective disorder, depressive type ([facility] [address] 6/15/2022 Yes    Acute psychosis ([facility] [address] 6/15/2022 Yes           The patient is a 40 y.o. Non- / non  male who presents   No hx chronic medical illness  Home meds reviewed      Marco Vizcarra is a 40 y. o. male with significant past medical history of schizoaffective disorder, asthma,'s polysubstance abuse, hypertension and obesity who presented to the [facility] suicidal and homicidal thoughts.      Per emergency department documentation: Patient here via private vehicle. Patient's sole historian. Patient notes that he is here \"because I have mental health issues \"patient identifies as \"paranoid schizophrenic \"patient notes that he follows with [facility] but has not been in contact with them for \"months \". Patient had presented to triage reporting that he wanted to be sent to [facility].  Patient reports to me that he is \"hearing people chattering and yelling and seeing images of basketball and football \"and these are distressing him.          Past Medical History:   Diagnosis Date    Asthma     Hypertension     Morbid obesity ([facility] [address]     Schizoaffective disorder ([facility] [address]     Substance abuse ([facility] [address]         Past Surgical History:     No past surgical history on file. Medications Prior to Admission:     Prior to Admission medications    Not on File        Allergies:     Patient has no known allergies.     Social History:     Tobacco:    reports that he has never smoked. He has never used smokeless tobacco.  Alcohol:      reports previous alcohol use. Drug Use:  reports previous drug use. Family History:     Family History   Family history unknown: Yes       Review of Systems:     AILYN Jim     SEE HPI          Respiratory ROS:            Negative for cough,                                              Negative for shortness of breath . Negative for wheezing ,     Cardiovascular ROS:     Negative for chest pain,                                             Negative for palpitations . Negative for worsening or new leg edema . Gastrointestinal ROS:   Negative for abdominal pain . Negative for change in bowel habits . [place] [facility] ROS:      Negative for rash,                                            .  ==============                                                       Physical Exam:         Physical Exam   Vitals:    Vitals:    06/15/22 0500 06/15/22 0800   BP: (!) 142/74 100/60   Pulse: 82 80   Resp: 14 14   Temp: 98.3 °F (36.8 °C) 97.9 °F (36.6 °C)   TempSrc: Temporal Temporal   Weight: 250 lb (113.4 kg)    Height: 6' (1.829 m)                     Body mass index is 33.91 kg/m². General Appearance:   Alert , CO-OPERATIVE ,                 Skin:                             No rash or erythema  HEENT ;                           Head                        Symmetrical , within normal limits                                           Eye                           Conjunctiva normal ,, sclera non-icteric . Ear                           External ear ok .                                                   Neck:                            No mass , no thyroid enlargement                                           Pulmonary/Chest:        Clear to auscultation bilaterally . No wheezes, rales or rhonchi . No abnormality on percussion                                                        Cardiovascular:            Normal rate, regular rhythm,                                          No murmur or  Gallop . Abdomen:                       Soft, non-tender                                           Normal bowels sounds,                                             Extremities:                    No  Edema . Neurological ;                 No focal motor deficit ,                 No focal sensory deficit ,    Musculo-skeletal ;                  No  gait abnormality                  No significant joint abnormality,                   Psych:   see Psych notect ,                                                                                           Investigations:      Imaging/Diagnostics:       No results found.        Current Facility-Administered Medications   Medication Dose Route Frequency Provider Last Rate Last Admin    acetaminophen (TYLENOL) tablet 650 mg  650 mg Oral Q4H PRN Jennifer Tom MD        aluminum & magnesium hydroxide-simethicone (MAALOX) 200-200-20 MG/5ML suspension 30 mL  30 mL Oral Q6H PRN Jennifer Tom MD        hydrOXYzine HCl (ATARAX) tablet 50 mg  50 mg Oral TID PRN Jennifer Tom MD        ibuprofen (ADVIL;MOTRIN) tablet 400 mg  400 mg Oral Q6H PRN Jennifer Tom MD        polyethylene glycol (GLYCOLAX) packet 17 g  17 g Oral Daily PRN Jennifer Tom MD        traZODone (DESYREL) tablet 50 mg  50 mg Oral Nightly PRN Jennifer Tom MD        haloperidol lactate (HALDOL) injection 5 mg  5 mg IntraMUSCular Q4H PRN Jennifer Tom MD        And    LORazepam (ATIVAN) injection 2 mg  2 mg IntraMUSCular Q4H PRN Jennifer Tom MD        And    diphenhydrAMINE (BENADRYL) injection 50 mg  50 mg IntraMUSCular Q4H PRN Ekaterina Rivas MD        haloperidol (HALDOL) tablet 5 mg  5 mg Oral Q4H PRN Ekaterina Rivas MD        And    LORazepam (ATIVAN) tablet 2 mg  2 mg Oral Q4H PRN Ekaterina Rivas MD        FLUoxetine (PROZAC) capsule 60 mg  60 mg Oral Daily Ekaterina Rivas MD   60 mg at 06/15/22 1202    OLANZapine (ZYPREXA) tablet 10 mg  10 mg Oral BID Ekaterina Rivas MD   10 mg at 06/15/22 1202    cyclobenzaprine (FLEXERIL) tablet 10 mg  10 mg Oral TID PRN Ekaterina Rivas MD         Impressions :      1.    Hospital Problems           Last Modified POA    * (Principal) Schizoaffective disorder, depressive type ([facility] [address] 6/15/2022 Yes    Acute psychosis ([facility] [address] 6/15/2022 Yes             Plans:     1   Admitted with hallucinations , acpsychosis The patient has a history of schizophrenia        Vital   istable ,    [x] yes     [] NO    ___     Labs    reviewed  Significant findings -will order--       Drug toxicology screen negative [] yes     [] NO       ----    Home meds reviewed    Reconcilliation done [] yes     [] Caro Barnes MD  6/15/2022  3:20 PM

## 2022-06-15 NOTE — CARE COORDINATION
Psychosocial Assessment    Current Level of Psychosocial Functioning     Independent  X  Dependent    Minimal Assist     Comments:      Psychosocial High Risk Factors (check all that apply)    Unable to obtain meds   Chronic illness/pain    Substance abuse   Lack of Family Support   Financial stress   Isolation   Inadequate Community Resources  Suicide attempt(s)  Not taking medications   Victim of crime   Developmental Delay  Unable to manage personal needs    Age 72 or older   Homeless  No transportation   Readmission within 30 days  Unemployment  Traumatic Event    Family/Supports identified: Unknown pt refused to respond    Sexual Orientation:  NA    Patient Strengths: Stable housing     Patient Barriers: Poor coping skills for mental health     Safety plan: NA    CMHC/MH history: Linked with [facility]    Plan of Care:  medication management, group/individual therapies, family meetings, psycho -education, treatment team meetings to assist with stabilization    Initial Discharge Plan:  Return home and follow up with [facility]    Clinical Summary:  Pt is a 40year old male admitted to the [facility] for safety from [facility]. Pt was irritable and only answered \"yeah or no\" to questions. Pt denied suicidal, homicidal ideations and hallucinations. Pt ended the assessment very quickly.

## 2022-06-15 NOTE — BH NOTE
Community Group Note     Date: 6/15/2022     Group Start Time: 0900   Group End Time:  5904  Group Topic: Goal Setting Group     [facility]       Attendees: 9/19           Pt did not participate in 9:00am group due to resting in room. Pt was offered talk time as an alternative to group but pt declined.

## 2022-06-16 PROCEDURE — 6370000000 HC RX 637 (ALT 250 FOR IP): Performed by: PSYCHIATRY & NEUROLOGY

## 2022-06-16 PROCEDURE — 99232 SBSQ HOSP IP/OBS MODERATE 35: CPT | Performed by: PSYCHIATRY & NEUROLOGY

## 2022-06-16 PROCEDURE — 99231 SBSQ HOSP IP/OBS SF/LOW 25: CPT | Performed by: INTERNAL MEDICINE

## 2022-06-16 PROCEDURE — 1240000000 HC EMOTIONAL WELLNESS R&B

## 2022-06-16 RX ADMIN — OLANZAPINE 10 MG: 10 TABLET, FILM COATED ORAL at 21:34

## 2022-06-16 RX ADMIN — TRAZODONE HYDROCHLORIDE 50 MG: 50 TABLET ORAL at 21:34

## 2022-06-16 RX ADMIN — FLUOXETINE 60 MG: 20 CAPSULE ORAL at 09:01

## 2022-06-16 RX ADMIN — OLANZAPINE 10 MG: 10 TABLET, FILM COATED ORAL at 09:01

## 2022-06-16 ASSESSMENT — LIFESTYLE VARIABLES
HOW OFTEN DO YOU HAVE A DRINK CONTAINING ALCOHOL: MONTHLY OR LESS
HOW MANY STANDARD DRINKS CONTAINING ALCOHOL DO YOU HAVE ON A TYPICAL DAY: 1 OR 2

## 2022-06-16 NOTE — GROUP NOTE
Group Therapy Note    Date: 6/16/2022    Group Start Time: 1330  Group End Time: 2563  Group Topic: Recreational    [address] [facility], [address]    Patient did not attend recreational skills group at 1330 due to resting in room despite staff invitation to attend. 1:1 talk time offered as alternative to group session, pt declined.          Signature:  Stanley Wright, [address]

## 2022-06-16 NOTE — PROGRESS NOTES
Daily Progress Note  6/16/2022    Patient Name: Rory Vann    CHIEF COMPLAINT: Command auditory hallucinations telling him to harm himself and others         SUBJECTIVE:    Nursing staff report that patient has been medication adherent with his Prozac and olanzapine. He is assessed at bedside where he continues to endorse suicidal and homicidal ideation as it is related to auditory hallucinations that are command in nature. He denies intent to hurt anyone in the immediate area and agrees to reach out to support team as needed. He denies side effects related to his medications and reports having an adequate appetite. He is not able to contract for safety if he were in the community and denies having questions or concerns at this time regarding his treatment plan. At this time, the patient is not appropriate for a lower level of care. There is risk of decompensation and patient warrants further hospitalization for safety and stabilization. Appetite:  [x] Normal/Adequate/Unchanged  [] Increased  [] Decreased      Sleep:       [] Normal/Adequate/Unchanged  [x] Fair  [] Poor      Group Attendance on Unit:   [] Yes  [] Selectively    [x] No    Medication Side Effects: Patient denies any medication side effects at the time of assessment.          Mental Status Exam  Level of consciousness:  Awake and alert  Appearance:  Hospital attire, lying in bed, fair grooming and hygiene  Behavior/Motor:  Irritable, no psychomotor abnormality  Attitude toward examiner:   Minimally cooperative, angry with poor eye contact  Speech: Irritable tone, normal rate and volume, fair articulation  Mood:  Depressed  Affect:   Mood congruent  Thought processes:   Linear responses overall  Thought content: active suicidal ideations without current plan or intent               Endorses homicidal ideations               Endorses auditory hallucinations to kill self and others              Paranoid delusions  Cognition:  Oriented to self, location, time, situation  Concentration clinically adequate  Memory: Age-appropriate  Insight &Judgment: poor  Data   height is 6' (1.829 m) and weight is 250 lb (113.4 kg). His temporal temperature is 97.9 °F (36.6 °C). His blood pressure is 109/66 and his pulse is 64. His respiration is 14. Labs:   No visits with results within 2 Day(s) from this visit.    Latest known visit with results is:   Admission on 05/02/2019, Discharged on 05/02/2019   Component Date Value Ref Range Status    WBC 05/02/2019 8.1  3.5 - 11.3 k/uL Final    RBC 05/02/2019 4.73  4.21 - 5.77 m/uL Final    Hemoglobin 05/02/2019 13.8  13.0 - 17.0 g/dL Final    Hematocrit 05/02/2019 43.0  40.7 - 50.3 % Final    MCV 05/02/2019 90.9  82.6 - 102.9 fL Final    MCH 05/02/2019 29.2  25.2 - 33.5 pg Final    MCHC 05/02/2019 32.1  28.4 - 34.8 g/dL Final    RDW 05/02/2019 12.7  11.8 - 14.4 % Final    Platelets 39/90/8461 264  138 - 453 k/uL Final    MPV 05/02/2019 10.7  8.1 - 13.5 fL Final    NRBC Automated 05/02/2019 0.0  0.0 per 100 WBC Final    Differential Type 05/02/2019 NOT REPORTED   Final    Seg Neutrophils 05/02/2019 52  36 - 65 % Final    Lymphocytes 05/02/2019 29  24 - 43 % Final    Monocytes 05/02/2019 13* 3 - 12 % Final    Eosinophils % 05/02/2019 5* 1 - 4 % Final    Basophils 05/02/2019 1  0 - 2 % Final    Immature Granulocytes 05/02/2019 0  0 % Final    Segs Absolute 05/02/2019 4.20  1.50 - 8.10 k/uL Final    Absolute Lymph # 05/02/2019 2.36  1.10 - 3.70 k/uL Final    Absolute Mono # 05/02/2019 1.07  0.10 - 1.20 k/uL Final    Absolute Eos # 05/02/2019 0.42  0.00 - 0.44 k/uL Final    Basophils Absolute 05/02/2019 0.06  0.00 - 0.20 k/uL Final    Absolute Immature Granulocyte 05/02/2019 0.03  0.00 - 0.30 k/uL Final    WBC Morphology 05/02/2019 NOT REPORTED   Final    RBC Morphology 05/02/2019 NOT REPORTED   Final    Platelet Estimate 30/71/1279 NOT REPORTED   Final    Glucose 05/02/2019 98  70 - 99 mg/dL Final  BUN 05/02/2019 14  6 - 20 mg/dL Final    CREATININE 05/02/2019 1.03  0.70 - 1.20 mg/dL Final    Bun/Cre Ratio 05/02/2019 NOT REPORTED  9 - 20 Final    Calcium 05/02/2019 9.1  8.6 - 10.4 mg/dL Final    Sodium 05/02/2019 136  135 - 144 mmol/L Final    Potassium 05/02/2019 3.8  3.7 - 5.3 mmol/L Final    Chloride 05/02/2019 98  98 - 107 mmol/L Final    CO2 05/02/2019 23  20 - 31 mmol/L Final    Anion Gap 05/02/2019 15  9 - 17 mmol/L Final    GFR Non- 05/02/2019 >60  >60 mL/min Final    GFR  05/02/2019 >60  >60 mL/min Final    GFR Comment 05/02/2019        Final    Comment: Average GFR for 38-51 years old:   80 mL/min/1.73sq m  Chronic Kidney Disease:   <60 mL/min/1.73sq m  Kidney failure:   <15 mL/min/1.73sq m              eGFR calculated using average adult body mass. Additional eGFR calculator available at:        [employer]            GFR Staging 05/02/2019 NOT REPORTED   Final    Troponin, High Sensitivity 05/02/2019 <6  0 - 22 ng/L Final    Comment:       High Sensitivity Troponin values cannot be compared with other Troponin methodologies. Patients with high levels of Biotin oral intake (i.e >5mg/day) may have falsely decreased   Troponin levels. Samples collected within 8 hours of biotin intake may require additional   information for diagnosis.       Troponin T 05/02/2019 NOT REPORTED  <0.03 ng/mL Final    Troponin Interp 05/02/2019 NOT REPORTED   Final    Ventricular Rate 05/02/2019 109  BPM Final    Atrial Rate 05/02/2019 109  BPM Final    P-R Interval 05/02/2019 136  ms Final    QRS Duration 05/02/2019 68  ms Final    Q-T Interval 05/02/2019 350  ms Final    QTc Calculation (Bazett) 05/02/2019 471  ms Final    P Axis 05/02/2019 38  degrees Final    R Axis 05/02/2019 19  degrees Final    T Axis 05/02/2019 1  degrees Final    Ethanol 05/02/2019 <10  <10 mg/dL Final    Ethanol percent 05/02/2019 <0.010 <0.010 % Final    NOTE: NEW REFERENCE RANGE    Ventricular Rate 05/02/2019 88  BPM Final    Atrial Rate 05/02/2019 88  BPM Final    P-R Interval 05/02/2019 150  ms Final    QRS Duration 05/02/2019 86  ms Final    Q-T Interval 05/02/2019 404  ms Final    QTc Calculation (Bazett) 05/02/2019 488  ms Final    P Axis 05/02/2019 39  degrees Final    R Axis 05/02/2019 19  degrees Final    T Axis 05/02/2019 1  degrees Final         Reviewed patient's current plan of care and vital signs with nursing staff. Labs reviewed: [x] Yes  Last EKG in EMR reviewed: [x] Yes  Hard copy chart    Medications  Current Facility-Administered Medications: acetaminophen (TYLENOL) tablet 650 mg, 650 mg, Oral, Q4H PRN  aluminum & magnesium hydroxide-simethicone (MAALOX) 200-200-20 MG/5ML suspension 30 mL, 30 mL, Oral, Q6H PRN  hydrOXYzine HCl (ATARAX) tablet 50 mg, 50 mg, Oral, TID PRN  ibuprofen (ADVIL;MOTRIN) tablet 400 mg, 400 mg, Oral, Q6H PRN  polyethylene glycol (GLYCOLAX) packet 17 g, 17 g, Oral, Daily PRN  traZODone (DESYREL) tablet 50 mg, 50 mg, Oral, Nightly PRN  haloperidol lactate (HALDOL) injection 5 mg, 5 mg, IntraMUSCular, Q4H PRN **AND** LORazepam (ATIVAN) injection 2 mg, 2 mg, IntraMUSCular, Q4H PRN **AND** diphenhydrAMINE (BENADRYL) injection 50 mg, 50 mg, IntraMUSCular, Q4H PRN  haloperidol (HALDOL) tablet 5 mg, 5 mg, Oral, Q4H PRN **AND** LORazepam (ATIVAN) tablet 2 mg, 2 mg, Oral, Q4H PRN  FLUoxetine (PROZAC) capsule 60 mg, 60 mg, Oral, Daily  OLANZapine (ZYPREXA) tablet 10 mg, 10 mg, Oral, BID  cyclobenzaprine (FLEXERIL) tablet 10 mg, 10 mg, Oral, TID PRN    ASSESSMENT  Schizoaffective disorder, depressive type ([facility] [address]         HANDOFF  Patient symptoms remain unstable  Medications as determined by attending physician  Encourage participation in groups and milieu.   Probable discharge is to be determined by MD    Electronically signed by MARIA D Aponte CNP on 6/16/2022 at 3:54 PM    **This report has been created using voice recognition software. It may contain minor errors which are inherent in voice recognition technology. **      I independently saw and evaluated the patient. I reviewed the nurse practitioners documentation above. Any additional comments or changes to the nurse practitioners documentation are stated below otherwise agree with assessment. Plan will be as follows:  Patient denying side effects to medication. Not yet able to contract for safety. We will continue to monitor on current dosing with consideration of further adjustment tomorrow pending observation  PLAN  Patient s symptoms   show minimal change  Observe on recently restarted medication  Attempt to develop insight  Psycho-education conducted. Supportive Therapy conducted.   Probable discharge is undetermined at this time  Follow-up daily while on inpatient unit

## 2022-06-16 NOTE — PROGRESS NOTES
[place] [address] Internal Medicine    Progress Note     6/16/2022    11:32 AM    Name:   Sharyle Condon  MRN:     [id number]     Acct:      [de-identified]   Room:   [address] [place] [address] Day:  1  Admit Date:  6/15/2022  4:59 AM    PCP:   No primary care provider on file. Code Status:  Full Code    Subjective:     C/C: No chief complaint on file. ABN BEHAVIOR    Principal Problem:    Schizoaffective disorder, depressive type ([facility] [address]  Active Problems:    Acute psychosis ([facility] [address]  Resolved Problems:    * No resolved hospital problems. *      Interval History Status: not changed. The patient is a 40 y.o. Non- / non  male who presents   No hx chronic medical illness  Home meds reviewed       Marco Vizcarra is a 40 y. o. male with significant past medical history of schizoaffective disorder, asthma,'s polysubstance abuse, hypertension and obesity who presented to the [facility] suicidal and homicidal thoughts.      Per emergency department documentation: Patient here via private vehicle.  Patient's sole historian. Margarita Agee notes that he is here \"because I have mental health issues \"patient identifies as \"paranoid schizophrenic \"patient notes that he follows with [facility] but has not been in contact with them for \"months \".  Patient had presented to triage reporting that he wanted to be sent to [facility]. Margarita Agee reports to me that he is \"hearing people chattering and yelling and seeing images of basketball and football \"and these are distressing him.              Significant last 24 hr data reviewed ;   Vitals:    06/15/22 0500 06/15/22 0800 06/15/22 2106 06/16/22 0815   BP: (!) 142/74 100/60 111/65 109/66   Pulse: 82 80 67 64   Resp: 14 14 14 14   Temp: 98.3 °F (36.8 °C) 97.9 °F (36.6 °C) 98.2 °F (36.8 °C) 97.9 °F (36.6 °C)   TempSrc: Temporal Temporal Oral Temporal   Weight: 250 lb (113.4 kg)      Height: 6' (1.829 m)         No results found for this or any previous visit (from the past 24 hour(s)). No results for input(s): POCGLU in the last 72 hours. No results found. HPI:   See history in H and P      Review of Systems:   Marco Vizcarra,       Respiratory ROS:            Negative for cough,                                              Negative for shortness of breath . Negative for wheezing ,     Cardiovascular ROS:     Negative for chest pain,                                             Negative for palpitations . Negative for worsening or new leg edema . Gastrointestinal ROS:   Negative for abdominal pain . Negative for change in bowel habits . Data:     Past Medical History:  no change     Social History:  no change    Family History: @no change    Vitals:      I/O (24Hr): No intake or output data in the 24 hours ending 06/16/22 1132    Labs:    URINE ANALYSIS: No results found for: LABURIN     CBC:  Lab Results   Component Value Date    WBC 8.1 05/02/2019    HGB 13.8 05/02/2019     05/02/2019        BMP:    Lab Results   Component Value Date     05/02/2019    K 3.8 05/02/2019    CL 98 05/02/2019    CO2 23 05/02/2019    BUN 14 05/02/2019    CREATININE 1.03 05/02/2019    GLUCOSE 98 05/02/2019      LIVER PROFILE:  Lab Results   Component Value Date    ALT 52 07/23/2018    AST 59 07/23/2018    PROT 7.5 07/23/2018    BILITOT 1.08 07/23/2018    LABALBU 4.3 07/23/2018               Radiology:      Physical Examination:        Physical Exam   Vitals:  /66   Pulse 64   Temp 97.9 °F (36.6 °C) (Temporal)   Resp 14   Ht 6' (1.829 m)   Wt 250 lb (113.4 kg)   BMI 33.91 kg/m²                 Body mass index is 33.91 kg/m². General Appearance:   Alert , CO-OPERATIVE ,                                                        Pulmonary/Chest:        Clear to auscultation bilaterally . No wheezes, rales or rhonchi . Cardiovascular:            Normal rate, regular rhythm,                                          No murmur or  Gallop . Abdomen:                       Soft, non-tender                                                                                    Extremities:                    No  Edema . Assessment:        Primary Problem  Schizoaffective disorder, depressive type ([facility] [address]    Principal Problem:    Schizoaffective disorder, depressive type ([facility] [address]  Active Problems:    Acute psychosis ([facility] [address]  Resolved Problems:    * No resolved hospital problems. *       Plan:        1. *Patient admitted to [facility] with acute psychosis  2. Vitals are stable is  3. Labs reviewed  4. Labs okay**  Will sign off . Thanks . Please call again , if neeeded . Medications:      Allergies:  No Known Allergies    Current Meds:   Scheduled Meds:    FLUoxetine  60 mg Oral Daily    OLANZapine  10 mg Oral BID     Continuous Infusions:   PRN Meds: acetaminophen, aluminum & magnesium hydroxide-simethicone, hydrOXYzine HCl, ibuprofen, polyethylene glycol, traZODone, haloperidol lactate **AND** LORazepam **AND** diphenhydrAMINE, haloperidol **AND** LORazepam, cyclobenzaprine       Genevieve Karimi MD  6/16/2022  11:32 AM

## 2022-06-16 NOTE — PLAN OF CARE
Problem: Psychosis  Goal: Will report no hallucinations or delusions  Description: INTERVENTIONS:  1. Administer medication as  ordered  2. Assist with reality testing to support increasing orientation  3. Assess if patient's hallucinations or delusions are encouraging self harm or harm to others and intervene as appropriate  6/15/2022 2237 by [id number]  Outcome: Progressing  6/15/2022 1213 by Glenny Sweeney LPN  Outcome: Progressing  6/15/2022 0926 by OLGA Diaz  Outcome: Progressing   Patient denies hallucinations at this time. He has been seclusive and nonrelating  Problem: Involuntary Admit  Goal: Will cooperate with staff recommendations and doctor's orders and will demonstrate appropriate behavior  Description: INTERVENTIONS:  1. Treat underlying conditions and offer medication as ordered  2. Educate regarding involuntary admission procedures and rules  3. Contain excessive/inappropriate behavior per unit and hospital policies  [id number] by Neetu Suarez  Outcome: Progressing  6/15/2022 1213 by Glenny Sweeney LPN  Outcome: Progressing  6/15/2022 0926 by OLGA Diaz  Outcome: Progressing   Patient denies current depression or anxiety and reports being tired.  He has been cooperative with interventions

## 2022-06-16 NOTE — GROUP NOTE
Group Therapy Note    Date: 6/16/2022    Group Start Time: 1000  Group End Time: 2820  Group Topic: Psychotherapy    [address], LSW    patient refused to attend psychotherapy group at [facility] after encouragement from staff.   1:1 talk time provided as alternative to group session

## 2022-06-16 NOTE — PLAN OF CARE
[address] [place] [address]  Day 3 Interdisciplinary Treatment Plan NOTE    Review Date & Time: 6/16/2022 1304    Admission Type:   Admission Type: Involuntary    Reason for admission:  Reason for Admission: Patient involuntary from [facility] with visual and command auditory hallucinations to hurt other people. He has increased paranoia and thinks people are following him and out to get him.   Estimated Length of Stay: 5-7 days  Estimated Discharge Date Update: to be determined by physician    PATIENT STRENGTHS:  Patient Strengths    Patient Strengths and Limitations:Limitations: Unrealistic self-view,External locus of control,Tendency to isolate self,Difficult relationships / poor social skills,Difficulty problem solving/relies on others to help solve problems  Addictive Behavior:Addictive Behavior  In the Past 3 Months, Have You Felt or Has Someone Told You That You Have a Problem With  : None  Medical Problems:  Past Medical History:   Diagnosis Date    Asthma     Hypertension     Morbid obesity ([facility]     Schizoaffective disorder ([facility] [facility]     Substance abuse ([facility]        Risk:  Fall Risk   Christopher Scale Christopher Scale Score: 22  BVC    Change in scores no Changes to plan of Care no    Status EXAM:   Mental Status and Behavioral Exam  Normal: No  Level of Assistance: Independent/Self  Facial Expression: Flat  Affect: Blunt  Level of Consciousness: Somnolent  Frequency of Checks: 4 times per hour, close  Mood:Normal: No  Mood: Depressed  Motor Activity:Normal: No  Motor Activity: Decreased  Eye Contact: Poor  Observed Behavior: Cooperative,Guarded  Sexual Misconduct History: Past - no  Preception: [place] to person,[place] to place,[place] to situation  Attention:Normal: No  Attention: Distractible  Thought Processes: Circumstantial  Thought Content:Normal: No  Thought Content: Poverty of content  Depression Symptoms: Change in energy level,Impaired concentration,Isolative,Loss of interest  Anxiety Symptoms: Generalized  Mayuri Symptoms: No problems reported or observed. Hallucinations: Other (comment) (denies)  Delusions: No  Delusions: Paranoid  Memory:Normal: No  Memory: Poor recent,Poor remote  Insight and Judgment: No  Insight and Judgment: Poor judgment,Poor insight,Unmotivated    Daily Assessment Last Entry:   Daily Sleep (WDL): Within Defined Limits            Level of Assistance: Independent/Self    Patient Monitoring:  Frequency of Checks: 4 times per hour, close    Psychiatric Symptoms:   Depression Symptoms  Depression Symptoms: Change in energy level,Impaired concentration,Isolative,Loss of interest  Anxiety Symptoms  Anxiety Symptoms: Generalized  Mayuri Symptoms  Mayuri Symptoms: No problems reported or observed. Suicide Risk CSSR-S:  1) Within the past month, have you wished you were dead or wished you could go to sleep and not wake up? : No  2) Have you actually had any thoughts of killing yourself? : No  6) Have you ever done anything, started to do anything, or prepared to do anything to end your life?: No  Change in Result no Change in Plan of care no      EDUCATION:   EDUCATION:   Learner Progress Toward Treatment Goals: Reviewed results and recommendations of this team, Reviewed group plan and strategies, Reviewed signs, symptoms and risk of self harm and violent behavior, Reviewed goals and plan of care    Method:small group, individual verbal education    Outcome:verbalized by patient, but needs reinforcement to obtain goals    PATIENT GOALS:  Short term: Patient refused to attend. Long term: Patient refused to attend.     PLAN/TREATMENT RECOMMENDATIONS UPDATE: continue with group therapies, increased socialization, continue planning for after discharge goals, continue with medication compliance    SHORT-TERM GOALS UPDATE:   Time frame for Short-Term Goals: 5-7 days    LONG-TERM GOALS UPDATE:   Time frame for Long-Term Goals: 6 months  Members Present in Team Meeting: See Signature Sheet    Semaj Vo CTRS

## 2022-06-17 PROCEDURE — APPSS30 APP SPLIT SHARED TIME 16-30 MINUTES

## 2022-06-17 PROCEDURE — 99232 SBSQ HOSP IP/OBS MODERATE 35: CPT | Performed by: PSYCHIATRY & NEUROLOGY

## 2022-06-17 PROCEDURE — 6370000000 HC RX 637 (ALT 250 FOR IP): Performed by: PSYCHIATRY & NEUROLOGY

## 2022-06-17 PROCEDURE — 1240000000 HC EMOTIONAL WELLNESS R&B

## 2022-06-17 RX ADMIN — OLANZAPINE 10 MG: 10 TABLET, FILM COATED ORAL at 21:51

## 2022-06-17 RX ADMIN — OLANZAPINE 10 MG: 10 TABLET, FILM COATED ORAL at 08:33

## 2022-06-17 RX ADMIN — FLUOXETINE 60 MG: 20 CAPSULE ORAL at 08:33

## 2022-06-17 RX ADMIN — TRAZODONE HYDROCHLORIDE 50 MG: 50 TABLET ORAL at 21:51

## 2022-06-17 NOTE — PROGRESS NOTES
Daily Progress Note  6/17/2022    Patient Name: Rose Mary Sánchez    CHIEF COMPLAINT: Command auditory hallucinations telling him to harm himself and others         SUBJECTIVE:    Patient was met with on unit for follow-up interview. Patient has been compliant with scheduled medication at this time and has not required emergency medication in the past 24 hours. When approached for interview patient was resting in bed and became irritable and dismissive when aroused. Multiple times throughout interview patient asked Florentino Sorto are you asking so many questions\". Patient was encouraged to participate in interview and treatment on the unit with goal of benefiting from hospitalization. Patient reports while she wants to do is sleep and for staff to leave him alone. Patient currently endorses depression as significant and states he has no idea why. Patient is reporting improvement and suicidal ideation and denies homicidal ideation at this time. Patient endorses command auditory hallucinations telling him to harm himself however states the voices have improved. Patient is unable to contract for safety outside the hospital at this time. Patient is denying current homicidal command auditory hallucinations. Patient states that visual hallucinations are improving. Patient states medication side effects have been making him drowsy. Patient states they have been helpful to clear his thoughts. Appetite:  [x] Normal/Adequate/Unchanged  [] Increased  [] Decreased      Sleep:       [x] Normal/Adequate/Unchanged  [] Fair  [] Poor      Group Attendance on Unit:   [] Yes  [] Selectively    [x] No    Medication Side Effects: Patient denies any medication side effects at the time of assessment.          Mental Status Exam  Level of consciousness:  Awake and alert  Appearance:  Hospital attire, lying in bed, fair grooming and hygiene  Behavior/Motor:  Irritable, no psychomotor abnormality  Attitude toward examiner:   Minimally cooperative, angry with poor eye contact  Speech: Irritable tone, normal rate and volume, fair articulation  Mood: \"Alright\"  Affect: Flat, irritable  Thought processes:   Linear responses overall  Thought content: Improvement in suicidal ideations without current plan or intent               Denying homicidal ideations    Reports improvement and auditory hallucinations to kill self and others              Paranoid delusions  Cognition:  Oriented to self, location, time, situation  Concentration clinically adequate  Memory: Age-appropriate  Insight &Judgment: poor      Data   height is 6' (1.829 m) and weight is 250 lb (113.4 kg). His temporal temperature is 98 °F (36.7 °C). His blood pressure is 126/83 and his pulse is 66. His respiration is 14. Labs:   No visits with results within 2 Day(s) from this visit.    Latest known visit with results is:   Admission on 05/02/2019, Discharged on 05/02/2019   Component Date Value Ref Range Status    WBC 05/02/2019 8.1  3.5 - 11.3 k/uL Final    RBC 05/02/2019 4.73  4.21 - 5.77 m/uL Final    Hemoglobin 05/02/2019 13.8  13.0 - 17.0 g/dL Final    Hematocrit 05/02/2019 43.0  40.7 - 50.3 % Final    MCV 05/02/2019 90.9  82.6 - 102.9 fL Final    MCH 05/02/2019 29.2  25.2 - 33.5 pg Final    MCHC 05/02/2019 32.1  28.4 - 34.8 g/dL Final    RDW 05/02/2019 12.7  11.8 - 14.4 % Final    Platelets 93/92/9769 264  138 - 453 k/uL Final    MPV 05/02/2019 10.7  8.1 - 13.5 fL Final    NRBC Automated 05/02/2019 0.0  0.0 per 100 WBC Final    Differential Type 05/02/2019 NOT REPORTED   Final    Seg Neutrophils 05/02/2019 52  36 - 65 % Final    Lymphocytes 05/02/2019 29  24 - 43 % Final    Monocytes 05/02/2019 13* 3 - 12 % Final    Eosinophils % 05/02/2019 5* 1 - 4 % Final    Basophils 05/02/2019 1  0 - 2 % Final    Immature Granulocytes 05/02/2019 0  0 % Final    Segs Absolute 05/02/2019 4.20  1.50 - 8.10 k/uL Final    Absolute Lymph # 05/02/2019 2.36 1.10 - 3.70 k/uL Final    Absolute Mono # 05/02/2019 1.07  0.10 - 1.20 k/uL Final    Absolute Eos # 05/02/2019 0.42  0.00 - 0.44 k/uL Final    Basophils Absolute 05/02/2019 0.06  0.00 - 0.20 k/uL Final    Absolute Immature Granulocyte 05/02/2019 0.03  0.00 - 0.30 k/uL Final    WBC Morphology 05/02/2019 NOT REPORTED   Final    RBC Morphology 05/02/2019 NOT REPORTED   Final    Platelet Estimate 83/85/9961 NOT REPORTED   Final    Glucose 05/02/2019 98  70 - 99 mg/dL Final    BUN 05/02/2019 14  6 - 20 mg/dL Final    CREATININE 05/02/2019 1.03  0.70 - 1.20 mg/dL Final    Bun/Cre Ratio 05/02/2019 NOT REPORTED  9 - 20 Final    Calcium 05/02/2019 9.1  8.6 - 10.4 mg/dL Final    Sodium 05/02/2019 136  135 - 144 mmol/L Final    Potassium 05/02/2019 3.8  3.7 - 5.3 mmol/L Final    Chloride 05/02/2019 98  98 - 107 mmol/L Final    CO2 05/02/2019 23  20 - 31 mmol/L Final    Anion Gap 05/02/2019 15  9 - 17 mmol/L Final    GFR Non- 05/02/2019 >60  >60 mL/min Final    GFR  05/02/2019 >60  >60 mL/min Final    GFR Comment 05/02/2019        Final    Comment: Average GFR for 38-51 years old:   80 mL/min/1.73sq m  Chronic Kidney Disease:   <60 mL/min/1.73sq m  Kidney failure:   <15 mL/min/1.73sq m              eGFR calculated using average adult body mass. Additional eGFR calculator available at:        [email or web address]            GFR Staging 05/02/2019 NOT REPORTED   Final    Troponin, High Sensitivity 05/02/2019 <6  0 - 22 ng/L Final    Comment:       High Sensitivity Troponin values cannot be compared with other Troponin methodologies. Patients with high levels of Biotin oral intake (i.e >5mg/day) may have falsely decreased   Troponin levels. Samples collected within 8 hours of biotin intake may require additional   information for diagnosis.       Troponin T 05/02/2019 NOT REPORTED  <0.03 ng/mL Final    Troponin Interp 05/02/2019 NOT REPORTED Final    Ventricular Rate 05/02/2019 109  BPM Final    Atrial Rate 05/02/2019 109  BPM Final    P-R Interval 05/02/2019 136  ms Final    QRS Duration 05/02/2019 68  ms Final    Q-T Interval 05/02/2019 350  ms Final    QTc Calculation (Bazett) 05/02/2019 471  ms Final    P Axis 05/02/2019 38  degrees Final    R Axis 05/02/2019 19  degrees Final    T Axis 05/02/2019 1  degrees Final    Ethanol 05/02/2019 <10  <10 mg/dL Final    Ethanol percent 05/02/2019 <0.010  <0.010 % Final    NOTE: NEW REFERENCE RANGE    Ventricular Rate 05/02/2019 88  BPM Final    Atrial Rate 05/02/2019 88  BPM Final    P-R Interval 05/02/2019 150  ms Final    QRS Duration 05/02/2019 86  ms Final    Q-T Interval 05/02/2019 404  ms Final    QTc Calculation (Bazett) 05/02/2019 488  ms Final    P Axis 05/02/2019 39  degrees Final    R Axis 05/02/2019 19  degrees Final    T Axis 05/02/2019 1  degrees Final         Reviewed patient's current plan of care and vital signs with nursing staff.     Labs reviewed: [x] Yes  Last EKG in EMR reviewed: [x] Yes  Hard copy chart    Medications  Current Facility-Administered Medications: acetaminophen (TYLENOL) tablet 650 mg, 650 mg, Oral, Q4H PRN  aluminum & magnesium hydroxide-simethicone (MAALOX) 200-200-20 MG/5ML suspension 30 mL, 30 mL, Oral, Q6H PRN  hydrOXYzine HCl (ATARAX) tablet 50 mg, 50 mg, Oral, TID PRN  ibuprofen (ADVIL;MOTRIN) tablet 400 mg, 400 mg, Oral, Q6H PRN  polyethylene glycol (GLYCOLAX) packet 17 g, 17 g, Oral, Daily PRN  traZODone (DESYREL) tablet 50 mg, 50 mg, Oral, Nightly PRN  haloperidol lactate (HALDOL) injection 5 mg, 5 mg, IntraMUSCular, Q4H PRN **AND** LORazepam (ATIVAN) injection 2 mg, 2 mg, IntraMUSCular, Q4H PRN **AND** diphenhydrAMINE (BENADRYL) injection 50 mg, 50 mg, IntraMUSCular, Q4H PRN  haloperidol (HALDOL) tablet 5 mg, 5 mg, Oral, Q4H PRN **AND** LORazepam (ATIVAN) tablet 2 mg, 2 mg, Oral, Q4H PRN  FLUoxetine (PROZAC) capsule 60 mg, 60 mg, Oral, Daily  OLANZapine (ZYPREXA) tablet 10 mg, 10 mg, Oral, BID  cyclobenzaprine (FLEXERIL) tablet 10 mg, 10 mg, Oral, TID PRN    ASSESSMENT  Schizoaffective disorder, depressive type ([facility] [address]         HANDOFF  Patient symptoms: Remain unstable  Medications as determined by attending physician  Encourage participation in groups and milieu. Probable discharge is to be determined by MD    Electronically signed by Lennox Maid, APRN - CNP on 6/17/2022 at 4:05 PM    **This report has been created using voice recognition software. It may contain minor errors which are inherent in voice recognition technology. **    I independently saw and evaluated the patient. I reviewed the nurse practitioners documentation above. Any additional comments or changes to the nurse practitioners documentation are stated below otherwise agree with assessment. Plan will be as follows:  Patient remains irritable, isolative, does not come out of his room much. He is reporting auditory command hallucination and to end his life. He is however reporting that the intensity is improving. He does not feel he is plateaued. Wants to continue with current medications for now until he feels he is no longer improving. This is reasonable as he is having modest improvement daily but remains unsafe at present time to be discharged to the community  PLAN  Patient s symptoms   are improving  Continue with current medication for now  Attempt to develop insight  Psycho-education conducted. Supportive Therapy conducted.   Probable discharge is next week  Follow-up daily while on inpatient unit

## 2022-06-17 NOTE — PLAN OF CARE
Problem: Psychosis  Goal: Will report no hallucinations or delusions  Description: INTERVENTIONS:  1. Administer medication as  ordered  2. Assist with reality testing to support increasing orientation  3. Assess if patient's hallucinations or delusions are encouraging self harm or harm to others and intervene as appropriate  6/17/2022 1109 by Jaci He LPN  Outcome: Progressing,  Patient blunt while attempting 1:1 talk time,denies hallucination. Denies suicidal and homicidal ideations. Problem: Involuntary Admit  Goal: Will cooperate with staff recommendations and doctor's orders and will demonstrate appropriate behavior  Description: INTERVENTIONS:  1. Treat underlying conditions and offer medication as ordered  2. Educate regarding involuntary admission procedures and rules  3. Contain excessive/inappropriate behavior per unit and hospital policies  Outcome: Progressing, Patient medication complaint,however need much encouragement with attending therapy group. 100% meal and fluid intake. 15 MIN safety checks.

## 2022-06-17 NOTE — PLAN OF CARE
Problem: Psychosis  Goal: Will report no hallucinations or delusions  Description: INTERVENTIONS:  1. Administer medication as  ordered  2. Assist with reality testing to support increasing orientation  3. Assess if patient's hallucinations or delusions are encouraging self harm or harm to others and intervene as appropriate  6/16/2022 2349 by Casie Maza LPN  Outcome: Progressing     Patient denies experiencing any delusions and or hallucinations. And, also denies having any thoughts or plans to harm himself or anyone else. I will continue to monitor him and provide for a safe and therapeutic environment.

## 2022-06-18 PROCEDURE — 1240000000 HC EMOTIONAL WELLNESS R&B

## 2022-06-18 PROCEDURE — 6370000000 HC RX 637 (ALT 250 FOR IP): Performed by: PSYCHIATRY & NEUROLOGY

## 2022-06-18 PROCEDURE — 99232 SBSQ HOSP IP/OBS MODERATE 35: CPT

## 2022-06-18 RX ADMIN — FLUOXETINE 60 MG: 20 CAPSULE ORAL at 08:41

## 2022-06-18 RX ADMIN — TRAZODONE HYDROCHLORIDE 50 MG: 50 TABLET ORAL at 21:16

## 2022-06-18 RX ADMIN — OLANZAPINE 10 MG: 10 TABLET, FILM COATED ORAL at 08:41

## 2022-06-18 RX ADMIN — OLANZAPINE 10 MG: 10 TABLET, FILM COATED ORAL at 21:16

## 2022-06-18 ASSESSMENT — LIFESTYLE VARIABLES: HOW MANY STANDARD DRINKS CONTAINING ALCOHOL DO YOU HAVE ON A TYPICAL DAY: 1 OR 2

## 2022-06-18 NOTE — GROUP NOTE
Group Therapy Note    Date: 6/18/2022    Group Start Time: 1030  Group End Time: 1120  Group Topic: Psychoeducation    [address] [place] [address],[address], MSW, MELONIEW        Group Therapy Note    Attendees: 9/17       Patient refused to attend psychoeducation group at 10:30 am after encouragement from staff. 1:1 talk time provided as alternative to group session.       Discipline Responsible: /Counselor      Signature:  CHRISTO Reddy, BATSHEVA

## 2022-06-18 NOTE — PLAN OF CARE
Patient has been seclusive to his room most of the evening. Patient is irritable at times. Patient states he has been eating and sleeping okay. Patient was compliant with scheduled medications. Patient denies any suicidal thoughts at this time. Patient agrees to come talk with staff if having any thoughts to harm himself this shift. 15 min rounds continued for patient safety. Problem: Psychosis  Goal: Will report no hallucinations or delusions  Description: INTERVENTIONS:  1. Administer medication as  ordered  2. Assist with reality testing to support increasing orientation  3. Assess if patient's hallucinations or delusions are encouraging self harm or harm to others and intervene as appropriate  6/17/2022 2106 by Krystle Celeste RN  Outcome: Progressing  6/17/2022 1109 by Sam Martin LPN  Outcome: Progressing     Problem: Involuntary Admit  Goal: Will cooperate with staff recommendations and doctor's orders and will demonstrate appropriate behavior  Description: INTERVENTIONS:  1. Treat underlying conditions and offer medication as ordered  2. Educate regarding involuntary admission procedures and rules  3.  Contain excessive/inappropriate behavior per unit and hospital policies  [id number] by Krystle Celeste RN  Outcome: Progressing  6/17/2022 1109 by Sam Martin LPN  Outcome: Progressing

## 2022-06-18 NOTE — PROGRESS NOTES
Daily Progress Note  6/18/2022    Patient Name: Giuseppe Baltazar    CHIEF COMPLAINT:  Command Auditory hallucinations telling him to harm himself and others          SUBJECTIVE:      Patient is seen today for a follow up assessment. Patient is compliant with scheduled Prozac and Zyprexa. Patient has not required emergency medications in the past 24 hours. Patient is seen at bedside today however he is evasive and withdrawn. His answers to assessment questions appear to be superficial and he does not seem interested in engaging with writer. He continues to endorse depression and anxiety however reports improvement in his mood today. He reports that his sleep was \"up and down\" last night stating that he continues to feel tired throughout the day today. He denies issues with his appetite. Marco reports improvement in suicidal ideation. He reports improvement in homicidal ideation. He does appear to be minimizing and superficial in his answers so he does not have to engage with writer. He reports improvement in auditory and visual hallucinations. He denies paranoia. He denies any medication side effects or other medical concerns at this time. He appears to lack insight into his mental illness and therefore would be unsafe out of the hospital at this time. He is isolative and does not attend group programming. Appetite:  [x] Normal/Adequate/Unchanged  [] Increased  [] Decreased      Sleep:       [] Normal/Adequate/Unchanged  [] Fair  [x] Poor      Group Attendance on Unit:   [] Yes  [] Selectively    [x] No    Medication Side Effects:  Patient denies any medication side effects at the time of assessment. Mental Status Exam  Level of consciousness: Somnolent but responds to verbal stimuli  Appearance: Appropriate attire for setting, resting in bed, with poor grooming and hygiene. Behavior/Motor: Evasive, withdrawn  Attitude toward examiner: Cooperative, fairly attentive, poor eye contact.   Speech: Normal rate, normal volume, normal tone, short answers   Mood:  Patient reports \"I'm cool\". Affect: withdrawn, irritable  Thought processes: Linear and coherent. Thought content: Reports improvement in homicidal ideation. Suicidal Ideation: Reports improvement in suicidal ideations  Delusions: No evidence of delusions. Denies paranoia. Perceptual Disturbance: Patient does not appear to be responding to internal stimuli. Reports improvement in auditory hallucinations. Reports improvement in visual hallucinations. Cognition: Oriented to self, location, time, and situation. Memory: Intact. Insight & Judgement: Poor. Data   height is 6' (1.829 m) and weight is 250 lb (113.4 kg). His temporal temperature is 98 °F (36.7 °C). His blood pressure is 134/61 and his pulse is 60. His respiration is 14. Labs:   No visits with results within 2 Day(s) from this visit.    Latest known visit with results is:   Admission on 05/02/2019, Discharged on 05/02/2019   Component Date Value Ref Range Status    WBC 05/02/2019 8.1  3.5 - 11.3 k/uL Final    RBC 05/02/2019 4.73  4.21 - 5.77 m/uL Final    Hemoglobin 05/02/2019 13.8  13.0 - 17.0 g/dL Final    Hematocrit 05/02/2019 43.0  40.7 - 50.3 % Final    MCV 05/02/2019 90.9  82.6 - 102.9 fL Final    MCH 05/02/2019 29.2  25.2 - 33.5 pg Final    MCHC 05/02/2019 32.1  28.4 - 34.8 g/dL Final    RDW 05/02/2019 12.7  11.8 - 14.4 % Final    Platelets 48/06/3318 264  138 - 453 k/uL Final    MPV 05/02/2019 10.7  8.1 - 13.5 fL Final    NRBC Automated 05/02/2019 0.0  0.0 per 100 WBC Final    Differential Type 05/02/2019 NOT REPORTED   Final    Seg Neutrophils 05/02/2019 52  36 - 65 % Final    Lymphocytes 05/02/2019 29  24 - 43 % Final    Monocytes 05/02/2019 13* 3 - 12 % Final    Eosinophils % 05/02/2019 5* 1 - 4 % Final    Basophils 05/02/2019 1  0 - 2 % Final    Immature Granulocytes 05/02/2019 0  0 % Final    Segs Absolute 05/02/2019 4.20  1.50 - 8.10 k/uL Final    Absolute Lymph # 05/02/2019 2.36  1.10 - 3.70 k/uL Final    Absolute Mono # 05/02/2019 1.07  0.10 - 1.20 k/uL Final    Absolute Eos # 05/02/2019 0.42  0.00 - 0.44 k/uL Final    Basophils Absolute 05/02/2019 0.06  0.00 - 0.20 k/uL Final    Absolute Immature Granulocyte 05/02/2019 0.03  0.00 - 0.30 k/uL Final    WBC Morphology 05/02/2019 NOT REPORTED   Final    RBC Morphology 05/02/2019 NOT REPORTED   Final    Platelet Estimate 84/66/3093 NOT REPORTED   Final    Glucose 05/02/2019 98  70 - 99 mg/dL Final    BUN 05/02/2019 14  6 - 20 mg/dL Final    CREATININE 05/02/2019 1.03  0.70 - 1.20 mg/dL Final    Bun/Cre Ratio 05/02/2019 NOT REPORTED  9 - 20 Final    Calcium 05/02/2019 9.1  8.6 - 10.4 mg/dL Final    Sodium 05/02/2019 136  135 - 144 mmol/L Final    Potassium 05/02/2019 3.8  3.7 - 5.3 mmol/L Final    Chloride 05/02/2019 98  98 - 107 mmol/L Final    CO2 05/02/2019 23  20 - 31 mmol/L Final    Anion Gap 05/02/2019 15  9 - 17 mmol/L Final    GFR Non- 05/02/2019 >60  >60 mL/min Final    GFR  05/02/2019 >60  >60 mL/min Final    GFR Comment 05/02/2019        Final    Comment: Average GFR for 38-51 years old:   80 mL/min/1.73sq m  Chronic Kidney Disease:   <60 mL/min/1.73sq m  Kidney failure:   <15 mL/min/1.73sq m              eGFR calculated using average adult body mass. Additional eGFR calculator available at:        [employer]            GFR Staging 05/02/2019 NOT REPORTED   Final    Troponin, High Sensitivity 05/02/2019 <6  0 - 22 ng/L Final    Comment:       High Sensitivity Troponin values cannot be compared with other Troponin methodologies. Patients with high levels of Biotin oral intake (i.e >5mg/day) may have falsely decreased   Troponin levels. Samples collected within 8 hours of biotin intake may require additional   information for diagnosis.       Troponin T 05/02/2019 NOT REPORTED  <0.03 ng/mL Final    capsule 60 mg, 60 mg, Oral, Daily  OLANZapine (ZYPREXA) tablet 10 mg, 10 mg, Oral, BID  cyclobenzaprine (FLEXERIL) tablet 10 mg, 10 mg, Oral, TID PRN    ASSESSMENT  Schizoaffective disorder, depressive [facility] ([facility]         PLAN  Patient symptoms are: Remains Unstable   Continue current medications at this time  Monitor need and frequency of PRN medications. Encourage participation in groups and milieu. Probable discharge is to be determined by MD.     Electronically signed by MARIA D Joel CNP on 6/18/2022 at 2:08 PM    **This report has been created using voice recognition software. It may contain minor errors which are inherent in voice recognition technology. **

## 2022-06-19 PROCEDURE — 99232 SBSQ HOSP IP/OBS MODERATE 35: CPT

## 2022-06-19 PROCEDURE — 1240000000 HC EMOTIONAL WELLNESS R&B

## 2022-06-19 PROCEDURE — 6370000000 HC RX 637 (ALT 250 FOR IP): Performed by: PSYCHIATRY & NEUROLOGY

## 2022-06-19 RX ADMIN — FLUOXETINE 60 MG: 20 CAPSULE ORAL at 08:33

## 2022-06-19 RX ADMIN — OLANZAPINE 10 MG: 10 TABLET, FILM COATED ORAL at 22:08

## 2022-06-19 RX ADMIN — TRAZODONE HYDROCHLORIDE 50 MG: 50 TABLET ORAL at 22:08

## 2022-06-19 RX ADMIN — HYDROXYZINE HYDROCHLORIDE 50 MG: 50 TABLET, FILM COATED ORAL at 22:08

## 2022-06-19 RX ADMIN — OLANZAPINE 10 MG: 10 TABLET, FILM COATED ORAL at 08:33

## 2022-06-19 ASSESSMENT — PAIN SCALES - GENERAL: PAINLEVEL_OUTOF10: 0

## 2022-06-19 NOTE — GROUP NOTE
HS Group     Date: June 18, 2022     Patient did not participate in HS group. 1:1 talk time was offered as an alternative. Will continue to encourage patient to participate in unit programming.      Signature: MERCEDES Osman

## 2022-06-19 NOTE — PROGRESS NOTES
Daily Progress Note  6/19/2022    Patient Name: Jazmine Weir    CHIEF COMPLAINT:  Command Auditory hallucinations telling him to harm himself and others          SUBJECTIVE:      Patient is seen today for a follow up assessment. Marco is compliant with scheduled Zyprexa and Prozac. He has not required emergency medications in the past 24 hours. He is agreeable to interview at bedside today however somewhat reluctant to engage with this writer. He endorses significant depression today and rates his depression as a 6 (0-10 scale with 0 being none and 10 being the worst). He denies any anxiety today. He reports that he slept well last night however continues to feel very tired throughout the day. He denies any issues with his appetite. Marco reports improvement in suicidal ideation and states that the thoughts are much less intense and severe today. He reports improvement in homicidal ideation. He denies auditory and visual hallucinations. He continues to feel that he would be unsafe out of the hospital at this time. When asked about patient's plans for discharge patient reports that he plans to go back to his home. He states that he has an apartment where he lives alone and plans to go back here however per documentation from patient's H&P it appears that patient was homeless at that time. We will work on safety planning for patient at this time. He denies any medication side effects or other medical concerns at this time. He remains isolative to his room and does not come out for any groups on the unit. Appetite:  [x] Normal/Adequate/Unchanged  [] Increased  [] Decreased      Sleep:       [] Normal/Adequate/Unchanged  [x] Fair  [] Poor      Group Attendance on Unit:   [] Yes  [] Selectively    [x] No    Medication Side Effects:  Patient denies any medication side effects at the time of assessment.          Mental Status Exam  Level of consciousness: Awake and alert  Appearance: Appropriate attire for setting, resting in bed, with poor grooming and hygiene. Behavior/Motor: Withdrawn, psychomotor slowing  Attitude toward examiner: Cooperative, fairly attentive, poor eye contact. Speech: Normal rate, normal volume, normal tone, somewhat irritable  Mood:  Patient reports \"fine\". Affect: Blunted  Thought processes: Linear and coherent. Thought content: Reports improvement in homicidal ideation. Suicidal Ideation: Reports improvement in suicidal ideations  Delusions: No evidence of delusions. Denies paranoia. Perceptual Disturbance: Patient does not appear to be responding to internal stimuli. Denies auditory hallucinations. Denies visual hallucinations. Cognition: Oriented to self, location, time, and situation. Memory: Intact. Insight & Judgement: Poor. Data   height is 6' (1.829 m) and weight is 250 lb (113.4 kg). His temporal temperature is 97.9 °F (36.6 °C). His blood pressure is 117/63 and his pulse is 61. His respiration is 14. Labs:   No visits with results within 2 Day(s) from this visit.    Latest known visit with results is:   Admission on 05/02/2019, Discharged on 05/02/2019   Component Date Value Ref Range Status    WBC 05/02/2019 8.1  3.5 - 11.3 k/uL Final    RBC 05/02/2019 4.73  4.21 - 5.77 m/uL Final    Hemoglobin 05/02/2019 13.8  13.0 - 17.0 g/dL Final    Hematocrit 05/02/2019 43.0  40.7 - 50.3 % Final    MCV 05/02/2019 90.9  82.6 - 102.9 fL Final    MCH 05/02/2019 29.2  25.2 - 33.5 pg Final    MCHC 05/02/2019 32.1  28.4 - 34.8 g/dL Final    RDW 05/02/2019 12.7  11.8 - 14.4 % Final    Platelets 77/91/1453 264  138 - 453 k/uL Final    MPV 05/02/2019 10.7  8.1 - 13.5 fL Final    NRBC Automated 05/02/2019 0.0  0.0 per 100 WBC Final    Differential Type 05/02/2019 NOT REPORTED   Final    Seg Neutrophils 05/02/2019 52  36 - 65 % Final    Lymphocytes 05/02/2019 29  24 - 43 % Final    Monocytes 05/02/2019 13* 3 - 12 % Final    Eosinophils % 05/02/2019 5* 1 - 4 % Final    Basophils 05/02/2019 1  0 - 2 % Final    Immature Granulocytes 05/02/2019 0  0 % Final    Segs Absolute 05/02/2019 4.20  1.50 - 8.10 k/uL Final    Absolute Lymph # 05/02/2019 2.36  1.10 - 3.70 k/uL Final    Absolute Mono # 05/02/2019 1.07  0.10 - 1.20 k/uL Final    Absolute Eos # 05/02/2019 0.42  0.00 - 0.44 k/uL Final    Basophils Absolute 05/02/2019 0.06  0.00 - 0.20 k/uL Final    Absolute Immature Granulocyte 05/02/2019 0.03  0.00 - 0.30 k/uL Final    WBC Morphology 05/02/2019 NOT REPORTED   Final    RBC Morphology 05/02/2019 NOT REPORTED   Final    Platelet Estimate 83/70/6355 NOT REPORTED   Final    Glucose 05/02/2019 98  70 - 99 mg/dL Final    BUN 05/02/2019 14  6 - 20 mg/dL Final    CREATININE 05/02/2019 1.03  0.70 - 1.20 mg/dL Final    Bun/Cre Ratio 05/02/2019 NOT REPORTED  9 - 20 Final    Calcium 05/02/2019 9.1  8.6 - 10.4 mg/dL Final    Sodium 05/02/2019 136  135 - 144 mmol/L Final    Potassium 05/02/2019 3.8  3.7 - 5.3 mmol/L Final    Chloride 05/02/2019 98  98 - 107 mmol/L Final    CO2 05/02/2019 23  20 - 31 mmol/L Final    Anion Gap 05/02/2019 15  9 - 17 mmol/L Final    GFR Non- 05/02/2019 >60  >60 mL/min Final    GFR  05/02/2019 >60  >60 mL/min Final    GFR Comment 05/02/2019        Final    Comment: Average GFR for 38-51 years old:   80 mL/min/1.73sq m  Chronic Kidney Disease:   <60 mL/min/1.73sq m  Kidney failure:   <15 mL/min/1.73sq m              eGFR calculated using average adult body mass. Additional eGFR calculator available at:        [employer]            GFR Staging 05/02/2019 NOT REPORTED   Final    Troponin, High Sensitivity 05/02/2019 <6  0 - 22 ng/L Final    Comment:       High Sensitivity Troponin values cannot be compared with other Troponin methodologies. Patients with high levels of Biotin oral intake (i.e >5mg/day) may have falsely decreased   Troponin levels.  Samples collected within 8 hours of biotin intake may require additional   information for diagnosis.  Troponin T 05/02/2019 NOT REPORTED  <0.03 ng/mL Final    Troponin Interp 05/02/2019 NOT REPORTED   Final    Ventricular Rate 05/02/2019 109  BPM Final    Atrial Rate 05/02/2019 109  BPM Final    P-R Interval 05/02/2019 136  ms Final    QRS Duration 05/02/2019 68  ms Final    Q-T Interval 05/02/2019 350  ms Final    QTc Calculation ([place]ett) 05/02/2019 471  ms Final    P Axis 05/02/2019 38  degrees Final    R Axis 05/02/2019 19  degrees Final    T Axis 05/02/2019 1  degrees Final    Ethanol 05/02/2019 <10  <10 mg/dL Final    Ethanol percent 05/02/2019 <0.010  <0.010 % Final    NOTE: NEW REFERENCE RANGE    Ventricular Rate 05/02/2019 88  BPM Final    Atrial Rate 05/02/2019 88  BPM Final    P-R Interval 05/02/2019 150  ms Final    QRS Duration 05/02/2019 86  ms Final    Q-T Interval 05/02/2019 404  ms Final    QTc Calculation ([place]ett) 05/02/2019 488  ms Final    P Axis 05/02/2019 39  degrees Final    R Axis 05/02/2019 19  degrees Final    T Axis 05/02/2019 1  degrees Final         Reviewed patient's current plan of care and vital signs with nursing staff.     Labs reviewed: [x] Yes  Last EKG in EMR reviewed: [x] Yes  QTc: 488    Medications  Current Facility-Administered Medications: acetaminophen (TYLENOL) tablet 650 mg, 650 mg, Oral, Q4H PRN  aluminum & magnesium hydroxide-simethicone (MAALOX) 200-200-20 MG/5ML suspension 30 mL, 30 mL, Oral, Q6H PRN  hydrOXYzine HCl (ATARAX) tablet 50 mg, 50 mg, Oral, TID PRN  ibuprofen (ADVIL;MOTRIN) tablet 400 mg, 400 mg, Oral, Q6H PRN  polyethylene glycol (GLYCOLAX) packet 17 g, 17 g, Oral, Daily PRN  traZODone (DESYREL) tablet 50 mg, 50 mg, Oral, Nightly PRN  haloperidol lactate (HALDOL) injection 5 mg, 5 mg, IntraMUSCular, Q4H PRN **AND** LORazepam (ATIVAN) injection 2 mg, 2 mg, IntraMUSCular, Q4H PRN **AND** diphenhydrAMINE (BENADRYL) injection 50 mg, 50 mg, IntraMUSCular, Q4H PRN  haloperidol (HALDOL) tablet 5 mg, 5 mg, Oral, Q4H PRN **AND** LORazepam (ATIVAN) tablet 2 mg, 2 mg, Oral, Q4H PRN  FLUoxetine (PROZAC) capsule 60 mg, 60 mg, Oral, Daily  OLANZapine (ZYPREXA) tablet 10 mg, 10 mg, Oral, BID  cyclobenzaprine (FLEXERIL) tablet 10 mg, 10 mg, Oral, TID PRN    ASSESSMENT  Schizoaffective disorder, depressive type ([facility]         PLAN  Patient symptoms are: Remains Unstable   Continue current medications at this time  Monitor need and frequency of PRN medications. Encourage participation in groups and milieu. Probable discharge is to be determined by MD.     Electronically signed by MARIA D Roblero CNP on 6/19/2022 at 2:39 PM    **This report has been created using voice recognition software. It may contain minor errors which are inherent in voice recognition technology. **

## 2022-06-19 NOTE — PLAN OF CARE
Problem: Psychosis  Goal: Will report no hallucinations or delusions  Description: INTERVENTIONS:  1. Administer medication as  ordered  2. Assist with reality testing to support increasing orientation  3. Assess if patient's hallucinations or delusions are encouraging self harm or harm to others and intervene as appropriate  Outcome: Progressing   Patient denies any hallucinations or delusions. He said he is just tired. He has remained resting most of the shift. Problem: Involuntary Admit  Goal: Will cooperate with staff recommendations and doctor's orders and will demonstrate appropriate behavior  Description: INTERVENTIONS:  1. Treat underlying conditions and offer medication as ordered  2. Educate regarding involuntary admission procedures and rules  3. Contain excessive/inappropriate behavior per unit and hospital policies  Outcome: Progressing   Patient has been sleeping most of the shift.  He does not attend programming but has been cooperative with  interventions

## 2022-06-19 NOTE — PLAN OF CARE
Problem: Psychosis  Goal: Will report no hallucinations or delusions  Description: INTERVENTIONS:  1. Administer medication as  ordered  2. Assist with reality testing to support increasing orientation  3. Assess if patient's hallucinations or delusions are encouraging self harm or harm to others and intervene as appropriate  Outcome: Progressing, Patient voiced auditory hallucination but much better since admission. Patient isolative to room out for meals only. Problem: Involuntary Admit  Goal: Will cooperate with staff recommendations and doctor's orders and will demonstrate appropriate behavior  Description: INTERVENTIONS:  1. Treat underlying conditions and offer medication as ordered  2. Educate regarding involuntary admission procedures and rules  3. Contain excessive/inappropriate behavior per unit and hospital policies  Outcome: Progressing,Patient denied suicidal and homicidal ideations.  15 MIN safety checks

## 2022-06-20 PROCEDURE — 99232 SBSQ HOSP IP/OBS MODERATE 35: CPT | Performed by: PSYCHIATRY & NEUROLOGY

## 2022-06-20 PROCEDURE — 6370000000 HC RX 637 (ALT 250 FOR IP)

## 2022-06-20 PROCEDURE — APPSS30 APP SPLIT SHARED TIME 16-30 MINUTES

## 2022-06-20 PROCEDURE — 1240000000 HC EMOTIONAL WELLNESS R&B

## 2022-06-20 PROCEDURE — 6370000000 HC RX 637 (ALT 250 FOR IP): Performed by: PSYCHIATRY & NEUROLOGY

## 2022-06-20 RX ORDER — OLANZAPINE 10 MG/1
10 TABLET ORAL NIGHTLY
Status: COMPLETED | OUTPATIENT
Start: 2022-06-20 | End: 2022-06-20

## 2022-06-20 RX ORDER — OLANZAPINE 10 MG/1
20 TABLET ORAL NIGHTLY
Status: DISCONTINUED | OUTPATIENT
Start: 2022-06-21 | End: 2022-06-22 | Stop reason: HOSPADM

## 2022-06-20 RX ADMIN — OLANZAPINE 10 MG: 10 TABLET, FILM COATED ORAL at 21:27

## 2022-06-20 RX ADMIN — FLUOXETINE 60 MG: 20 CAPSULE ORAL at 08:06

## 2022-06-20 RX ADMIN — OLANZAPINE 10 MG: 10 TABLET, FILM COATED ORAL at 08:06

## 2022-06-20 NOTE — PLAN OF CARE
Problem: Psychosis  Goal: Will report no hallucinations or delusions  Description: INTERVENTIONS:  1. Administer medication as  ordered  2. Assist with reality testing to support increasing orientation  3. Assess if patient's hallucinations or delusions are encouraging self harm or harm to others and intervene as appropriate  6/20/2022 1012 by Nolen Brittle, RN  Outcome: Progressing     Problem: Involuntary Admit  Goal: Will cooperate with staff recommendations and doctor's orders and will demonstrate appropriate behavior  Description: INTERVENTIONS:  1. Treat underlying conditions and offer medication as ordered  2. Educate regarding involuntary admission procedures and rules  3.  Contain excessive/inappropriate behavior per unit and hospital policies  6/55/0180 [id number] by Nolen Brittle, RN  Outcome: Progressing

## 2022-06-20 NOTE — GROUP NOTE
Group Therapy Note    Date: 6/20/2022    Group Start Time: 1100  Group End Time: 2949  Group Topic: Cognitive Skills    OLGA Quevedo    Patient refused to attend cognitive skills group at 1100 after encouragement from staff.   1:1 talk time offered as alternative to group session            Signature:  Corwin Groves, [address]

## 2022-06-20 NOTE — GROUP NOTE
Group Therapy Note    Date: 6/20/2022    Group Start Time: 1330  Group End Time: 6196  Group Topic: Relaxation    [address], [facility]    Patient refused to attend creative expression group at 1330 after encouragement from staff. 1:1 talk time offered as alternative to group session.           Signature:  Maria E Hunter, [address]

## 2022-06-20 NOTE — PLAN OF CARE
Problem: Psychosis  Goal: Will report no hallucinations or delusions  Description: INTERVENTIONS:  1. Administer medication as  ordered  2. Assist with reality testing to support increasing orientation  3. Assess if patient's hallucinations or delusions are encouraging self harm or harm to others and intervene as appropriate  6/19/2022 2251 by [address]  Outcome: Progressing  6/19/2022 1648 by Gloria Borrego LPN  Outcome: Progressing   Patient denies hallucinations and delusions  Problem: Involuntary Admit  Goal: Will cooperate with staff recommendations and doctor's orders and will demonstrate appropriate behavior  Description: INTERVENTIONS:  1. Treat underlying conditions and offer medication as ordered  2. Educate regarding involuntary admission procedures and rules  3.  Contain excessive/inappropriate behavior per unit and hospital policies  6/29/2649 9102 by [address]  Outcome: Progressing  6/19/2022 1648 by Gloria Borrego LPN  Outcome: Progressing   Patient has been cooperative with interventions

## 2022-06-20 NOTE — PROGRESS NOTES
Daily Progress Note  6/20/2022    Patient Name: Kobe Flores    CHIEF COMPLAINT: Command auditory hallucinations telling him to harm himself and others         SUBJECTIVE:      Patient is seen today for a follow up assessment. Patient is compliant with scheduled medications. Patient has not required emergency medications in the past 24 hours. Patient is agreeable to assessment at bedside today however reluctantly. He is somewhat irritable and reports that he is asleep. He does continue to endorse depression and anxiety however reports improvement in his mood today. He does state that he feels like he is \"sleeping too much\" and reports that he feels extremely tired throughout the day. We discussed discontinuing patient's morning dose of Zyprexa and having him take the full dose at night and he is in agreement to this. He denies any issues with his appetite. Amy Jc reports improvement in suicidal ideation. He denies homicidal ideation. He continues to feel that he would be unsafe out of the hospital at this time however. He reports improvement in both auditory and visual hallucinations and states that he has not heard any voices the past couple of days. He denies paranoia. He reports that he plans to return home upon discharge. Other than increased fatigue he denies other medication side effects or other medical concerns at this time. Patient continues to remain isolative to his room and does not attend any group programming on the unit. Appetite:  [x] Normal/Adequate/Unchanged  [] Increased  [] Decreased      Sleep:       [x] Normal/Adequate/Unchanged  [] Fair  [] Poor      Group Attendance on Unit:   [] Yes  [] Selectively    [x] No    Medication Side Effects: Increased daytime fatigue         Mental Status Exam  Level of consciousness: Somnolent but responds to verbal stimuli  Appearance: Appropriate attire for setting, resting in bed, with poor grooming and hygiene.    Behavior/Motor: Approachable, slightly irritable  Attitude toward examiner: Cooperative, attentive, poor eye contact. Speech: Normal rate, normal volume, irritable tone. Mood:  Patient reports \"fine\". Affect: Blunted  Thought processes: Linear and coherent. Thought content: Denies homicidal ideation. Suicidal Ideation: Reports improvement in suicidal ideations  Delusions: No evidence of delusions. Denies paranoia. Perceptual Disturbance: Patient does not appear to be responding to internal stimuli. Reports improvement in auditory hallucinations. Reports improvement in visual hallucinations. Cognition: Oriented to self, location, time, and situation. Memory: Intact. Insight & Judgement: Poor. Data   height is 6' (1.829 m) and weight is 250 lb (113.4 kg). His temporal temperature is 98.6 °F (37 °C). His blood pressure is 156/62 (abnormal) and his pulse is 62. His respiration is 14. Labs:   No visits with results within 2 Day(s) from this visit.    Latest known visit with results is:   Admission on 05/02/2019, Discharged on 05/02/2019   Component Date Value Ref Range Status    WBC 05/02/2019 8.1  3.5 - 11.3 k/uL Final    RBC 05/02/2019 4.73  4.21 - 5.77 m/uL Final    Hemoglobin 05/02/2019 13.8  13.0 - 17.0 g/dL Final    Hematocrit 05/02/2019 43.0  40.7 - 50.3 % Final    MCV 05/02/2019 90.9  82.6 - 102.9 fL Final    MCH 05/02/2019 29.2  25.2 - 33.5 pg Final    MCHC 05/02/2019 32.1  28.4 - 34.8 g/dL Final    RDW 05/02/2019 12.7  11.8 - 14.4 % Final    Platelets 00/16/9517 264  138 - 453 k/uL Final    MPV 05/02/2019 10.7  8.1 - 13.5 fL Final    NRBC Automated 05/02/2019 0.0  0.0 per 100 WBC Final    Differential Type 05/02/2019 NOT REPORTED   Final    Seg Neutrophils 05/02/2019 52  36 - 65 % Final    Lymphocytes 05/02/2019 29  24 - 43 % Final    Monocytes 05/02/2019 13* 3 - 12 % Final    Eosinophils % 05/02/2019 5* 1 - 4 % Final    Basophils 05/02/2019 1  0 - 2 % Final    Immature Granulocytes 05/02/2019 0  0 % Final    Segs Absolute 05/02/2019 4.20  1.50 - 8.10 k/uL Final    Absolute Lymph # 05/02/2019 2.36  1.10 - 3.70 k/uL Final    Absolute Mono # 05/02/2019 1.07  0.10 - 1.20 k/uL Final    Absolute Eos # 05/02/2019 0.42  0.00 - 0.44 k/uL Final    Basophils Absolute 05/02/2019 0.06  0.00 - 0.20 k/uL Final    Absolute Immature Granulocyte 05/02/2019 0.03  0.00 - 0.30 k/uL Final    WBC Morphology 05/02/2019 NOT REPORTED   Final    RBC Morphology 05/02/2019 NOT REPORTED   Final    Platelet Estimate 64/56/0623 NOT REPORTED   Final    Glucose 05/02/2019 98  70 - 99 mg/dL Final    BUN 05/02/2019 14  6 - 20 mg/dL Final    CREATININE 05/02/2019 1.03  0.70 - 1.20 mg/dL Final    Bun/Cre Ratio 05/02/2019 NOT REPORTED  9 - 20 Final    Calcium 05/02/2019 9.1  8.6 - 10.4 mg/dL Final    Sodium 05/02/2019 136  135 - 144 mmol/L Final    Potassium 05/02/2019 3.8  3.7 - 5.3 mmol/L Final    Chloride 05/02/2019 98  98 - 107 mmol/L Final    CO2 05/02/2019 23  20 - 31 mmol/L Final    Anion Gap 05/02/2019 15  9 - 17 mmol/L Final    GFR Non- 05/02/2019 >60  >60 mL/min Final    GFR  05/02/2019 >60  >60 mL/min Final    GFR Comment 05/02/2019        Final    Comment: Average GFR for 38-51 years old:   80 mL/min/1.73sq m  Chronic Kidney Disease:   <60 mL/min/1.73sq m  Kidney failure:   <15 mL/min/1.73sq m              eGFR calculated using average adult body mass. Additional eGFR calculator available at:        [employer]            GFR Staging 05/02/2019 NOT REPORTED   Final    Troponin, High Sensitivity 05/02/2019 <6  0 - 22 ng/L Final    Comment:       High Sensitivity Troponin values cannot be compared with other Troponin methodologies. Patients with high levels of Biotin oral intake (i.e >5mg/day) may have falsely decreased   Troponin levels.  Samples collected within 8 hours of biotin intake may require additional   information for diagnosis.  Troponin T 05/02/2019 NOT REPORTED  <0.03 ng/mL Final    Troponin Interp 05/02/2019 NOT REPORTED   Final    Ventricular Rate 05/02/2019 109  BPM Final    Atrial Rate 05/02/2019 109  BPM Final    P-R Interval 05/02/2019 136  ms Final    QRS Duration 05/02/2019 68  ms Final    Q-T Interval 05/02/2019 350  ms Final    QTc Calculation (Bazett) 05/02/2019 471  ms Final    P Axis 05/02/2019 38  degrees Final    R Axis 05/02/2019 19  degrees Final    T Axis 05/02/2019 1  degrees Final    Ethanol 05/02/2019 <10  <10 mg/dL Final    Ethanol percent 05/02/2019 <0.010  <0.010 % Final    NOTE: NEW REFERENCE RANGE    Ventricular Rate 05/02/2019 88  BPM Final    Atrial Rate 05/02/2019 88  BPM Final    P-R Interval 05/02/2019 150  ms Final    QRS Duration 05/02/2019 86  ms Final    Q-T Interval 05/02/2019 404  ms Final    QTc Calculation (Bazett) 05/02/2019 488  ms Final    P Axis 05/02/2019 39  degrees Final    R Axis 05/02/2019 19  degrees Final    T Axis 05/02/2019 1  degrees Final         Reviewed patient's current plan of care and vital signs with nursing staff.     Labs reviewed: [x] Yes  Last EKG in EMR reviewed: [x] Yes  QTc: 488    Medications  Current Facility-Administered Medications: acetaminophen (TYLENOL) tablet 650 mg, 650 mg, Oral, Q4H PRN  aluminum & magnesium hydroxide-simethicone (MAALOX) 200-200-20 MG/5ML suspension 30 mL, 30 mL, Oral, Q6H PRN  hydrOXYzine HCl (ATARAX) tablet 50 mg, 50 mg, Oral, TID PRN  ibuprofen (ADVIL;MOTRIN) tablet 400 mg, 400 mg, Oral, Q6H PRN  polyethylene glycol (GLYCOLAX) packet 17 g, 17 g, Oral, Daily PRN  traZODone (DESYREL) tablet 50 mg, 50 mg, Oral, Nightly PRN  haloperidol lactate (HALDOL) injection 5 mg, 5 mg, IntraMUSCular, Q4H PRN **AND** LORazepam (ATIVAN) injection 2 mg, 2 mg, IntraMUSCular, Q4H PRN **AND** diphenhydrAMINE (BENADRYL) injection 50 mg, 50 mg, IntraMUSCular, Q4H PRN  haloperidol (HALDOL) tablet 5 mg, 5 mg, Oral, Q4H PRN **AND** LORazepam (ATIVAN) tablet 2 mg, 2 mg, Oral, Q4H PRN  FLUoxetine (PROZAC) capsule 60 mg, 60 mg, Oral, Daily  OLANZapine (ZYPREXA) tablet 10 mg, 10 mg, Oral, BID  cyclobenzaprine (FLEXERIL) tablet 10 mg, 10 mg, Oral, TID PRN    ASSESSMENT  Schizoaffective disorder, depressive type ([facility] [address]         HANDOFF  Patient symptoms are: Modestly Improving. Medications as discussed with attending physician  Discontinue daytime dosing of Zyprexa  Administered Zyprexa 20 mg nightly  Monitor need and frequency of PRN medications. Encourage participation in groups and milieu. Probable discharge is to be determined by MD.     Electronically signed by MARIA D Robles CNP on 6/20/2022 at 3:03 PM    **This report has been created using voice recognition software. It may contain minor errors which are inherent in voice recognition technology. **    I independently saw and evaluated the patient. I reviewed the nurse practitioners documentation above. Any additional comments or changes to the nurse practitioners documentation are stated below otherwise agree with assessment. Plan will be as follows: We will switch olanzapine all to bedtime as patient complaining of daytime sedation. Will monitor. Overall patient symptoms improving  PLAN  Patient s symptoms   are improving  Change olanzapine to nighttime only  Attempt to develop insight  Psycho-education conducted. Supportive Therapy conducted.   Probable discharge is 2 to 3 days  Follow-up daily while on inpatient unit

## 2022-06-20 NOTE — GROUP NOTE
Group Therapy Note    Date: 6/20/2022    Group Start Time: 1000  Group End Time: 6259  Group Topic: Group Therapy    [facility]    CHELSEA Schneider        Group Therapy Note  Pt declined to attend psychotherapy at 1000 am despite encouragement.     Attendees: 9/19           Signature:  CHELSEA Schneider

## 2022-06-21 PROCEDURE — 6370000000 HC RX 637 (ALT 250 FOR IP)

## 2022-06-21 PROCEDURE — APPSS30 APP SPLIT SHARED TIME 16-30 MINUTES

## 2022-06-21 PROCEDURE — 90833 PSYTX W PT W E/M 30 MIN: CPT | Performed by: PSYCHIATRY & NEUROLOGY

## 2022-06-21 PROCEDURE — 6370000000 HC RX 637 (ALT 250 FOR IP): Performed by: PSYCHIATRY & NEUROLOGY

## 2022-06-21 PROCEDURE — 99232 SBSQ HOSP IP/OBS MODERATE 35: CPT | Performed by: PSYCHIATRY & NEUROLOGY

## 2022-06-21 PROCEDURE — 1240000000 HC EMOTIONAL WELLNESS R&B

## 2022-06-21 RX ORDER — FLUOXETINE HYDROCHLORIDE 20 MG/1
60 CAPSULE ORAL DAILY
Qty: 90 CAPSULE | Refills: 0 | Status: SHIPPED | OUTPATIENT
Start: 2022-06-21

## 2022-06-21 RX ORDER — OLANZAPINE 20 MG/1
20 TABLET ORAL NIGHTLY
Qty: 30 TABLET | Refills: 0 | Status: SHIPPED | OUTPATIENT
Start: 2022-06-21

## 2022-06-21 RX ORDER — HYDROXYZINE 50 MG/1
50 TABLET, FILM COATED ORAL 3 TIMES DAILY PRN
Qty: 30 TABLET | Refills: 0 | Status: SHIPPED | OUTPATIENT
Start: 2022-06-21 | End: 2022-07-01

## 2022-06-21 RX ORDER — TRAZODONE HYDROCHLORIDE 50 MG/1
50 TABLET ORAL NIGHTLY PRN
Qty: 30 TABLET | Refills: 0 | Status: SHIPPED | OUTPATIENT
Start: 2022-06-21

## 2022-06-21 RX ADMIN — FLUOXETINE 60 MG: 20 CAPSULE ORAL at 08:02

## 2022-06-21 RX ADMIN — OLANZAPINE 20 MG: 10 TABLET, FILM COATED ORAL at 21:11

## 2022-06-21 NOTE — GROUP NOTE
Group Therapy Note    Date: 6/21/2022    Group Start Time: 1130  Group End Time: 8358  Group Topic: Psychoeducation    GIAN Pate, CTRS    Pt did not attend 1100 psychoeducation group d/t resting in room despite staff invitation to attend. 1:1 talk time offered as alternative to group session, pt declined.            Signature:  Lynnette Olguin

## 2022-06-21 NOTE — PLAN OF CARE
Problem: Psychosis  Goal: Will report no hallucinations or delusions  Description: INTERVENTIONS:  1. Administer medication as  ordered  2. Assist with reality testing to support increasing orientation  3. Assess if patient's hallucinations or delusions are encouraging self harm or harm to others and intervene as appropriate  6/21/2022 0931 by Adam Rodriguez RN  Outcome: Progressing     Problem: Involuntary Admit  Goal: Will cooperate with staff recommendations and doctor's orders and will demonstrate appropriate behavior  Description: INTERVENTIONS:  1. Treat underlying conditions and offer medication as ordered  2. Educate regarding involuntary admission procedures and rules  3.  Contain excessive/inappropriate behavior per unit and hospital policies  Outcome: Progressing

## 2022-06-21 NOTE — GROUP NOTE
Group Therapy Note    Date: 6/21/2022    Group Start Time: 1330  Group End Time: 1430  Group Topic: Recreational    [facility] STE[facility] Arredondo, SANAZS    Pt did not attend 1330 recreation skills group d/t resting in room despite staff invitation to attend. 1:1 talk time offered as alternative to group session, pt declined.         Signature:  Neeraj Robertson

## 2022-06-21 NOTE — GROUP NOTE
Group Therapy Note    Date: 6/21/2022    Group Start Time: 1000  Group End Time: 18  Group Topic: Psychotherapy    [facility]    CHRISTO Lemus LSW        Group Therapy Note    Attendees: 7/14         Patient refused to attend psychotherapy group at 10:00 am after encouragement from staff. 1:1 talk time provided as alternative to group session.     Discipline Responsible: /Counselor      Signature:  CHRISTO Lemus LSW

## 2022-06-21 NOTE — PROGRESS NOTES
CLINICAL PHARMACY NOTE: MEDS TO BEDS    Total # of Prescriptions Filled: 4   The following medications were delivered to the patient:  · Fluoxetine HCL 20mg  · Hydroxyzine HCL 50mg  · Olanzapine 20mg  · Trazodone HCL 50mg    Additional Documentation:    Delivered medications to nurses station

## 2022-06-21 NOTE — PROGRESS NOTES
attentive, fair eye contact. Speech: Normal rate, normal volume, normal tone. Mood:  Patient reports \"all right\". Affect: Blunted  Thought processes: Linear and coherent. Thought content: Denies homicidal ideation. Suicidal Ideation: Reports improvement in suicidal ideations  Delusions: No evidence of delusions. Denies paranoia. Perceptual Disturbance: Patient does not appear to be responding to internal stimuli. Reports improvement in auditory hallucinations. Reports improvement in visual hallucinations. Cognition: Oriented to self, location, time, and situation. Memory: Intact. Insight & Judgement: Poor. Data   height is 6' (1.829 m) and weight is 250 lb (113.4 kg). His oral temperature is 98 °F (36.7 °C). His blood pressure is 127/64 and his pulse is 54. His respiration is 14. Labs:   No visits with results within 2 Day(s) from this visit.    Latest known visit with results is:   Admission on 05/02/2019, Discharged on 05/02/2019   Component Date Value Ref Range Status    WBC 05/02/2019 8.1  3.5 - 11.3 k/uL Final    RBC 05/02/2019 4.73  4.21 - 5.77 m/uL Final    Hemoglobin 05/02/2019 13.8  13.0 - 17.0 g/dL Final    Hematocrit 05/02/2019 43.0  40.7 - 50.3 % Final    MCV 05/02/2019 90.9  82.6 - 102.9 fL Final    MCH 05/02/2019 29.2  25.2 - 33.5 pg Final    MCHC 05/02/2019 32.1  28.4 - 34.8 g/dL Final    RDW 05/02/2019 12.7  11.8 - 14.4 % Final    Platelets 54/59/2654 264  138 - 453 k/uL Final    MPV 05/02/2019 10.7  8.1 - 13.5 fL Final    NRBC Automated 05/02/2019 0.0  0.0 per 100 WBC Final    Differential Type 05/02/2019 NOT REPORTED   Final    Seg Neutrophils 05/02/2019 52  36 - 65 % Final    Lymphocytes 05/02/2019 29  24 - 43 % Final    Monocytes 05/02/2019 13* 3 - 12 % Final    Eosinophils % 05/02/2019 5* 1 - 4 % Final    Basophils 05/02/2019 1  0 - 2 % Final    Immature Granulocytes 05/02/2019 0  0 % Final    Segs Absolute 05/02/2019 4.20  1.50 - 8.10 k/uL Final    Absolute Lymph # 05/02/2019 2.36  1.10 - 3.70 k/uL Final    Absolute Mono # 05/02/2019 1.07  0.10 - 1.20 k/uL Final    Absolute Eos # 05/02/2019 0.42  0.00 - 0.44 k/uL Final    Basophils Absolute 05/02/2019 0.06  0.00 - 0.20 k/uL Final    Absolute Immature Granulocyte 05/02/2019 0.03  0.00 - 0.30 k/uL Final    WBC Morphology 05/02/2019 NOT REPORTED   Final    RBC Morphology 05/02/2019 NOT REPORTED   Final    Platelet Estimate 83/11/2007 NOT REPORTED   Final    Glucose 05/02/2019 98  70 - 99 mg/dL Final    BUN 05/02/2019 14  6 - 20 mg/dL Final    CREATININE 05/02/2019 1.03  0.70 - 1.20 mg/dL Final    Bun/Cre Ratio 05/02/2019 NOT REPORTED  9 - 20 Final    Calcium 05/02/2019 9.1  8.6 - 10.4 mg/dL Final    Sodium 05/02/2019 136  135 - 144 mmol/L Final    Potassium 05/02/2019 3.8  3.7 - 5.3 mmol/L Final    Chloride 05/02/2019 98  98 - 107 mmol/L Final    CO2 05/02/2019 23  20 - 31 mmol/L Final    Anion Gap 05/02/2019 15  9 - 17 mmol/L Final    GFR Non- 05/02/2019 >60  >60 mL/min Final    GFR  05/02/2019 >60  >60 mL/min Final    GFR Comment 05/02/2019        Final    Comment: Average GFR for 38-51 years old:   80 mL/min/1.73sq m  Chronic Kidney Disease:   <60 mL/min/1.73sq m  Kidney failure:   <15 mL/min/1.73sq m              eGFR calculated using average adult body mass. Additional eGFR calculator available at:        [employer]            GFR Staging 05/02/2019 NOT REPORTED   Final    Troponin, High Sensitivity 05/02/2019 <6  0 - 22 ng/L Final    Comment:       High Sensitivity Troponin values cannot be compared with other Troponin methodologies. Patients with high levels of Biotin oral intake (i.e >5mg/day) may have falsely decreased   Troponin levels. Samples collected within 8 hours of biotin intake may require additional   information for diagnosis.       Troponin T 05/02/2019 NOT REPORTED  <0.03 ng/mL Final    Troponin Interp 05/02/2019 NOT REPORTED   Final    Ventricular Rate 05/02/2019 109  BPM Final    Atrial Rate 05/02/2019 109  BPM Final    P-R Interval 05/02/2019 136  ms Final    QRS Duration 05/02/2019 68  ms Final    Q-T Interval 05/02/2019 350  ms Final    QTc Calculation (Bazett) 05/02/2019 471  ms Final    P Axis 05/02/2019 38  degrees Final    R Axis 05/02/2019 19  degrees Final    T Axis 05/02/2019 1  degrees Final    Ethanol 05/02/2019 <10  <10 mg/dL Final    Ethanol percent 05/02/2019 <0.010  <0.010 % Final    NOTE: NEW REFERENCE RANGE    Ventricular Rate 05/02/2019 88  BPM Final    Atrial Rate 05/02/2019 88  BPM Final    P-R Interval 05/02/2019 150  ms Final    QRS Duration 05/02/2019 86  ms Final    Q-T Interval 05/02/2019 404  ms Final    QTc Calculation (Bazett) 05/02/2019 488  ms Final    P Axis 05/02/2019 39  degrees Final    R Axis 05/02/2019 19  degrees Final    T Axis 05/02/2019 1  degrees Final         Reviewed patient's current plan of care and vital signs with nursing staff.     Labs reviewed: [x] Yes  Last EKG in EMR reviewed: [x] Yes  QTc: 488    Medications  Current Facility-Administered Medications: OLANZapine (ZYPREXA) tablet 20 mg, 20 mg, Oral, Nightly  acetaminophen (TYLENOL) tablet 650 mg, 650 mg, Oral, Q4H PRN  aluminum & magnesium hydroxide-simethicone (MAALOX) 200-200-20 MG/5ML suspension 30 mL, 30 mL, Oral, Q6H PRN  hydrOXYzine HCl (ATARAX) tablet 50 mg, 50 mg, Oral, TID PRN  ibuprofen (ADVIL;MOTRIN) tablet 400 mg, 400 mg, Oral, Q6H PRN  polyethylene glycol (GLYCOLAX) packet 17 g, 17 g, Oral, Daily PRN  traZODone (DESYREL) tablet 50 mg, 50 mg, Oral, Nightly PRN  haloperidol lactate (HALDOL) injection 5 mg, 5 mg, IntraMUSCular, Q4H PRN **AND** LORazepam (ATIVAN) injection 2 mg, 2 mg, IntraMUSCular, Q4H PRN **AND** diphenhydrAMINE (BENADRYL) injection 50 mg, 50 mg, IntraMUSCular, Q4H PRN  haloperidol (HALDOL) tablet 5 mg, 5 mg, Oral, Q4H PRN **AND** LORazepam (ATIVAN) tablet 2 mg, 2 mg, Oral, Q4H PRN  FLUoxetine (PROZAC) capsule 60 mg, 60 mg, Oral, Daily  cyclobenzaprine (FLEXERIL) tablet 10 mg, 10 mg, Oral, TID PRN    ASSESSMENT  Schizoaffective disorder, depressive type ([facility])         HANDOFF  Patient symptoms are: Modestly Improving. Medications as determined by attending physician  Monitor need and frequency of PRN medications. Encourage participation in groups and milieu. Probable discharge is to be determined by MD.     Electronically signed by MARIA D Leo CNP on 6/21/2022 at 1:42 PM    **This report has been created using voice recognition software. It may contain minor errors which are inherent in voice recognition technology. **      I independently saw and evaluated the patient. I reviewed the nurse practitioners documentation above. Any additional comments or changes to the nurse practitioners documentation are stated below otherwise agree with assessment. Plan will be as follows:  Spent 30 minutes with the patient, of that greater than 16 minutes was spent in supportive psychotherapy. Patient denying side effects to medication. He does report improvement in control of his symptoms suicidal or homicidal ideation intent or plan. We discussed if stable through tomorrow would consider discharge and patient is in agreement  PLAN  Patient s symptoms   are improving  Continue with current medication for now  Attempt to develop insight  Psycho-education conducted. Supportive Therapy conducted.   Probable discharge is tomorrow  Follow-up daily while on inpatient unit

## 2022-06-22 VITALS
RESPIRATION RATE: 14 BRPM | HEIGHT: 72 IN | SYSTOLIC BLOOD PRESSURE: 123 MMHG | TEMPERATURE: 97.7 F | BODY MASS INDEX: 33.86 KG/M2 | WEIGHT: 250 LBS | HEART RATE: 77 BPM | DIASTOLIC BLOOD PRESSURE: 61 MMHG

## 2022-06-22 PROCEDURE — 99239 HOSP IP/OBS DSCHRG MGMT >30: CPT | Performed by: PSYCHIATRY & NEUROLOGY

## 2022-06-22 PROCEDURE — 6370000000 HC RX 637 (ALT 250 FOR IP): Performed by: PSYCHIATRY & NEUROLOGY

## 2022-06-22 ASSESSMENT — PAIN SCALES - GENERAL: PAINLEVEL_OUTOF10: 0

## 2022-06-22 NOTE — GROUP NOTE
Group Therapy Note    Date: 6/22/2022    Group Start Time: 1100  Group End Time: 6827  Group Topic: Psychoeducation    OLGA Quevedo      Patient refused to attend psychoeducation group at 1100 after encouragement from staff. 1:1 talk time offered as alternative to group session.           Signature:  Corwin Groves, [address]

## 2022-06-22 NOTE — PROGRESS NOTES
Behavioral Services                                              Medicare Re-Certification    I certify that the inpatient psychiatric hospital services furnished since the previous certification/re-certification were, and continue to be, medically necessary for;    [x] (1) Treatment which could reasonably be expected to improve the patient's condition,    [x] (2) Or for diagnostic study. Estimated length of stay/service 1 to 2 days    Plan for post-hospital care Home with outpatient community mental health follow-up    This patient continues to need, on a daily basis, active treatment furnished directly by or requiring the supervision of inpatient psychiatric personnel.     Electronically signed by Kyle Bryant MD on 6/21/2022 at 8:38 PM

## 2022-06-22 NOTE — BH NOTE
Patient was approached with his medication this morning. Patient sat up on bedside and proceeded like he was going to take the medication. Patient then stopped himself, looked up at [address] and said, \"Arn't I being discharged today. \" Writer explained that the order is in and patient stated that \"I am not taking the medication then. \" Patient was encouraged to take medication despite discharge. Patient refused medication at this time.

## 2022-06-22 NOTE — GROUP NOTE
Group Therapy Note    Date: 6/22/2022    Group Start Time: 1000  Group End Time: 6933  Group Topic: Psychotherapy    [address] [facility], CHRISTO, BATSHEVA        Group Therapy Note    Attendees: 11/22       Patient refused to attend psychotherapy group at 10:00 am after encouragement from staff. 1:1 talk time provided as alternative to group session.       Discipline Responsible: /Counselor      Signature:  CHRISTO Saravia, BATSHEVA

## 2022-06-22 NOTE — DISCHARGE SUMMARY
Provider Discharge Summary     Patient ID:  Vickie Pickens  [id number]  62 y.o.  1975    Admit date: 6/15/2022    Discharge date and time: 6/22/2022  3:22 PM     Admitting Physician: Erica Banerjee MD     Discharge Physician: Coni Sanchez MD    Admission Diagnoses: Acute psychosis [facility]) [F23]    Discharge Diagnoses:      Schizoaffective disorder, depressive type [facility])     Patient Active Problem List   Diagnosis Code    Polysubstance abuse ([address] F19.10    Schizophrenia, paranoid ([address] F20.0    Paranoid schizophrenia ([address] F20.0    Schizoaffective disorder, bipolar type ([address] F25.0    Schizophrenia ([address] F20.9    Schizoaffective disorder ([address] F25.9    Schizoaffective disorder, depressive type ([address] F25.1    MDD (major depressive disorder), single episode F32.9    Depression with suicidal ideation F32. A, R45.851    Acute psychosis ([address]        Admission Condition: poor    Discharged Condition: stable    Indication for Admission: threat to self    History of Present Illnes (present tense wording is of findings from admission exam and are not necessarily indicative of current findings):   Jazmin Jenkins a 40 y. o. male with significant past medical history of schizoaffective disorder, asthma,'s polysubstance abuse, hypertension and obesity who presented to the [facility] suicidal and homicidal thoughts.      Per emergency department documentation: Patient here via private vehicle. Patient's sole historian. Patient notes that he is here \"because I have mental health issues \"patient identifies as \"paranoid schizophrenic \"patient notes that he follows with [facility] but has not been in contact with them for \"months \".   Patient had presented to triage reporting that he wanted to be sent to [facility].  Patient reports to me that he is \"hearing people chattering and yelling and seeing images of basketball and football \"and these are distressing him.     At diagnostic assessment patient is extremely withdrawn and irritable. He endorses auditory hallucinations that are currently very distressful rating volume of 7/10 on a 0-10 scale with 10 being the loudest.  He reports these are command in nature and telling him to hurt himself and others. He is unable to articulate his medications but states that when he is compliant these were beneficial.  When asked if he recalls taking Zyprexa 10 mg twice daily he states \"no . ..just start that\". It should be noted that patient does have a history of violence toward medical staff and has been placed at [facility]- previously due to his violence.     Patient is very guarded and evasive, willing to confirm that he has not taken his medication and \"months \". Endorsing symptoms of depression greater than 2-week timeframe including significant anhedonia and feelings of helplessness hopelessness, poor motivation and decreased appetite. He endorses that his life is currently not worth living but denies having specific plan to end his life at this time. He endorses homicidal ideation as it relates to command voices. He denies this is towards any specific person and contracts for safety not to harm those in the immediate area. Patient endorses concerns that people are watching or talking about him. He believes that law enforcement frequently targets him and he often does not feel safe.      He denies symptoms of anxiety or experienced panic attacks. He denies symptoms of PTSD. Unable to elicit any symptoms of rodolfo including present or past.  He denies phobias fear of abandonment or utilizing self damaging behaviors as coping mechanisms. When asked about violence and history of incarceration patient states \"I am tired. ..gonna lay down\". Hospital Course:   Upon admission, Sharyle Condon was provided a safe secure environment, introduced to unit milieu. Patient participated in groups and individual therapies. Meds were adjusted as noted below.   After few days of hospital care, patient began to feel improvement. Depression lifted, thoughts to harm self ceased. Sleep improved, appetite was good. On morning rounds 6/22/2022, Alejandro Gutierrez  endorses feeling ready for discharge. Patient denies suicidal or homicidal ideations, denies hallucinations or delusions. Denies SE's from meds. It was decided that maximum benefit from hospital care had been achieved and patient can be discharged. Consults:   Internal medicine for medical management    Significant Diagnostic Studies: Routine labs and diagnostics    Treatments: Psychotropic medications, therapy with group, milieu, and 1:1 with nurses, social workers, PA-C/CNP, and Attending physician.       Discharge Medications:  Discharge Medication List as of 6/22/2022 10:07 AM      START taking these medications    Details   hydrOXYzine HCl (ATARAX) 50 MG tablet Take 1 tablet by mouth 3 times daily as needed for Anxiety, Disp-30 tablet, R-0Normal         CONTINUE these medications which have CHANGED    Details   FLUoxetine (PROZAC) 20 MG capsule Take 3 capsules by mouth daily, Disp-90 capsule, R-0Normal      OLANZapine (ZYPREXA) 20 MG tablet Take 1 tablet by mouth nightly, Disp-30 tablet, R-0Normal      traZODone (DESYREL) 50 MG tablet Take 1 tablet by mouth nightly as needed for Sleep, Disp-30 tablet, R-0Normal              Core Measures statement:   Not applicable    Discharge Exam:  Level of consciousness:  Within normal limits  Appearance: Street clothes, seated, with good grooming  Behavior/Motor: No abnormalities noted  Attitude toward examiner:  Cooperative, attentive, good eye contact  Speech:  spontaneous, normal rate, normal volume and well articulated  Mood:  euthymic  Affect:  Full range  Thought processes:  linear, goal directed and coherent  Thought content:  denies homicidal ideation  Suicidal Ideation:  denies suicidal ideation  Delusions:  no evidence of delusions  Perceptual Disturbance:  denies any perceptual disturbance  Cognition:  Intact  Memory: age appropriate  Insight & Judgement: fair  Medication side effects: denies     Disposition: home    Patient Instructions: Activity: activity as tolerated  1. Patient instructed to take medications regularly and follow up with outpatient appointments. Follow-up as scheduled with outpatient [facility]      Signed:    Electronically signed by Kyle Bryant MD on 6/22/22 at 3:22 PM EDT    Time Spent on discharge is more than 30 minutes in the examination, evaluation, counseling and review of medications and discharge plan.

## 2022-06-22 NOTE — GROUP NOTE
Group Therapy Note    Date: 6/22/2022    Group Start Time: 1330  Group End Time: 3941  Group Topic: Relaxation    [address], [facility]      Patient refused to attend art relaxation group at 1330 after encouragement from staff. 1:1 talk time offered as alternative to group session.              Signature:  Efrain Amaya, [address]

## 2022-06-22 NOTE — BH NOTE
[address] [facility]  Discharge Note    Patient was discharged home and picked up at the [facility] entrance by black and white taxi. [employer] white taxi confirmed patients name. Patient left with all of their belongings, discharge instructions, and medications. Patient was cooperative with discharge but endorsed not taking his medication as prescribed after leaving. Patient was wearing a mask when escorted to the [facility] entrance. Pt discharged with followings belongings:   Dental Appliances: None  Vision - Corrective Lenses: None  Hearing Aid: None  Jewelry: Bracelet (silver and black )  Body Piercings Removed: N/A  Clothing: Footwear,Hat,Pants,Shirt,Socks,Undergarments  Other Valuables: [employer] (cano fob)   Valuables sent home with patient or returned to patient. Patient education on aftercare instructions: Yes  Information faxed to [facility] by Nurse  at 2:10 PM .Patient verbalize understanding of AVS:  Yes. Status EXAM upon discharge:  Mental Status and Behavioral Exam  Normal: No  Level of Assistance: Independent/Self  Facial Expression: Flat  Affect: Blunt  Level of Consciousness: Alert  Frequency of Checks: 4 times per hour, close  Mood:Normal: No  Mood: Irritable,Suspicious  Motor Activity:Normal: Yes  Motor Activity: Decreased  Eye Contact: Fair  Observed Behavior: Preoccupied,Guarded  Sexual Misconduct History: Current - no  Preception: [place] to person,[place] to time,[place] to place,[place] to situation  Attention:Normal: No  Attention: Distractible  Thought Processes: Blocking,Circumstantial  Thought Content:Normal: No  Thought Content: Poverty of content,Preoccupations,Paranoia  Depression Symptoms: Isolative,Impaired concentration  Anxiety Symptoms: No problems reported or observed. Mayuri Symptoms: No problems reported or observed.   Hallucinations: None  Delusions: No  Delusions: Paranoid  Memory:Normal: No  Memory: Poor recent,Poor remote  Insight and Judgment: No  Insight and Judgment: Poor judgment      Metabolic Screening:    No results found for: LABA1C    No results found for: CHOL  No results found for: TRIG  No results found for: HDL  No components found for: LDLCAL  No results found for: Florina Capps RN

## 2023-01-30 ENCOUNTER — HOSPITAL ENCOUNTER (INPATIENT)
Age: 48
LOS: 8 days | Discharge: HOME | DRG: 885 | End: 2023-02-07
Attending: PSYCHIATRY & NEUROLOGY | Admitting: PSYCHIATRY & NEUROLOGY
Payer: MEDICARE

## 2023-01-30 PROBLEM — E66.811 CLASS 1 OBESITY DUE TO EXCESS CALORIES WITHOUT SERIOUS COMORBIDITY WITH BODY MASS INDEX (BMI) OF 31.0 TO 31.9 IN ADULT: Status: ACTIVE | Noted: 2023-01-30

## 2023-01-30 PROBLEM — F18.10 ABUSE OF SMOKED SUBSTANCE (HCC): Status: ACTIVE | Noted: 2023-01-30

## 2023-01-30 PROBLEM — E66.09 CLASS 1 OBESITY DUE TO EXCESS CALORIES WITHOUT SERIOUS COMORBIDITY WITH BODY MASS INDEX (BMI) OF 31.0 TO 31.9 IN ADULT: Status: ACTIVE | Noted: 2023-01-30

## 2023-01-30 PROCEDURE — 1240000000 HC EMOTIONAL WELLNESS R&B

## 2023-01-30 PROCEDURE — 6370000000 HC RX 637 (ALT 250 FOR IP): Performed by: NURSE PRACTITIONER

## 2023-01-30 PROCEDURE — 6370000000 HC RX 637 (ALT 250 FOR IP): Performed by: PSYCHIATRY & NEUROLOGY

## 2023-01-30 RX ORDER — IBUPROFEN 400 MG/1
400 TABLET ORAL EVERY 6 HOURS PRN
Status: DISCONTINUED | OUTPATIENT
Start: 2023-01-30 | End: 2023-02-07 | Stop reason: HOSPADM

## 2023-01-30 RX ORDER — POLYETHYLENE GLYCOL 3350 17 G/17G
17 POWDER, FOR SOLUTION ORAL DAILY PRN
Status: DISCONTINUED | OUTPATIENT
Start: 2023-01-30 | End: 2023-02-07 | Stop reason: HOSPADM

## 2023-01-30 RX ORDER — OLANZAPINE 5 MG/1
5 TABLET ORAL NIGHTLY
Status: DISCONTINUED | OUTPATIENT
Start: 2023-01-30 | End: 2023-02-01

## 2023-01-30 RX ORDER — FLUOXETINE 10 MG/1
10 CAPSULE ORAL DAILY
Status: DISCONTINUED | OUTPATIENT
Start: 2023-01-30 | End: 2023-02-02

## 2023-01-30 RX ORDER — ACETAMINOPHEN 325 MG/1
650 TABLET ORAL EVERY 4 HOURS PRN
Status: DISCONTINUED | OUTPATIENT
Start: 2023-01-30 | End: 2023-02-07 | Stop reason: HOSPADM

## 2023-01-30 RX ORDER — MAGNESIUM HYDROXIDE/ALUMINUM HYDROXICE/SIMETHICONE 120; 1200; 1200 MG/30ML; MG/30ML; MG/30ML
30 SUSPENSION ORAL EVERY 6 HOURS PRN
Status: DISCONTINUED | OUTPATIENT
Start: 2023-01-30 | End: 2023-02-07 | Stop reason: HOSPADM

## 2023-01-30 RX ORDER — TRAZODONE HYDROCHLORIDE 50 MG/1
50 TABLET ORAL NIGHTLY PRN
Status: DISCONTINUED | OUTPATIENT
Start: 2023-01-30 | End: 2023-02-07 | Stop reason: HOSPADM

## 2023-01-30 RX ORDER — TRAZODONE HYDROCHLORIDE 50 MG/1
50 TABLET ORAL NIGHTLY PRN
Status: DISCONTINUED | OUTPATIENT
Start: 2023-01-30 | End: 2023-01-30

## 2023-01-30 RX ORDER — HYDROXYZINE 50 MG/1
50 TABLET, FILM COATED ORAL 3 TIMES DAILY PRN
Status: DISCONTINUED | OUTPATIENT
Start: 2023-01-30 | End: 2023-02-07 | Stop reason: HOSPADM

## 2023-01-30 RX ADMIN — HYDROXYZINE HYDROCHLORIDE 50 MG: 50 TABLET ORAL at 21:28

## 2023-01-30 RX ADMIN — FLUOXETINE 10 MG: 10 CAPSULE ORAL at 17:10

## 2023-01-30 RX ADMIN — HYDROXYZINE HYDROCHLORIDE 50 MG: 50 TABLET ORAL at 00:34

## 2023-01-30 RX ADMIN — OLANZAPINE 5 MG: 5 TABLET, FILM COATED ORAL at 21:29

## 2023-01-30 RX ADMIN — TRAZODONE HYDROCHLORIDE 50 MG: 50 TABLET ORAL at 21:28

## 2023-01-30 RX ADMIN — TRAZODONE HYDROCHLORIDE 50 MG: 50 TABLET ORAL at 00:43

## 2023-01-30 ASSESSMENT — LIFESTYLE VARIABLES
HOW MANY STANDARD DRINKS CONTAINING ALCOHOL DO YOU HAVE ON A TYPICAL DAY: PATIENT DOES NOT DRINK
HOW OFTEN DO YOU HAVE A DRINK CONTAINING ALCOHOL: NEVER

## 2023-01-30 ASSESSMENT — PAIN SCALES - GENERAL: PAINLEVEL_OUTOF10: 0

## 2023-01-30 ASSESSMENT — SLEEP AND FATIGUE QUESTIONNAIRES
AVERAGE NUMBER OF SLEEP HOURS: 4
DO YOU HAVE DIFFICULTY SLEEPING: YES
DO YOU USE A SLEEP AID: YES
SLEEP PATTERN: RESTLESSNESS;DIFFICULTY FALLING ASLEEP;DISTURBED/INTERRUPTED SLEEP

## 2023-01-30 NOTE — PROGRESS NOTES
Pharmacy Medication History Note      List of current medications patient is taking is complete. Source of information: [address] ([address]), [place], [place]    Changes made to medication list:  Medications removed (include reason, ex. therapy complete or physician discontinued, noncompliance):  none    Medications added/doses adjusted:  none    Other notes (ex. Recent course of antibiotics, Coumadin dosing):  Patient is not currently active with the [facility]. No fill history since last discharge from the [facility] found. Current Home Medication List at Time of Admission:  Prior to Admission medications    Medication Sig   FLUoxetine (PROZAC) 20 MG capsule Take 3 capsules by mouth daily   OLANZapine (ZYPREXA) 20 MG tablet Take 1 tablet by mouth nightly   traZODone (DESYREL) 50 MG tablet Take 1 tablet by mouth nightly as needed for Sleep         Please let me know if you have any questions about this encounter. Thank you!     Electronically signed by LISA Saavedra [facility] on 1/30/2023 at 11:32 AM

## 2023-01-30 NOTE — GROUP NOTE
Group Therapy Note    Date: 1/30/2023    Group Start Time: 1430  Group End Time: 8471  Group Topic: Psychoeducation    GIAN Spears, OLGA        Group Therapy Note    Attendees: 1/11    Psych-Ed/Relapse Prevention Group Note        Date: January 30, 2023 Start Time: 2:30pm  End Time: 3:10pm      Number of Participants in Group & Unit Census:  1/11    Topic: interpersonal skills, coping skills, self-expression    Goal of Group: To improve interpersonal skills and coping skills awareness through collaborating with peers and demonstrating self-expression. Comments:     Patient did not participate in Psych-Ed/Relapse Prevention group, despite staff encouragement and explanation of benefits. Patient remain seclusive to self. Q15 minute safety checks maintained for patient safety and will continue to encourage patient to attend unit programming.         Signature:  SANAZ VelasquezS

## 2023-01-30 NOTE — H&P
[address] [facility] Internal Medicine  Bogdan Fink MD; Philip Marcus MD; Aye Cunningham MD; MD Shen Montes MD; Ninfa Baugh MD    [facility]   [address] / HISTORY AND PHYSICAL EXAMINATION            Date:   1/30/2023  Patient name:  Sydnie Leiva  Date of admission:  1/30/2023 12:24 AM  MRN:   [id number]  Account:  [de-identified]  YOB: 1975  PCP:    No primary care provider on file. Room:   [address] [place] [address]  Code Status:    Full Code    Physician Requesting Consult: José Miller MD    Reason for Consult: History and physical examination    Chief Complaint:     No chief complaint on file. Suicidal    History Obtained From:     patient    History of Present Illness:     60-year-old gentleman with underlying history of anxiety, depression, asthma, polysubstance abuse, obesity, admitted to inpatient psych for suicidal ideations    Past Medical History:     Past Medical History:   Diagnosis Date    Asthma     Hypertension     Morbid obesity ([address]     Schizoaffective disorder ([address]     Substance abuse ([address]         Past Surgical History:     No past surgical history on file. Medications Prior to Admission:     Prior to Admission medications    Medication Sig Start Date End Date Taking? Authorizing Provider   FLUoxetine (PROZAC) 20 MG capsule Take 3 capsules by mouth daily 6/21/22   José Miller MD   OLANZapine (ZYPREXA) 20 MG tablet Take 1 tablet by mouth nightly 6/21/22   José Miller MD   traZODone (DESYREL) 50 MG tablet Take 1 tablet by mouth nightly as needed for Sleep 6/21/22   José Miller MD        Allergies:     Patient has no known allergies. Social History:     Tobacco:    reports that he has never smoked. He has never used smokeless tobacco.  Alcohol:      reports that he does not currently use alcohol.   Drug Use:  reports that he does not currently use drugs. Family History:     Family History   Family history unknown: Yes       Review of Systems:     Positive and Negative as described in HPI. Physical Exam:     /81   Pulse 75   Temp 98.2 °F (36.8 °C) (Oral)   Resp 14   Ht 6' (1.829 m)   Wt 230 lb (104.3 kg)   BMI 31.19 kg/m²   Temp (24hrs), Av.2 °F (36.8 °C), Min:98.2 °F (36.8 °C), Max:98.2 °F (36.8 °C)    No results for input(s): POCGLU in the last 72 hours. No intake or output data in the 24 hours ending 23 1745    General Appearance:  alert, well appearing, and in no acute distress  Mental status: oriented to person, place, and time with normal affect  Head:  normocephalic, atraumatic. Lungs: Bilateral equal air entry, clear to ausculation, no wheezing, rales or rhonchi, normal effort  Cardiovascular: normal rate, regular rhythm, no murmur, gallop, rub. Abdomen: Soft, nontender, nondistended, normal bowel sounds, no hepatomegaly or splenomegaly  Neurologic: There are no new focal motor or sensory deficits, normal muscle tone and bulk, no abnormal sensation, normal speech, cranial nerves II through XII grossly intact  Skin: No gross lesions, rashes, bruising or bleeding on exposed skin area  Extremities:  peripheral pulses palpable, no pedal edema or calf pain with palpation    Investigations:      Laboratory Testing:  No results found for this or any previous visit (from the past 24 hour(s)). Imaging/Diagonstics:  No results found.     Assessment :      Hospital Problems             Last Modified POA    * (Principal) Schizoaffective disorder, depressive type ([address] 2023 Yes    Abuse of smoked substance ([address] 2023 Yes    Class 1 obesity due to excess calories without serious comorbidity with body mass index (BMI) of 31.0 to 31.9 in adult 2023 Yes    Polysubstance abuse ([address] 2023 Yes    Schizophrenia, paranoid ([address] 2023 Yes       Plan:     61-year-old gentleman with underlying history of anxiety and depression admitted to inpatient psych for suicidal ideations and psychosis,  Current smoker, advised to quit smoking,  Obesity, low-calorie diet, lifestyle modification,  Polysubstance abuse, watch for withdrawal,  Asthma, continue inhalers as needed,  DVT prophylaxis, patient mobile,  Full CODE STATUS    Consultations:   lA Auguste MD  1/30/2023  5:45 PM    Copy sent to Dr. Linnette Espino primary care provider on file. Please note that this chart was generated using voice recognition Dragon dictation software. Although every effort was made to ensure the accuracy of this automated transcription, some errors in transcription may have occurred.
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
## 2023-01-30 NOTE — GROUP NOTE
Group Therapy Note    Date: 1/30/2023    Group Start Time: 1100  Group End Time: 1150  Group Topic: Cognitive Skills    [facility], [employer]        Group Therapy Note    Attendees: 1/11    Cognitive Skills Group Note        Date: January 30, 2023 Start Time: 11am  End Time: 11:50am      Number of Participants in Group & Unit Census:  1/11    Topic:  concentration, decision-making, interpersonal skills    Goal of Group: To improve decision-making and interpersonal skills through collaborating with peers and concentrating on a presented task. Comments:     Patient did not participate in Cognitive Skills group, despite staff encouragement and explanation of benefits. Patient remain seclusive to self. Q15 minute safety checks maintained for patient safety and will continue to encourage patient to attend unit programming.         Signature:  OLGA Schultz

## 2023-01-30 NOTE — PROGRESS NOTES
Behavioral Services  Medicare Certification Upon Admission    I certify that this patient's inpatient psychiatric hospital admission is medically necessary for:    [x] (1) Treatment which could reasonably be expected to improve this patient's condition,       [x] (2) Or for diagnostic study;     AND     [x](2) The inpatient psychiatric services are provided while the individual is under the care of a physician and are included in the individualized plan of care.     Estimated length of stay/service 4-7 days    Plan for post-hospital care home with outpatient [facility] f/u    Electronically signed by Hyacinth Preciado MD on 1/30/2023 at 7:37 AM

## 2023-01-30 NOTE — PLAN OF CARE
Problem: Pain  Goal: Verbalizes/displays adequate comfort level or baseline comfort level  Outcome: Progressing   Patient verbalizes adequate comfort level.
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
## 2023-01-30 NOTE — PLAN OF CARE
[facility]  Initial Interdisciplinary Treatment Plan NO      Original treatment plan Date & Time: 1/30/2023 1000    Admission Type:  Admission Type: Voluntary    Reason for admission:   Reason for Admission: Suicidal bizarre behavior    Estimated Length of Stay:  5-7days  Estimated Discharge Date: to be determined by physician    PATIENT STRENGTHS:  Patient Strengths:   Patient Strengths and Limitations:Limitations: Difficult relationships / poor social skills, Multiple barriers to leisure interests, Tendency to isolate self, Difficulty problem solving/relies on others to help solve problems  Addictive Behavior: Addictive Behavior  In the Past 3 Months, Have You Felt or Has Someone Told You That You Have a Problem With  : None  Medical Problems:  Past Medical History:   Diagnosis Date    Asthma     Hypertension     Morbid obesity ([facility]     Schizoaffective disorder ([facility]     Substance abuse ([facility]      Status EXAM:Mental Status and Behavioral Exam  Normal: No  Level of Assistance: Independent/Self  Facial Expression: Flat, Hostile  Affect: Blunt  Level of Consciousness: Alert  Frequency of Checks: 4 times per hour, close  Mood:Normal: No  Mood: Anxious, Suspicious, Irritable  Motor Activity:Normal: Yes  Eye Contact: Fair  Observed Behavior: Preoccupied, Agitated, Hostile  Sexual Misconduct History: Current - no  Preception: [place] to person, [place] to time, [place] to place, [place] to situation  Attention:Normal: No  Attention: Unable to concentrate  Thought Processes: Circumstantial  Thought Content:Normal: No  Thought Content: Paranoia, Preoccupations  Depression Symptoms: Impaired concentration, Sleep disturbance  Anxiety Symptoms: Generalized  Mayuri Symptoms: Labile, Poor judgment  Hallucinations: None  Delusions: Yes  Delusions: Paranoid  Memory:Normal: No  Memory: Poor recent  Insight and Judgment: No  Insight and Judgment: Poor judgment, Poor insight    EDUCATION:   Learner Progress Toward Treatment Goals: reviewed group plans and strategies for care    Method:group therapy, medication compliance, individualized assessments and care planning    Outcome: needs reinforcement    PATIENT GOALS: to be discussed with patient within 72 hours    PLAN/TREATMENT RECOMMENDATIONS:     continue group therapy , medications compliance, goal setting, individualized assessments and care, continue to monitor pt on unit      SHORT-TERM GOALS: per psychoeducational specialist, patient refused to attend treatment team to discuss goals at this time  Time frame for Short-Term Goals: 5-7 days    LONG-TERM GOALS: per psychoeducational specialist, patient refused to attend treatment team to discuss goals at this time  Time frame for Long-Term Goals: 6 months  Members Present in Team Meeting: See Signature Sheet    Iraida Knowles RN
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
## 2023-01-31 PROCEDURE — 6370000000 HC RX 637 (ALT 250 FOR IP): Performed by: NURSE PRACTITIONER

## 2023-01-31 PROCEDURE — 1240000000 HC EMOTIONAL WELLNESS R&B

## 2023-01-31 RX ADMIN — OLANZAPINE 5 MG: 5 TABLET, FILM COATED ORAL at 20:35

## 2023-01-31 RX ADMIN — FLUOXETINE 10 MG: 10 CAPSULE ORAL at 08:40

## 2023-01-31 NOTE — GROUP NOTE
Group Therapy Note    Date: 1/31/2023    Group Start Time: 0915  Group End Time: 5609  Group Topic: Community Meeting    GIAN Aldana RN        Group Therapy Note    Attendees: 0/10  Community Meeting Group Note        Date: January 31, 2023 Start Time:  0915   End Time:  3183      Number of Participants in Group & Unit Census:  0/10    Topic: Goals Group    Goal of Group:Identify goal related to mental health for today. Comments:     Patient did not participate in [employer] group, despite staff encouragement and explanation of benefits. Patient remain seclusive to self. Q15 minute safety checks maintained for patient safety and will continue to encourage patient to attend unit programming.

## 2023-01-31 NOTE — PROGRESS NOTES
Daily Progress Note  1/31/2023    Patient Name: Inna Haji    CHIEF COMPLAINT: Psychosis with suicidal and homicidal ideation         SUBJECTIVE:      Patient is seen today for a follow up assessment. Nursing staff report that patient has been medication adherent with his Prozac and olanzapine. He is assessed at bedside where he continues to endorse suicidal and homicidal ideation as it is related to auditory hallucinations that are command in nature. He denies intent to hurt anyone in the immediate area and agrees to reach out to support team as needed. Attempt to explore his homicidal thoughts towards his mother and he denies currently. He confirms her name is \"Negar\" and denies that he has telephoned her since his admission. Explore possibility of contacting his mother to gain additional collaborating information and patient states \"no\". He denies side effects related to his medications and reports having an adequate appetite. He is not able to contract for safety if he were in the community and denies having questions or concerns at this time regarding his treatment plan. At this time, the patient is not appropriate for a lower level of care. There is risk of decompensation and patient warrants further hospitalization for safety and stabilization. Appetite:  [x] Adequate/Unchanged  [] Increased  [] Decreased      Sleep:       [x] Adequate/Unchanged  [] Fair  [] Poor      Group Attendance on Unit:   [] Yes   [] Selectively    [x] No    Compliant with scheduled medications: [x] Yes  [] No    Received emergency medications in past 24 hrs: [] Yes   [x] No    Medication Side Effects: Denies         Mental Status Exam  Level of consciousness:  Awake and alert  Appearance:  Hospital attire, laying in bed, fair grooming and hygiene  Behavior/Motor:  Withdrawn, somewhat irritable, no psychomotor abnormality  Attitude toward examiner:   Minimally cooperative,  poor eye contact  Speech: Irritable tone, normal rate and volume, fair articulation  Mood:  Depressed  Affect:   Mood congruent  Thought processes:   Linear responses overall, poverty of thought  Thought content: active suicidal ideations without current plan or intent               Endorses homicidal ideations               Endorses auditory hallucinations to kill self and others              Paranoid delusions  Cognition:  Oriented to self, location, time, situation  Concentration clinically adequate  Memory: Age-appropriate  Insight &Judgment: poor      Data   height is 6' (1.829 m) and weight is 230 lb (104.3 kg). His oral temperature is 98.3 °F (36.8 °C). His blood pressure is 132/85 and his pulse is 80. His respiration is 14. Labs:   No visits with results within 2 Day(s) from this visit.    Latest known visit with results is:   Admission on 05/02/2019, Discharged on 05/02/2019   Component Date Value Ref Range Status    WBC 05/02/2019 8.1  3.5 - 11.3 k/uL Final    RBC 05/02/2019 4.73  4.21 - 5.77 m/uL Final    Hemoglobin 05/02/2019 13.8  13.0 - 17.0 g/dL Final    Hematocrit 05/02/2019 43.0  40.7 - 50.3 % Final    MCV 05/02/2019 90.9  82.6 - 102.9 fL Final    MCH 05/02/2019 29.2  25.2 - 33.5 pg Final    MCHC 05/02/2019 32.1  28.4 - 34.8 g/dL Final    RDW 05/02/2019 12.7  11.8 - 14.4 % Final    Platelets 77/65/9373 264  138 - 453 k/uL Final    MPV 05/02/2019 10.7  8.1 - 13.5 fL Final    NRBC Automated 05/02/2019 0.0  0.0 per 100 WBC Final    Differential Type 05/02/2019 NOT REPORTED   Final    Seg Neutrophils 05/02/2019 52  36 - 65 % Final    Lymphocytes 05/02/2019 29  24 - 43 % Final    Monocytes 05/02/2019 13 (A)  3 - 12 % Final    Eosinophils % 05/02/2019 5 (A)  1 - 4 % Final    Basophils 05/02/2019 1  0 - 2 % Final    Immature Granulocytes 05/02/2019 0  0 % Final    Segs Absolute 05/02/2019 4.20  1.50 - 8.10 k/uL Final    Absolute Lymph # 05/02/2019 2.36  1.10 - 3.70 k/uL Final    Absolute Mono # 05/02/2019 1.07  0.10 - 1.20 k/uL Final Absolute Eos # 05/02/2019 0.42  0.00 - 0.44 k/uL Final    Basophils Absolute 05/02/2019 0.06  0.00 - 0.20 k/uL Final    Absolute Immature Granulocyte 05/02/2019 0.03  0.00 - 0.30 k/uL Final    WBC Morphology 05/02/2019 NOT REPORTED   Final    RBC Morphology 05/02/2019 NOT REPORTED   Final    Platelet Estimate 06/04/5599 NOT REPORTED   Final    Glucose 05/02/2019 98  70 - 99 mg/dL Final    BUN 05/02/2019 14  6 - 20 mg/dL Final    Creatinine 05/02/2019 1.03  0.70 - 1.20 mg/dL Final    Bun/Cre Ratio 05/02/2019 NOT REPORTED  9 - 20 Final    Calcium 05/02/2019 9.1  8.6 - 10.4 mg/dL Final    Sodium 05/02/2019 136  135 - 144 mmol/L Final    Potassium 05/02/2019 3.8  3.7 - 5.3 mmol/L Final    Chloride 05/02/2019 98  98 - 107 mmol/L Final    CO2 05/02/2019 23  20 - 31 mmol/L Final    Anion Gap 05/02/2019 15  9 - 17 mmol/L Final    GFR Non- 05/02/2019 >60  >60 mL/min Final    GFR  05/02/2019 >60  >60 mL/min Final    GFR Comment 05/02/2019        Final    Comment: Average GFR for 38-51 years old:   80 mL/min/1.73sq m  Chronic Kidney Disease:   <60 mL/min/1.73sq m  Kidney failure:   <15 mL/min/1.73sq m              eGFR calculated using average adult body mass. Additional eGFR calculator available at:        [email or web address]            GFR Staging 05/02/2019 NOT REPORTED   Final    Troponin, High Sensitivity 05/02/2019 <6  0 - 22 ng/L Final    Comment:       High Sensitivity Troponin values cannot be compared with other Troponin methodologies. Patients with high levels of Biotin oral intake (i.e >5mg/day) may have falsely decreased   Troponin levels. Samples collected within 8 hours of biotin intake may require additional   information for diagnosis.       Troponin T 05/02/2019 NOT REPORTED  <0.03 ng/mL Final    Troponin Interp 05/02/2019 NOT REPORTED   Final    Ventricular Rate 05/02/2019 109  BPM Final    Atrial Rate 05/02/2019 109  BPM Final    P-R Interval 05/02/2019 136  ms Final    QRS Duration 05/02/2019 68  ms Final    Q-T Interval 05/02/2019 350  ms Final    QTc Calculation (Bazett) 05/02/2019 471  ms Final    P Axis 05/02/2019 38  degrees Final    R Axis 05/02/2019 19  degrees Final    T [place] 05/02/2019 1  degrees Final    Ethanol 05/02/2019 <10  <10 mg/dL Final    Ethanol percent 05/02/2019 <0.010  <0.010 % Final    NOTE: NEW REFERENCE RANGE    Ventricular Rate 05/02/2019 88  BPM Final    Atrial Rate 05/02/2019 88  BPM Final    P-R Interval 05/02/2019 150  ms Final    QRS Duration 05/02/2019 86  ms Final    Q-T Interval 05/02/2019 404  ms Final    QTc Calculation (Bazett) 05/02/2019 488  ms Final    P Axis 05/02/2019 39  degrees Final    R Axis 05/02/2019 19  degrees Final    T [place] 05/02/2019 1  degrees Final         Reviewed patient's current plan of care and vital signs with nursing staff. Labs reviewed: [x] Yes  EKG reviewed  QTc 488  Medications  Current Facility-Administered Medications: acetaminophen (TYLENOL) tablet 650 mg, 650 mg, Oral, Q4H PRN  ibuprofen (ADVIL;MOTRIN) tablet 400 mg, 400 mg, Oral, Q6H PRN  polyethylene glycol (GLYCOLAX) packet 17 g, 17 g, Oral, Daily PRN  aluminum & magnesium hydroxide-simethicone (MAALOX) 200-200-20 MG/5ML suspension 30 mL, 30 mL, Oral, Q6H PRN  hydrOXYzine HCl (ATARAX) tablet 50 mg, 50 mg, Oral, TID PRN  traZODone (DESYREL) tablet 50 mg, 50 mg, Oral, Nightly PRN  FLUoxetine (PROZAC) capsule 10 mg, 10 mg, Oral, Daily  OLANZapine (ZYPREXA) tablet 5 mg, 5 mg, Oral, Nightly    ASSESSMENT  Schizoaffective disorder, depressive type ([address]         PLAN  Patient symptoms are: Remain unstable  No Medication Changes Today  Patient has received 1 dose only of olanzapine and fluoxetine. Will consider titration tomorrow if he continues to tolerate well  Monitor need and frequency of PRN medications. Encourage participation in groups and milieu. Attempt to develop insight. Psycho-education conducted.   Probable discharge is currently not determined  Follow-up daily while inpatient. Patient continues to be monitored in the inpatient psychiatric facility at [facility] for safety and stabilization. Patient continues to need, on a daily basis, active treatment furnished directly by or requiring the supervision of inpatient psychiatric personnel. Electronically signed by MARIA D Corona CNP on 1/31/2023 at 12:33 PM    **This report has been created using voice recognition software. It may contain minor errors which are inherent in voice recognition technology. **

## 2023-01-31 NOTE — GROUP NOTE
Group Therapy Note    Date: 1/31/2023    Group Start Time: 9058  Group End Time: 1130  Group Topic: Psychoeducation    GIAN Spears, SANAZS        Group Therapy Note    Attendees: 1/10    Psych-Ed/Relapse Prevention Group Note        Date: January 31, 2023 Start Time: 10:40am  End Time: 11:30am      Number of Participants in Group & Unit Census:  1/10    Topic: creative expression, interpersonal skills, leisure awareness     Goal of Group: To improve leisure awareness and interpersonal skills through collaborating with peers and utilizing creative expression. Comments:     Patient did not participate in Psych-Ed/Relapse Prevention group, despite staff encouragement and explanation of benefits. Patient remain seclusive to self. Q15 minute safety checks maintained for patient safety and will continue to encourage patient to attend unit programming.         Signature:  SANAZ VelasquezS

## 2023-01-31 NOTE — PLAN OF CARE
Problem: Anxiety  Goal: Will report anxiety at manageable levels  Description: INTERVENTIONS:  1. Administer medication as ordered  2. Teach and rehearse alternative coping skills  3. Provide emotional support with 1:1 interaction with staff  Outcome: Progressing     Problem: Depression/Self Harm  Goal: Effect of psychiatric condition will be minimized and patient will be protected from self harm  Description: INTERVENTIONS:  1. Assess impact of patient's symptoms on level of functioning, self care needs and offer support as indicated  2. Assess patient/family knowledge of depression, impact on illness and need for teaching  3. Provide emotional support, presence and reassurance  4. Assess for possible suicidal thoughts or ideation. If patient expresses suicidal thoughts or statements do not leave alone, initiate Suicide Precautions, move to a room close to the nursing station and obtain sitter  5. Initiate consults as appropriate with Mental Health Professional, Spiritual Care, Psychosocial CNS, and consider a recommendation to the LIP for a Psychiatric Consultation  Outcome: Progressing   Patient is isolative most of shift only out for needs. Patient is controlled and cooperative with staff. Patient asked if he could shave and staff agreed to allow him with supervision. Patient took all ordered medications and denies any suicidal ideations or homicidal ideations. Q15 min safety checks maintained.

## 2023-01-31 NOTE — GROUP NOTE
Group Therapy Note    Date: 1/31/2023    Group Start Time: 1330  Group End Time: 1400  Group Topic: Psychoeducation    OLGA Jensen        Group Therapy Note    Attendees: 1/10    Psych-Ed/Relapse Prevention Group Note        Date: January 31, 2023 Start Time: 1:30pm  End Time: 2:00pm      Number of Participants in Group & Unit Census:  1/10    Topic:  coping skills, self-expression, interpersonal skills    Goal of Group: To improve interpersonal skills and coping skills awareness through collaborating with peers and demonstrating self-expression. Comments:     Patient did not participate in Psych-Ed/Relapse Prevention group, despite staff encouragement and explanation of benefits. Patient remain seclusive to self. Q15 minute safety checks maintained for patient safety and will continue to encourage patient to attend unit programming.         Signature:  SANAZ SequeiraS

## 2023-01-31 NOTE — PLAN OF CARE
Problem: Pain  Goal: Verbalizes/displays adequate comfort level or baseline comfort level  Outcome: Progressing  Flowsheets (Taken 1/31/2023 0955)  Verbalizes/displays adequate comfort level or baseline comfort level:   Encourage patient to monitor pain and request assistance   Assess pain using appropriate pain scale   Administer analgesics based on type and severity of pain and evaluate response     Problem: Anxiety  Goal: Will report anxiety at manageable levels  Description: INTERVENTIONS:  1. Administer medication as ordered  2. Teach and rehearse alternative coping skills  3. Provide emotional support with 1:1 interaction with staff  1/31/2023 0955 by Jose Hector LPN  Outcome: Progressing  Flowsheets (Taken 1/31/2023 0955)  Will report anxiety at manageable levels:   Administer medication as ordered   Teach and rehearse alternative coping skills   Provide emotional support with 1:1 interaction with staff  1/31/2023 0035 by Mai Primrose, RN  Outcome: Progressing     Problem: Depression/Self Harm  Goal: Effect of psychiatric condition will be minimized and patient will be protected from self harm  Description: INTERVENTIONS:  1. Assess impact of patient's symptoms on level of functioning, self care needs and offer support as indicated  2. Assess patient/family knowledge of depression, impact on illness and need for teaching  3. Provide emotional support, presence and reassurance  4. Assess for possible suicidal thoughts or ideation. If patient expresses suicidal thoughts or statements do not leave alone, initiate Suicide Precautions, move to a room close to the nursing station and obtain sitter  5.  Initiate consults as appropriate with Mental Health Professional, Spiritual Care, Psychosocial CNS, and consider a recommendation to the LIP for a Psychiatric Consultation  1/31/2023 0955 by Jose Hector LPN  Outcome: Progressing  Flowsheets (Taken 1/31/2023 0955)  Effect of psychiatric condition will be minimized and patient will be protected from self harm:   Assess impact of patients symptoms on level of functioning, self care needs and offer support as indicated   Assess patient/family knowledge of depression, impact on illness and need for teaching   Provide emotional support, presence and reassurance  1/31/2023 0035 by Angeline Hernandez RN  Outcome: Progressing

## 2023-02-01 PROCEDURE — 1240000000 HC EMOTIONAL WELLNESS R&B

## 2023-02-01 PROCEDURE — 6370000000 HC RX 637 (ALT 250 FOR IP): Performed by: NURSE PRACTITIONER

## 2023-02-01 PROCEDURE — 6370000000 HC RX 637 (ALT 250 FOR IP): Performed by: PSYCHIATRY & NEUROLOGY

## 2023-02-01 RX ORDER — OLANZAPINE 7.5 MG/1
7.5 TABLET ORAL NIGHTLY
Status: DISCONTINUED | OUTPATIENT
Start: 2023-02-01 | End: 2023-02-04

## 2023-02-01 RX ADMIN — OLANZAPINE 7.5 MG: 7.5 TABLET, FILM COATED ORAL at 21:08

## 2023-02-01 RX ADMIN — FLUOXETINE 10 MG: 10 CAPSULE ORAL at 08:30

## 2023-02-01 RX ADMIN — TRAZODONE HYDROCHLORIDE 50 MG: 50 TABLET ORAL at 21:13

## 2023-02-01 NOTE — PLAN OF CARE
[facility]  Day 3 Interdisciplinary Treatment Plan NOTE    Review Date & Time: 2/1/2023   1325    Admission Type:   Admission Type: Voluntary    Reason for admission:  Reason for Admission: Suicidal bizarre behavior  Estimated Length of Stay: 5-7 days  Estimated Discharge Date Update: to be determined by physician    PATIENT STRENGTHS:  Patient Strengths    Patient Strengths and Limitations:Limitations: Difficult relationships / poor social skills, Multiple barriers to leisure interests, Tendency to isolate self, Difficulty problem solving/relies on others to help solve problems  Addictive Behavior:Addictive Behavior  In the Past 3 Months, Have You Felt or Has Someone Told You That You Have a Problem With  : None  Medical Problems:  Past Medical History:   Diagnosis Date    Asthma     Hypertension     Morbid obesity ([facility] [address]     Schizoaffective disorder ([facility] [address]     Substance abuse ([facility]        Risk:  Fall Risk   Christopher Scale Christopher Scale Score: 22  BVC    Change in scores no Changes to plan of Care no    Status EXAM:   Mental Status and Behavioral Exam  Normal: No  Level of Assistance: Independent/Self  Facial Expression: Flat, Avoids Gaze  Affect: Blunt  Level of Consciousness: Alert  Frequency of Checks: 4 times per hour, close  Mood:Normal: No  Mood: Depressed, Irritable  Motor Activity:Normal: No  Motor Activity: Decreased  Eye Contact: Fair  Observed Behavior: Guarded, Withdrawn, Cooperative  Sexual Misconduct History: Current - no  Preception: [place] to person, [place] to time, [place] to place, [place] to situation  Attention:Normal: Yes  Attention: Hyperalert  Thought Processes: Circumstantial  Thought Content:Normal: No  Thought Content: Preoccupations  Depression Symptoms: Isolative, Loss of interest, Increased irritability, Feelings of hopelessess, Feelings of helplessness, Change in energy level  Anxiety Symptoms: Generalized  Mayuri Symptoms: No problems reported or observed.   Hallucinations: None  Delusions: Yes  Delusions: Paranoid  Memory:Normal: No  Memory: Poor recent, Poor remote  Insight and Judgment: No  Insight and Judgment: Poor insight, Poor judgment    Daily Assessment Last Entry:   Daily Sleep (WDL): Within Defined Limits            Daily Nutrition (WDL): Within Defined Limits  Level of Assistance: Independent/Self    Patient Monitoring:  Frequency of Checks: 4 times per hour, close    Psychiatric Symptoms:   Depression Symptoms  Depression Symptoms: Isolative, Loss of interest, Increased irritability, Feelings of hopelessess, Feelings of helplessness, Change in energy level  Anxiety Symptoms  Anxiety Symptoms: Generalized  Mayuri Symptoms  Mayuri Symptoms: No problems reported or observed. Suicide Risk CSSR-S:  1) Within the past month, have you wished you were dead or wished you could go to sleep and not wake up? : No  2) Have you actually had any thoughts of killing yourself? : No  6) Have you ever done anything, started to do anything, or prepared to do anything to end your life?: No  Change in Result no Change in Plan of care no      EDUCATION:   EDUCATION:   Learner Progress Toward Treatment Goals: Reviewed results and recommendations of this team, Reviewed group plan and strategies, Reviewed signs, symptoms and risk of self harm and violent behavior, Reviewed goals and plan of care    Method:small group, individual verbal education    Outcome:verbalized by patient, but needs reinforcement to obtain goals    PATIENT GOALS:  Short term:pt refused to attend meeting.    Long term:    PLAN/TREATMENT RECOMMENDATIONS UPDATE: continue with group therapies, increased socialization, continue planning for after discharge goals, continue with medication compliance    SHORT-TERM GOALS UPDATE:   Time frame for Short-Term Goals: 5-7 days    LONG-TERM GOALS UPDATE:   Time frame for Long-Term Goals: 6 months  Members Present in Team Meeting: See Signature Sheet    Radha Horvath RN

## 2023-02-01 NOTE — PROGRESS NOTES
Daily Progress Note  2/1/2023    Patient Name: Eve Fernando    CHIEF COMPLAINT: Psychosis with suicidal and homicidal ideation         SUBJECTIVE:      Patient is seen today for a follow up assessment. Patient has been compliant with scheduled medications of Prozac and Zyprexa. Patient has not needed to utilize emergency medications at this time. Patient has utilized Atarax to help with anxiety. Patient endorses suicidal ideations and continues to feel unsafe if he was outside of the hospital. Patient was asked about his recent homicidal ideations and denies any contact or homicidal thoughts towards his mother. Patient denies any thoughts of harming others on the unit or outside of the hospital. Patient reports improvement in his auditory hallucinations compared to yesterday, but they still cause some \"bad thoughts. \" According to staff, patient has been isolative and is resting in bed all day except during meals. Patient is reluctant to engage with staff on the unit. Patient was educated to continue taking his medications as prescribed and socialize in the dayroom. Writer encouraged patient to attend groups on the unit. At this time, the patient is not appropriate for a lower level of care. There is risk of decompensation and patient warrants further hospitalization for safety and stabilization. Appetite:  [x] Adequate/Unchanged  [] Increased  [] Decreased      Sleep:       [x] Adequate/Unchanged  [] Fair  [] Poor      Group Attendance on Unit:   [] Yes   [] Selectively    [x] No    Compliant with scheduled medications: [x] Yes  [] No    Received emergency medications in past 24 hrs: [] Yes   [x] No    Medication Side Effects: Denies         Mental Status Exam  Level of consciousness: Alert and awake. Appearance: Appropriate attire for setting, resting in bed, with fair  grooming and hygiene. Behavior/Motor:  Withdrawn, compliant with interview but irritable  Attitude toward examiner: Cooperative, poor concentration, poor eye contact. Speech: normal rate and slow - Irritable tone   Mood:  Patient reports \"Tired and depressed\". Affect: Congruent  Thought processes: Linear and poverty of thought. Thought content: Denies homicidal ideation. Suicidal Ideation: Active suicidal ideations, without current intent to harm self on unit. Unable to contract for safety off unit. Delusions: No evidence of delusions. Denies paranoia today  Perceptual Disturbance: Patient does not appear to be responding to internal stimuli. Reports improvement in auditory hallucinations. Denies visual hallucinations. Cognition: Oriented to self, location, time, and situation. Memory: Age appropriate. Insight & Judgement: Poor. Data   height is 6' (1.829 m) and weight is 230 lb (104.3 kg). His temporal temperature is 97.3 °F (36.3 °C). His blood pressure is 103/53 (abnormal) and his pulse is 62. His respiration is 14. Labs:   No visits with results within 2 Day(s) from this visit.    Latest known visit with results is:   Admission on 05/02/2019, Discharged on 05/02/2019   Component Date Value Ref Range Status    WBC 05/02/2019 8.1  3.5 - 11.3 k/uL Final    RBC 05/02/2019 4.73  4.21 - 5.77 m/uL Final    Hemoglobin 05/02/2019 13.8  13.0 - 17.0 g/dL Final    Hematocrit 05/02/2019 43.0  40.7 - 50.3 % Final    MCV 05/02/2019 90.9  82.6 - 102.9 fL Final    MCH 05/02/2019 29.2  25.2 - 33.5 pg Final    MCHC 05/02/2019 32.1  28.4 - 34.8 g/dL Final    RDW 05/02/2019 12.7  11.8 - 14.4 % Final    Platelets 03/60/9593 264  138 - 453 k/uL Final    MPV 05/02/2019 10.7  8.1 - 13.5 fL Final    NRBC Automated 05/02/2019 0.0  0.0 per 100 WBC Final    Differential Type 05/02/2019 NOT REPORTED   Final    Seg Neutrophils 05/02/2019 52  36 - 65 % Final    Lymphocytes 05/02/2019 29  24 - 43 % Final    Monocytes 05/02/2019 13 (A)  3 - 12 % Final    Eosinophils % 05/02/2019 5 (A)  1 - 4 % Final    Basophils 05/02/2019 1  0 - 2 % Final    Immature Granulocytes 05/02/2019 0  0 % Final    Segs Absolute 05/02/2019 4.20  1.50 - 8.10 k/uL Final    Absolute Lymph # 05/02/2019 2.36  1.10 - 3.70 k/uL Final    Absolute Mono # 05/02/2019 1.07  0.10 - 1.20 k/uL Final    Absolute Eos # 05/02/2019 0.42  0.00 - 0.44 k/uL Final    Basophils Absolute 05/02/2019 0.06  0.00 - 0.20 k/uL Final    Absolute Immature Granulocyte 05/02/2019 0.03  0.00 - 0.30 k/uL Final    WBC Morphology 05/02/2019 NOT REPORTED   Final    RBC Morphology 05/02/2019 NOT REPORTED   Final    Platelet Estimate 18/67/6989 NOT REPORTED   Final    Glucose 05/02/2019 98  70 - 99 mg/dL Final    BUN 05/02/2019 14  6 - 20 mg/dL Final    Creatinine 05/02/2019 1.03  0.70 - 1.20 mg/dL Final    Bun/Cre Ratio 05/02/2019 NOT REPORTED  9 - 20 Final    Calcium 05/02/2019 9.1  8.6 - 10.4 mg/dL Final    Sodium 05/02/2019 136  135 - 144 mmol/L Final    Potassium 05/02/2019 3.8  3.7 - 5.3 mmol/L Final    Chloride 05/02/2019 98  98 - 107 mmol/L Final    CO2 05/02/2019 23  20 - 31 mmol/L Final    Anion Gap 05/02/2019 15  9 - 17 mmol/L Final    GFR Non- 05/02/2019 >60  >60 mL/min Final    GFR  05/02/2019 >60  >60 mL/min Final    GFR Comment 05/02/2019        Final    Comment: Average GFR for 38-51 years old:   80 mL/min/1.73sq m  Chronic Kidney Disease:   <60 mL/min/1.73sq m  Kidney failure:   <15 mL/min/1.73sq m              eGFR calculated using average adult body mass. Additional eGFR calculator available at:        [employer]            GFR Staging 05/02/2019 NOT REPORTED   Final    Troponin, High Sensitivity 05/02/2019 <6  0 - 22 ng/L Final    Comment:       High Sensitivity Troponin values cannot be compared with other Troponin methodologies. Patients with high levels of Biotin oral intake (i.e >5mg/day) may have falsely decreased   Troponin levels.  Samples collected within 8 hours of biotin intake may require additional   information for diagnosis. Troponin T 05/02/2019 NOT REPORTED  <0.03 ng/mL Final    Troponin Interp 05/02/2019 NOT REPORTED   Final    Ventricular Rate 05/02/2019 109  BPM Final    Atrial Rate 05/02/2019 109  BPM Final    P-R Interval 05/02/2019 136  ms Final    QRS Duration 05/02/2019 68  ms Final    Q-T Interval 05/02/2019 350  ms Final    QTc Calculation (Bazett) 05/02/2019 471  ms Final    P Axis 05/02/2019 38  degrees Final    R Axis 05/02/2019 19  degrees Final    T Bally 05/02/2019 1  degrees Final    Ethanol 05/02/2019 <10  <10 mg/dL Final    Ethanol percent 05/02/2019 <0.010  <0.010 % Final    NOTE: NEW REFERENCE RANGE    Ventricular Rate 05/02/2019 88  BPM Final    Atrial Rate 05/02/2019 88  BPM Final    P-R Interval 05/02/2019 150  ms Final    QRS Duration 05/02/2019 86  ms Final    Q-T Interval 05/02/2019 404  ms Final    QTc Calculation (Bazett) 05/02/2019 488  ms Final    P Axis 05/02/2019 39  degrees Final    R Axis 05/02/2019 19  degrees Final    T Bally 05/02/2019 1  degrees Final         Reviewed patient's current plan of care and vital signs with nursing staff. Labs reviewed: [x] Yes  EKG reviewed  QTc 488  Medications  Current Facility-Administered Medications: acetaminophen (TYLENOL) tablet 650 mg, 650 mg, Oral, Q4H PRN  ibuprofen (ADVIL;MOTRIN) tablet 400 mg, 400 mg, Oral, Q6H PRN  polyethylene glycol (GLYCOLAX) packet 17 g, 17 g, Oral, Daily PRN  aluminum & magnesium hydroxide-simethicone (MAALOX) 200-200-20 MG/5ML suspension 30 mL, 30 mL, Oral, Q6H PRN  hydrOXYzine HCl (ATARAX) tablet 50 mg, 50 mg, Oral, TID PRN  traZODone (DESYREL) tablet 50 mg, 50 mg, Oral, Nightly PRN  FLUoxetine (PROZAC) capsule 10 mg, 10 mg, Oral, Daily  OLANZapine (ZYPREXA) tablet 5 mg, 5 mg, Oral, Nightly    ASSESSMENT  [facility]         PLAN  Patient symptoms are: Remain unstable  Medication change today:   This writer discussed risks versus benefits and alternative to medications including plan to titrate olanzapine 7.5 mg HS starting tonight and we mutually agree  Monitor need and frequency of PRN medications. Encourage participation in groups and milieu. Attempt to develop insight. Psycho-education conducted. Probable discharge is currently not determined  Follow-up daily while inpatient. Patient continues to be monitored in the inpatient psychiatric facility at [facility] for safety and stabilization. Patient continues to need, on a daily basis, active treatment furnished directly by or requiring the supervision of inpatient psychiatric personnel. Electronically signed by MARIA D Can CNP on 2/1/2023 at 1:35 PM    **This report has been created using voice recognition software. It may contain minor errors which are inherent in voice recognition technology. **

## 2023-02-01 NOTE — PROGRESS NOTES
Daily Progress Note  2/1/2023    Patient Name: Clarke Grover    CHIEF COMPLAINT:  Psychosis with Suicidal and Homicidal ideations           SUBJECTIVE:      Patient is seen today for a follow up assessment. Patient has been compliant with scheduled medications of Prozac and Zyprexa. Patient has not needed to utilize emergency medications at this time. Patient has utilized Atarax to help with anxiety. Patient endorses suicidal ideations and continues to feel unsafe if he was outside of the hospital. Patient was asked about his recent homicidal ideations and denies any contact or homicidal thoughts towards his mother. Patient denies any thoughts of harming others on the unit or outside of the hospital. Patient reports improvement in his auditory hallucinations compared to yesterday, but they still cause some \"bad thoughts. \" According to staff, patient has been isolative and is resting in bed all day except during meals. Patient is reluctant to engage with staff on the unit. Patient was educated to continue taking his medications as prescribed and socialize in the dayroom. Writer encouraged patient to attend groups on the unit. At this time, the patient is not appropriate for a lower level of care. There is risk of decompensation and patient warrants further hospitalization for safety and stabilization. Appetite:  [x] Adequate/Unchanged  [] Increased  [] Decreased      Sleep:       [x] Adequate/Unchanged  [] Fair  [] Poor      Group Attendance on Unit:   [] Yes   [] Selectively    [x] No    Medication Side Effects: None         Mental Status Exam  Level of consciousness: Alert and awake. Appearance: Appropriate attire for setting, resting in bed, with fair  grooming and hygiene. Behavior/Motor: Withdrawn, compliant with interview but irritable  Attitude toward examiner: Cooperative, poor concentration, poor eye contact.   Speech: normal rate and slow - Irritable tone   Mood:  Patient reports \"Tired and depressed\". Affect: Congruent  Thought processes: Linear and poverty of thought. Thought content: Denies homicidal ideation. Suicidal Ideation: Active suicidal ideations, without current intent to harm self on unit. Unable to contract for safety off unit. Delusions: No evidence of delusions. Denies paranoia today  Perceptual Disturbance: Patient does not appear to be responding to internal stimuli. Reports improvement in auditory hallucinations. Denies visual hallucinations. Cognition: Oriented to self, location, time, and situation. Memory: Age appropriate. Insight & Judgement: Poor. Data   height is 6' (1.829 m) and weight is 230 lb (104.3 kg). His temporal temperature is 97.3 °F (36.3 °C). His blood pressure is 103/53 (abnormal) and his pulse is 62. His respiration is 14. Labs:   No visits with results within 2 Day(s) from this visit.    Latest known visit with results is:   Admission on 05/02/2019, Discharged on 05/02/2019   Component Date Value Ref Range Status    WBC 05/02/2019 8.1  3.5 - 11.3 k/uL Final    RBC 05/02/2019 4.73  4.21 - 5.77 m/uL Final    Hemoglobin 05/02/2019 13.8  13.0 - 17.0 g/dL Final    Hematocrit 05/02/2019 43.0  40.7 - 50.3 % Final    MCV 05/02/2019 90.9  82.6 - 102.9 fL Final    MCH 05/02/2019 29.2  25.2 - 33.5 pg Final    MCHC 05/02/2019 32.1  28.4 - 34.8 g/dL Final    RDW 05/02/2019 12.7  11.8 - 14.4 % Final    Platelets 02/46/9747 264  138 - 453 k/uL Final    MPV 05/02/2019 10.7  8.1 - 13.5 fL Final    NRBC Automated 05/02/2019 0.0  0.0 per 100 WBC Final    Differential Type 05/02/2019 NOT REPORTED   Final    Seg Neutrophils 05/02/2019 52  36 - 65 % Final    Lymphocytes 05/02/2019 29  24 - 43 % Final    Monocytes 05/02/2019 13 (A)  3 - 12 % Final    Eosinophils % 05/02/2019 5 (A)  1 - 4 % Final    Basophils 05/02/2019 1  0 - 2 % Final    Immature Granulocytes 05/02/2019 0  0 % Final    Segs Absolute 05/02/2019 4.20  1.50 - 8.10 k/uL Final    Absolute Lymph # 05/02/2019 2.36  1.10 - 3.70 k/uL Final    Absolute Mono # 05/02/2019 1.07  0.10 - 1.20 k/uL Final    Absolute Eos # 05/02/2019 0.42  0.00 - 0.44 k/uL Final    Basophils Absolute 05/02/2019 0.06  0.00 - 0.20 k/uL Final    Absolute Immature Granulocyte 05/02/2019 0.03  0.00 - 0.30 k/uL Final    WBC Morphology 05/02/2019 NOT REPORTED   Final    RBC Morphology 05/02/2019 NOT REPORTED   Final    Platelet Estimate 67/05/8536 NOT REPORTED   Final    Glucose 05/02/2019 98  70 - 99 mg/dL Final    BUN 05/02/2019 14  6 - 20 mg/dL Final    Creatinine 05/02/2019 1.03  0.70 - 1.20 mg/dL Final    Bun/Cre Ratio 05/02/2019 NOT REPORTED  9 - 20 Final    Calcium 05/02/2019 9.1  8.6 - 10.4 mg/dL Final    Sodium 05/02/2019 136  135 - 144 mmol/L Final    Potassium 05/02/2019 3.8  3.7 - 5.3 mmol/L Final    Chloride 05/02/2019 98  98 - 107 mmol/L Final    CO2 05/02/2019 23  20 - 31 mmol/L Final    Anion Gap 05/02/2019 15  9 - 17 mmol/L Final    GFR Non- 05/02/2019 >60  >60 mL/min Final    GFR  05/02/2019 >60  >60 mL/min Final    GFR Comment 05/02/2019        Final    Comment: Average GFR for 38-51 years old:   80 mL/min/1.73sq m  Chronic Kidney Disease:   <60 mL/min/1.73sq m  Kidney failure:   <15 mL/min/1.73sq m              eGFR calculated using average adult body mass. Additional eGFR calculator available at:        [employer]            GFR Staging 05/02/2019 NOT REPORTED   Final    Troponin, High Sensitivity 05/02/2019 <6  0 - 22 ng/L Final    Comment:       High Sensitivity Troponin values cannot be compared with other Troponin methodologies. Patients with high levels of Biotin oral intake (i.e >5mg/day) may have falsely decreased   Troponin levels. Samples collected within 8 hours of biotin intake may require additional   information for diagnosis.       Troponin T 05/02/2019 NOT REPORTED  <0.03 ng/mL Final    Troponin Interp 05/02/2019 NOT REPORTED   Final Ventricular Rate 05/02/2019 109  BPM Final    Atrial Rate 05/02/2019 109  BPM Final    P-R Interval 05/02/2019 136  ms Final    QRS Duration 05/02/2019 68  ms Final    Q-T Interval 05/02/2019 350  ms Final    QTc Calculation (Bazett) 05/02/2019 471  ms Final    P Axis 05/02/2019 38  degrees Final    R Axis 05/02/2019 19  degrees Final    T Pierz 05/02/2019 1  degrees Final    Ethanol 05/02/2019 <10  <10 mg/dL Final    Ethanol percent 05/02/2019 <0.010  <0.010 % Final    NOTE: NEW REFERENCE RANGE    Ventricular Rate 05/02/2019 88  BPM Final    Atrial Rate 05/02/2019 88  BPM Final    P-R Interval 05/02/2019 150  ms Final    QRS Duration 05/02/2019 86  ms Final    Q-T Interval 05/02/2019 404  ms Final    QTc Calculation (Bazett) 05/02/2019 488  ms Final    P Axis 05/02/2019 39  degrees Final    R Axis 05/02/2019 19  degrees Final    T Pierz 05/02/2019 1  degrees Final         Reviewed patient's current plan of care and vital signs with nursing staff. Labs reviewed: [x] Yes    Medications  Current Facility-Administered Medications: acetaminophen (TYLENOL) tablet 650 mg, 650 mg, Oral, Q4H PRN  ibuprofen (ADVIL;MOTRIN) tablet 400 mg, 400 mg, Oral, Q6H PRN  polyethylene glycol (GLYCOLAX) packet 17 g, 17 g, Oral, Daily PRN  aluminum & magnesium hydroxide-simethicone (MAALOX) 200-200-20 MG/5ML suspension 30 mL, 30 mL, Oral, Q6H PRN  hydrOXYzine HCl (ATARAX) tablet 50 mg, 50 mg, Oral, TID PRN  traZODone (DESYREL) tablet 50 mg, 50 mg, Oral, Nightly PRN  FLUoxetine (PROZAC) capsule 10 mg, 10 mg, Oral, Daily  OLANZapine (ZYPREXA) tablet 5 mg, 5 mg, Oral, Nightly    ASSESSMENT  Schizoaffective disorder, depressive type ([facility]         HANDOFF  Patient symptoms: Unstable   Medications as determined by attending physician  -  Encourage participation in groups and milieu.   Probable discharge is to be determined by MD    Electronically signed by Brandy Garcia RN on 2/1/2023 at 12:59 PM    **This report has been created using voice recognition software. It may contain minor errors which are inherent in voice recognition technology. **

## 2023-02-01 NOTE — PLAN OF CARE
Problem: Pain  Goal: Verbalizes/displays adequate comfort level or baseline comfort level  1/31/2023 1945 by Miya Almeida LPN  Outcome: Progressing     Problem: Anxiety  Goal: Will report anxiety at manageable levels  Description: INTERVENTIONS:  1. Administer medication as ordered  2. Teach and rehearse alternative coping skills  3. Provide emotional support with 1:1 interaction with staff  1/31/2023 1945 by Miya Almeida LPN  Outcome: Progressing     Problem: Depression/Self Harm  Goal: Effect of psychiatric condition will be minimized and patient will be protected from self harm  Description: INTERVENTIONS:  1. Assess impact of patient's symptoms on level of functioning, self care needs and offer support as indicated  2. Assess patient/family knowledge of depression, impact on illness and need for teaching  3. Provide emotional support, presence and reassurance  4. Assess for possible suicidal thoughts or ideation. If patient expresses suicidal thoughts or statements do not leave alone, initiate Suicide Precautions, move to a room close to the nursing station and obtain sitter  5. Initiate consults as appropriate with Mental Health Professional, Spiritual Care, Psychosocial CNS, and consider a recommendation to the LIP for a Psychiatric Consultation  1/31/2023 1945 by Miya Almeida LPN  Outcome: Progressing     Patient has remained isolative to his room, out briefly for snacks. Patient has a flat affect, depressed mood and is withdrawn during assessment. Patient denies thoughts of self-harm, anxiety and pain at this time. Patient is medication compliant and behavior is controlled. Patient safety checks maintained every 15 minutes.

## 2023-02-01 NOTE — PLAN OF CARE
Problem: Pain  Goal: Verbalizes/displays adequate comfort level or baseline comfort level  2/1/2023 0959 by Cristy Dobbins RN  Outcome: Progressing - patient has no complaints of pain at this time     Problem: Anxiety  Goal: Will report anxiety at manageable levels  Description: INTERVENTIONS:  1. Administer medication as ordered  2. Teach and rehearse alternative coping skills  3. Provide emotional support with 1:1 interaction with staff  2/1/2023 0959 by Cristy Dobbins RN  Outcome: Progressing - patient denies anxiety at this time  Problem: Depression/Self Harm  Goal: Effect of psychiatric condition will be minimized and patient will be protected from self harm  Description: INTERVENTIONS:  1. Assess impact of patient's symptoms on level of functioning, self care needs and offer support as indicated  2. Assess patient/family knowledge of depression, impact on illness and need for teaching  3. Provide emotional support, presence and reassurance  4. Assess for possible suicidal thoughts or ideation. If patient expresses suicidal thoughts or statements do not leave alone, initiate Suicide Precautions, move to a room close to the nursing station and obtain sitter  5. Initiate consults as appropriate with Mental Health Professional, Spiritual Care, Psychosocial CNS, and consider a recommendation to the LIP for a Psychiatric Consultation  2/1/2023 0959 by Cristy Dobbins RN  Outcome: Progressing - upon assessment, patient is observed to have a flat affect and a depressed mood; patient is isolative to his room and comes out briefly for needs only; patient denies thoughts of self-harm and remains free from self-inflicted injuries, safety checks maintained and continue every 15 minutes and at random intervals.  Patient can be irritable at times but is cooperative with staff and remains in control of his behaviors

## 2023-02-01 NOTE — GROUP NOTE
Group Therapy Note    Date: 2/1/2023    Group Start Time: 1110  Group End Time: 1150  Group Topic: Cognitive Skills    [facility]        Group Therapy Note    Attendees: 10/20     Topic: Problem solving, and communication skills     Goal of Group: To increase social interaction and problem solving, and communication skills  . Comments:      Patient did not participate Cognitive Skills Group at 11:10, despite staff encouragement and explanation of benefits. Patient remains seclusive to self, resting in room during group time. Q15 minute safety checks maintained for patient safety and will continue to encourage patient to attend unit programming.             Discipline Responsible: Psychoeducational Specialist        Signature:  Rosenda Coronel

## 2023-02-02 PROCEDURE — 6370000000 HC RX 637 (ALT 250 FOR IP): Performed by: NURSE PRACTITIONER

## 2023-02-02 PROCEDURE — 1240000000 HC EMOTIONAL WELLNESS R&B

## 2023-02-02 PROCEDURE — 6370000000 HC RX 637 (ALT 250 FOR IP): Performed by: PSYCHIATRY & NEUROLOGY

## 2023-02-02 RX ORDER — FLUOXETINE HYDROCHLORIDE 20 MG/1
20 CAPSULE ORAL DAILY
Status: DISCONTINUED | OUTPATIENT
Start: 2023-02-03 | End: 2023-02-07 | Stop reason: HOSPADM

## 2023-02-02 RX ADMIN — TRAZODONE HYDROCHLORIDE 50 MG: 50 TABLET ORAL at 20:46

## 2023-02-02 RX ADMIN — HYDROXYZINE HYDROCHLORIDE 50 MG: 50 TABLET ORAL at 20:46

## 2023-02-02 RX ADMIN — FLUOXETINE 10 MG: 10 CAPSULE ORAL at 10:37

## 2023-02-02 RX ADMIN — OLANZAPINE 7.5 MG: 7.5 TABLET, FILM COATED ORAL at 20:45

## 2023-02-02 NOTE — PLAN OF CARE
Problem: Pain  Goal: Verbalizes/displays adequate comfort level or baseline comfort level  Outcome: Progressing  Flowsheets (Taken 2/2/2023 1632)  Verbalizes/displays adequate comfort level or baseline comfort level:   Assess pain using appropriate pain scale   Encourage patient to monitor pain and request assistance  Note: Denies pain this shift. Encouraged patient to report any pain if it occurs. Problem: Anxiety  Goal: Will report anxiety at manageable levels  Description: INTERVENTIONS:  1. Administer medication as ordered  2. Teach and rehearse alternative coping skills  3. Provide emotional support with 1:1 interaction with staff  Outcome: Progressing  Flowsheets (Taken 2/2/2023 1633)  Will report anxiety at manageable levels:   Provide emotional support with 1:1 interaction with staff   Administer medication as ordered  Note: Patient reports mild anxiety. PRN Atarax offered for relief, patient declines . Problem: Depression/Self Harm  Goal: Effect of psychiatric condition will be minimized and patient will be protected from self harm  Description: INTERVENTIONS:  1. Assess impact of patient's symptoms on level of functioning, self care needs and offer support as indicated  2. Assess patient/family knowledge of depression, impact on illness and need for teaching  3. Provide emotional support, presence and reassurance  4. Assess for possible suicidal thoughts or ideation. If patient expresses suicidal thoughts or statements do not leave alone, initiate Suicide Precautions, move to a room close to the nursing station and obtain sitter  5.  Initiate consults as appropriate with Mental Health Professional, Spiritual Care, Psychosocial CNS, and consider a recommendation to the LIP for a Psychiatric Consultation  Outcome: Progressing  Flowsheets (Taken 1/31/2023 0955 by Nolan Michelle LPN)  Effect of psychiatric condition will be minimized and patient will be protected from self harm:   Assess impact of patients symptoms on level of functioning, self care needs and offer support as indicated   Assess patient/family knowledge of depression, impact on illness and need for teaching   Provide emotional support, presence and reassurance  Note: No self harm noted this shift. Patient denies thoughts of suicide or self-harm. Patient irritable and evasive during 1:1 interaction , isolative to room and out for needs only. Patient eats % of meals in dayroom. Patient is compliant with meds and is able to maintain behavioral control. Safe environment maintained. Q15 minute checks for safety continued per unit policy. Will continue to monitor for safety and provide support and reassurance as needed.

## 2023-02-02 NOTE — GROUP NOTE
- hx of MS  - wheelchair bound but can transfer and walk a few steps with walker, incontinent   - pt receives Plegridy q 2 weeks - next dose 05/30   - f/u neuro outpatient for same  Daughter said patient's  will bring in medication in interferon beta for patient today Group Therapy Note    Date: 2/2/2023    Group Start Time: 0845  Group End Time: 7261  Group Topic: Community Meeting    GIAN Banegas LPN    Patient refused to attend goal group.  1:1 time offered and refused  Signature:  Josy Banegas LPN

## 2023-02-02 NOTE — PLAN OF CARE
Problem: Pain  Goal: Verbalizes/displays adequate comfort level or baseline comfort level  2/1/2023 2100 by Loco Graves LPN  Outcome: Progressing     Problem: Anxiety  Goal: Will report anxiety at manageable levels  Description: INTERVENTIONS:  1. Administer medication as ordered  2. Teach and rehearse alternative coping skills  3. Provide emotional support with 1:1 interaction with staff  2/1/2023 2100 by Loco Graves LPN  Outcome: Progressing     Problem: Depression/Self Harm  Goal: Effect of psychiatric condition will be minimized and patient will be protected from self harm  Description: INTERVENTIONS:  1. Assess impact of patient's symptoms on level of functioning, self care needs and offer support as indicated  2. Assess patient/family knowledge of depression, impact on illness and need for teaching  3. Provide emotional support, presence and reassurance  4. Assess for possible suicidal thoughts or ideation. If patient expresses suicidal thoughts or statements do not leave alone, initiate Suicide Precautions, move to a room close to the nursing station and obtain sitter  5. Initiate consults as appropriate with Mental Health Professional, Spiritual Care, Psychosocial CNS, and consider a recommendation to the [facility] for a Psychiatric Consultation  2/1/2023 2100 by Loco Graves LPN  Outcome: Progressing     Patient is flat and continues to isolate in his room, out for needs only. Patient denies thoughts of self-harm and anxiety. Patient encouraged to attend unit programming to explore coping skills. Patient is medication compliant and requests his order for as needed Trazodone 50 mg for restful sleep at bedtime. No pain or discomfort voiced at this time. Patient behavior controlled, safety checks maintained every 15 minutes.

## 2023-02-02 NOTE — GROUP NOTE
Group Therapy Note    Date: 2/2/2023    Group Start Time: 1440  Group End Time: 5624  Group Topic: Cognitive Skills    GIAN Mchugh, CTRS        Group Therapy Note    Attendees: 8/16     Topic: Expressing feelings, mindfulness and relaxation techniques. Goal of Group: To increase social interaction and to practice expressing feelings, and explore/practice mindfulness and relaxation techniques. Comments:      Patient did not participate Cognitive Skills Group at 14:40, despite staff encouragement and explanation of benefits. Patient remains seclusive to self, resting in room during group time. Q15 minute safety checks maintained for patient safety and will continue to encourage patient to attend unit programming.             Discipline Responsible: Psychoeducational Specialist        Signature:  Fadumo Corea

## 2023-02-02 NOTE — GROUP NOTE
Group Therapy Note    Date: 2/2/2023    Group Start Time: 1100  Group End Time: 1150  Group Topic: Cognitive Skills    GIAN Palm, CTRS        Group Therapy Note    Attendees: 11/19     Topic: Decision making, and communication skills     Goal of Group: To increase social interaction and decision making, and communication skills  . Comments:      Patient did not participate Cognitive Skills Group at 11:00, despite staff encouragement and explanation of benefits. Patient remains seclusive to self, resting in room during group time. Q15 minute safety checks maintained for patient safety and will continue to encourage patient to attend unit programming.             Discipline Responsible: Psychoeducational Specialist        Signature:  Atul Aviles

## 2023-02-02 NOTE — PROGRESS NOTES
Daily Progress Note  2/2/2023    Patient Name: Meli Gonzalez    CHIEF COMPLAINT: Psychosis with suicidal and homicidal ideation         SUBJECTIVE:      Patient is seen today for a follow up assessment. Patient has been compliant with scheduled medications of Prozac and Zyprexa. Patient has not exhibited acute behavioral changes or needed to utilize emergency medications at this time. Mr. Laisha Davidson is agreeable to assessment at bedside where he reports his mood is \"tired\". He has remained isolative and comes to the milieu for personal and nutritional needs only. He offers 1-3 word responses. He denies side effects related to his current medication regimen and is aware we will titrate Prozac starting tomorrow. He reports improving auditory hallucinations and denies any intent to harm his mother. He does confirm his plan to return to her house once his symptoms are stabilized. Patient denies any current pain or discomfort. He does state [place] is the date \". At this time he endorses fleeting suicidal ideation and is not able to contract for safety in the community. Appetite:  [x] Adequate/Unchanged  [] Increased  [] Decreased      Sleep:       [x] Adequate/Unchanged  [] Fair  [] Poor      Group Attendance on Unit:   [] Yes   [] Selectively    [x] No    Compliant with scheduled medications: [x] Yes  [] No    Received emergency medications in past 24 hrs: [] Yes   [x] No    Medication Side Effects: Denies         Mental Status Exam  Level of consciousness: Resting, responds to verbal stimuli  Appearance: Appropriate attire for setting, resting in bed, with fair  grooming and hygiene. Behavior/Motor: Withdrawn, compliant with interview but irritable  Attitude toward examiner: Cooperative, poor concentration, poor eye contact. Speech: normal rate and slow - Irritable tone   Mood:  Patient reports \"Tired \". Affect: Congruent  Thought processes: Linear and poverty of thought.    Thought content: Denies homicidal ideation. Suicidal Ideation: Active fleeting suicidal ideations, without current intent to harm self on unit. Unable to contract for safety off unit. Delusions: No evidence of delusions. Denies paranoia today  Perceptual Disturbance: Patient does not appear to be responding to internal stimuli. Reports improvement in auditory hallucinations. Denies visual hallucinations. Cognition: Oriented to self, location and situation. Requesting to know the date  Memory: Age appropriate. Insight & Judgement: Poor. Data   height is 6' (1.829 m) and weight is 230 lb (104.3 kg). His temporal temperature is 97 °F (36.1 °C). His blood pressure is 106/49 (abnormal) and his pulse is 63. His respiration is 16. Labs:   No visits with results within 2 Day(s) from this visit.    Latest known visit with results is:   Admission on 05/02/2019, Discharged on 05/02/2019   Component Date Value Ref Range Status    WBC 05/02/2019 8.1  3.5 - 11.3 k/uL Final    RBC 05/02/2019 4.73  4.21 - 5.77 m/uL Final    Hemoglobin 05/02/2019 13.8  13.0 - 17.0 g/dL Final    Hematocrit 05/02/2019 43.0  40.7 - 50.3 % Final    MCV 05/02/2019 90.9  82.6 - 102.9 fL Final    MCH 05/02/2019 29.2  25.2 - 33.5 pg Final    MCHC 05/02/2019 32.1  28.4 - 34.8 g/dL Final    RDW 05/02/2019 12.7  11.8 - 14.4 % Final    Platelets 41/52/5809 264  138 - 453 k/uL Final    MPV 05/02/2019 10.7  8.1 - 13.5 fL Final    NRBC Automated 05/02/2019 0.0  0.0 per 100 WBC Final    Differential Type 05/02/2019 NOT REPORTED   Final    Seg Neutrophils 05/02/2019 52  36 - 65 % Final    Lymphocytes 05/02/2019 29  24 - 43 % Final    Monocytes 05/02/2019 13 (A)  3 - 12 % Final    Eosinophils % 05/02/2019 5 (A)  1 - 4 % Final    Basophils 05/02/2019 1  0 - 2 % Final    Immature Granulocytes 05/02/2019 0  0 % Final    Segs Absolute 05/02/2019 4.20  1.50 - 8.10 k/uL Final    Absolute Lymph # 05/02/2019 2.36  1.10 - 3.70 k/uL Final    Absolute Mono # 05/02/2019 1.07  0.10 - 1.20 k/uL Final    Absolute Eos # 05/02/2019 0.42  0.00 - 0.44 k/uL Final    Basophils Absolute 05/02/2019 0.06  0.00 - 0.20 k/uL Final    Absolute Immature Granulocyte 05/02/2019 0.03  0.00 - 0.30 k/uL Final    WBC Morphology 05/02/2019 NOT REPORTED   Final    RBC Morphology 05/02/2019 NOT REPORTED   Final    Platelet Estimate 66/77/0977 NOT REPORTED   Final    Glucose 05/02/2019 98  70 - 99 mg/dL Final    BUN 05/02/2019 14  6 - 20 mg/dL Final    Creatinine 05/02/2019 1.03  0.70 - 1.20 mg/dL Final    Bun/Cre Ratio 05/02/2019 NOT REPORTED  9 - 20 Final    Calcium 05/02/2019 9.1  8.6 - 10.4 mg/dL Final    Sodium 05/02/2019 136  135 - 144 mmol/L Final    Potassium 05/02/2019 3.8  3.7 - 5.3 mmol/L Final    Chloride 05/02/2019 98  98 - 107 mmol/L Final    CO2 05/02/2019 23  20 - 31 mmol/L Final    Anion Gap 05/02/2019 15  9 - 17 mmol/L Final    GFR Non- 05/02/2019 >60  >60 mL/min Final    GFR  05/02/2019 >60  >60 mL/min Final    GFR Comment 05/02/2019        Final    Comment: Average GFR for 38-51 years old:   80 mL/min/1.73sq m  Chronic Kidney Disease:   <60 mL/min/1.73sq m  Kidney failure:   <15 mL/min/1.73sq m              eGFR calculated using average adult body mass. Additional eGFR calculator available at:        [employer]            GFR Staging 05/02/2019 NOT REPORTED   Final    Troponin, High Sensitivity 05/02/2019 <6  0 - 22 ng/L Final    Comment:       High Sensitivity Troponin values cannot be compared with other Troponin methodologies. Patients with high levels of Biotin oral intake (i.e >5mg/day) may have falsely decreased   Troponin levels. Samples collected within 8 hours of biotin intake may require additional   information for diagnosis.       Troponin T 05/02/2019 NOT REPORTED  <0.03 ng/mL Final    Troponin Interp 05/02/2019 NOT REPORTED   Final    Ventricular Rate 05/02/2019 109  BPM Final    Atrial Rate 05/02/2019 109  BPM Final P-R Interval 05/02/2019 136  ms Final    QRS Duration 05/02/2019 68  ms Final    Q-T Interval 05/02/2019 350  ms Final    QTc Calculation (Bazett) 05/02/2019 471  ms Final    P Axis 05/02/2019 38  degrees Final    R Axis 05/02/2019 19  degrees Final    T [place] 05/02/2019 1  degrees Final    Ethanol 05/02/2019 <10  <10 mg/dL Final    Ethanol percent 05/02/2019 <0.010  <0.010 % Final    NOTE: NEW REFERENCE RANGE    Ventricular Rate 05/02/2019 88  BPM Final    Atrial Rate 05/02/2019 88  BPM Final    P-R Interval 05/02/2019 150  ms Final    QRS Duration 05/02/2019 86  ms Final    Q-T Interval 05/02/2019 404  ms Final    QTc Calculation (Bazett) 05/02/2019 488  ms Final    P Axis 05/02/2019 39  degrees Final    R Axis 05/02/2019 19  degrees Final    T [place] 05/02/2019 1  degrees Final         Reviewed patient's current plan of care and vital signs with nursing staff. Labs reviewed: [x] Yes  EKG reviewed  QTc 488  Medications  Current Facility-Administered Medications: OLANZapine (ZYPREXA) tablet 7.5 mg, 7.5 mg, Oral, Nightly  acetaminophen (TYLENOL) tablet 650 mg, 650 mg, Oral, Q4H PRN  ibuprofen (ADVIL;MOTRIN) tablet 400 mg, 400 mg, Oral, Q6H PRN  polyethylene glycol (GLYCOLAX) packet 17 g, 17 g, Oral, Daily PRN  aluminum & magnesium hydroxide-simethicone (MAALOX) 200-200-20 MG/5ML suspension 30 mL, 30 mL, Oral, Q6H PRN  hydrOXYzine HCl (ATARAX) tablet 50 mg, 50 mg, Oral, TID PRN  traZODone (DESYREL) tablet 50 mg, 50 mg, Oral, Nightly PRN  FLUoxetine (PROZAC) capsule 10 mg, 10 mg, Oral, Daily    ASSESSMENT  Schizoaffective disorder, depressive type ([facility] [address]         PLAN  Patient symptoms are: Remain unstable  Medication change today: This writer discussed risks versus benefits and alternative to medications including plan to titrate Prozac 1 20 mg starting tomorrow and we mutually agree  Monitor need and frequency of PRN medications. Encourage participation in groups and milieu.   Attempt to develop insight. Psycho-education conducted. Probable discharge is currently not determined  Follow-up daily while inpatient. Patient continues to be monitored in the inpatient psychiatric facility at [facility] for safety and stabilization. Patient continues to need, on a daily basis, active treatment furnished directly by or requiring the supervision of inpatient psychiatric personnel. Electronically signed by MARIA D Delvalle CNP on 2/2/2023 at 2:32 PM    **This report has been created using voice recognition software. It may contain minor errors which are inherent in voice recognition technology. **

## 2023-02-03 PROCEDURE — 1240000000 HC EMOTIONAL WELLNESS R&B

## 2023-02-03 PROCEDURE — 6370000000 HC RX 637 (ALT 250 FOR IP): Performed by: PSYCHIATRY & NEUROLOGY

## 2023-02-03 PROCEDURE — 6370000000 HC RX 637 (ALT 250 FOR IP): Performed by: NURSE PRACTITIONER

## 2023-02-03 RX ADMIN — TRAZODONE HYDROCHLORIDE 50 MG: 50 TABLET ORAL at 21:46

## 2023-02-03 RX ADMIN — HYDROXYZINE HYDROCHLORIDE 50 MG: 50 TABLET ORAL at 21:46

## 2023-02-03 RX ADMIN — OLANZAPINE 7.5 MG: 7.5 TABLET, FILM COATED ORAL at 21:46

## 2023-02-03 RX ADMIN — FLUOXETINE 20 MG: 20 CAPSULE ORAL at 13:53

## 2023-02-03 NOTE — PLAN OF CARE
Problem: Pain  Goal: Verbalizes/displays adequate comfort level or baseline comfort level  2/3/2023 0303 by Sarah Vasquez LPN  Outcome: Progressing     Problem: Anxiety  Goal: Will report anxiety at manageable levels  Description: INTERVENTIONS:  1. Administer medication as ordered  2. Teach and rehearse alternative coping skills  3. Provide emotional support with 1:1 interaction with staff  2/3/2023 0303 by Sarah Vasquez LPN  Outcome: Progressing     Problem: Depression/Self Harm  Goal: Effect of psychiatric condition will be minimized and patient will be protected from self harm  Description: INTERVENTIONS:  1. Assess impact of patient's symptoms on level of functioning, self care needs and offer support as indicated  2. Assess patient/family knowledge of depression, impact on illness and need for teaching  3. Provide emotional support, presence and reassurance  4. Assess for possible suicidal thoughts or ideation. If patient expresses suicidal thoughts or statements do not leave alone, initiate Suicide Precautions, move to a room close to the nursing station and obtain sitter  5. Initiate consults as appropriate with Mental Health Professional, Spiritual Care, Psychosocial CNS, and consider a recommendation to the [facility] for a Psychiatric Consultation  2/3/2023 0303 by Sarah Vasquez LPN  Outcome: Progressing   Note: Patient denies suicidal ideations and thoughts of self harm. Denies audio and visual hallucinations. Guarded and isolative to self in dayroom. Medication complaint and behavior controlled. Patient is on a safe and secured unit. Q15 minute checks continue for safety.

## 2023-02-03 NOTE — GROUP NOTE
Group Therapy Note    Date: 2/3/2023    Group Start Time: 1100  Group End Time: 1140  Group Topic: Cognitive Skills    [facility]    OLGA Frederick        Group Therapy Note    Attendees: 13/18    Cognitive Skills Group Note        Date: February 3, 2023 Start Time: 11:00am  End Time: 11:40am      Number of Participants in Group & Unit Census:  13/18    Topic: concentration, interpersonal skills, creative expression     Goal of Group: To improve interpersonal skills and creative expression through collaborating with peers and concentrating on a presented task. Comments:     Patient did not participate in Cognitive Skills group, despite staff encouragement and explanation of benefits. Patient remain seclusive to self. Q15 minute safety checks maintained for patient safety and will continue to encourage patient to attend unit programming.         Signature:  OLGA Frederick

## 2023-02-03 NOTE — PROGRESS NOTES
Daily Progress Note  2/3/2023    Patient Name: Adri Luo    CHIEF COMPLAINT: Psychosis with suicidal and homicidal ideation         SUBJECTIVE:      Patient is seen today for a follow up assessment. Nursing staff report that Mr. Moriah Eller has maintained medication adherence. He is agreeable to assessment at bedside where he continues to endorse fleeting suicidal ideation. He remains quite difficult to engage offering 1-3 word responses. He continues to endorse a plan to return to his \"mama's house \". Have attempted several times to encourage him to offer a release of information so we made to speak with his mother as prior to admission he had endorsed thoughts to kill her. He reports improving auditory hallucinations and denies any intent to harm his mother however it is explained that we are obligated to establish a safety plan. At this time he is not able to contract for safety in the community and denies having questions or concerns. Appetite:  [x] Adequate/Unchanged  [] Increased  [] Decreased      Sleep:       [x] Adequate/Unchanged  [] Fair  [] Poor      Group Attendance on Unit:   [] Yes   [] Selectively    [x] No    Compliant with scheduled medications: [x] Yes  [] No    Received emergency medications in past 24 hrs: [] Yes   [x] No    Medication Side Effects: Denies         Mental Status Exam  Level of consciousness: Resting, responds to verbal stimuli  Appearance: Appropriate attire for setting, resting in bed, with fair  grooming and hygiene. Behavior/Motor: Withdrawn, compliant with interview but irritable  Attitude toward examiner: Cooperative, poor concentration, poor eye contact. Speech: normal rate and slow - Irritable tone   Mood:  Patient reports \"Tired \". Affect: Congruent  Thought processes: Linear and poverty of thought. Thought content: Denies homicidal ideation. Suicidal Ideation: Active fleeting suicidal ideations, without current intent to harm self on unit. Unable to contract for safety off unit. Delusions: No evidence of delusions. Denies paranoia today  Perceptual Disturbance: Patient does not appear to be responding to internal stimuli. Reports improvement in auditory hallucinations. Denies visual hallucinations. Cognition: Oriented to self, location and situation. Requesting to know the date  Memory: Age appropriate. Insight & Judgement: Poor. Data   height is 6' (1.829 m) and weight is 230 lb (104.3 kg). His oral temperature is 98.4 °F (36.9 °C). His blood pressure is 123/80 and his pulse is 70. His respiration is 15. Labs:   No visits with results within 2 Day(s) from this visit.    Latest known visit with results is:   Admission on 05/02/2019, Discharged on 05/02/2019   Component Date Value Ref Range Status    WBC 05/02/2019 8.1  3.5 - 11.3 k/uL Final    RBC 05/02/2019 4.73  4.21 - 5.77 m/uL Final    Hemoglobin 05/02/2019 13.8  13.0 - 17.0 g/dL Final    Hematocrit 05/02/2019 43.0  40.7 - 50.3 % Final    MCV 05/02/2019 90.9  82.6 - 102.9 fL Final    MCH 05/02/2019 29.2  25.2 - 33.5 pg Final    MCHC 05/02/2019 32.1  28.4 - 34.8 g/dL Final    RDW 05/02/2019 12.7  11.8 - 14.4 % Final    Platelets 67/86/4230 264  138 - 453 k/uL Final    MPV 05/02/2019 10.7  8.1 - 13.5 fL Final    NRBC Automated 05/02/2019 0.0  0.0 per 100 WBC Final    Differential Type 05/02/2019 NOT REPORTED   Final    Seg Neutrophils 05/02/2019 52  36 - 65 % Final    Lymphocytes 05/02/2019 29  24 - 43 % Final    Monocytes 05/02/2019 13 (A)  3 - 12 % Final    Eosinophils % 05/02/2019 5 (A)  1 - 4 % Final    Basophils 05/02/2019 1  0 - 2 % Final    Immature Granulocytes 05/02/2019 0  0 % Final    Segs Absolute 05/02/2019 4.20  1.50 - 8.10 k/uL Final    Absolute Lymph # 05/02/2019 2.36  1.10 - 3.70 k/uL Final    Absolute Mono # 05/02/2019 1.07  0.10 - 1.20 k/uL Final    Absolute Eos # 05/02/2019 0.42  0.00 - 0.44 k/uL Final    Basophils Absolute 05/02/2019 0.06  0.00 - 0.20 k/uL Final Absolute Immature Granulocyte 05/02/2019 0.03  0.00 - 0.30 k/uL Final    WBC Morphology 05/02/2019 NOT REPORTED   Final    RBC Morphology 05/02/2019 NOT REPORTED   Final    Platelet Estimate 88/88/5972 NOT REPORTED   Final    Glucose 05/02/2019 98  70 - 99 mg/dL Final    BUN 05/02/2019 14  6 - 20 mg/dL Final    Creatinine 05/02/2019 1.03  0.70 - 1.20 mg/dL Final    Bun/Cre Ratio 05/02/2019 NOT REPORTED  9 - 20 Final    Calcium 05/02/2019 9.1  8.6 - 10.4 mg/dL Final    Sodium 05/02/2019 136  135 - 144 mmol/L Final    Potassium 05/02/2019 3.8  3.7 - 5.3 mmol/L Final    Chloride 05/02/2019 98  98 - 107 mmol/L Final    CO2 05/02/2019 23  20 - 31 mmol/L Final    Anion Gap 05/02/2019 15  9 - 17 mmol/L Final    GFR Non- 05/02/2019 >60  >60 mL/min Final    GFR  05/02/2019 >60  >60 mL/min Final    GFR Comment 05/02/2019        Final    Comment: Average GFR for 38-51 years old:   80 mL/min/1.73sq m  Chronic Kidney Disease:   <60 mL/min/1.73sq m  Kidney failure:   <15 mL/min/1.73sq m              eGFR calculated using average adult body mass. Additional eGFR calculator available at:        [facility]            GFR Staging 05/02/2019 NOT REPORTED   Final    Troponin, High Sensitivity 05/02/2019 <6  0 - 22 ng/L Final    Comment:       High Sensitivity Troponin values cannot be compared with other Troponin methodologies. Patients with high levels of Biotin oral intake (i.e >5mg/day) may have falsely decreased   Troponin levels. Samples collected within 8 hours of biotin intake may require additional   information for diagnosis.       Troponin T 05/02/2019 NOT REPORTED  <0.03 ng/mL Final    Troponin Interp 05/02/2019 NOT REPORTED   Final    Ventricular Rate 05/02/2019 109  BPM Final    Atrial Rate 05/02/2019 109  BPM Final    P-R Interval 05/02/2019 136  ms Final    QRS Duration 05/02/2019 68  ms Final    Q-T Interval 05/02/2019 350  ms Final    QTc Calculation (Bazett) 05/02/2019 471  ms Final    P Axis 05/02/2019 38  degrees Final    R Axis 05/02/2019 19  degrees Final    T [place] 05/02/2019 1  degrees Final    Ethanol 05/02/2019 <10  <10 mg/dL Final    Ethanol percent 05/02/2019 <0.010  <0.010 % Final    NOTE: NEW REFERENCE RANGE    Ventricular Rate 05/02/2019 88  BPM Final    Atrial Rate 05/02/2019 88  BPM Final    P-R Interval 05/02/2019 150  ms Final    QRS Duration 05/02/2019 86  ms Final    Q-T Interval 05/02/2019 404  ms Final    QTc Calculation (Bazett) 05/02/2019 488  ms Final    P Axis 05/02/2019 39  degrees Final    R Axis 05/02/2019 19  degrees Final    T [place] 05/02/2019 1  degrees Final         Reviewed patient's current plan of care and vital signs with nursing staff. Labs reviewed: [x] Yes  EKG reviewed  QTc 488  Medications  Current Facility-Administered Medications: FLUoxetine (PROZAC) capsule 20 mg, 20 mg, Oral, Daily  OLANZapine (ZYPREXA) tablet 7.5 mg, 7.5 mg, Oral, Nightly  acetaminophen (TYLENOL) tablet 650 mg, 650 mg, Oral, Q4H PRN  ibuprofen (ADVIL;MOTRIN) tablet 400 mg, 400 mg, Oral, Q6H PRN  polyethylene glycol (GLYCOLAX) packet 17 g, 17 g, Oral, Daily PRN  aluminum & magnesium hydroxide-simethicone (MAALOX) 200-200-20 MG/5ML suspension 30 mL, 30 mL, Oral, Q6H PRN  hydrOXYzine HCl (ATARAX) tablet 50 mg, 50 mg, Oral, TID PRN  traZODone (DESYREL) tablet 50 mg, 50 mg, Oral, Nightly PRN    ASSESSMENT  Schizoaffective disorder, depressive type ([facility] [address]         PLAN  Patient symptoms are: Beginning to show modest improvement no medication changes today will continue to monitor improvement as Prozac and olanzapine were titrated in the last 2 days receiving his first dose of Prozac 20 mg this morning. Monitor need and frequency of PRN medications. Encourage participation in groups and milieu. Attempt to develop insight. Psycho-education conducted. Probable discharge is currently not determined  Follow-up daily while inpatient. Patient continues to be monitored in the inpatient psychiatric facility at [facility] for safety and stabilization. Patient continues to need, on a daily basis, active treatment furnished directly by or requiring the supervision of inpatient psychiatric personnel. Electronically signed by MARIA D Hall CNP on 2/3/2023 at 3:53 PM    **This report has been created using voice recognition software. It may contain minor errors which are inherent in voice recognition technology. **

## 2023-02-03 NOTE — BH NOTE
MED NOTE    Patient up to nurses station to take meds after being asked a minimal of 6 times . Patient becomes irritable, yells and curses at [address]. Patient  refused vitals this morning. Patient does not attend any group programing and is isolative to room. Patient does come to the dayroom for all meals and eats %.

## 2023-02-03 NOTE — PLAN OF CARE
Problem: Depression/Self Harm  Goal: Effect of psychiatric condition will be minimized and patient will be protected from self harm  Description: INTERVENTIONS:  1. Assess impact of patient's symptoms on level of functioning, self care needs and offer support as indicated  2. Assess patient/family knowledge of depression, impact on illness and need for teaching  3. Provide emotional support, presence and reassurance  4. Assess for possible suicidal thoughts or ideation. If patient expresses suicidal thoughts or statements do not leave alone, initiate Suicide Precautions, move to a room close to the nursing station and obtain sitter  5. Initiate consults as appropriate with Mental Health Professional, Spiritual Care, Psychosocial CNS, and consider a recommendation to the LIP for a Psychiatric Consultation  2/3/2023 1416 by Doreen Velázquez LPN  Outcome: Progressing  Flowsheets (Taken 1/31/2023 0943 by Chinmay Tinajero LPN)  Effect of psychiatric condition will be minimized and patient will be protected from self harm:   Assess impact of patients symptoms on level of functioning, self care needs and offer support as indicated   Assess patient/family knowledge of depression, impact on illness and need for teaching   Provide emotional support, presence and reassurance  Note:    No self harm noted this shift. Patient denies thoughts of suicide or self-harm and denies hallucinations. Patient extremely irritable, demanding and yells and curses at staff. Patient is evasive during assessment and has minimal interaction. Patient refused vitals multiple times throughout the shift. Patient is isolative to room and out for meals only, eating % of meals. Patient is compliant with meds however takes lots of encouragement to take them and takes them late in the day after many reminders. Safe environment maintained. Q15 minute checks for safety continued per unit policy.  Will continue to monitor for safety and provide support and reassurance as needed.      2/3/2023 0303 by Jolene Zapata LPN  Outcome: Progressing

## 2023-02-03 NOTE — GROUP NOTE
Group Therapy Note    Date: 2/3/2023    Group Start Time: 0900  Group End Time: 0920  Group Topic: Community Meeting    [facility]    Britni Pugh RN        Group Therapy Note    Attendees: 10/18       Patient did not participate in [employer] group, despite staff encouragement and explanation of benefits. Patient remain seclusive to self. Q15 minute safety checks maintained for patient safety and will continue to encourage patient to attend unit programming.          Signature:  Britni Pugh RN

## 2023-02-03 NOTE — GROUP NOTE
Group Therapy Note    Date: 2/3/2023    Group Start Time: 1330  Group End Time: 9298  Group Topic: Psychoeducation    [facility]    OLGA Hough        Group Therapy Note    Attendees: 9/15    Psych-Ed/Relapse Prevention Group Note        Date: February 3, 2023 Start Time: 1:30pm  End Time: 2:15pm      Number of Participants in Group & Unit Census:  9/15    Topic:  conversation skills, self-expression, decision-making    Goal of Group: To improve conversation skills and self-expression through collaborating with peers and selecting prompts. Comments:     Patient did not participate in Psych-Ed/Relapse Prevention group, despite staff encouragement and explanation of benefits. Patient remain seclusive to self. Q15 minute safety checks maintained for patient safety and will continue to encourage patient to attend unit programming.         Signature:  OLGA Hough

## 2023-02-03 NOTE — PROGRESS NOTES
[facility] Internal Medicine  Leslie Moulton MD; Tucker Sanchez MD; Magda June MD; Marcy Fothergill, MD Wendelyn Cleaver, MD; MD YOSELIN Case [facility] Internal Medicine   [facility]  Progress Note            Date:   2/3/2023  Patient name:  Nano Brown  Date of admission:  1/30/2023 12:24 AM  MRN:   [id number]  Account:  [de-identified]  YOB: 1975  PCP:    No primary care provider on file. Room:   [address] [place] [address]  Code Status:    Full Code    Physician Requesting Consult: Jamila Maldonado MD    Reason for Consult: History and physical examination    Chief Complaint:     No chief complaint on file. Suicidal    History Obtained From:     patient    History of Present Illness:     73-year-old gentleman with underlying history of anxiety, depression, asthma, polysubstance abuse, obesity, admitted to inpatient psych for suicidal ideations    Past Medical History:     Past Medical History:   Diagnosis Date    Asthma     Hypertension     Morbid obesity ([facility] [address]     Schizoaffective disorder ([facility] [address]     Substance abuse ([facility] [address]         Past Surgical History:     No past surgical history on file. Medications Prior to Admission:     Prior to Admission medications    Medication Sig Start Date End Date Taking? Authorizing Provider   FLUoxetine (PROZAC) 20 MG capsule Take 3 capsules by mouth daily 6/21/22   Jamila Maldonado MD   OLANZapine (ZYPREXA) 20 MG tablet Take 1 tablet by mouth nightly 6/21/22   Jamila Maldonado MD   traZODone (DESYREL) 50 MG tablet Take 1 tablet by mouth nightly as needed for Sleep 6/21/22   Jamila Maldonado MD        Allergies:     Patient has no known allergies. Social History:     Tobacco:    reports that he has never smoked. He has never used smokeless tobacco.  Alcohol:      reports that he does not currently use alcohol. Drug Use:  reports that he does not currently use drugs.     Family History:     Family History Family history unknown: Yes       Review of Systems:     Positive and Negative as described in HPI. Physical Exam:     /80   Pulse 70   Temp 98.4 °F (36.9 °C) (Oral)   Resp 15   Ht 6' (1.829 m)   Wt 230 lb (104.3 kg)   BMI 31.19 kg/m²   Temp (24hrs), Av.4 °F (36.9 °C), Min:98.4 °F (36.9 °C), Max:98.4 °F (36.9 °C)    No results for input(s): POCGLU in the last 72 hours. No intake or output data in the 24 hours ending 23 1527    General Appearance:  alert, well appearing, and in no acute distress  Mental status: oriented to person, place, and time with normal affect  Head:  normocephalic, atraumatic. Lungs: Bilateral equal air entry, clear to ausculation, no wheezing, rales or rhonchi, normal effort  Cardiovascular: normal rate, regular rhythm, no murmur, gallop, rub. Abdomen: Soft, nontender, nondistended, normal bowel sounds, no hepatomegaly or splenomegaly  Neurologic: There are no new focal motor or sensory deficits, normal muscle tone and bulk, no abnormal sensation, normal speech, cranial nerves II through XII grossly intact  Skin: No gross lesions, rashes, bruising or bleeding on exposed skin area  Extremities:  peripheral pulses palpable, no pedal edema or calf pain with palpation    Investigations:      Laboratory Testing:  No results found for this or any previous visit (from the past 24 hour(s)). Imaging/Diagonstics:  No results found.     Assessment :      Hospital Problems             Last Modified POA    * (Principal) Schizoaffective disorder, depressive type ([address] 2023 Yes    Abuse of smoked substance ([address] 2023 Yes    Class 1 obesity due to excess calories without serious comorbidity with body mass index (BMI) of 31.0 to 31.9 in adult 2023 Yes    Polysubstance abuse ([address] 2023 Yes    Schizophrenia, paranoid ([address] 2023 Yes       Plan:     28-year-old gentleman with underlying history of anxiety and depression admitted to inpatient psych for suicidal ideations and psychosis,  Current smoker, advised to quit smoking,  Obesity, low-calorie diet, lifestyle modification,  Polysubstance abuse, watch for withdrawal,  Asthma, continue inhalers as needed,  DVT prophylaxis, patient mobile,  Full CODE STATUS    2/3/23    Asthyma under control . Daniele cohen           Consultations:   Skip Marmolejo MD  2/3/2023  3:27 PM    Copy sent to Dr. Otto primary care provider on file. Please note that this chart was generated using voice recognition Dragon dictation software. Although every effort was made to ensure the accuracy of this automated transcription, some errors in transcription may have occurred.

## 2023-02-04 PROCEDURE — 6370000000 HC RX 637 (ALT 250 FOR IP): Performed by: PSYCHIATRY & NEUROLOGY

## 2023-02-04 PROCEDURE — 1240000000 HC EMOTIONAL WELLNESS R&B

## 2023-02-04 RX ORDER — OLANZAPINE 10 MG/1
10 TABLET ORAL NIGHTLY
Status: DISCONTINUED | OUTPATIENT
Start: 2023-02-04 | End: 2023-02-07 | Stop reason: HOSPADM

## 2023-02-04 RX ADMIN — OLANZAPINE 10 MG: 10 TABLET, FILM COATED ORAL at 20:47

## 2023-02-04 RX ADMIN — TRAZODONE HYDROCHLORIDE 50 MG: 50 TABLET ORAL at 20:47

## 2023-02-04 RX ADMIN — FLUOXETINE 20 MG: 20 CAPSULE ORAL at 09:53

## 2023-02-04 ASSESSMENT — PAIN SCALES - GENERAL: PAINLEVEL_OUTOF10: 0

## 2023-02-04 NOTE — PLAN OF CARE
Problem: Pain  Goal: Verbalizes/displays adequate comfort level or baseline comfort level  Outcome: Progressing     Problem: Anxiety  Goal: Will report anxiety at manageable levels  Description: INTERVENTIONS:  1. Administer medication as ordered  2. Teach and rehearse alternative coping skills  3. Provide emotional support with 1:1 interaction with staff  Outcome: Progressing     Problem: Depression/Self Harm  Goal: Effect of psychiatric condition will be minimized and patient will be protected from self harm  Description: INTERVENTIONS:  1. Assess impact of patient's symptoms on level of functioning, self care needs and offer support as indicated  2. Assess patient/family knowledge of depression, impact on illness and need for teaching  3. Provide emotional support, presence and reassurance  4. Assess for possible suicidal thoughts or ideation. If patient expresses suicidal thoughts or statements do not leave alone, initiate Suicide Precautions, move to a room close to the nursing station and obtain sitter  5. Initiate consults as appropriate with Mental Health Professional, Spiritual Care, Psychosocial CNS, and consider a recommendation to the LIP for a Psychiatric Consultation  2/4/2023 0127 by Laila Novak LPN  Outcome: Progressing   Notes: Patient denies suicidal ideations and thoughts of self harm. Denies audio and visual hallucinations and pain. Isolative to self when out in dayroom watching TV. Guarded, cooperative, and medication complaint and behavior controlled. Patient on a safe secure unit. Q15 minute checks continue.

## 2023-02-04 NOTE — PLAN OF CARE
Problem: Depression/Self Harm  Goal: Effect of psychiatric condition will be minimized and patient will be protected from self harm  Description: INTERVENTIONS:  1. Assess impact of patient's symptoms on level of functioning, self care needs and offer support as indicated  2. Assess patient/family knowledge of depression, impact on illness and need for teaching  3. Provide emotional support, presence and reassurance  4. Assess for possible suicidal thoughts or ideation. If patient expresses suicidal thoughts or statements do not leave alone, initiate Suicide Precautions, move to a room close to the nursing station and obtain sitter  5. Initiate consults as appropriate with Mental Health Professional, Spiritual Care, Psychosocial CNS, and consider a recommendation to the LIP for a Psychiatric Consultation  2/4/2023 1252 by Mayda Abdul RN  Outcome: Progressing   1:1 with pt x ten minutes. Pt encouraged to attend unit programming and interact with peers and staff. Pt also encouraged to tend to hygiene and ADLs. Pt encouraged to discuss feelings with staff and feedback and reassurance provided. Pt denies thoughts of self harm and is agreeable to seeking out should thoughts of self harm arise. Safe environment maintained. Q15 minute checks for safety cont per unit policy. Will cont to monitor for safety and provides support and reassurance as needed. Pt refuses groups and any talk times with staff. Guarded and out of room only for meals. Compliant with medications.

## 2023-02-04 NOTE — GROUP NOTE
Group Therapy Note    Date: 2/4/2023    Group Start Time: 0900  Group End Time: 0915  Group Topic: Community Meeting    GIAN Alaniz LPN    patient refused to attend Goals group at 0900 after encouragement from staff.   1:1 talk time provided as alternative to group session          Signature:  Riavs Alaniz LPN

## 2023-02-04 NOTE — GROUP NOTE
Group Therapy Note    Date: 2/4/2023    Group Start Time: 1400  Group End Time: 1500  Group Topic: Recreational    OLGA Mendez        Group Therapy Note    Attendees: 5/16     Topic: Self expression, creative problem solving , and communication skills    Goal of Group: To increase social interaction and practice/improve self expression,and   creative problem solving through creative expression, and communication skills       Comments:      Patient did not participate Recreational Group at 14:00, despite staff encouragement and explanation of benefits. Patient remains seclusive to self, resting in room during group time. Q15 minute safety checks maintained for patient safety and will continue to encourage patient to attend unit programming.             Discipline Responsible: Psychoeducational Specialist        Signature:  Yadira Mccarty

## 2023-02-04 NOTE — PROGRESS NOTES
Daily Progress Note  2/4/2023    Patient Name: Luis Pickett    CHIEF COMPLAINT: Psychosis with suicidal and homicidal ideation         SUBJECTIVE:      Patient is seen today for a follow up assessment. Nursing staff report patient has maintained medication adherence. He is agreeable to assessment at bedside where he continues to endorse fleeting suicidal ideation. He remains quite difficult to engage. He continues to endorse a plan to return to his \"mama's house \" although he has declined to sign a release of information. Prior to admission he had endorsed thoughts to kill her. He denies homicidal ideation currently towards anyone in the immediate area or his mother specifically. He reports improving auditory hallucinations  that are less distressing and we agree to titrate olanzapine tonight. At this time he is not able to contract for safety in the community and denies having questions or concerns. Appetite:  [x] Adequate/Unchanged  [] Increased  [] Decreased      Sleep:       [] Adequate/Unchanged  [x] Fair  [] Poor      Group Attendance on Unit:   [] Yes   [] Selectively    [x] No    Compliant with scheduled medications: [x] Yes  [] No    Received emergency medications in past 24 hrs: [] Yes   [x] No    Medication Side Effects: Denies         Mental Status Exam  Level of consciousness: Resting, responds to verbal stimuli  Appearance: Appropriate attire for setting, resting in bed, with fair  grooming and hygiene. Behavior/Motor: Withdrawn, compliant with interview, remains slightly irritable  Attitude toward examiner: Cooperative, poor concentration, poor eye contact. Speech: Slow to normal rate, normal volume & less irritable tone   Mood:  Patient reports \"still tired \". Affect: Congruent  Thought processes: Linear and poverty of thought. Thought content: Denies homicidal ideation. Suicidal Ideation: Active fleeting suicidal ideations, without current intent to harm self on unit.  Unable to contract for safety off unit. Delusions: No evidence of delusions. Denies paranoia today  Perceptual Disturbance: Patient does not appear to be responding to internal stimuli. Reports improvement in auditory hallucinations. Denies visual hallucinations. Cognition: Oriented to self, location and situation. Memory: Age appropriate. Insight & Judgement: Poor. Data   height is 6' (1.829 m) and weight is 230 lb (104.3 kg). His temporal temperature is 97.6 °F (36.4 °C). His blood pressure is 111/62 and his pulse is 88. His respiration is 14. Labs:   No visits with results within 2 Day(s) from this visit.    Latest known visit with results is:   Admission on 05/02/2019, Discharged on 05/02/2019   Component Date Value Ref Range Status    WBC 05/02/2019 8.1  3.5 - 11.3 k/uL Final    RBC 05/02/2019 4.73  4.21 - 5.77 m/uL Final    Hemoglobin 05/02/2019 13.8  13.0 - 17.0 g/dL Final    Hematocrit 05/02/2019 43.0  40.7 - 50.3 % Final    MCV 05/02/2019 90.9  82.6 - 102.9 fL Final    MCH 05/02/2019 29.2  25.2 - 33.5 pg Final    MCHC 05/02/2019 32.1  28.4 - 34.8 g/dL Final    RDW 05/02/2019 12.7  11.8 - 14.4 % Final    Platelets 13/95/7488 264  138 - 453 k/uL Final    MPV 05/02/2019 10.7  8.1 - 13.5 fL Final    NRBC Automated 05/02/2019 0.0  0.0 per 100 WBC Final    Differential Type 05/02/2019 NOT REPORTED   Final    Seg Neutrophils 05/02/2019 52  36 - 65 % Final    Lymphocytes 05/02/2019 29  24 - 43 % Final    Monocytes 05/02/2019 13 (A)  3 - 12 % Final    Eosinophils % 05/02/2019 5 (A)  1 - 4 % Final    Basophils 05/02/2019 1  0 - 2 % Final    Immature Granulocytes 05/02/2019 0  0 % Final    Segs Absolute 05/02/2019 4.20  1.50 - 8.10 k/uL Final    Absolute Lymph # 05/02/2019 2.36  1.10 - 3.70 k/uL Final    Absolute Mono # 05/02/2019 1.07  0.10 - 1.20 k/uL Final    Absolute Eos # 05/02/2019 0.42  0.00 - 0.44 k/uL Final    Basophils Absolute 05/02/2019 0.06  0.00 - 0.20 k/uL Final    Absolute Immature Granulocyte 05/02/2019 0.03  0.00 - 0.30 k/uL Final    WBC Morphology 05/02/2019 NOT REPORTED   Final    RBC Morphology 05/02/2019 NOT REPORTED   Final    Platelet Estimate 93/92/9652 NOT REPORTED   Final    Glucose 05/02/2019 98  70 - 99 mg/dL Final    BUN 05/02/2019 14  6 - 20 mg/dL Final    Creatinine 05/02/2019 1.03  0.70 - 1.20 mg/dL Final    Bun/Cre Ratio 05/02/2019 NOT REPORTED  9 - 20 Final    Calcium 05/02/2019 9.1  8.6 - 10.4 mg/dL Final    Sodium 05/02/2019 136  135 - 144 mmol/L Final    Potassium 05/02/2019 3.8  3.7 - 5.3 mmol/L Final    Chloride 05/02/2019 98  98 - 107 mmol/L Final    CO2 05/02/2019 23  20 - 31 mmol/L Final    Anion Gap 05/02/2019 15  9 - 17 mmol/L Final    GFR Non- 05/02/2019 >60  >60 mL/min Final    GFR  05/02/2019 >60  >60 mL/min Final    GFR Comment 05/02/2019        Final    Comment: Average GFR for 38-51 years old:   80 mL/min/1.73sq m  Chronic Kidney Disease:   <60 mL/min/1.73sq m  Kidney failure:   <15 mL/min/1.73sq m              eGFR calculated using average adult body mass. Additional eGFR calculator available at:        [email or web address]            GFR Staging 05/02/2019 NOT REPORTED   Final    Troponin, High Sensitivity 05/02/2019 <6  0 - 22 ng/L Final    Comment:       High Sensitivity Troponin values cannot be compared with other Troponin methodologies. Patients with high levels of Biotin oral intake (i.e >5mg/day) may have falsely decreased   Troponin levels. Samples collected within 8 hours of biotin intake may require additional   information for diagnosis.       Troponin T 05/02/2019 NOT REPORTED  <0.03 ng/mL Final    Troponin Interp 05/02/2019 NOT REPORTED   Final    Ventricular Rate 05/02/2019 109  BPM Final    Atrial Rate 05/02/2019 109  BPM Final    P-R Interval 05/02/2019 136  ms Final    QRS Duration 05/02/2019 68  ms Final    Q-T Interval 05/02/2019 350  ms Final    QTc Calculation ([place]azett) 05/02/2019 471 ms Final    P [place] 05/02/2019 38  degrees Final    R Axis 05/02/2019 19  degrees Final    T [place] 05/02/2019 1  degrees Final    Ethanol 05/02/2019 <10  <10 mg/dL Final    Ethanol percent 05/02/2019 <0.010  <0.010 % Final    NOTE: NEW REFERENCE RANGE    Ventricular Rate 05/02/2019 88  BPM Final    Atrial Rate 05/02/2019 88  BPM Final    P-R Interval 05/02/2019 150  ms Final    QRS Duration 05/02/2019 86  ms Final    Q-T Interval 05/02/2019 404  ms Final    QTc Calculation (Bazett) 05/02/2019 488  ms Final    P Axis 05/02/2019 39  degrees Final    R Axis 05/02/2019 19  degrees Final    T [place] 05/02/2019 1  degrees Final         Reviewed patient's current plan of care and vital signs with nursing staff. Labs reviewed: [x] Yes  EKG reviewed  QTc 488  Medications  Current Facility-Administered Medications: OLANZapine (ZYPREXA) tablet 10 mg, 10 mg, Oral, Nightly  FLUoxetine (PROZAC) capsule 20 mg, 20 mg, Oral, Daily  acetaminophen (TYLENOL) tablet 650 mg, 650 mg, Oral, Q4H PRN  ibuprofen (ADVIL;MOTRIN) tablet 400 mg, 400 mg, Oral, Q6H PRN  polyethylene glycol (GLYCOLAX) packet 17 g, 17 g, Oral, Daily PRN  aluminum & magnesium hydroxide-simethicone (MAALOX) 200-200-20 MG/5ML suspension 30 mL, 30 mL, Oral, Q6H PRN  hydrOXYzine HCl (ATARAX) tablet 50 mg, 50 mg, Oral, TID PRN  traZODone (DESYREL) tablet 50 mg, 50 mg, Oral, Nightly PRN    ASSESSMENT  Schizoaffective disorder, depressive type ([facility]         PLAN  Patient symptoms are: Beginning to show modest improvement   Medications changed today. At discussion of risk, benefits and alternatives was held with the patient and this provider with regards to medication changes. After this discussion we mutually agreed to proceed with the medication changes including olanzapine titrated to 10 mg at hour of sleep starting tonight  Monitor need and frequency of PRN medications. Encourage participation in groups and milieu. Attempt to develop insight.   Psycho-education conducted. Probable discharge is currently not determined  Follow-up daily while inpatient. Patient continues to be monitored in the inpatient psychiatric facility at [facility] for safety and stabilization. Patient continues to need, on a daily basis, active treatment furnished directly by or requiring the supervision of inpatient psychiatric personnel. Electronically signed by MARIA D Evans CNP on 2/4/2023 at 10:51 AM    **This report has been created using voice recognition software. It may contain minor errors which are inherent in voice recognition technology. **

## 2023-02-05 PROCEDURE — 6370000000 HC RX 637 (ALT 250 FOR IP): Performed by: PSYCHIATRY & NEUROLOGY

## 2023-02-05 PROCEDURE — 1240000000 HC EMOTIONAL WELLNESS R&B

## 2023-02-05 RX ADMIN — TRAZODONE HYDROCHLORIDE 50 MG: 50 TABLET ORAL at 21:08

## 2023-02-05 RX ADMIN — FLUOXETINE 20 MG: 20 CAPSULE ORAL at 09:18

## 2023-02-05 RX ADMIN — OLANZAPINE 10 MG: 10 TABLET, FILM COATED ORAL at 21:09

## 2023-02-05 NOTE — GROUP NOTE
Group Therapy Note    Date: 2/5/2023    Group Start Time: 1400  Group End Time: 1500  Group Topic: Cognitive Skills    [facility]    OLGA Blanton        Group Therapy Note    Attendees: 9/18     Topic: Socialization, creative expression ,coping skills, and communication skills     Goal of Group: To increase social interaction and engage in creative expression, exploring coping skills, and communication skills  . Comments:      Patient did not participate in Cognitive Skills Group at 14:00, despite staff encouragement and explanation of benefits. Patient remains seclusive to self, resting in room during group time. Q15 minute safety checks maintained for patient safety and will continue to encourage patient to attend unit programming.             Discipline Responsible: Psychoeducational Specialist        Signature:  Adolfo Altamirano

## 2023-02-05 NOTE — BH NOTE
Sunday SAFETY DRILL completed. Food and trash removed from rooms. No safety concerns expressed by pts at this time.

## 2023-02-05 NOTE — GROUP NOTE
Group Therapy Note    Date: 2/5/2023    Group Start Time: 0900  Group End Time: 0930  Group Topic: Community Meeting    GIAN Barlow        Group Therapy Note    Attendees: 8/18     Community Meeting Group Note        Date: February 5, 2023 Start Time: 9am  End Time:  0930      Number of Participants in Group & Unit Census:  8/18    Topic: goal setting    Goal of Group: set short term goal for the day      Comments:     Patient did not participate in [employer] group, despite staff encouragement and explanation of benefits. Patient remain seclusive to self. Q15 minute safety checks maintained for patient safety and will continue to encourage patient to attend unit programming.

## 2023-02-05 NOTE — PLAN OF CARE
Problem: Pain  Goal: Verbalizes/displays adequate comfort level or baseline comfort level  2/5/2023 1037 by Jeffrey Abebe LPN  Outcome: Progressing   Pt is calm, controlled and medication compliant. Pt denies suicidal ideations. Pt reports feeling homicidal towards select people but does not elaborate who. Pt denies both auditory and visual disturbances. Pt reports feeling paranoid but does not elaborate what he feels paranoid about. Pt denies feeling anxious and/or depressed. Pt is polite, reports eating and sleeping adequately with safety checks Q15 minutes and at irregular intervals. Problem: Anxiety  Goal: Will report anxiety at manageable levels  Description: INTERVENTIONS:  1. Administer medication as ordered  2. Teach and rehearse alternative coping skills  3. Provide emotional support with 1:1 interaction with staff  2/5/2023 1037 by Jeffrey Abebe LPN  Outcome: Progressing   Pt is calm, controlled and medication compliant. Pt denies suicidal ideations. Pt reports feeling homicidal towards select people but does not elaborate who. Pt denies both auditory and visual disturbances. Pt reports feeling paranoid but does not elaborate what he feels paranoid about. Pt denies feeling anxious and/or depressed. Pt is polite, reports eating and sleeping adequately with safety checks Q15 minutes and at irregular intervals. Problem: Depression/Self Harm  Goal: Effect of psychiatric condition will be minimized and patient will be protected from self harm  Description: INTERVENTIONS:  1. Assess impact of patient's symptoms on level of functioning, self care needs and offer support as indicated  2. Assess patient/family knowledge of depression, impact on illness and need for teaching  3. Provide emotional support, presence and reassurance  4. Assess for possible suicidal thoughts or ideation.  If patient expresses suicidal thoughts or statements do not leave alone, initiate Suicide Precautions, move to a room close to the nursing station and obtain sitter  5. Initiate consults as appropriate with Mental Health Professional, Spiritual Care, Psychosocial CNS, and consider a recommendation to the LIP for a Psychiatric Consultation  2/5/2023 1037 by Betty Coronel LPN  Outcome: Progressing   Pt is calm, controlled and medication compliant. Pt denies suicidal ideations. Pt reports feeling homicidal towards select people but does not elaborate who. Pt denies both auditory and visual disturbances. Pt reports feeling paranoid but does not elaborate what he feels paranoid about. Pt denies feeling anxious and/or depressed. Pt is polite, reports eating and sleeping adequately with safety checks Q15 minutes and at irregular intervals.

## 2023-02-05 NOTE — PLAN OF CARE
Problem: Pain  Goal: Verbalizes/displays adequate comfort level or baseline comfort level  2/4/2023 2130 by Nelia James LPN  Outcome: Progressing     Problem: Anxiety  Goal: Will report anxiety at manageable levels  Description: INTERVENTIONS:  1. Administer medication as ordered  2. Teach and rehearse alternative coping skills  3. Provide emotional support with 1:1 interaction with staff  2/4/2023 2130 by Nelia James LPN  Outcome: Progressing     Problem: Depression/Self Harm  Goal: Effect of psychiatric condition will be minimized and patient will be protected from self harm  Description: INTERVENTIONS:  1. Assess impact of patient's symptoms on level of functioning, self care needs and offer support as indicated  2. Assess patient/family knowledge of depression, impact on illness and need for teaching  3. Provide emotional support, presence and reassurance  4. Assess for possible suicidal thoughts or ideation. If patient expresses suicidal thoughts or statements do not leave alone, initiate Suicide Precautions, move to a room close to the nursing station and obtain sitter  5. Initiate consults as appropriate with Mental Health Professional, Spiritual Care, Psychosocial CNS, and consider a recommendation to the LIP for a Psychiatric Consultation  2/4/2023 2130 by Nelia James LPN  Outcome: Progressing     Patient is isolative to self out in dayroom aloof of peers this shift. Patient has a flat affect and poor eye contact during assessment. Patient denies anxiety and thoughts of self-harm at this time. Patient is also without any pain. Patient is medication compliant and behavior is controlled. Patient safety checks maintained 15 minutes.

## 2023-02-06 PROCEDURE — 1240000000 HC EMOTIONAL WELLNESS R&B

## 2023-02-06 PROCEDURE — 6370000000 HC RX 637 (ALT 250 FOR IP): Performed by: PSYCHIATRY & NEUROLOGY

## 2023-02-06 RX ADMIN — OLANZAPINE 10 MG: 10 TABLET, FILM COATED ORAL at 20:53

## 2023-02-06 RX ADMIN — FLUOXETINE 20 MG: 20 CAPSULE ORAL at 08:22

## 2023-02-06 RX ADMIN — TRAZODONE HYDROCHLORIDE 50 MG: 50 TABLET ORAL at 20:52

## 2023-02-06 NOTE — GROUP NOTE
Group Therapy Note    Date: 2/6/2023    Group Start Time: 1430  Group End Time: 3922  Group Topic: Psychoeducation    [facility]    Psych-Ed/Relapse Prevention Group Note        Date: February 6, 2023 Start Time: 2:30pm  End Time:  255pm       Number of Participants in Group & Unit Census:  4/17    Topic: psycheducation group     Goal of Group: pt will demonstrate improved self esteem and improve communication skills       Comments:     Patient did not participate in Psych-Ed/Relapse Prevention group, despite staff encouragement and explanation of benefits. Patient remain seclusive to self. Q15 minute safety checks maintained for patient safety and will continue to encourage patient to attend unit programming.               Signature:  Vinod Fisher

## 2023-02-06 NOTE — PLAN OF CARE
[id number] [facility] Interdisciplinary Treatment Plan Note     Review Date & Time: 2/6/2023 1300    Admission Type:   Admission Type: Voluntary    Reason for admission:  Reason for Admission: Suicidal bizarre behavior    Estimated Length of Stay Update:  Est 3-7 days, to be determined by physician  Estimated Discharge Date Update: to be determined by physician    PATIENT STRENGTHS:  Patient Strengths:   Patient Strengths and Limitations:Limitations: Difficult relationships / poor social skills, Multiple barriers to leisure interests, Tendency to isolate self, Difficulty problem solving/relies on others to help solve problems  Addictive Behavior:Addictive Behavior  In the Past 3 Months, Have You Felt or Has Someone Told You That You Have a Problem With  : None  Medical Problems:   Past Medical History:   Diagnosis Date    Asthma     Hypertension     Morbid obesity ([facility] [address]     Schizoaffective disorder ([facility] [address]     Substance abuse ([facility] [address]        Risk:  Fall Risk   Christopher Scale Christopher Scale Score: 22  BVC    Change in scores NO. Changes to plan of Care NO    Status EXAM:   Mental Status and Behavioral Exam  Normal: No  Level of Assistance: Independent/Self  Facial Expression: Flat, Expressionless  Affect: Constricted  Level of Consciousness: Alert  Frequency of Checks: 4 times per hour, close  Mood:Normal: No  Mood: Depressed  Motor Activity:Normal: Yes  Motor Activity: Decreased  Eye Contact: Fair  Observed Behavior: Guarded, Withdrawn  Sexual Misconduct History: Current - no  Preception: [place] to person, [place] to time, [place] to place, [place] to situation  Attention:Normal: Yes  Attention: Distractible  Thought Processes: Circumstantial  Thought Content:Normal: Yes  Thought Content: Preoccupations  Depression Symptoms: Isolative  Anxiety Symptoms: Generalized  Mayuri Symptoms: No problems reported or observed.   Hallucinations: None  Delusions: No  Delusions: Paranoid  Memory:Normal: Yes  Memory: Poor recent, Poor remote  Insight and Judgment: No  Insight and Judgment: Poor judgment, Poor insight    Daily Assessment Last Entry:   Daily Sleep (WDL): Within Defined Limits            Daily Nutrition (WDL): Within Defined Limits  Level of Assistance: Independent/Self    Patient Monitoring:  Frequency of Checks: 4 times per hour, close    Psychiatric Symptoms:   Depression Symptoms  Depression Symptoms: Isolative  Anxiety Symptoms  Anxiety Symptoms: Generalized  Mayuri Symptoms  Mayuri Symptoms: No problems reported or observed.           Suicide Risk CSSR-S:  1) Within the past month, have you wished you were dead or wished you could go to sleep and not wake up? : No  2) Have you actually had any thoughts of killing yourself? : No  6) Have you ever done anything, started to do anything, or prepared to do anything to end your life?: No  Change in Result NO Change in Plan of care NO    EDUCATION:   Learner Progress Toward Treatment Goals: Reviewed goals and plan of care    Method: Individual    Outcome: Refused Education    PATIENT GOALS: per , patient refused to attend treatment team to discuss goals at this time    PLAN/TREATMENT RECOMMENDATIONS UPDATE:   [address] [place] [address] [place]:  Time frame for Short-Term Goals: 1-2 WEEKS     LONG-TERM GOALS UPDATE:  Time frame for Long-Term Goals: 6 MONTHS  Members Present in Team Meeting: See Signature Sheet    Pal Campbell RN

## 2023-02-06 NOTE — PLAN OF CARE
Problem: Pain  Goal: Verbalizes/displays adequate comfort level or baseline comfort level  Outcome: Progressing  Note: Patient denies having any pain today. Will continue to monitor. Problem: Anxiety  Goal: Will report anxiety at manageable levels  Description: INTERVENTIONS:  1. Administer medication as ordered  2. Teach and rehearse alternative coping skills  3. Provide emotional support with 1:1 interaction with staff  Outcome: Progressing  Note: Patient denies having any symptoms of anxiety today. He has not needed any PRN anxiety medications. Problem: Depression/Self Harm  Goal: Effect of psychiatric condition will be minimized and patient will be protected from self harm  Description: INTERVENTIONS:  1. Assess impact of patient's symptoms on level of functioning, self care needs and offer support as indicated  2. Assess patient/family knowledge of depression, impact on illness and need for teaching  3. Provide emotional support, presence and reassurance  4. Assess for possible suicidal thoughts or ideation. If patient expresses suicidal thoughts or statements do not leave alone, initiate Suicide Precautions, move to a room close to the nursing station and obtain sitter  5. Initiate consults as appropriate with Mental Health Professional, Spiritual Care, Psychosocial CNS, and consider a recommendation to the LIP for a Psychiatric Consultation  Outcome: Progressing  Note: Patient denies having any feelings or symptoms of depression today. Patient isolative at times but is less irritable than other days. Patient showered last night.

## 2023-02-06 NOTE — PLAN OF CARE
Problem: Pain  Goal: Verbalizes/displays adequate comfort level or baseline comfort level  2/5/2023 2124 by Becky Jacob RN  Outcome: Not Progressing  Flowsheets (Taken 2/5/2023 2124)  Verbalizes/displays adequate comfort level or baseline comfort level:   Encourage patient to monitor pain and request assistance   Assess pain using appropriate pain scale  Note: Patient denies pain upon assessment. Patient remains isolative in room; only coming out for snack time. Patient claims he is \"so/so. \" Patient appears guarded and slightly irritable upon approach. Patient is medication compliment. Staff will continue to monitor and assess with Q15m checks. Problem: Anxiety  Goal: Will report anxiety at manageable levels  Description: INTERVENTIONS:  1. Administer medication as ordered  2. Teach and rehearse alternative coping skills  3. Provide emotional support with 1:1 interaction with staff  2/5/2023 2124 by Becky Jacob RN  Outcome: Progressing  Flowsheets (Taken 2/5/2023 2124)  Will report anxiety at manageable levels: Provide emotional support with 1:1 interaction with staff  Note: Patient denies feelings of anxiety upon assessment. Patient denied atarax. Staff will continue to assess. Problem: Depression/Self Harm  Goal: Effect of psychiatric condition will be minimized and patient will be protected from self harm  Description: INTERVENTIONS:  1. Assess impact of patient's symptoms on level of functioning, self care needs and offer support as indicated  2. Assess patient/family knowledge of depression, impact on illness and need for teaching  3. Provide emotional support, presence and reassurance  4. Assess for possible suicidal thoughts or ideation. If patient expresses suicidal thoughts or statements do not leave alone, initiate Suicide Precautions, move to a room close to the nursing station and obtain sitter  5.  Initiate consults as appropriate with Mental Health Professional, Spiritual Care, Psychosocial CNS, and consider a recommendation to the LIP for a Psychiatric Consultation  2/5/2023 2124 by Mariano Ramos RN  Outcome: Progressing  Flowsheets (Taken 2/5/2023 2124)  Effect of psychiatric condition will be minimized and patient will be protected from self harm: Provide emotional support, presence and reassurance  Note: Patient has remained free from self harm and injury during this shift. Staff will continue to monitor Q15M checks. Problem: Pain  Goal: Verbalizes/displays adequate comfort level or baseline comfort level  2/5/2023 2124 by Mariano Ramos RN  Outcome: Not Progressing  Flowsheets (Taken 2/5/2023 2124)  Verbalizes/displays adequate comfort level or baseline comfort level:   Encourage patient to monitor pain and request assistance   Assess pain using appropriate pain scale  Note: Patient denies pain upon assessment. Patient remains isolative in room; only coming out for snack time. Patient claims he is \"so/so. \" Patient appears guarded and slightly irritable upon approach. Patient is medication compliment. Staff will continue to monitor and assess with Q15m checks.   2/5/2023 Evan by John Marino LPN  Outcome: Progressing

## 2023-02-06 NOTE — GROUP NOTE
Group Therapy Note    Date: 2/6/2023    Group Start Time: 0915  Group End Time: 0940  Group Topic: [address] [facility]    Destiny Atkinson RN        Group Therapy Note    Attendees: 10/20     Patient did not participate in [employer] group, despite staff encouragement and explanation of benefits. Patient remain seclusive to self. Q15 minute safety checks maintained for patient safety and will continue to encourage patient to attend unit programming.          Signature:  Destiny Atkinson RN

## 2023-02-06 NOTE — PROGRESS NOTES
Daily Progress Note  2/6/2023    Patient Name: Margareth Navarro    CHIEF COMPLAINT: Psychosis with suicidal and homicidal ideation         SUBJECTIVE:      Patient is seen today for a follow up assessment. Staff reports that he comes out for meals and needs only, he is not really participating in any group activity. He is cooperative upon approach today, wakes up and engages with interviewer with little encouragement. He endorses continued improvement in his overall mood. His depression and suicidal ideation continues to improve, we did begin discussion of discharge planning. He continues to endorse fleeting suicidal ideation though reports the intensity being much less. He is unsure however believes that he will discharge home with his mother. He was encouraged to make those phone calls today for confirmation. He is tolerating his medication, he reports improvement in auditory hallucinations. He denies any specific target or homicidal ideation. We will discharge tomorrow morning with continued improvement in symptoms throughout the day today. Appetite:  [x] Adequate/Unchanged  [] Increased  [] Decreased      Sleep:       [x] Adequate/Unchanged  [] Fair  [] Poor      Group Attendance on Unit:   [] Yes   [] Selectively    [x] No    Compliant with scheduled medications: [x] Yes  [] No    Received emergency medications in past 24 hrs: [] Yes   [x] No    Medication Side Effects: Denies         Mental Status Exam  Level of consciousness: Resting, easily arousable to verbal stimulus  Appearance: Appropriate attire for setting, resting in bed, with fair  grooming and hygiene. Behavior/Motor: Engages with interviewer, still presents as somewhat irritable  Attitude toward examiner: Cooperative, mostly attentive with fair eye contact  Speech: Initially delayed, eventually normal rate, volume and somewhat irritable tone  Mood:  Patient reports \"better\".    Affect: Congruent  Thought processes: Linear and coherent  Thought content: Endorses improvement in homicidal ideation, denies any specific target for harm  Suicidal Ideation: Reports improvement in suicidal ideations, without current intent to harm self on unit. Feels that he may be able to contract for safety off of the unit with a discharge plan of tomorrow morning   delusions: Reports improvement in paranoia  Perceptual Disturbance: Patient does not appear to be responding to internal stimuli. Denies auditory hallucinations. Denies visual hallucinations. Cognition: Oriented to self, location and situation. Memory: Age appropriate. Insight & Judgement: Fair/poor. Data   height is 6' (1.829 m) and weight is 230 lb (104.3 kg). His temporal temperature is 97.2 °F (36.2 °C). His blood pressure is 106/64 and his pulse is 66. His respiration is 14. Labs:   No visits with results within 2 Day(s) from this visit.    Latest known visit with results is:   Admission on 05/02/2019, Discharged on 05/02/2019   Component Date Value Ref Range Status    WBC 05/02/2019 8.1  3.5 - 11.3 k/uL Final    RBC 05/02/2019 4.73  4.21 - 5.77 m/uL Final    Hemoglobin 05/02/2019 13.8  13.0 - 17.0 g/dL Final    Hematocrit 05/02/2019 43.0  40.7 - 50.3 % Final    MCV 05/02/2019 90.9  82.6 - 102.9 fL Final    MCH 05/02/2019 29.2  25.2 - 33.5 pg Final    MCHC 05/02/2019 32.1  28.4 - 34.8 g/dL Final    RDW 05/02/2019 12.7  11.8 - 14.4 % Final    Platelets 91/75/1060 264  138 - 453 k/uL Final    MPV 05/02/2019 10.7  8.1 - 13.5 fL Final    NRBC Automated 05/02/2019 0.0  0.0 per 100 WBC Final    Differential Type 05/02/2019 NOT REPORTED   Final    Seg Neutrophils 05/02/2019 52  36 - 65 % Final    Lymphocytes 05/02/2019 29  24 - 43 % Final    Monocytes 05/02/2019 13 (A)  3 - 12 % Final    Eosinophils % 05/02/2019 5 (A)  1 - 4 % Final    Basophils 05/02/2019 1  0 - 2 % Final    Immature Granulocytes 05/02/2019 0  0 % Final    Segs Absolute 05/02/2019 4.20  1.50 - 8.10 k/uL Final Absolute Lymph # 05/02/2019 2.36  1.10 - 3.70 k/uL Final    Absolute Mono # 05/02/2019 1.07  0.10 - 1.20 k/uL Final    Absolute Eos # 05/02/2019 0.42  0.00 - 0.44 k/uL Final    Basophils Absolute 05/02/2019 0.06  0.00 - 0.20 k/uL Final    Absolute Immature Granulocyte 05/02/2019 0.03  0.00 - 0.30 k/uL Final    WBC Morphology 05/02/2019 NOT REPORTED   Final    RBC Morphology 05/02/2019 NOT REPORTED   Final    Platelet Estimate 71/92/1282 NOT REPORTED   Final    Glucose 05/02/2019 98  70 - 99 mg/dL Final    BUN 05/02/2019 14  6 - 20 mg/dL Final    Creatinine 05/02/2019 1.03  0.70 - 1.20 mg/dL Final    Bun/Cre Ratio 05/02/2019 NOT REPORTED  9 - 20 Final    Calcium 05/02/2019 9.1  8.6 - 10.4 mg/dL Final    Sodium 05/02/2019 136  135 - 144 mmol/L Final    Potassium 05/02/2019 3.8  3.7 - 5.3 mmol/L Final    Chloride 05/02/2019 98  98 - 107 mmol/L Final    CO2 05/02/2019 23  20 - 31 mmol/L Final    Anion Gap 05/02/2019 15  9 - 17 mmol/L Final    GFR Non- 05/02/2019 >60  >60 mL/min Final    GFR  05/02/2019 >60  >60 mL/min Final    GFR Comment 05/02/2019        Final    Comment: Average GFR for 38-51 years old:   80 mL/min/1.73sq m  Chronic Kidney Disease:   <60 mL/min/1.73sq m  Kidney failure:   <15 mL/min/1.73sq m              eGFR calculated using average adult body mass. Additional eGFR calculator available at:        [email or web address]            GFR Staging 05/02/2019 NOT REPORTED   Final    Troponin, High Sensitivity 05/02/2019 <6  0 - 22 ng/L Final    Comment:       High Sensitivity Troponin values cannot be compared with other Troponin methodologies. Patients with high levels of Biotin oral intake (i.e >5mg/day) may have falsely decreased   Troponin levels. Samples collected within 8 hours of biotin intake may require additional   information for diagnosis.       Troponin T 05/02/2019 NOT REPORTED  <0.03 ng/mL Final    Troponin Interp 05/02/2019 NOT REPORTED   Final    Ventricular Rate 05/02/2019 109  BPM Final    Atrial Rate 05/02/2019 109  BPM Final    P-R Interval 05/02/2019 136  ms Final    QRS Duration 05/02/2019 68  ms Final    Q-T Interval 05/02/2019 350  ms Final    QTc Calculation (Bazett) 05/02/2019 471  ms Final    P Axis 05/02/2019 38  degrees Final    R Axis 05/02/2019 19  degrees Final    T [place] 05/02/2019 1  degrees Final    Ethanol 05/02/2019 <10  <10 mg/dL Final    Ethanol percent 05/02/2019 <0.010  <0.010 % Final    NOTE: NEW REFERENCE RANGE    Ventricular Rate 05/02/2019 88  BPM Final    Atrial Rate 05/02/2019 88  BPM Final    P-R Interval 05/02/2019 150  ms Final    QRS Duration 05/02/2019 86  ms Final    Q-T Interval 05/02/2019 404  ms Final    QTc Calculation (Bazett) 05/02/2019 488  ms Final    P Axis 05/02/2019 39  degrees Final    R Axis 05/02/2019 19  degrees Final    T [place] 05/02/2019 1  degrees Final         Reviewed patient's current plan of care and vital signs with nursing staff. Labs reviewed: [x] Yes  EKG reviewed  QTc 488    Medications  Current Facility-Administered Medications: OLANZapine (ZYPREXA) tablet 10 mg, 10 mg, Oral, Nightly  FLUoxetine (PROZAC) capsule 20 mg, 20 mg, Oral, Daily  acetaminophen (TYLENOL) tablet 650 mg, 650 mg, Oral, Q4H PRN  ibuprofen (ADVIL;MOTRIN) tablet 400 mg, 400 mg, Oral, Q6H PRN  polyethylene glycol (GLYCOLAX) packet 17 g, 17 g, Oral, Daily PRN  aluminum & magnesium hydroxide-simethicone (MAALOX) 200-200-20 MG/5ML suspension 30 mL, 30 mL, Oral, Q6H PRN  hydrOXYzine HCl (ATARAX) tablet 50 mg, 50 mg, Oral, TID PRN  traZODone (DESYREL) tablet 50 mg, 50 mg, Oral, Nightly PRN    ASSESSMENT  Schizoaffective disorder, depressive type ([facility] [address]         PLAN  Patient symptoms: Slowly improving  No Medication Changes Today  Monitor need and frequency of PRN medications. Encourage participation in groups and milieu. Attempt to develop insight. Psycho-education conducted.   Probable discharge is likely tomorrow with continued stability in symptoms  Follow-up daily while inpatient. Patient continues to be monitored in the inpatient psychiatric facility at [facility] for safety and stabilization. Patient continues to need, on a daily basis, active treatment furnished directly by or requiring the supervision of inpatient psychiatric personnel. Electronically signed by MARIA D Osorio CNP on 2/6/2023 at 11:22 AM    **This report has been created using voice recognition software. It may contain minor errors which are inherent in voice recognition technology. **

## 2023-02-06 NOTE — GROUP NOTE
Group Therapy Note    Date: 2/6/2023    Group Start Time: 1100  Group End Time: 2875  Group Topic: Cognitive Skills    [facility] Torres    Cognitive Skills Group Note        Date: February 6, 2023 Start Time: 11am  End Time:  1135am      Number of Participants in Group & Unit Census:  8/19    Topic: cognitive skills     Goal of Group: pt will demonstrate improved concentration and improved decision making skills       Comments:     Patient did not participate in Cognitive Skills group, despite staff encouragement and explanation of benefits. Patient remain seclusive to self. Q15 minute safety checks maintained for patient safety and will continue to encourage patient to attend unit programming.               Signature:  Rod Reyes

## 2023-02-06 NOTE — PROGRESS NOTES
Daily Progress Note  2/5/2023    Patient Name: Erica Nunez    CHIEF COMPLAINT: Psychosis with suicidal and homicidal ideation         SUBJECTIVE:      Patient is seen today for a follow up assessment. Patient endorses continued depression and suicidal ideation however reports both have improved some today. Patient is unable to contract for safety outside the hospital.  Patient continues to endorse homicidal ideation however states it is directed towards \"no one in particular\". Patient continues to deny thoughts about killing his mother. Patient states paranoia has improved today and is denying auditory and visual hallucinations. Patient has been isolating in her room and has not been attending groups because he has been \"tired\". Patient was encouraged to participate in unit activities. Patient endorses tolerating titration of Zyprexa well and is denying medication side effects. We will continue her current dose and monitor. Appetite:  [x] Adequate/Unchanged  [] Increased  [] Decreased      Sleep:       [] Adequate/Unchanged  [x] Fair  [] Poor      Group Attendance on Unit:   [] Yes   [] Selectively    [x] No    Compliant with scheduled medications: [x] Yes  [] No    Received emergency medications in past 24 hrs: [] Yes   [x] No    Medication Side Effects: Denies         Mental Status Exam  Level of consciousness: Resting, responds to verbal stimuli  Appearance: Appropriate attire for setting, resting in bed, with fair  grooming and hygiene. Behavior/Motor: Withdrawn, compliant with interview, remains slightly irritable  Attitude toward examiner: Cooperative, poor concentration, poor eye contact. Speech: Slow to normal rate, normal volume & less irritable tone   Mood:  Patient reports \"still tired \". Affect: Congruent  Thought processes: Linear and poverty of thought. Thought content: Endorses homicidal ideation.   Suicidal Ideation: Reports improvement and suicidal ideations, without current intent to harm self on unit. Unable to contract for safety off unit. Delusions: No evidence of delusions. Reports improvement and paranoia today  Perceptual Disturbance: Patient does not appear to be responding to internal stimuli. Denies auditory hallucinations. Denies visual hallucinations. Cognition: Oriented to self, location and situation. Memory: Age appropriate. Insight & Judgement: Poor. Data   height is 6' (1.829 m) and weight is 230 lb (104.3 kg). His temperature is 98 °F (36.7 °C). His blood pressure is 97/66 and his pulse is 72. His respiration is 14. Labs:   No visits with results within 2 Day(s) from this visit.    Latest known visit with results is:   Admission on 05/02/2019, Discharged on 05/02/2019   Component Date Value Ref Range Status    WBC 05/02/2019 8.1  3.5 - 11.3 k/uL Final    RBC 05/02/2019 4.73  4.21 - 5.77 m/uL Final    Hemoglobin 05/02/2019 13.8  13.0 - 17.0 g/dL Final    Hematocrit 05/02/2019 43.0  40.7 - 50.3 % Final    MCV 05/02/2019 90.9  82.6 - 102.9 fL Final    MCH 05/02/2019 29.2  25.2 - 33.5 pg Final    MCHC 05/02/2019 32.1  28.4 - 34.8 g/dL Final    RDW 05/02/2019 12.7  11.8 - 14.4 % Final    Platelets 28/61/3615 264  138 - 453 k/uL Final    MPV 05/02/2019 10.7  8.1 - 13.5 fL Final    NRBC Automated 05/02/2019 0.0  0.0 per 100 WBC Final    Differential Type 05/02/2019 NOT REPORTED   Final    Seg Neutrophils 05/02/2019 52  36 - 65 % Final    Lymphocytes 05/02/2019 29  24 - 43 % Final    Monocytes 05/02/2019 13 (A)  3 - 12 % Final    Eosinophils % 05/02/2019 5 (A)  1 - 4 % Final    Basophils 05/02/2019 1  0 - 2 % Final    Immature Granulocytes 05/02/2019 0  0 % Final    Segs Absolute 05/02/2019 4.20  1.50 - 8.10 k/uL Final    Absolute Lymph # 05/02/2019 2.36  1.10 - 3.70 k/uL Final    Absolute Mono # 05/02/2019 1.07  0.10 - 1.20 k/uL Final    Absolute Eos # 05/02/2019 0.42  0.00 - 0.44 k/uL Final    Basophils Absolute 05/02/2019 0.06  0.00 - 0.20 k/uL Final Absolute Immature Granulocyte 05/02/2019 0.03  0.00 - 0.30 k/uL Final    WBC Morphology 05/02/2019 NOT REPORTED   Final    RBC Morphology 05/02/2019 NOT REPORTED   Final    Platelet Estimate 18/21/8764 NOT REPORTED   Final    Glucose 05/02/2019 98  70 - 99 mg/dL Final    BUN 05/02/2019 14  6 - 20 mg/dL Final    Creatinine 05/02/2019 1.03  0.70 - 1.20 mg/dL Final    Bun/Cre Ratio 05/02/2019 NOT REPORTED  9 - 20 Final    Calcium 05/02/2019 9.1  8.6 - 10.4 mg/dL Final    Sodium 05/02/2019 136  135 - 144 mmol/L Final    Potassium 05/02/2019 3.8  3.7 - 5.3 mmol/L Final    Chloride 05/02/2019 98  98 - 107 mmol/L Final    CO2 05/02/2019 23  20 - 31 mmol/L Final    Anion Gap 05/02/2019 15  9 - 17 mmol/L Final    GFR Non- 05/02/2019 >60  >60 mL/min Final    GFR  05/02/2019 >60  >60 mL/min Final    GFR Comment 05/02/2019        Final    Comment: Average GFR for 38-51 years old:   80 mL/min/1.73sq m  Chronic Kidney Disease:   <60 mL/min/1.73sq m  Kidney failure:   <15 mL/min/1.73sq m              eGFR calculated using average adult body mass. Additional eGFR calculator available at:        [email or web address]            GFR Staging 05/02/2019 NOT REPORTED   Final    Troponin, High Sensitivity 05/02/2019 <6  0 - 22 ng/L Final    Comment:       High Sensitivity Troponin values cannot be compared with other Troponin methodologies. Patients with high levels of Biotin oral intake (i.e >5mg/day) may have falsely decreased   Troponin levels. Samples collected within 8 hours of biotin intake may require additional   information for diagnosis.       Troponin T 05/02/2019 NOT REPORTED  <0.03 ng/mL Final    Troponin Interp 05/02/2019 NOT REPORTED   Final    Ventricular Rate 05/02/2019 109  BPM Final    Atrial Rate 05/02/2019 109  BPM Final    P-R Interval 05/02/2019 136  ms Final    QRS Duration 05/02/2019 68  ms Final    Q-T Interval 05/02/2019 350  ms Final    QTc Calculation (Bazett) 05/02/2019 471  ms Final    P Axis 05/02/2019 38  degrees Final    R Axis 05/02/2019 19  degrees Final    T [place] 05/02/2019 1  degrees Final    Ethanol 05/02/2019 <10  <10 mg/dL Final    Ethanol percent 05/02/2019 <0.010  <0.010 % Final    NOTE: NEW REFERENCE RANGE    Ventricular Rate 05/02/2019 88  BPM Final    Atrial Rate 05/02/2019 88  BPM Final    P-R Interval 05/02/2019 150  ms Final    QRS Duration 05/02/2019 86  ms Final    Q-T Interval 05/02/2019 404  ms Final    QTc Calculation (Bazett) 05/02/2019 488  ms Final    P Axis 05/02/2019 39  degrees Final    R Axis 05/02/2019 19  degrees Final    T [place] 05/02/2019 1  degrees Final         Reviewed patient's current plan of care and vital signs with nursing staff. Labs reviewed: [x] Yes  EKG reviewed  QTc 488  Medications  Current Facility-Administered Medications: OLANZapine (ZYPREXA) tablet 10 mg, 10 mg, Oral, Nightly  FLUoxetine (PROZAC) capsule 20 mg, 20 mg, Oral, Daily  acetaminophen (TYLENOL) tablet 650 mg, 650 mg, Oral, Q4H PRN  ibuprofen (ADVIL;MOTRIN) tablet 400 mg, 400 mg, Oral, Q6H PRN  polyethylene glycol (GLYCOLAX) packet 17 g, 17 g, Oral, Daily PRN  aluminum & magnesium hydroxide-simethicone (MAALOX) 200-200-20 MG/5ML suspension 30 mL, 30 mL, Oral, Q6H PRN  hydrOXYzine HCl (ATARAX) tablet 50 mg, 50 mg, Oral, TID PRN  traZODone (DESYREL) tablet 50 mg, 50 mg, Oral, Nightly PRN    ASSESSMENT  Schizoaffective disorder, depressive type ([facility] [address]         PLAN  Patient symptoms: Remains unstable  No Medication Changes Today  Monitor need and frequency of PRN medications. Encourage participation in groups and milieu. Attempt to develop insight. Psycho-education conducted. Probable discharge is to be determined. Follow-up daily while inpatient. Patient continues to be monitored in the inpatient psychiatric facility at [facility] for safety and stabilization.  Patient continues to need, on a daily basis, active treatment furnished directly by or requiring the supervision of inpatient psychiatric personnel. Electronically signed by MARIA D Prajapati CNP on 2/5/2023 at 10:58 PM    **This report has been created using voice recognition software. It may contain minor errors which are inherent in voice recognition technology. **

## 2023-02-06 NOTE — PROGRESS NOTES
Behavioral Services                                              Medicare Re-Certification    I certify that the inpatient psychiatric hospital services furnished since the previous certification/re-certification were, and continue to be, medically necessary for;    [x] (1) Treatment which could reasonably be expected to improve the patient's condition,    [x] (2) Or for diagnostic study. Estimated length of stay/service 2-5 days    Plan for post-hospital care home with outpatient [facility] f/u    This patient continues to need, on a daily basis, active treatment furnished directly by or requiring the supervision of inpatient psychiatric personnel.     Electronically signed by Hyacinth Preciado MD on 2/6/2023 at 7:26 AM

## 2023-02-07 VITALS
SYSTOLIC BLOOD PRESSURE: 130 MMHG | BODY MASS INDEX: 31.15 KG/M2 | WEIGHT: 230 LBS | RESPIRATION RATE: 14 BRPM | TEMPERATURE: 98.6 F | HEIGHT: 72 IN | DIASTOLIC BLOOD PRESSURE: 68 MMHG | HEART RATE: 71 BPM

## 2023-02-07 PROCEDURE — 6370000000 HC RX 637 (ALT 250 FOR IP): Performed by: PSYCHIATRY & NEUROLOGY

## 2023-02-07 RX ORDER — FLUOXETINE HYDROCHLORIDE 20 MG/1
20 CAPSULE ORAL DAILY
Qty: 30 CAPSULE | Refills: 3 | Status: SHIPPED | OUTPATIENT
Start: 2023-02-08

## 2023-02-07 RX ORDER — OLANZAPINE 10 MG/1
10 TABLET ORAL NIGHTLY
Qty: 30 TABLET | Refills: 3 | Status: SHIPPED | OUTPATIENT
Start: 2023-02-07

## 2023-02-07 RX ADMIN — FLUOXETINE 20 MG: 20 CAPSULE ORAL at 09:38

## 2023-02-07 NOTE — PROGRESS NOTES
CLINICAL PHARMACY NOTE: MEDS TO BEDS    Total # of Prescriptions Filled: 2   The following medications were delivered to the patient:  Fluoxetine HCL 20mg  Olanzapine 10mg    Additional Documentation:  Delivered medications to nurses station

## 2023-02-07 NOTE — GROUP NOTE
Group Therapy Note    Date: 2/7/2023    Group Start Time: 1330  Group End Time: 7870  Group Topic: Relaxation    [facility]    Dago Mad, CTRS    Relaxation Group Note        Date: February 7, 2023 Start Time: 1:30pm  End Time:  215pm      Number of Participants in Group & Unit Census:  6/15    Topic: relaxation group     Goal of Group: pt will demonstrate improved mood and improved ability to relax by using art       Comments:     Patient did not participate in Psych-Ed/Relapse Prevention group, despite staff encouragement and explanation of benefits. Patient remain seclusive to self. Q15 minute safety checks maintained for patient safety and will continue to encourage patient to attend unit programming.             Signature:  Eve Willoughby

## 2023-02-07 NOTE — DISCHARGE SUMMARY
Provider Discharge Summary     Patient ID:  Lindsay Jasso  [id number]  68 y.o.  1975    Admit date: 1/30/2023    Discharge date and time: 2/7/2023  9:56 AM     Admitting Physician: Neeraj Anguiano MD     Discharge Physician: MARIA D Rangel - CNP    Admission Diagnoses: Acute psychosis [facility]) [F23]    Discharge Diagnoses:     Schizoaffective disorder, depressive type [facility])     Patient Active Problem List   Diagnosis Code    Polysubstance abuse ([address] F19.10    Schizophrenia, paranoid ([address] F20.0    Paranoid schizophrenia ([address] F20.0    Schizoaffective disorder, bipolar type ([address] F25.0    Schizophrenia ([address] F20.9    Schizoaffective disorder ([address] F25.9    Schizoaffective disorder, depressive type ([facility] [address] F25.1    MDD (major depressive disorder), single episode F32.9    Depression with suicidal ideation F32. A, R45.851    Acute psychosis ([address] F23    Abuse of smoked substance ([address] F18.10    Class 1 obesity due to excess calories without serious comorbidity with body mass index (BMI) of 31.0 to 31.9 in adult E66.09, Z68.31        Admission Condition: poor    Discharged Condition: stable    Indication for Admission: threat to self    History of Present Illness:   Patient presented to the [facility] ED expressing paranoia that he is being targeted and that people want to harm him. Per documentation he had previously threatened to kill his mother. He also expressed suicidal ideation in the ED. Patient has a history of auditory hallucinations. These hallucinations are often command in nature telling him to harm himself and others. He has a history of noncompliance with treatment in the community. He also has a history of violence toward medical staff and spent time at the [facility] psychiatric facility. He has also denied symptoms of PTSD in the past.    Hospital Course:   Upon admission, Lindsay Jasso was provided a safe secure environment, introduced to unit milieu. Patient participated in groups and individual therapies. Meds were adjusted as noted below. After few days of hospital care, patient began to feel improvement. Depression lifted, thoughts to harm self ceased. Sleep improved, appetite was good. On morning rounds 2/7/2023, Erendira Dahl endorses feeling ready for discharge. Patient denies suicidal or homicidal ideations, denies hallucinations or delusions. Denies SE's from meds. It was decided that maximum benefit from hospital care had been achieved and patient can be discharged. Consults:   Internal Medicine    Significant Diagnostic Studies: Routine labs and diagnostics    Treatments: Psychotropic medications, therapy with group, milieu, and 1:1 with nurses, social workers, PAMeetC/CNP, and Attending physician.       Discharge Medications:  Current Discharge Medication List        CONTINUE these medications which have CHANGED    Details   FLUoxetine (PROZAC) 20 MG capsule Take 1 capsule by mouth daily  Qty: 30 capsule, Refills: 3      OLANZapine (ZYPREXA) 10 MG tablet Take 1 tablet by mouth nightly  Qty: 30 tablet, Refills: 3           STOP taking these medications       traZODone (DESYREL) 50 MG tablet Comments:   Reason for Stopping:                Core Measures statement:   Not applicable    Discharge Exam:  Level of consciousness:  Awake and alert  Appearance: Street clothes, seated, with good grooming  Behavior/Motor: No abnormalities noted  Attitude toward examiner:  Cooperative, attentive, good eye contact  Speech:  spontaneous, normal rate, normal volume and well articulated  Mood:  euthymic  Affect:  Full range  Thought processes:  linear, goal directed and coherent  Thought content:  denies homicidal ideation  Suicidal Ideation:  denies suicidal ideation  Delusions:  no evidence of delusions  Perceptual Disturbance:  denies any perceptual disturbance  Cognition:  Intact  Memory: age appropriate  Insight & Judgement: fair  Medication side effects: denies     Disposition: home    Patient Instructions: Activity: activity as tolerated  1. Patient instructed to take medications regularly and follow up with outpatient appointments. Follow-up scheduled with [facility] as arranged by social work       Signed:    Electronically signed by MARIA D Acosta CNP on 2/7/23 at 9:56 AM EST    Time Spent on discharge is more than 30 minutes in the examination, evaluation, counseling and review of medications and discharge plan. An electronic signature was used to authenticate this note. **This report has been created using voice recognition software. It may contain minor errors which are inherent in voice recognition technology. **

## 2023-02-07 NOTE — PLAN OF CARE
Problem: Pain  Goal: Verbalizes/displays adequate comfort level or baseline comfort level  2/6/2023 2149 by Brando Goel RN  Outcome: Progressing  Flowsheets (Taken 2/6/2023 2149)  Verbalizes/displays adequate comfort level or baseline comfort level: Encourage patient to monitor pain and request assistance  Note: Patient denies pain upon assessment. Staff will continue to assess as needed. Problem: Anxiety  Goal: Will report anxiety at manageable levels  Description: INTERVENTIONS:  1. Administer medication as ordered  2. Teach and rehearse alternative coping skills  3. Provide emotional support with 1:1 interaction with staff  2/6/2023 2149 by Brando Goel RN  Outcome: Progressing  Flowsheets (Taken 2/6/2023 2149)  Will report anxiety at manageable levels: Provide emotional support with 1:1 interaction with staff  Note: Patient denies anxiety upon assessment. Patient is isolative to room; only coming out for snack time. Patient is medication compliant. Patient is expressionless and flat. Staff will continue to assess and continue Q15m checks. Problem: Depression/Self Harm  Goal: Effect of psychiatric condition will be minimized and patient will be protected from self harm  Description: INTERVENTIONS:  1. Assess impact of patient's symptoms on level of functioning, self care needs and offer support as indicated  2. Assess patient/family knowledge of depression, impact on illness and need for teaching  3. Provide emotional support, presence and reassurance  4. Assess for possible suicidal thoughts or ideation. If patient expresses suicidal thoughts or statements do not leave alone, initiate Suicide Precautions, move to a room close to the nursing station and obtain sitter  5.  Initiate consults as appropriate with Mental Health Professional, Spiritual Care, Psychosocial CNS, and consider a recommendation to the LIP for a Psychiatric Consultation  2/6/2023 2149 by Brando Goel RN  Outcome: Progressing  Flowsheets (Taken 2/6/2023 2149)  Effect of psychiatric condition will be minimized and patient will be protected from self harm: Assess for suicidal thoughts or ideation. If patient expresses suicidal thoughts or statements do not leave alone, initiate Suicide Precautions, move near nurse station, obtain sitter  Note: Patient remains free from self harm and injury during shift.

## 2023-02-07 NOTE — BH NOTE
Patient given tobacco quitline number 82079344057 at this time, refusing to call at this time, states \" I just dont want to quit now\"- patient given information as to the dangers of long term tobacco use. Continue to reinforce the importance of tobacco cessation.

## 2023-02-07 NOTE — DISCHARGE INSTRUCTIONS
Information:  Medications:   Medication summary provided   I understand that I should take only the medications on my list.     -why and when I need to take each medicine.     -which side effects to watch for.     -that I should carry my medication information at all times in case of     Emergency situations. I will take all of my medicines to follow up appointments.     -check with my physician or pharmacist before taking any new    Medication, over the counter product or drink alcohol.    -Ask about food, drug or dietary supplement interactions.    -discard old lists and update records with medication providers. Notify Physician:  Notify physician if you notice:   Always call 911 if you feel your life is in danger  In case of an emergency call 911 immediately! If 911 is not available call your local emergency medical system for help    Behavioral Health Follow Up:  Original Referral Source:[facility]  Discharge Diagnosis: Acute psychosis ([address] [F23]  Recommendations for Level of Care: [facility] mental [facility] outpatient treatment center for follow up appointment and meds  Patient status at discharge: alert, oriented, denies thoughts of harm to self or others  My hospital  was: [address] [facility]  Aftercare plan faxed: Kamryn Franco   -faxed by: nursing staff   -date: 2/7/23   -time: 1500  Prescriptions: filled and sent home with patient per Meds to Beds    Smoking: Quit Smoking. Call the [employer]'s smoking quitline at 5-833-14E-QUIT  Know the signs of a heart attack   If you have any of the following symptoms call 911 immediately, do not wait more    Than five minutes. 1. Pressure, fullness and/ or squeezing in the center of the chest spreading to    The jaw, neck or shoulder. 2. Chest discomfort with light headedness, fainting, sweating, nausea or    Shortness of breath. 3. Upper abdominal pressure or discomfort. 4. Lower chest pain, back pain, unusual fatigue, shortness of breath, nausea   Or dizziness. General Information:   Questions regarding your bill: Call HELP program [phone]     Suicide Hotline ([place] [address])  (177) 722-2563      Recovery Help line- 243.952.4839      To obtain results of pending studies call Medical Records at: [phone]     For emergencies and 24 hour/7 days a week contact information:  [phone]

## 2023-02-07 NOTE — BH NOTE
[facility]  Discharge Note    Pt discharged with followings belongings:   Dental Appliances: None  Vision - Corrective Lenses: None  Hearing Aid: None  Jewelry: Bracelet  Body Piercings Removed: N/A  Clothing: Footwear, Shorts, Shirt, Socks, Undergarments  Other Valuables: Money, Keys, Lighter/Matches ($10.25)   Valuables returned to patient. Patient educated on aftercare instructions: yes, follow up appointment and medications. Information faxed to [facility] by nursing staff  at 4:58 PM .Patient verbalize understanding of AVS:  yes. Patient discharged to a hotel. .    Status EXAM upon discharge:  Mental Status and Behavioral Exam  Normal: No  Level of Assistance: Independent/Self  Facial Expression: Flat, Expressionless  Affect: Blunt  Level of Consciousness: Alert  Frequency of Checks: 4 times per hour, close  Mood:Normal: No  Mood: Depressed, Angry, Irritable  Motor Activity:Normal: No  Motor Activity: Decreased  Eye Contact: Fair  Observed Behavior: Withdrawn, Guarded  Sexual Misconduct History: Current - no  Preception: [place] to person, [place] to time, [place] to place, [place] to situation  Attention:Normal: No  Attention: Distractible  Thought Processes: Circumstantial  Thought Content:Normal: No  Thought Content: Preoccupations  Depression Symptoms: Isolative, Loss of interest, Increased irritability, Change in energy level  Anxiety Symptoms: No problems reported or observed. May[place] Symptoms: No problems reported or observed.   Hallucinations: None  Delusions: No  Delusions: Paranoid  Memory:Normal: Yes  Memory: Poor recent, Poor remote  Insight and Judgment: No  Insight and Judgment: Poor judgment, Poor insight, Unmotivated    Tobacco Screening:  Practical Counseling, on admission, sung X, if applicable and completed (first 3 are required if patient doesn't refuse):            ( ) Recognizing danger situations (included triggers and roadblocks)                    ( ) Coping skills (new ways to manage stress,relaxation techniques, changing routine, distraction)                                                           ( ) Basic information about quitting (benefits of quitting, techniques in how to quit, available resources  ( ) Referral for counseling faxed to [facility]                                                                                                                   (X) Patient refused counseling  ( ) Patient refused referral  ( ) Patient refused prescription upon discharge  ( ) Patient has not smoked in the last 30 days    Metabolic Screening:    No results found for: LABA1C    No results found for: CHOL  No results found for: TRIG  No results found for: HDL  No components found for: LDLCAL  No results found for: Nickie Monae LPN

## 2023-02-08 NOTE — CARE COORDINATION
Name: Sarah Leiva    : 1975    Discharge Date: 23      Destination: hotel     Discharge Medications:      Medication List        CHANGE how you take these medications      FLUoxetine 20 MG capsule  Commonly known as: PROZAC  Take 1 capsule by mouth daily  What changed: how much to take  Notes to patient: For mood/depression     OLANZapine 10 MG tablet  Commonly known as: ZYPREXA  Take 1 tablet by mouth nightly  What changed:   medication strength  how much to take  Notes to patient:  For mood/clears thoughts            STOP taking these medications      traZODone 50 MG tablet  Commonly known as: DESYREL               Where to Get Your Medications        These medications were sent to [facility] [address] [facility] [address]  [facility] [address], [address] [facility] [address]      Phone: [phone]   FLUoxetine 20 MG capsule  OLANZapine 10 MG tablet         Follow Up Appointment: [address]  [phone]    Follow up on 2023  Pt has appointment at 9:00 am

## 2023-04-06 ENCOUNTER — HOSPITAL ENCOUNTER (INPATIENT)
Age: 48
LOS: 1 days | Discharge: HOME | End: 2023-04-07
Attending: PSYCHIATRY & NEUROLOGY | Admitting: PSYCHIATRY & NEUROLOGY
Payer: MEDICARE

## 2023-04-06 PROCEDURE — 6370000000 HC RX 637 (ALT 250 FOR IP): Performed by: NURSE PRACTITIONER

## 2023-04-06 PROCEDURE — 1240000000 HC EMOTIONAL WELLNESS R&B

## 2023-04-06 PROCEDURE — 6370000000 HC RX 637 (ALT 250 FOR IP): Performed by: PSYCHIATRY & NEUROLOGY

## 2023-04-06 RX ORDER — LURASIDONE HYDROCHLORIDE 60 MG/1
120 TABLET, FILM COATED ORAL DAILY
Status: DISCONTINUED | OUTPATIENT
Start: 2023-04-06 | End: 2023-04-06

## 2023-04-06 RX ORDER — TRAZODONE HYDROCHLORIDE 50 MG/1
50 TABLET ORAL NIGHTLY PRN
Status: DISCONTINUED | OUTPATIENT
Start: 2023-04-06 | End: 2023-04-07 | Stop reason: HOSPADM

## 2023-04-06 RX ORDER — ACETAMINOPHEN 325 MG/1
650 TABLET ORAL EVERY 4 HOURS PRN
Status: DISCONTINUED | OUTPATIENT
Start: 2023-04-06 | End: 2023-04-07 | Stop reason: HOSPADM

## 2023-04-06 RX ORDER — MAGNESIUM HYDROXIDE/ALUMINUM HYDROXICE/SIMETHICONE 120; 1200; 1200 MG/30ML; MG/30ML; MG/30ML
30 SUSPENSION ORAL EVERY 6 HOURS PRN
Status: DISCONTINUED | OUTPATIENT
Start: 2023-04-06 | End: 2023-04-07 | Stop reason: HOSPADM

## 2023-04-06 RX ORDER — SERTRALINE HYDROCHLORIDE 100 MG/1
100 TABLET, FILM COATED ORAL NIGHTLY
Status: DISCONTINUED | OUTPATIENT
Start: 2023-04-06 | End: 2023-04-07 | Stop reason: HOSPADM

## 2023-04-06 RX ORDER — LURASIDONE HYDROCHLORIDE 60 MG/1
120 TABLET, FILM COATED ORAL
Status: DISCONTINUED | OUTPATIENT
Start: 2023-04-06 | End: 2023-04-07 | Stop reason: HOSPADM

## 2023-04-06 RX ORDER — SERTRALINE HYDROCHLORIDE 100 MG/1
100 TABLET, FILM COATED ORAL NIGHTLY
Status: ON HOLD | COMMUNITY
End: 2023-04-07 | Stop reason: SDUPTHER

## 2023-04-06 RX ORDER — POLYETHYLENE GLYCOL 3350 17 G/17G
17 POWDER, FOR SOLUTION ORAL DAILY PRN
Status: DISCONTINUED | OUTPATIENT
Start: 2023-04-06 | End: 2023-04-07 | Stop reason: HOSPADM

## 2023-04-06 RX ORDER — IBUPROFEN 400 MG/1
400 TABLET ORAL EVERY 6 HOURS PRN
Status: DISCONTINUED | OUTPATIENT
Start: 2023-04-06 | End: 2023-04-07 | Stop reason: HOSPADM

## 2023-04-06 RX ORDER — TRAZODONE HYDROCHLORIDE 50 MG/1
50 TABLET ORAL NIGHTLY PRN
Status: ON HOLD | COMMUNITY
End: 2023-04-07 | Stop reason: SDUPTHER

## 2023-04-06 RX ORDER — LURASIDONE HYDROCHLORIDE 120 MG/1
120 TABLET, FILM COATED ORAL DAILY
Status: ON HOLD | COMMUNITY
End: 2023-04-07 | Stop reason: HOSPADM

## 2023-04-06 RX ORDER — TRAZODONE HYDROCHLORIDE 50 MG/1
50 TABLET ORAL NIGHTLY PRN
Status: DISCONTINUED | OUTPATIENT
Start: 2023-04-06 | End: 2023-04-06

## 2023-04-06 RX ORDER — HYDROXYZINE 50 MG/1
50 TABLET, FILM COATED ORAL 3 TIMES DAILY PRN
Status: DISCONTINUED | OUTPATIENT
Start: 2023-04-06 | End: 2023-04-07 | Stop reason: HOSPADM

## 2023-04-06 RX ORDER — LURASIDONE HYDROCHLORIDE 60 MG/1
120 TABLET, FILM COATED ORAL
Status: DISCONTINUED | OUTPATIENT
Start: 2023-04-07 | End: 2023-04-06

## 2023-04-06 RX ORDER — ALBUTEROL SULFATE 90 UG/1
2 AEROSOL, METERED RESPIRATORY (INHALATION) EVERY 6 HOURS PRN
Status: DISCONTINUED | OUTPATIENT
Start: 2023-04-06 | End: 2023-04-07 | Stop reason: HOSPADM

## 2023-04-06 RX ADMIN — LURASIDONE HYDROCHLORIDE 120 MG: 60 TABLET, FILM COATED ORAL at 11:19

## 2023-04-06 RX ADMIN — HYDROXYZINE HYDROCHLORIDE 50 MG: 50 TABLET, FILM COATED ORAL at 02:38

## 2023-04-06 RX ADMIN — IBUPROFEN 400 MG: 400 TABLET ORAL at 08:27

## 2023-04-06 RX ADMIN — SERTRALINE HYDROCHLORIDE 100 MG: 100 TABLET ORAL at 21:09

## 2023-04-06 RX ADMIN — HYDROXYZINE HYDROCHLORIDE 50 MG: 50 TABLET, FILM COATED ORAL at 22:56

## 2023-04-06 RX ADMIN — TRAZODONE HYDROCHLORIDE 50 MG: 50 TABLET ORAL at 22:56

## 2023-04-06 RX ADMIN — TRAZODONE HYDROCHLORIDE 50 MG: 50 TABLET ORAL at 02:38

## 2023-04-06 RX ADMIN — ACETAMINOPHEN 650 MG: 325 TABLET ORAL at 02:38

## 2023-04-06 ASSESSMENT — LIFESTYLE VARIABLES
HOW MANY STANDARD DRINKS CONTAINING ALCOHOL DO YOU HAVE ON A TYPICAL DAY: PATIENT DOES NOT DRINK
HOW MANY STANDARD DRINKS CONTAINING ALCOHOL DO YOU HAVE ON A TYPICAL DAY: PATIENT DOES NOT DRINK
HOW OFTEN DO YOU HAVE A DRINK CONTAINING ALCOHOL: NEVER
HOW OFTEN DO YOU HAVE A DRINK CONTAINING ALCOHOL: NEVER

## 2023-04-06 ASSESSMENT — PAIN DESCRIPTION - ORIENTATION
ORIENTATION: LEFT
ORIENTATION: RIGHT

## 2023-04-06 ASSESSMENT — PAIN SCALES - GENERAL
PAINLEVEL_OUTOF10: 0
PAINLEVEL_OUTOF10: 3
PAINLEVEL_OUTOF10: 7
PAINLEVEL_OUTOF10: 6
PAINLEVEL_OUTOF10: 0
PAINLEVEL_OUTOF10: 0

## 2023-04-06 ASSESSMENT — PATIENT HEALTH QUESTIONNAIRE - PHQ9: SUM OF ALL RESPONSES TO PHQ QUESTIONS 1-9: 8

## 2023-04-06 ASSESSMENT — SLEEP AND FATIGUE QUESTIONNAIRES
SLEEP PATTERN: DIFFICULTY FALLING ASLEEP;DISTURBED/INTERRUPTED SLEEP
AVERAGE NUMBER OF SLEEP HOURS: 4
DO YOU USE A SLEEP AID: YES
DO YOU HAVE DIFFICULTY SLEEPING: YES

## 2023-04-06 ASSESSMENT — PAIN DESCRIPTION - LOCATION
LOCATION: ANKLE
LOCATION: ANKLE

## 2023-04-06 NOTE — GROUP NOTE
Psych-Ed/Relapse Prevention Group Note        Date: April 6, 2023 Start Time: 1:30pm  End Time:  2:00pm      Number of Participants in Group & Unit Census:  8/15    Topic: Community resources/BR[place]    Goal of Group:Patient will demonstrate understanding of [place] and other community support. Comments:     Patient did not participate in Psych-Ed/Relapse Prevention group, despite staff encouragement and explanation of benefits. Patient remain seclusive to self. Q15 minute safety checks maintained for patient safety and will continue to encourage patient to attend unit programming.        Signature:  Chelsy Zendejas, [address]

## 2023-04-06 NOTE — H&P
panic attacks. He stated that he does not have any intrusive thoughts or any past abuse. Patient denied any symptoms related to rodolfo. He reported no hallucinations or delusions. He stated that he was \"just paranoid; a little bit\" but refused to elaborate or provide details. He is not observed to be responding to internal stimuli. Patient stated that he did not have any legal issues. When asked about probation patient reported that he just \"got on\". He stated that probation is due to a \"false charge\". When asked what the false charge was patient reported that he was told he violated probation but that he was not on probation before. Patient would not go into further detail about legal problems and minimized the current probation. Per court records, patient was placed on probation on 4/5/23 due to \"ATTEMPT TO COMMIT AN OFFENSE amended from VIOLATION OF A PROTECTION ORDER\". Patient presents with several traits of antisocial personality disorder and has an extensive criminal record dating back to age 16. Charges include assault to a teacher, theft, domestic violence, false alarms to law enforcement, aggravated menacing, criminal trespass, impersonation of a , and public indecency. Nursing staff report patient has denied suicidal ideation today. Patient denies any alcohol use, cigarette use, or substance use. Urine drug screen and blood alcohol level from [facility] dated 4/5/2023 were negative.           History of head trauma: [] Yes [x] No  Patient denies    History of seizures: [] Yes [x] No  Patient denies    History of violence or aggression: [x] Yes [] No         PSYCHIATRIC HISTORY:  [x] Yes [] No    Currently follows with [facility]  Denies lifetime suicide attempts  Multiple psychiatric hospital admissions    Home Medication Compliance: [] Yes [x] No    Past psychiatric medications includes: Trazodone, Prozac, Zyprexa, hydroxyzine, Cymbalta, Remeron, Invega    Adverse reactions from
ausculation, no wheezing, rales or rhonchi, normal effort  Cardiovascular: normal rate, regular rhythm, no murmur, gallop, rub. Abdomen: Soft, nontender, nondistended, normal bowel sounds, no hepatomegaly or splenomegaly  Neurologic: There are no new focal motor or sensory deficits, normal muscle tone and bulk, no abnormal sensation, normal speech, cranial nerves II through XII grossly intact  Skin: No gross lesions, rashes, bruising or bleeding on exposed skin area  Extremities:  peripheral pulses palpable, no pedal edema or calf pain with palpation  Psych: Investigations:      Laboratory Testing:  No results found for this or any previous visit (from the past 24 hour(s)). Consultations:   IP CONSULT TO INTERNAL MEDICINE  Assessment :      Primary Problem  Depression with suicidal ideation    Active Hospital Problems    Diagnosis Date Noted    Depression with suicidal ideation [F32. A, R45.851] 09/10/2021       Plan:     Depression with suicidal ideation  History of asthma, currently compensated  Labs reviewed unremarkable  U tox negative, T3 was checked in January which was normal, ethanol level normal,  DVT prophylaxis patient is ambulatory. Joe Knight MD  4/6/2023  5:29 PM    Copy sent to Dr. Otto primary care provider on file. Please note that this chart was generated using voice recognition Dragon dictation software. Although every effort was made to ensure the accuracy of this automated transcription, some errors in transcription may have occurred.

## 2023-04-06 NOTE — CARE COORDINATION
Psychosocial Assessment    Current Level of Psychosocial Functioning     Independent X  Dependent    Minimal Assist     Comments:      Psychosocial High Risk Factors (check all that apply)    Unable to obtain meds   Chronic illness/pain    Substance abuse   Lack of Family Support    Financial stress   Isolation   Inadequate Community Resources  Suicide attempt(s) X  Not taking medications  X  Victim of crime   Developmental Delay  Unable to manage personal needs    Age 73 or older   Homeless  No transportation   Readmission within 30 days  Unemployment  Traumatic Event    Family/Supports identified: Pt reports mother is somewhat supportive      Patient Strengths: Housing, and insurance     Patient Barriers: No  income     CMHC/MH history: Last linked with [facility]     Plan of Care:  medication management, group/individual therapies, family meetings, psycho -education, treatment team meetings to assist with stabilization    Initial Discharge Plan:  Wants to be sent to airport, will need to follow up once stable. Re-link with [facility]    Clinical Summary:  Pt is a 56year old male admitted to the [facility] for safety. Pt reports some suicidal ideation, with no plan. Pt denies homicidal ideations. Pt reports hallucinations of \"sports things\" but would not discuss any further. Pt denies any substance use. Pt denies physical, emotional, verbal, and sexual abuse. Pt reports being on \"partial\" probation. Pt refused to discuss a discharge plan and would only state \"I'm going to the airport when I leave because I have things to handle. \" Social work will continue to work with pt while on the unit.

## 2023-04-06 NOTE — GROUP NOTE
Psych-Ed/Relapse Prevention Group Note        Date: April 6, 2023 Start Time:  10:30am   End Time:  11:15am      Number of Participants in Group & Unit Census:  8/17    Topic: Socialization     Goal of Group:Patient will demonstrate improved interpersonal skills      Comments:     Patient did not participate in Psych-Ed/Relapse Prevention group, despite staff encouragement and explanation of benefits. Patient remain seclusive to self. Q15 minute safety checks maintained for patient safety and will continue to encourage patient to attend unit programming.          Signature:  Miguel Ceron, [address]

## 2023-04-06 NOTE — PROGRESS NOTES
Pharmacy Medication History Note      List of current medications patient is taking is complete. Source of information: [address], [place], [place] Dis[place] medication list    Changes made to medication list:  Medications removed (include reason, ex. therapy complete or physician discontinued, noncompliance):  Flagged Fluoxetine and Olanzapine for provider review, these were not continued after patient's admission to [facility] in March. The patient was discharged from [facility] 3/2/23. Medications added/doses adjusted: Added Latuda 120 mg daily  Added Sertraline 100 mg nightly  Added Trazodone 50 mg nightly as needed      Current Home Medication List at Time of Admission:  Prior to Admission medications    Medication Sig   lurasidone (LATUDA) 120 MG tablet Take 1 tablet by mouth daily   sertraline (ZOLOFT) 100 MG tablet Take 1 tablet by mouth at bedtime   traZODone (DESYREL) 50 MG tablet Take 1 tablet by mouth nightly as needed for Sleep   FLUoxetine (PROZAC) 20 MG capsule Take 1 capsule by mouth daily  Patient not taking: Reported on 4/6/2023   OLANZapine (ZYPREXA) 10 MG tablet Take 1 tablet by mouth nightly  Patient not taking: Reported on 4/6/2023         Please let me know if you have any questions about this encounter. Thank you!     Electronically signed by Mccoy Kanner, [facility] on 4/6/2023 at 8:32 AM

## 2023-04-06 NOTE — PROGRESS NOTES
Behavioral Services  Medicare Certification Upon Admission    I certify that this patient's inpatient psychiatric hospital admission is medically necessary for:    [x] (1) Treatment which could reasonably be expected to improve this patient's condition,       [x] (2) Or for diagnostic study;     AND     [x](2) The inpatient psychiatric services are provided while the individual is under the care of a physician and are included in the individualized plan of care.     Estimated length of stay/service 4-7 days    Plan for post-hospital care home with outpatient [facility] f/u    Electronically signed by Booker Boudreaux MD on 4/6/2023 at 7:58 AM

## 2023-04-07 VITALS
WEIGHT: 235 LBS | BODY MASS INDEX: 31.83 KG/M2 | SYSTOLIC BLOOD PRESSURE: 123 MMHG | DIASTOLIC BLOOD PRESSURE: 62 MMHG | RESPIRATION RATE: 14 BRPM | HEIGHT: 72 IN | HEART RATE: 58 BPM | TEMPERATURE: 98 F

## 2023-04-07 PROCEDURE — 6370000000 HC RX 637 (ALT 250 FOR IP): Performed by: NURSE PRACTITIONER

## 2023-04-07 RX ORDER — SERTRALINE HYDROCHLORIDE 100 MG/1
100 TABLET, FILM COATED ORAL NIGHTLY
Qty: 30 TABLET | Refills: 0 | Status: SHIPPED | OUTPATIENT
Start: 2023-04-07

## 2023-04-07 RX ORDER — ALBUTEROL SULFATE 90 UG/1
2 AEROSOL, METERED RESPIRATORY (INHALATION) EVERY 6 HOURS PRN
Qty: 18 G | Refills: 0 | Status: SHIPPED | OUTPATIENT
Start: 2023-04-07

## 2023-04-07 RX ORDER — LURASIDONE HYDROCHLORIDE 120 MG/1
120 TABLET, FILM COATED ORAL
Qty: 30 TABLET | Refills: 0 | Status: SHIPPED | OUTPATIENT
Start: 2023-04-08

## 2023-04-07 RX ORDER — HYDROXYZINE 50 MG/1
50 TABLET, FILM COATED ORAL 3 TIMES DAILY PRN
Qty: 30 TABLET | Refills: 0 | Status: SHIPPED | OUTPATIENT
Start: 2023-04-07 | End: 2023-04-17

## 2023-04-07 RX ORDER — TRAZODONE HYDROCHLORIDE 50 MG/1
50 TABLET ORAL NIGHTLY PRN
Qty: 30 TABLET | Refills: 0 | Status: SHIPPED | OUTPATIENT
Start: 2023-04-07

## 2023-04-07 RX ADMIN — LURASIDONE HYDROCHLORIDE 120 MG: 60 TABLET, FILM COATED ORAL at 08:14

## 2023-04-07 ASSESSMENT — PAIN SCALES - GENERAL: PAINLEVEL_OUTOF10: 0

## 2023-04-07 ASSESSMENT — LIFESTYLE VARIABLES
HOW OFTEN DO YOU HAVE A DRINK CONTAINING ALCOHOL: NEVER
HOW MANY STANDARD DRINKS CONTAINING ALCOHOL DO YOU HAVE ON A TYPICAL DAY: PATIENT DOES NOT DRINK

## 2023-04-07 NOTE — GROUP NOTE
Psych-Ed/Relapse Prevention and Recreation Group Note        Date: April 7, 2023 Start Time: 11am  End Time: 11:45am      Number of Participants in Group & Unit Census:  8/15    Topic: Leisure skills    Goal of Group:Patient will identify benefits of leisure for coping. Comments:     Patient did not participate in Psych-Ed/Relapse Prevention and Recreation group, despite staff encouragement and explanation of benefits. Patient remain seclusive to self. Q15 minute safety checks maintained for patient safety and will continue to encourage patient to attend unit programming.          Signature:  Pee Kim, [address]

## 2023-04-07 NOTE — BH NOTE
[address] [facility]  Admission Note     Admission Type:   Admission Type: Involuntary    Reason for admission:  Reason for Admission: Off medication decreased sleep thoughts to harm self      Addictive Behavior:   Addictive Behavior  In the Past 3 Months, Have You Felt or Has Someone Told You That You Have a Problem With  : None    Medical Problems:   Past Medical History:   Diagnosis Date    Asthma     Hypertension     Morbid obesity ([facility]     [facility]     Substance abuse ([facility]        Status EXAM:  Mental Status and Behavioral Exam  Normal: No  Level of Assistance: Independent/Self  Facial Expression: Flat  Affect: Blunt  Level of Consciousness: Alert  Frequency of Checks: 4 times per hour, close  Mood:Normal: No  Mood: Anxious, Suspicious  Motor Activity:Normal: No  Motor Activity: Decreased  Eye Contact: Fair  Observed Behavior: Preoccupied, Withdrawn, Impulsive, Guarded  Sexual Misconduct History: Current - no  Preception: [place] to person, [place] to time, [place] to place, [place] to situation  Attention:Normal: No  Attention: Distractible  Thought Processes: Circumstantial  Thought Content:Normal: No  Thought Content: Preoccupations  Depression Symptoms: Impaired concentration, Feelings of hopelessess, Feelings of helplessness  Anxiety Symptoms: Generalized  Mayuri Symptoms: No problems reported or observed.   Hallucinations: None  Delusions: No  Memory:Normal: No  Memory: Poor recent  Insight and Judgment: No  Insight and Judgment: Poor insight, Poor judgment    Tobacco Screening:  Practical Counseling, on admission, sung X, if applicable and completed (first 3 are required if patient doesn't refuse):            ( ) Recognizing danger situations (included triggers and roadblocks)                    ( ) Coping skills (new ways to manage stress,relaxation techniques, changing routine, distraction)                                                           ( ) Basic information about
Patient given tobacco quitline number 2-215.631.4486 at this time, refusing to call at this time, states \" I just dont want to quit now\"- patient given information as to the dangers of long term tobacco use. Continue to reinforce the importance of tobacco cessation.
Patient is a non smoker no education needed.
Patient refused 9am group after staff encouraged to attend.
counseling faxed to [facility]                                                                                                                   ( x) Patient refused counseling  (x ) Patient refused referral  ( x) Patient refused prescription upon discharge  ( ) Patient has not smoked in the last 30 days    Metabolic Screening:    No results found for: LABA1C    No results found for: CHOL  No results found for: TRIG  No results found for: HDL  No components found for: LDLCAL  No results found for: Renée Steele RN    Patient discharged home via approved [employer] and white cab.

## 2023-04-07 NOTE — DISCHARGE SUMMARY
assault to a teacher, theft, domestic violence, false alarms to law enforcement, aggravated menacing, criminal trespass, impersonation of a , and public indecency. Nursing staff report patient has denied suicidal ideation today. Patient denies any alcohol use, cigarette use, or substance use. Urine drug screen and blood alcohol level from [address] dated 4/5/2023 were negative. Hospital Course:   Upon admission, Leslee Moreno was provided a safe secure environment, introduced to unit milieu. After a few days of hospital care, patient began to feel improvement. Patient refused to participate in group therapy. Meds were adjusted as noted below. Patient has continued to deny suicidal and homicidal ideation since admission. He denies experiencing hallucinations or paranoia. Sleep improved, appetite was good. On morning rounds 4/7/2023, Leslee Moreno  denies suicidal or homicidal ideations, denies hallucinations or delusions. Denies SE's from meds. It was decided that maximum benefit from hospital care had been achieved and patient can be discharged. Consults:   Internal medicine for medical management     Significant Diagnostic Studies: Routine labs and diagnostics    Treatments: Psychotropic medications, therapy with group, milieu, and 1:1 with nurses, social workers, PA-C/CNP, and Attending physician.       Discharge Medications:  Current Discharge Medication List        START taking these medications    Details   hydrOXYzine HCl (ATARAX) 50 MG tablet Take 1 tablet by mouth 3 times daily as needed for Anxiety  Qty: 30 tablet, Refills: 0      albuterol sulfate HFA (PROVENTIL;VENTOLIN;PROAIR) 108 (90 Base) MCG/ACT inhaler Inhale 2 puffs into the lungs every 6 hours as needed for Wheezing  Qty: 18 g, Refills: 0           CONTINUE these medications which have CHANGED    Details   sertraline (ZOLOFT) 100 MG tablet Take 1 tablet by mouth at bedtime  Qty: 30 tablet, Refills: 0      traZODone

## 2023-04-07 NOTE — PROGRESS NOTES
CLINICAL PHARMACY NOTE: MEDS TO BEDS    Total # of Prescriptions Filled: 5   The following medications were delivered to the patient:  Albuterol sulfate hfa  Hydroxyzine [id number]  TrazodoneHCL 50mg  Sertraline HCL 100mg  Latuda 120mg      Additional Documentation: Patient is Eligible to Utilize Meds To Beds for Their Discharge Medications Delivered Medication to [address] [facility]  jc-04/07/23 11:50

## 2023-04-07 NOTE — DISCHARGE INSTRUCTIONS
pressure. You are severely dizzy or lightheaded. You are confused or can't think clearly. You have pale, gray, or blue-colored skin or lips. You pass out (lose consciousness) or are very hard to wake up. You have loss of balance or trouble walking. You have trouble seeing out of one or both eyes. You have weakness or drooping on one side of the face. You have weakness or numbness in an arm or a leg. You have trouble speaking. You have a severe headache. You have a seizure. Call your doctor now or seek immediate medical care if:    You have moderate trouble breathing. (You can't speak a full sentence.)     You are coughing up blood. You have signs of low blood pressure. These include feeling lightheaded; being too weak to stand; and having cold, pale, clammy skin. Watch closely for changes in your health, and be sure to contact your doctor if:    Your symptoms get worse. You are not getting better as expected. You have new or worse symptoms of anxiety, depression, nightmares, or flashbacks. Call before you go to the doctor's office. Follow their instructions. And wear a mask. Where can you learn more? Go to http://www.woods.com/ and enter C008 to learn more about \"Learning About Coronavirus (COVID-19). \"  Current as of: January 28, 2023               Content Version: 13.6  © 2171-4808 [employer]. Care instructions adapted under license by [facility] ([facility]). If you have questions about a medical condition or this instruction, always ask your healthcare professional. [place] [address] any warranty or liability for your use of this information.

## 2023-04-07 NOTE — PLAN OF CARE
[facility]  Initial Interdisciplinary Treatment Plan NO      Original treatment plan Date & Time: 4/6/2023 1300    Admission Type:  Admission Type: Involuntary    Reason for admission:   Reason for Admission: Off medication decreased sleep thoughts to harm self    Estimated Length of Stay:  5-7days  Estimated Discharge Date: to be determined by physician    PATIENT STRENGTHS:  Patient Strengths:   Patient Strengths and Limitations:   Addictive Behavior: Addictive Behavior  In the Past 3 Months, Have You Felt or Has Someone Told You That You Have a Problem With  : None  Medical Problems:  Past Medical History:   Diagnosis Date    Asthma     Hypertension     Morbid obesity ([facility]     Schizoaffective disorder ([facility]     Substance abuse ([facility]      Status EXAM:Mental Status and Behavioral Exam  Normal: No  Level of Assistance: Independent/Self  Facial Expression: Flat  Affect: Blunt  Level of Consciousness: Alert  Frequency of Checks: 4 times per hour, close  Mood:Normal: No  Mood: Anxious  Motor Activity:Normal: Yes  Motor Activity: Decreased  Eye Contact: Fair  Observed Behavior: Guarded, Preoccupied  Sexual Misconduct History: Current - no  Preception: [place] to person, [place] to time, [place] to place  Attention:Normal: No  Attention: Distractible  Thought Processes: Circumstantial  Thought Content:Normal: No  Thought Content: Preoccupations  Depression Symptoms: Impaired concentration  Anxiety Symptoms: Generalized  Mayuri Symptoms: No problems reported or observed.   Hallucinations: None  Delusions: No  Memory:Normal: No  Memory: Poor recent, Poor remote  Insight and Judgment: No  Insight and Judgment: Poor judgment, Poor insight    EDUCATION:   Learner Progress Toward Treatment Goals: reviewed group plans and strategies for care    Method:group therapy, medication compliance, individualized assessments and care planning    Outcome: needs reinforcement    PATIENT GOALS: to be discussed with patient
Problem: Self Harm/Suicidality  Goal: Will have no self-injury during hospital stay  Description: INTERVENTIONS:  1. Ensure constant observer at bedside with Q15M safety checks  2. Maintain a safe environment  3. Secure patient belongings  4. Ensure family/visitors adhere to safety recommendations  5. Ensure safety tray has been added to patient's diet order  6. Every shift and PRN: Re-assess suicidal risk via Frequent Screener    4/6/2023 2213 by Liu Rodriguez LPN  Outcome: Progressing  Flowsheets (Taken 4/6/2023 2207)  Will have no self-injury during hospital stay: Maintain a safe environment     Patient denies any thoughts and or plans to harm himself and or anyone else. He is isolative to self slightly irritable yet redirectable. On the milieu watching television isolative to self. Q 15 minute checks continue.
milieu/groups/activities  4. Monitor for social isolation  Outcome: Not Progressing  Note: Patient continues to admit to anxiety and depression. Patient has identified coping skills but remains isolated. Staff ensures safety by providing safety checks on the unit intermittently and every 15 minutes. Staff continues to provide a safe environment.

## 2023-08-25 ENCOUNTER — HOSPITAL ENCOUNTER (INPATIENT)
Age: 48
LOS: 2 days | Discharge: HOME | DRG: 885 | End: 2023-08-27
Attending: EMERGENCY MEDICINE | Admitting: PSYCHIATRY & NEUROLOGY
Payer: MEDICARE

## 2023-08-25 DIAGNOSIS — R45.851 DEPRESSION WITH SUICIDAL IDEATION: Primary | ICD-10-CM

## 2023-08-25 DIAGNOSIS — F32.A DEPRESSION WITH SUICIDAL IDEATION: Primary | ICD-10-CM

## 2023-08-25 LAB
ALBUMIN SERPL-MCNC: 4.5 G/DL (ref 3.5–5.2)
ALP SERPL-CCNC: 69 U/L (ref 40–129)
ALT SERPL-CCNC: 26 U/L (ref 5–41)
AMPHET UR QL SCN: NEGATIVE
ANION GAP SERPL CALCULATED.3IONS-SCNC: 12 MMOL/L (ref 9–17)
APAP SERPL-MCNC: <5 UG/ML (ref 10–30)
AST SERPL-CCNC: 25 U/L
BARBITURATES UR QL SCN: NEGATIVE
BASOPHILS # BLD: 0.1 K/UL (ref 0–0.2)
BASOPHILS NFR BLD: 1 % (ref 0–2)
BENZODIAZ UR QL: NEGATIVE
BILIRUB SERPL-MCNC: 1.3 MG/DL (ref 0.3–1.2)
BUN SERPL-MCNC: 20 MG/DL (ref 6–20)
CALCIUM SERPL-MCNC: 9.3 MG/DL (ref 8.6–10.4)
CANNABINOIDS UR QL SCN: NEGATIVE
CHLORIDE SERPL-SCNC: 101 MMOL/L (ref 98–107)
CO2 SERPL-SCNC: 25 MMOL/L (ref 20–31)
COCAINE UR QL SCN: NEGATIVE
CREAT SERPL-MCNC: 0.9 MG/DL (ref 0.7–1.2)
EOSINOPHIL # BLD: 0.3 K/UL (ref 0–0.4)
EOSINOPHILS RELATIVE PERCENT: 4 % (ref 0–4)
ERYTHROCYTE [DISTWIDTH] IN BLOOD BY AUTOMATED COUNT: 13.4 % (ref 11.5–14.9)
ETHANOL PERCENT: <0.01 %
ETHANOLAMINE SERPL-MCNC: <10 MG/DL
FENTANYL UR QL: NEGATIVE
GFR SERPL CREATININE-BSD FRML MDRD: >60 ML/MIN/1.73M2
GLUCOSE SERPL-MCNC: 117 MG/DL (ref 70–99)
HCT VFR BLD AUTO: 41.2 % (ref 41–53)
HGB BLD-MCNC: 14.1 G/DL (ref 13.5–17.5)
LYMPHOCYTES NFR BLD: 1.4 K/UL (ref 1–4.8)
LYMPHOCYTES RELATIVE PERCENT: 21 % (ref 24–44)
MAGNESIUM SERPL-MCNC: 1.9 MG/DL (ref 1.6–2.6)
MCH RBC QN AUTO: 29.4 PG (ref 26–34)
MCHC RBC AUTO-ENTMCNC: 34.1 G/DL (ref 31–37)
MCV RBC AUTO: 86.2 FL (ref 80–100)
METHADONE UR QL: NEGATIVE
MONOCYTES NFR BLD: 0.7 K/UL (ref 0.1–1.3)
MONOCYTES NFR BLD: 10 % (ref 1–7)
NEUTROPHILS NFR BLD: 64 % (ref 36–66)
NEUTS SEG NFR BLD: 4.5 K/UL (ref 1.3–9.1)
OPIATES UR QL SCN: NEGATIVE
OXYCODONE UR QL SCN: NEGATIVE
PCP UR QL SCN: NEGATIVE
PLATELET # BLD AUTO: 294 K/UL (ref 150–450)
PMV BLD AUTO: 7.6 FL (ref 6–12)
POTASSIUM SERPL-SCNC: 4 MMOL/L (ref 3.7–5.3)
PROT SERPL-MCNC: 7.9 G/DL (ref 6.4–8.3)
RBC # BLD AUTO: 4.78 M/UL (ref 4.5–5.9)
SALICYLATES SERPL-MCNC: <1 MG/DL (ref 3–10)
SODIUM SERPL-SCNC: 138 MMOL/L (ref 135–144)
TEST INFORMATION: NORMAL
WBC OTHER # BLD: 6.9 K/UL (ref 3.5–11)

## 2023-08-25 PROCEDURE — 99222 1ST HOSP IP/OBS MODERATE 55: CPT | Performed by: INTERNAL MEDICINE

## 2023-08-25 PROCEDURE — 1240000000 HC EMOTIONAL WELLNESS R&B

## 2023-08-25 PROCEDURE — 80307 DRUG TEST PRSMV CHEM ANLYZR: CPT

## 2023-08-25 PROCEDURE — 99223 1ST HOSP IP/OBS HIGH 75: CPT | Performed by: PSYCHIATRY & NEUROLOGY

## 2023-08-25 PROCEDURE — 99285 EMERGENCY DEPT VISIT HI MDM: CPT

## 2023-08-25 PROCEDURE — 80179 DRUG ASSAY SALICYLATE: CPT

## 2023-08-25 PROCEDURE — 83735 ASSAY OF MAGNESIUM: CPT

## 2023-08-25 PROCEDURE — 36415 COLL VENOUS BLD VENIPUNCTURE: CPT

## 2023-08-25 PROCEDURE — 6370000000 HC RX 637 (ALT 250 FOR IP): Performed by: NURSE PRACTITIONER

## 2023-08-25 PROCEDURE — 6370000000 HC RX 637 (ALT 250 FOR IP): Performed by: INTERNAL MEDICINE

## 2023-08-25 PROCEDURE — 80053 COMPREHEN METABOLIC PANEL: CPT

## 2023-08-25 PROCEDURE — 85025 COMPLETE CBC W/AUTO DIFF WBC: CPT

## 2023-08-25 PROCEDURE — 80143 DRUG ASSAY ACETAMINOPHEN: CPT

## 2023-08-25 PROCEDURE — G0480 DRUG TEST DEF 1-7 CLASSES: HCPCS

## 2023-08-25 PROCEDURE — 6370000000 HC RX 637 (ALT 250 FOR IP): Performed by: EMERGENCY MEDICINE

## 2023-08-25 PROCEDURE — 6370000000 HC RX 637 (ALT 250 FOR IP): Performed by: PSYCHIATRY & NEUROLOGY

## 2023-08-25 RX ORDER — TRAZODONE HYDROCHLORIDE 50 MG/1
50 TABLET ORAL NIGHTLY PRN
Status: DISCONTINUED | OUTPATIENT
Start: 2023-08-25 | End: 2023-08-27 | Stop reason: HOSPADM

## 2023-08-25 RX ORDER — MAGNESIUM HYDROXIDE/ALUMINUM HYDROXICE/SIMETHICONE 120; 1200; 1200 MG/30ML; MG/30ML; MG/30ML
30 SUSPENSION ORAL EVERY 6 HOURS PRN
Status: DISCONTINUED | OUTPATIENT
Start: 2023-08-25 | End: 2023-08-27 | Stop reason: HOSPADM

## 2023-08-25 RX ORDER — ACETAMINOPHEN 325 MG/1
650 TABLET ORAL ONCE
Status: COMPLETED | OUTPATIENT
Start: 2023-08-25 | End: 2023-08-25

## 2023-08-25 RX ORDER — ACETAMINOPHEN 325 MG/1
650 TABLET ORAL EVERY 4 HOURS PRN
Status: DISCONTINUED | OUTPATIENT
Start: 2023-08-25 | End: 2023-08-27 | Stop reason: HOSPADM

## 2023-08-25 RX ORDER — LISINOPRIL 5 MG/1
5 TABLET ORAL DAILY
Status: DISCONTINUED | OUTPATIENT
Start: 2023-08-25 | End: 2023-08-27 | Stop reason: HOSPADM

## 2023-08-25 RX ORDER — IBUPROFEN 400 MG/1
400 TABLET ORAL EVERY 6 HOURS PRN
Status: DISCONTINUED | OUTPATIENT
Start: 2023-08-25 | End: 2023-08-27 | Stop reason: HOSPADM

## 2023-08-25 RX ORDER — HYDROXYZINE 50 MG/1
50 TABLET, FILM COATED ORAL 3 TIMES DAILY PRN
Status: DISCONTINUED | OUTPATIENT
Start: 2023-08-25 | End: 2023-08-27 | Stop reason: HOSPADM

## 2023-08-25 RX ORDER — SERTRALINE HYDROCHLORIDE 100 MG/1
100 TABLET, FILM COATED ORAL NIGHTLY
Status: DISCONTINUED | OUTPATIENT
Start: 2023-08-25 | End: 2023-08-27 | Stop reason: HOSPADM

## 2023-08-25 RX ORDER — LURASIDONE HYDROCHLORIDE 60 MG/1
120 TABLET, FILM COATED ORAL
Status: DISCONTINUED | OUTPATIENT
Start: 2023-08-26 | End: 2023-08-27 | Stop reason: HOSPADM

## 2023-08-25 RX ORDER — POLYETHYLENE GLYCOL 3350 17 G/17G
17 POWDER, FOR SOLUTION ORAL DAILY PRN
Status: DISCONTINUED | OUTPATIENT
Start: 2023-08-25 | End: 2023-08-27 | Stop reason: HOSPADM

## 2023-08-25 RX ORDER — CHLORPROMAZINE HYDROCHLORIDE 25 MG/ML
50 INJECTION INTRAMUSCULAR 3 TIMES DAILY PRN
Status: DISCONTINUED | OUTPATIENT
Start: 2023-08-25 | End: 2023-08-27 | Stop reason: HOSPADM

## 2023-08-25 RX ORDER — ALBUTEROL SULFATE 90 UG/1
2 AEROSOL, METERED RESPIRATORY (INHALATION) EVERY 6 HOURS PRN
Status: DISCONTINUED | OUTPATIENT
Start: 2023-08-25 | End: 2023-08-27 | Stop reason: HOSPADM

## 2023-08-25 RX ORDER — ALBUTEROL SULFATE 90 UG/1
2 AEROSOL, METERED RESPIRATORY (INHALATION) EVERY 6 HOURS PRN
Status: CANCELLED | OUTPATIENT
Start: 2023-08-25

## 2023-08-25 RX ORDER — CHLORPROMAZINE HYDROCHLORIDE 25 MG/1
50 TABLET, FILM COATED ORAL 3 TIMES DAILY PRN
Status: DISCONTINUED | OUTPATIENT
Start: 2023-08-25 | End: 2023-08-27 | Stop reason: HOSPADM

## 2023-08-25 RX ADMIN — HYDROXYZINE HYDROCHLORIDE 50 MG: 50 TABLET, FILM COATED ORAL at 20:31

## 2023-08-25 RX ADMIN — TRAZODONE HYDROCHLORIDE 50 MG: 50 TABLET ORAL at 20:31

## 2023-08-25 RX ADMIN — ACETAMINOPHEN 650 MG: 325 TABLET ORAL at 10:31

## 2023-08-25 RX ADMIN — SERTRALINE HYDROCHLORIDE 100 MG: 100 TABLET ORAL at 20:31

## 2023-08-25 RX ADMIN — IBUPROFEN 400 MG: 400 TABLET, FILM COATED ORAL at 15:40

## 2023-08-25 RX ADMIN — ACETAMINOPHEN 650 MG: 325 TABLET ORAL at 20:31

## 2023-08-25 RX ADMIN — LISINOPRIL 5 MG: 5 TABLET ORAL at 15:41

## 2023-08-25 ASSESSMENT — PATIENT HEALTH QUESTIONNAIRE - PHQ9
SUM OF ALL RESPONSES TO PHQ QUESTIONS 1-9: 8
8. MOVING OR SPEAKING SO SLOWLY THAT OTHER PEOPLE COULD HAVE NOTICED. OR THE OPPOSITE, BEING SO FIGETY OR RESTLESS THAT YOU HAVE BEEN MOVING AROUND A LOT MORE THAN USUAL: 1
SUM OF ALL RESPONSES TO PHQ QUESTIONS 1-9: 9
1. LITTLE INTEREST OR PLEASURE IN DOING THINGS: 2
2. FEELING DOWN, DEPRESSED OR HOPELESS: 1
SUM OF ALL RESPONSES TO PHQ QUESTIONS 1-9: 9
SUM OF ALL RESPONSES TO PHQ QUESTIONS 1-9: 9
5. POOR APPETITE OR OVEREATING: 1
SUM OF ALL RESPONSES TO PHQ9 QUESTIONS 1 & 2: 3
9. THOUGHTS THAT YOU WOULD BE BETTER OFF DEAD, OR OF HURTING YOURSELF: 1
3. TROUBLE FALLING OR STAYING ASLEEP: 1
10. IF YOU CHECKED OFF ANY PROBLEMS, HOW DIFFICULT HAVE THESE PROBLEMS MADE IT FOR YOU TO DO YOUR WORK, TAKE CARE OF THINGS AT HOME, OR GET ALONG WITH OTHER PEOPLE: 2
6. FEELING BAD ABOUT YOURSELF - OR THAT YOU ARE A FAILURE OR HAVE LET YOURSELF OR YOUR FAMILY DOWN: 1
4. FEELING TIRED OR HAVING LITTLE ENERGY: 1

## 2023-08-25 ASSESSMENT — PAIN SCALES - GENERAL
PAINLEVEL_OUTOF10: 8
PAINLEVEL_OUTOF10: 6

## 2023-08-25 ASSESSMENT — ENCOUNTER SYMPTOMS
DIARRHEA: 0
NAUSEA: 0
ABDOMINAL PAIN: 0
COUGH: 0
VOMITING: 0
SHORTNESS OF BREATH: 0

## 2023-08-25 ASSESSMENT — PAIN DESCRIPTION - LOCATION
LOCATION: ANKLE
LOCATION: OTHER (COMMENT)

## 2023-08-25 ASSESSMENT — PAIN - FUNCTIONAL ASSESSMENT
PAIN_FUNCTIONAL_ASSESSMENT: NONE - DENIES PAIN
PAIN_FUNCTIONAL_ASSESSMENT: ACTIVITIES ARE NOT PREVENTED
PAIN_FUNCTIONAL_ASSESSMENT: ACTIVITIES ARE NOT PREVENTED

## 2023-08-25 ASSESSMENT — SLEEP AND FATIGUE QUESTIONNAIRES
DO YOU HAVE DIFFICULTY SLEEPING: YES
DO YOU USE A SLEEP AID: NO
AVERAGE NUMBER OF SLEEP HOURS: 4
SLEEP PATTERN: DISTURBED/INTERRUPTED SLEEP;RESTLESSNESS

## 2023-08-25 ASSESSMENT — LIFESTYLE VARIABLES
HOW OFTEN DO YOU HAVE A DRINK CONTAINING ALCOHOL: NEVER
HOW MANY STANDARD DRINKS CONTAINING ALCOHOL DO YOU HAVE ON A TYPICAL DAY: PATIENT DOES NOT DRINK
HOW OFTEN DO YOU HAVE A DRINK CONTAINING ALCOHOL: NEVER
HOW MANY STANDARD DRINKS CONTAINING ALCOHOL DO YOU HAVE ON A TYPICAL DAY: PATIENT DOES NOT DRINK

## 2023-08-25 ASSESSMENT — PAIN DESCRIPTION - DESCRIPTORS
DESCRIPTORS: ACHING

## 2023-08-25 NOTE — CARE COORDINATION
BHI Biopsychosocial Assessment    Current Level of Psychosocial Functioning     Independent   Dependent  XX  Minimal Assist       Psychosocial High Risk Factors (check all that apply)    Unable to obtain meds   Chronic illness/pain    Substance abuse   Lack of Family Support   Financial stress   Isolation   Inadequate Community Resources  Suicide attempt(s)  Not taking medications XX  Victim of crime   Developmental Delay  Unable to manage personal needs    Age 73 or older   Homeless  No transportation   Readmission within 30 days  Unemployment  Traumatic Event      Psychiatric Advanced Directives: denies     Family to [employer] in Treatment: denies     Sexual Orientation:  NA    Patient Strengths: Patient has insurance; patient has stable housing; patient is linked with [address] [place] [address]    Patient Barriers: poor coping skills; non compliance with medications       Opiate Education Provided:  NA      CMHC/mental health history: Patient has multiple previous psychiatric admissions; linked with [facility]     Plan of Care   medication management, group/individual therapies, family meetings, psycho -education, treatment team meetings to assist with stabilization    Initial Discharge Plan:  stabilize mood and symptoms with medication management; Patient will follow up with       Clinical Summary:  Patient is a 53 y.o. male admitted to the [facility] for depression with suicidal ideation. Patient reported a plan to shoot self. Patient denies having access to firearms,. Patient is irritated when approached and responds minimally to assessment questions. Patient reports having \"many residences\" when asked about housing. Patient reports being linked with [facility]. Patient has multiple previous psychiatric admissions and was most recently at [facility]. SW will continue to engage patient in treatment and discharge planning as symptoms improve.

## 2023-08-25 NOTE — PROGRESS NOTES
Behavioral Services  Medicare Certification Upon Admission    I certify that this patient's inpatient psychiatric hospital admission is medically necessary for:    [x] (1) Treatment which could reasonably be expected to improve this patient's condition,       [x] (2) Or for diagnostic study;     AND     [x](2) The inpatient psychiatric services are provided while the individual is under the care of a physician and are included in the individualized plan of care.     Estimated length of stay/service 2-9 days    Plan for post-hospital care -outpatient care    Electronically signed by Chloe Vogel MD on 8/25/2023 at 3:27 PM

## 2023-08-25 NOTE — GROUP NOTE
Group Therapy Note    Date: 8/25/2023    Group Start Time: 2098  Group End Time: 2002  Group Topic: Healthy Living/Wellness    GIAN White        Group Therapy Note    Attendees: 8/17     Health/Wellness Group Note        Date: August 25, 2023 Start Time:  3167   End Time:  2589      Number of Participants in [address]:  8/17    Topic: My Story    Goal of Group: Shared progress      Comments:     Patient did not participate in Health/Wellness group, despite staff encouragement and explanation of benefits. Patient remain seclusive to self. Q15 minute safety checks maintained for patient safety and will continue to encourage patient to attend unit programming.

## 2023-08-25 NOTE — ED PROVIDER NOTES
[address] [facility] [facility] ED  EMERGENCY DEPARTMENT ENCOUNTER      Pt Name: Fabio Banerjee  MRN: [id number]  [address] [facility] 1975  Date of evaluation: 8/25/23      CHIEF COMPLAINT       Chief Complaint   Patient presents with    Suicidal     HISTORY OF PRESENT ILLNESS   HPI 53 y.o. male presents with c/o suicidal ideation. The patient reports feeling depressed and having thought of suicide for the last few days. The patient has been thinking of shooting himself. The patient denies access to a gun. The patient denies a h/o of previous suicide attempt. The patient reports that their symptoms were provoked by \"things not going right\", but declines to elaborate. The patient does have a h/o of previous psychiatric admission. The patient denies drug use, denies attempts at self harm to this point. The patient denies medical complaints at this time. REVIEW OF SYSTEMS     Review of Systems   Constitutional:  Negative for fever. HENT:  Negative for congestion. Respiratory:  Negative for cough and shortness of breath. Cardiovascular:  Negative for chest pain. Gastrointestinal:  Negative for abdominal pain, diarrhea, nausea and vomiting. Psychiatric/Behavioral:  Positive for decreased concentration, dysphoric mood and suicidal ideas. PAST MEDICAL HISTORY     Past Medical History:   Diagnosis Date    Asthma     Hypertension     Morbid obesity ([facility])     Schizoaffective disorder ([facility])     Substance abuse ([facility])        SURGICAL HISTORY     History reviewed. No pertinent surgical history.     CURRENT MEDICATIONS       Previous Medications    ALBUTEROL SULFATE HFA (PROVENTIL;VENTOLIN;PROAIR) 108 (90 BASE) MCG/ACT INHALER    Inhale 2 puffs into the lungs every 6 hours as needed for Wheezing    LURASIDONE (LATUDA) 120 MG TABLET    Take 1 tablet by mouth daily (with breakfast)    SERTRALINE (ZOLOFT) 100 MG TABLET    Take 1 tablet by mouth at bedtime    TRAZODONE (DESYREL) 50 MG TABLET    Take 1 tablet by mouth nightly as

## 2023-08-25 NOTE — BH NOTE
Patient arrived on unit via wheelchair and 2 staff. Patient is alert and oriented x4. Patient oriented to room and unit.

## 2023-08-25 NOTE — ED NOTES
Pt admitted to [facility] [address]2 for depression with suicidal ideations     Damaso Stewart, NERY  05/08/10 [id number]

## 2023-08-25 NOTE — BH NOTE
[address] [facility]  Admission Note     Admission Type:   Admission Type: Voluntary    Reason for admission:  Reason for Admission: SI with plan to shoot self      Addictive Behavior:   Addictive Behavior  In the Past 3 Months, Have You Felt or Has Someone Told You That You Have a Problem With  : None    Medical Problems:   Past Medical History:   Diagnosis Date    Asthma     Hypertension     Morbid obesity ([facility])     Schizoaffective disorder ([address] [place] [address])     Substance abuse ([address] [place] [address])        Status EXAM:  Mental Status and Behavioral Exam  Normal: No  Level of Assistance: Independent/Self  Facial Expression: Hostile, Worried  Affect: Unstable  Level of Consciousness: Alert  Frequency of Checks: 4 times per hour, close  Mood:Normal: No  Mood: Anxious, Angry, Suspicious, Irritable  Motor Activity:Normal: Yes  Eye Contact: Fair  Observed Behavior: Guarded, Hostile, Withdrawn, Agitated, Preoccupied  Sexual Misconduct History: Current - no  Preception: [place] to person, [place] to time, [place] to place, [place] to situation  Attention:Normal: No  Attention: Unable to concentrate  Thought Processes: Blocking  Thought Content:Normal: No  Thought Content: Paranoia, Preoccupations  Depression Symptoms: Isolative, Increased irritability, Impaired concentration  Anxiety Symptoms: Generalized  Mayuri Symptoms: No problems reported or observed. Hallucinations:  Auditory (comment) (voice says people are trying to hurt him and other negative things)  Delusions: Yes  Delusions: Paranoid  Memory:Normal: No  Memory: Confabulation  Insight and Judgment: No  Insight and Judgment: Poor judgment, Poor insight, Unmotivated, Unrealistic    Tobacco Screening:  Practical Counseling, on admission, sung X, if applicable and completed (first 3 are required if patient doesn't refuse):            ( ) Recognizing danger situations (included triggers and roadblocks)                    ( ) Coping skills (new ways to manage stress,relaxation techniques,

## 2023-08-25 NOTE — ED NOTES
Safeguard in [facility] for patient watch. Safeguard informed that they need to stay with the patient at all time, must be present in the room and if they need a break or relief to let the nurse know so they can be replaced. Safeguard verbalizes understanding. Belongings and patient checked by security. Belongings locked up. Pt in blue gown.        Gabriel Pierre LPN  69/83/67 [id number]

## 2023-08-25 NOTE — GROUP NOTE
Group Therapy Note    Date: 8/25/2023    Group Start Time: 1330  Group End Time: 5051  Group Topic: Relaxation    SANAZ AntonioS    Relaxation Group Note        Date: August 25, 2023 Start Time: 1:30pm  End Time:  215pm      Number of Participants in Group & Unit Census:  8/18    Topic: relaxation group     Goal of Group:pt will identify the benefits of using art for leisure       Comments:     Patient did not participate in Relaxation group, despite staff encouragement and explanation of benefits. Patient remain seclusive to self. Q15 minute safety checks maintained for patient safety and will continue to encourage patient to attend unit programming.               Signature:  Tavia Gamble

## 2023-08-25 NOTE — H&P
sent to Dr. Otto primary care provider on file. Please note that this chart was generated using voice recognition Dragon dictation software. Although every effort was made to ensure the accuracy of this automated transcription, some errors in transcription may have occurred.
signed by Suly Swenson MD on 8/25/23 at 6:50 PM EDT

## 2023-08-26 VITALS
BODY MASS INDEX: 31.83 KG/M2 | RESPIRATION RATE: 14 BRPM | OXYGEN SATURATION: 96 % | WEIGHT: 235 LBS | TEMPERATURE: 98.8 F | HEIGHT: 72 IN | SYSTOLIC BLOOD PRESSURE: 133 MMHG | HEART RATE: 82 BPM | DIASTOLIC BLOOD PRESSURE: 86 MMHG

## 2023-08-26 PROCEDURE — 6370000000 HC RX 637 (ALT 250 FOR IP): Performed by: NURSE PRACTITIONER

## 2023-08-26 PROCEDURE — 6370000000 HC RX 637 (ALT 250 FOR IP): Performed by: INTERNAL MEDICINE

## 2023-08-26 PROCEDURE — 99232 SBSQ HOSP IP/OBS MODERATE 35: CPT | Performed by: NURSE PRACTITIONER

## 2023-08-26 PROCEDURE — 6370000000 HC RX 637 (ALT 250 FOR IP): Performed by: PSYCHIATRY & NEUROLOGY

## 2023-08-26 PROCEDURE — 99231 SBSQ HOSP IP/OBS SF/LOW 25: CPT | Performed by: INTERNAL MEDICINE

## 2023-08-26 PROCEDURE — 1240000000 HC EMOTIONAL WELLNESS R&B

## 2023-08-26 RX ADMIN — TRAZODONE HYDROCHLORIDE 50 MG: 50 TABLET ORAL at 21:13

## 2023-08-26 RX ADMIN — HYDROXYZINE HYDROCHLORIDE 50 MG: 50 TABLET, FILM COATED ORAL at 21:13

## 2023-08-26 RX ADMIN — LURASIDONE HYDROCHLORIDE 120 MG: 60 TABLET ORAL at 09:28

## 2023-08-26 RX ADMIN — SERTRALINE HYDROCHLORIDE 100 MG: 100 TABLET ORAL at 21:13

## 2023-08-26 RX ADMIN — LISINOPRIL 5 MG: 5 TABLET ORAL at 09:28

## 2023-08-26 ASSESSMENT — PAIN SCALES - GENERAL: PAINLEVEL_OUTOF10: 0

## 2023-08-26 NOTE — GROUP NOTE
Group Therapy Note    Date: 8/26/2023    Group Start Time: 1400  Group End Time: 9351  Group Topic: Music Therapy    GIAN Choi    Music Therapy Group Note        Date: 8/26/2023 Start Time: 1400  End Time: 5657      Number of Participants in Group & Unit Census:  4/15    Topic: Patients shared music and answered questions based on their music selections, or were offered to just talk about the music as they say important. Goal of Group: Patient goals to increase self-expression; Increase sense of community; Increase socialization; Normalization of the environment; Demonstrate positive use of time;       Comments:     Patient did not participate in Music Therapy group, despite staff encouragement and explanation of benefits. Patient remain seclusive to self. Q15 minute safety checks maintained for patient safety and will continue to encourage patient to attend unit programming.

## 2023-08-26 NOTE — PROGRESS NOTES
Daily Progress Note  8/26/2023    Patient Name: Zeeshan Lambert    CHIEF COMPLAINT:  Off medication, decreased sleep, thoughts to harm self          SUBJECTIVE:    Marco was seen for follow-up assessment today. He has been compliant with scheduled medication and has not required any emergency medications in the last 24 hours. Nursing staff report patient is frequently irritable and isolative coming out for needs only. He has not been attending groups. When writer knocked on door and entered his room patient was resting in bed. Writer attempted to engage him in conversation in his head shot up abruptly and he stated \"y'all are trippin with coming in here all the time\". He was very irritable and dismissive of writer. He was difficult to engage in sustained conversation. His responses to assessment questions were superficial and brief. Frequently he would ignore writer and not answer questions at all. Patient is currently denying hallucinations or paranoia. He reports suicidal ideation is fleeting but would not discuss it any further. It is noted patient refused EKG yesterday and stated that staff is \"bothering him too much\". Writer discontinued assessment due to patient's lack of cooperation. Patient has a history of malingering behavior and antisocial personality disorder.     Appetite:  [x] Adequate/Unchanged  [] Increased  [] Decreased      Sleep:       [] Adequate/Unchanged  [x] Fair  [] Poor      Group Attendance on Unit:   [] Yes   [] Selectively    [x] No    Compliant with scheduled medications: [x] Yes  [] No    Received emergency medications in past 24 hrs: [] Yes   [x] No    Medication Side Effects: Denies         Mental Status Exam  Level of consciousness: Awake and alert  Appearance:  Appropriate attire, resting in bed, poor grooming and hygiene, disheveled  Behavior/Motor: Guarded, restless   Attitude toward examiner:  Minimally cooperative, dismissive, irritable  Speech: Loud, irritable,

## 2023-08-26 NOTE — PLAN OF CARE
Problem: Self Harm/Suicidality  Goal: Will have no self-injury during hospital stay  Description: INTERVENTIONS:  1. Ensure constant observer at bedside with Q15M safety checks  2. Maintain a safe environment  3. Secure patient belongings  4. Ensure family/visitors adhere to safety recommendations  5. Ensure safety tray has been added to patient's diet order  6. Every shift and PRN: Re-assess suicidal risk via Frequent Screener    8/26/2023 1358 by Teresita Becerra  Outcome: Progressing     Problem: Anxiety  Goal: Will report anxiety at manageable levels  Description: INTERVENTIONS:  1. Administer medication as ordered  2. Teach and rehearse alternative coping skills  3.  Provide emotional support with 1:1 interaction with staff  8/26/2023 1358 by Teresita Becerra  Outcome: Progressing   Pt currently denies any thoughts to harm self or others denies any hallucinations reports normal sleep and appetite attending groups cooperative with treatment continues to isolate self

## 2023-08-26 NOTE — PROGRESS NOTES
[address] [facility] Internal Medicine    CONSULTATION / HISTORY AND PHYSICAL EXAMINATION            Date:   8/26/2023  Patient name:  Luis Arango  Date of admission:  8/25/2023  6:44 AM  MRN:   [id number]  Account:  [de-identified]  YOB: 1975  PCP:    No primary care provider on file. Room:   [address] [place] [address]  Code Status:    Full Code    Physician Requesting Consult: Tony Mora MD    Reason for Consult:  medical management    Chief Complaint:     Chief Complaint   Patient presents with    Suicidal   HTN      History Obtained From:     Patient medical record nursing staff    History of Present Illness:     HTN  Onset more than 2 years ago  mayra mild to mod  Controlled with current po meds  Not associated with headaches or blurry vision  No chest pain      Past Medical History:     Past Medical History:   Diagnosis Date    Asthma     Hypertension     Morbid obesity ([facility])     Schizoaffective disorder ([facility])     Substance abuse ([facility])         Past Surgical History:     History reviewed. No pertinent surgical history. Medications Prior to Admission:     Prior to Admission medications    Medication Sig Start Date End Date Taking? Authorizing Provider   albuterol sulfate HFA (PROVENTIL;VENTOLIN;PROAIR) 108 (90 Base) MCG/ACT inhaler Inhale 2 puffs into the lungs every 6 hours as needed for Wheezing 4/7/23   MARIA D Laird CNP   sertraline (ZOLOFT) 100 MG tablet Take 1 tablet by mouth at bedtime 4/7/23   MARIA D Laird CNP   traZODone (DESYREL) 50 MG tablet Take 1 tablet by mouth nightly as needed for Sleep 4/7/23   MARIA D Laird CNP   lurasidone (LATUDA) 120 MG tablet Take 1 tablet by mouth daily (with breakfast) 4/8/23   MARIA D Laird CNP        Allergies:     Patient has no known allergies. Social History:     Tobacco:    reports that he has never smoked.  He has never used smokeless tobacco.  Alcohol:      reports that he does not currently

## 2023-08-26 NOTE — PLAN OF CARE
Problem: Self Harm/Suicidality  Goal: Will have no self-injury during hospital stay  Description: INTERVENTIONS:  1. Ensure constant observer at bedside with Q15M safety checks  2. Maintain a safe environment  3. Secure patient belongings  4. Ensure family/visitors adhere to safety recommendations  5. Ensure safety tray has been added to patient's diet order  6. Every shift and PRN: Re-assess suicidal risk via Frequent Screener    Outcome: Progressing  Note: Patient denies wanting to hurt self of others at this time. Patient educated to seek help from staff if needing anything. Patient isolative and only out for needs.

## 2023-08-26 NOTE — PLAN OF CARE
[facility]  Initial Interdisciplinary Treatment Plan NO      Original treatment plan Date & Time: 8/26/2023 1300    Admission Type:  Admission Type: Voluntary    Reason for admission:   Reason for Admission: SI with plan to shoot self    Estimated Length of Stay:  5-7days  Estimated Discharge Date: to be determined by physician    PATIENT STRENGTHS:  Patient Strengths:   Patient Strengths and Limitations:Limitations: Difficulty problem solving/relies on others to help solve problems, Difficult relationships / poor social skills, Unrealistic self-view, External locus of control  Addictive Behavior: Addictive Behavior  In the Past 3 Months, Have You Felt or Has Someone Told You That You Have a Problem With  : None  Medical Problems:  Past Medical History:   Diagnosis Date    Asthma     Hypertension     Morbid obesity ([facility])     Schizoaffective disorder ([facility])     Substance abuse ([facility])      Status EXAM:Mental Status and Behavioral Exam  Normal: No  Level of Assistance: Independent/Self  Facial Expression: Flat, Expressionless  Affect: Blunt  Level of Consciousness: Alert  Frequency of Checks: 4 times per hour, close  Mood:Normal: No  Mood: Anxious, Depressed  Motor Activity:Normal: No  Motor Activity: Decreased  Eye Contact: Fair  Observed Behavior: Guarded, Preoccupied  Sexual Misconduct History: Current - no  Preception: [place] to person, [place] to time, [place] to place, [place] to situation  Attention:Normal: No  Attention: Distractible, Unable to concentrate  Thought Processes: Circumstantial  Thought Content:Normal: No  Thought Content: Preoccupations  Depression Symptoms: Loss of interest, Isolative  Anxiety Symptoms: Generalized  Mayuri Symptoms: No problems reported or observed. Hallucinations:  Auditory (comment)  Delusions: Yes  Delusions: Paranoid  Memory:Normal: No  Memory: Poor recent, Poor remote  Insight and Judgment: No  Insight and Judgment: Poor judgment, Poor insight,

## 2023-08-27 PROCEDURE — 99239 HOSP IP/OBS DSCHRG MGMT >30: CPT | Performed by: NURSE PRACTITIONER

## 2023-08-27 PROCEDURE — 2500000003 HC RX 250 WO HCPCS: Performed by: NURSE PRACTITIONER

## 2023-08-27 RX ORDER — LISINOPRIL 5 MG/1
5 TABLET ORAL DAILY
Qty: 30 TABLET | Refills: 0 | Status: SHIPPED | OUTPATIENT
Start: 2023-08-27

## 2023-08-27 RX ORDER — LURASIDONE HYDROCHLORIDE 120 MG/1
120 TABLET, FILM COATED ORAL
Qty: 30 TABLET | Refills: 0 | Status: SHIPPED | OUTPATIENT
Start: 2023-08-27

## 2023-08-27 RX ORDER — SERTRALINE HYDROCHLORIDE 100 MG/1
100 TABLET, FILM COATED ORAL NIGHTLY
Qty: 30 TABLET | Refills: 0 | Status: SHIPPED | OUTPATIENT
Start: 2023-08-27

## 2023-08-27 RX ADMIN — CHLORPROMAZINE HYDROCHLORIDE 50 MG: 25 INJECTION INTRAMUSCULAR at 08:48

## 2023-08-27 NOTE — BH NOTE
Emergency PRN Medication Administration Note:      Patient is Agitated, Disruptive, and Threat of Lethal as evidence by yelling/swearing, posturing and threatening staff. Patient told writer several times, \"Shut your fucking mouth bitch before I beat your ass! \" Patient then threatened another staff member who attempted to talk with patient to calm him down. Patient was unable to be redirected, continued to threaten staff, and refused to go back to his room. Staff attempted to find and relieve the distress by Talking to patient, Refocusing on new activity, and Administer PRN medications. Security was called to unit for medication administration. Patient is currently continuing to escalate and challenge staff. Medication Administered as prescribed: IM Thorazine 50mg. Alexei Marx Patient Tolerated medication administration. Will continue to monitor, offer support, and reassess.

## 2023-08-27 NOTE — GROUP NOTE
Group Therapy Note    Date: 8/26/2023    Group Start Time: 2035  Group End Time: 2100  Group Topic: Relaxation    GIAN BHMARIO Felipe RN      Group Therapy Note    Attendees: 4/15           Pt refused to attend Relaxation group this evening after encouragement from staff. 1:1 talk time offered as alternative to group session but pt declined. Will continue to encourage patient to attend unit group programming.         Signature:  Elbert Felipe RN

## 2023-08-27 NOTE — BH NOTE
Patient continues to threaten staff stating that he will beat us up and \"end\" us. Patients mother had called earlier stating that patient was threatening her. Shortly after threatening staff, patient was on the phone threatening the person on the other end. After the call, patient began to say derogatory statements to both the female and male nurses. Patient walked back to his room and shut the door.

## 2023-08-27 NOTE — DISCHARGE INSTRUCTIONS
Information:  Medications:   Medication summary provided   I understand that I should take only the medications on my list.     -why and when I need to take each medicine.     -which side effects to watch for.     -that I should carry my medication information at all times in case of     Emergency situations. I will take all of my medicines to follow up appointments.     -check with my physician or pharmacist before taking any new    Medication, over the counter product or drink alcohol.    -Ask about food, drug or dietary supplement interactions.    -discard old lists and update records with medication providers. Notify Physician:  Notify physician if you notice:   Always call 911 if you feel your life is in danger  In case of an emergency call 911 immediately! If 911 is not available call your local emergency medical system for help    Behavioral Health Follow Up:  Original Referral Source: [facility]  Discharge Diagnosis: Depression with suicidal ideation [F32. A, R45.851]  Recommendations for Level of Care: follow up with outpatinet provider   Patient status at discharge: Stable   [facility] hospital  was: [facility]   Aftercare plan faxed: Nurse   -faxed by: Nurse   -date: 8/27/2023   -time: 1100  Prescriptions: Sent to preferred pharmacy. Learning About Coronavirus [phone])  What is COVID-19? COVID-19 is a disease caused by a type of coronavirus. This illness was first found in 2019 and has since spread worldwide (pandemic). Symptoms can range from mild, such as fever and body aches, to severe, including trouble breathing. COVID-19 can be deadly. Coronaviruses are a large group of viruses. Some types cause the common cold. Others cause more serious illnesses like Middle East respiratory syndrome (MERS) and severe acute respiratory syndrome (SARS). What are the symptoms? COVID-19 symptoms may include:  Fever. Cough. Trouble breathing. Chills or repeated shaking with chills.   Muscle and body

## 2023-08-27 NOTE — BH NOTE
Emergency Medication Follow-Up Note:    PRN medication of (Thorazine IM 50mg) was ineffective as patient is currently exhibiting Agitated, Disruptive, and Threat of Lethal  behavior as evidence by yelling at staff, approaching the desk \"Do we have a fucking problem? Ok then shut the fuck up\" and posturing. . Staff has attempted to find and relieve the distress by Talking to patient, Refocusing on new activity, Offering suggestions, Checking for undiagnosed pain, and Administer PRN medications , with no success. Staff will Contact Provider, offer additional medication, provide quiet time.

## 2023-08-27 NOTE — PLAN OF CARE
Problem: Self Harm/Suicidality  Goal: Will have no self-injury during hospital stay  Description: INTERVENTIONS:  1. Ensure constant observer at bedside with Q15M safety checks  2. Maintain a safe environment  3. Secure patient belongings  4. Ensure family/visitors adhere to safety recommendations  5. Ensure safety tray has been added to patient's diet order  6. Every shift and PRN: Re-assess suicidal risk via Frequent Screener    8/26/2023 2027 by Jemal Grider RN  Outcome: Not Progressing  Note: Patient was not willing to participate in assessment. Patient refused to answer most of writer's questions. Q 15 minute checks done on pt for safety      Problem: Anxiety  Goal: Will report anxiety at manageable levels  Description: INTERVENTIONS:  1. Administer medication as ordered  2. Teach and rehearse alternative coping skills  3. Provide emotional support with 1:1 interaction with staff  8/26/2023 2027 by Jemal Grider RN  Outcome: Progressing  Note: Patient was not willing to participate in assessment. Patient refused to answer most of writer's questions. Q 15 minute checks done on pt for safety      Problem: Depression  Goal: Will be euthymic at discharge  Description: INTERVENTIONS:  1. Administer medication as ordered  2. Provide emotional support via 1:1 interaction with staff  3. Encourage involvement in milieu/groups/activities  4. Monitor for social isolation  8/26/2023 2027 by Jemal Grider RN  Outcome: Not Progressing  Note: Patient was not willing to participate in assessment. Patient refused to answer most of writer's questions. Q 15 minute checks done on pt for safety      Problem: Psychosis  Goal: Will report no hallucinations or delusions  Description: INTERVENTIONS:  1. Administer medication as  ordered  2. Assist with reality testing to support increasing orientation  3.  Assess if patient's hallucinations or delusions are encouraging self harm or harm to others and intervene as

## 2023-08-27 NOTE — DISCHARGE SUMMARY
Provider Discharge Summary     Patient ID:  Kota Acosta  [id number]  86 y.o.  1975    Admit date: 8/25/2023    Discharge date and time: 8/27/2023  11:00 AM     Admitting Physician: Chris Ibarra MD     Discharge Physician: MARIA D Thomas - JAVIER    Admission Diagnoses: Depression with suicidal ideation [F32. A, R45.851]    Discharge Diagnoses:     Schizoaffective disorder, bipolar type [facility])     Patient Active Problem List   Diagnosis Code    Polysubstance abuse ([address]) F19.10    Schizophrenia, paranoid ([address]) F20.0    Paranoid schizophrenia ([address]) F20.0    Schizoaffective disorder, bipolar type ([address]) F25.0    Schizophrenia ([address]) F20.9    Schizoaffective disorder ([address]) F25.9    Schizoaffective disorder, depressive type ([address]) F25.1    MDD (major depressive disorder), single episode F32.9    Depression with suicidal ideation F32. A, R45.851    Acute psychosis ([address]) F23    Abuse of smoked substance ([address]) F18.10    Class 1 obesity due to excess calories without serious comorbidity with body mass index (BMI) of 31.0 to 31.9 in adult E66.09, Z68.31        Admission Condition: poor    Discharged Condition: stable    Indication for Admission: threat to self    History of Present Illnes (present tense wording is of findings from admission exam and are not necessarily indicative of current findings):   Kota Acosta is a 53 y.o. male who has a past medical history of polysubstance abuse and schizoaffective disorder versus schizophrenia. Patient has noted antisocial traits per most recent [facility] hospitalization. He presented to the ED endorsing suicidal ideation with a plan to shoot himself in the head. Patient denies having access to a gun but had reported ability to purchase one. Per EMR review, patient admitted to [facility] for 17 days and discharged on 8/23/2023. Patient reports not being compliant with his follow-up appointments or taking his prescribed psychotropic medications once discharged.   He

## 2023-08-27 NOTE — GROUP NOTE
Group Therapy Note    Date: 8/27/2023    Group Start Time: 1030  Group End Time: 1100  Group Topic: Psychoeducation    GIAN Evans        Group Therapy Note    Attendees: 5/18       Patient declined to attend psychotherapy group after encouragement from staff. 1:1 talk time offered but refused. Signature:   Noé Evans

## 2023-08-27 NOTE — BH NOTE
[facility]  Discharge Note    Pt discharged with followings belongings:   Dental Appliances: None  Vision - Corrective Lenses: None  Hearing Aid: None  Jewelry: Other (Comment) (silver tone link bracelet)  Body Piercings Removed: No  Clothing: Footwear, Hat, Pants, Shirt, Socks (1pr blk shoes, blk sweat pants, black tank top, worn tshirt and 2 long sleeve worn white shirts, 1pr black socks, white towel)  Other Valuables: Lighter/Matches, Other (Comment) (1-ebt card 9720, [address] card 9453, no money)   Valuables sent home with  patient. Patient educated on aftercare instructions: yes  Information faxed to [facility] by staff  at 11:54 AM .Patient verbalize understanding of AVS:  yes.     Status EXAM upon discharge: Ambulatory, independent, stable   Mental Status and Behavioral Exam  Normal: No  Level of Assistance: Independent/Self  Facial Expression: Avoids Gaze, Flat  Affect: Congruent  Level of Consciousness: Alert  Frequency of Checks: 4 times per hour, close  Mood:Normal: No  Mood: Anxious, Angry, Suspicious, Irritable  Motor Activity:Normal: No  Motor Activity: Decreased  Eye Contact: Poor  Observed Behavior: Guarded, Withdrawn, Agitated, Hostile  Sexual Misconduct History: Current - no  Preception: [place] to person, [place] to time, [place] to place, [place] to situation  Attention:Normal: No  Attention: Distractible  Thought Processes: Loose association  Thought Content:Normal: No  Thought Content: Preoccupations, Poverty of content, Paranoia, Delusions  Depression Symptoms: Impaired concentration, Isolative, Increased irritability, Change in energy level  Anxiety Symptoms: Generalized  Mayuri Symptoms: Poor judgment  Hallucinations: None  Delusions: Yes  Delusions: Paranoid, Persecutory  Memory:Normal: No  Memory: Poor recent  Insight and Judgment: No  Insight and Judgment: Poor judgment, Poor insight, Unmotivated, Unrealistic    Tobacco Screening:  Practical Counseling, on admission, sung X, if applicable

## 2023-08-28 NOTE — CARE COORDINATION
Name: Maximilian Pizano    : 1975    Discharge Date: 23    Primary Auth/Cert #:     Destination:     Discharge Medications:      Medication List        START taking these medications      lisinopril 5 MG tablet  Commonly known as: PRINIVIL;ZESTRIL  Take 1 tablet by mouth daily  Notes to patient: Help with blood pressure. CONTINUE taking these medications      albuterol sulfate  (90 Base) MCG/ACT inhaler  Commonly known as: PROVENTIL;VENTOLIN;PROAIR  Inhale 2 puffs into the lungs every 6 hours as needed for Wheezing  Notes to patient: Take as needed for wheezing     lurasidone 120 MG tablet  Commonly known as: LATUDA  Take 1 tablet by mouth daily (with breakfast)  Notes to patient: Help with mood      sertraline 100 MG tablet  Commonly known as: ZOLOFT  Take 1 tablet by mouth at bedtime  Notes to patient: Help with mood             STOP taking these medications      traZODone 50 MG tablet  Commonly known as: DESYREL               Where to Get Your Medications        These medications were sent to [facility] (), [address] [address] [address], [address] [facility] [place] [address]      Phone: [phone]   lisinopril 5 MG tablet  lurasidone 120 MG tablet  sertraline 100 MG tablet         Follow Up Appointment: [address], [address]  [phone]  fax [phone]  Follow up on 2023  SW will call you on  with your scheduled appointment at [facility].

## 2024-02-21 NOTE — GROUP NOTE
Group Therapy Note    Date: 2/1/2023    Group Start Time: 6673  Group End Time: 1656  Group Topic: Focus Group    [facility] D    April Holes, CTRS        Group Therapy Note    Attendees: 7/22     Topic: Patient Advisory Group : sharing positive experiences r/t stay and identifying areas of improvement/offering suggestions. Goal of Group: To increase social interaction and for patient to share positive experiences r/t stay/care and identifying areas of improvement/offering suggestions. Comments:      Patient did not participate Cognitive Skills Group at 10:15, despite staff encouragement and explanation of benefits and purpose of task/group. Pt was provided with a simple worksheet to list positive/and areas of improvement for care experience on unit to complete, and directions for handing in to RT. Patient remains seclusive to self, resting in room during group time. Q15 minute safety checks maintained for patient safety and will continue to encourage patient to attend unit programming.             Discipline Responsible: Psychoeducational Specialist        Signature:  Liu Moreno Patient is up-to-date with age-appropriate screenings and immunizations.

## 2024-02-27 ENCOUNTER — HOSPITAL ENCOUNTER (INPATIENT)
Age: 49
LOS: 1 days | Discharge: HOME | DRG: 885 | End: 2024-02-28
Attending: PSYCHIATRY & NEUROLOGY | Admitting: PSYCHIATRY & NEUROLOGY
Payer: MEDICARE

## 2024-02-27 PROCEDURE — 6370000000 HC RX 637 (ALT 250 FOR IP): Performed by: PSYCHIATRY & NEUROLOGY

## 2024-02-27 PROCEDURE — APPSS60 APP SPLIT SHARED TIME 46-60 MINUTES: Performed by: NURSE PRACTITIONER

## 2024-02-27 PROCEDURE — 1240000000 HC EMOTIONAL WELLNESS R&B

## 2024-02-27 PROCEDURE — 6370000000 HC RX 637 (ALT 250 FOR IP): Performed by: NURSE PRACTITIONER

## 2024-02-27 PROCEDURE — 99222 1ST HOSP IP/OBS MODERATE 55: CPT | Performed by: INTERNAL MEDICINE

## 2024-02-27 PROCEDURE — 99223 1ST HOSP IP/OBS HIGH 75: CPT | Performed by: PSYCHIATRY & NEUROLOGY

## 2024-02-27 RX ORDER — ALBUTEROL SULFATE 90 UG/1
2 AEROSOL, METERED RESPIRATORY (INHALATION) EVERY 6 HOURS PRN
Status: DISCONTINUED | OUTPATIENT
Start: 2024-02-27 | End: 2024-02-28 | Stop reason: HOSPADM

## 2024-02-27 RX ORDER — TRAZODONE HYDROCHLORIDE 50 MG/1
50 TABLET ORAL NIGHTLY PRN
Status: ON HOLD | COMMUNITY
End: 2024-02-28

## 2024-02-27 RX ORDER — HYDROXYZINE 50 MG/1
50 TABLET, FILM COATED ORAL 3 TIMES DAILY PRN
Status: DISCONTINUED | OUTPATIENT
Start: 2024-02-27 | End: 2024-02-28 | Stop reason: HOSPADM

## 2024-02-27 RX ORDER — SERTRALINE HYDROCHLORIDE 100 MG/1
100 TABLET, FILM COATED ORAL NIGHTLY
Status: DISCONTINUED | OUTPATIENT
Start: 2024-02-27 | End: 2024-02-28 | Stop reason: HOSPADM

## 2024-02-27 RX ORDER — TRAZODONE HYDROCHLORIDE 50 MG/1
50 TABLET ORAL NIGHTLY PRN
Status: DISCONTINUED | OUTPATIENT
Start: 2024-02-27 | End: 2024-02-28 | Stop reason: HOSPADM

## 2024-02-27 RX ORDER — LURASIDONE HYDROCHLORIDE 60 MG/1
120 TABLET, FILM COATED ORAL
Status: DISCONTINUED | OUTPATIENT
Start: 2024-02-28 | End: 2024-02-28 | Stop reason: HOSPADM

## 2024-02-27 RX ORDER — POLYETHYLENE GLYCOL 3350 17 G/17G
17 POWDER, FOR SOLUTION ORAL DAILY PRN
Status: DISCONTINUED | OUTPATIENT
Start: 2024-02-27 | End: 2024-02-28 | Stop reason: HOSPADM

## 2024-02-27 RX ORDER — MAGNESIUM HYDROXIDE/ALUMINUM HYDROXICE/SIMETHICONE 120; 1200; 1200 MG/30ML; MG/30ML; MG/30ML
30 SUSPENSION ORAL EVERY 6 HOURS PRN
Status: DISCONTINUED | OUTPATIENT
Start: 2024-02-27 | End: 2024-02-28 | Stop reason: HOSPADM

## 2024-02-27 RX ORDER — IBUPROFEN 400 MG/1
400 TABLET ORAL EVERY 6 HOURS PRN
Status: DISCONTINUED | OUTPATIENT
Start: 2024-02-27 | End: 2024-02-28 | Stop reason: HOSPADM

## 2024-02-27 RX ORDER — POLYETHYLENE GLYCOL 3350 17 G
2 POWDER IN PACKET (EA) ORAL
Status: DISCONTINUED | OUTPATIENT
Start: 2024-02-27 | End: 2024-02-28 | Stop reason: HOSPADM

## 2024-02-27 RX ORDER — ACETAMINOPHEN 325 MG/1
650 TABLET ORAL EVERY 4 HOURS PRN
Status: DISCONTINUED | OUTPATIENT
Start: 2024-02-27 | End: 2024-02-28 | Stop reason: HOSPADM

## 2024-02-27 RX ADMIN — HYDROXYZINE HYDROCHLORIDE 50 MG: 50 TABLET, FILM COATED ORAL at 20:40

## 2024-02-27 RX ADMIN — SERTRALINE 100 MG: 100 TABLET, FILM COATED ORAL at 20:39

## 2024-02-27 RX ADMIN — IBUPROFEN 400 MG: 400 TABLET, FILM COATED ORAL at 20:39

## 2024-02-27 RX ADMIN — TRAZODONE HYDROCHLORIDE 50 MG: 50 TABLET ORAL at 20:39

## 2024-02-27 ASSESSMENT — PAIN DESCRIPTION - LOCATION: LOCATION: ANKLE

## 2024-02-27 ASSESSMENT — SLEEP AND FATIGUE QUESTIONNAIRES
DO YOU HAVE DIFFICULTY SLEEPING: YES
DO YOU USE A SLEEP AID: YES
SLEEP PATTERN: RESTLESSNESS
AVERAGE NUMBER OF SLEEP HOURS: 6

## 2024-02-27 ASSESSMENT — PAIN SCALES - GENERAL: PAINLEVEL_OUTOF10: 4

## 2024-02-27 ASSESSMENT — PAIN DESCRIPTION - ORIENTATION: ORIENTATION: RIGHT

## 2024-02-27 NOTE — PROGRESS NOTES
Pharmacy Medication History Note      List of current medications patient is taking is complete.    Source of information: [facility] ([phone]) spoke with pharmacist; [facility] ([phone]) spoke with LATOYA Becerril; Epic; PDMP    Changes made to medication list:  Medications removed (include reason, ex. therapy complete or physician discontinued, noncompliance):  none    Medications flagged for provider review:  Lisinopril - prescription has not been filled since August 2023    Medications added/doses adjusted:  Added Trazodone 50 mg nightly as needed    Other notes (ex. Recent course of antibiotics, Coumadin dosing):  The patient had prescriptions sent to [facility] in January for his Lurasidone, Sertraline, and Trazodone but never picked them up.  Confirmed with LATOYA Becerril at [facility], patient's medication list and that patient is still active with their services.      Current Home Medication List at Time of Admission:  Prior to Admission medications    Medication Sig   traZODone (DESYREL) 50 MG tablet Take 1 tablet by mouth nightly as needed for Sleep   sertraline (ZOLOFT) 100 MG tablet Take 1 tablet by mouth at bedtime   lurasidone (LATUDA) 120 MG tablet Take 1 tablet by mouth daily (with breakfast)   albuterol sulfate HFA (PROVENTIL;VENTOLIN;PROAIR) 108 (90 Base) MCG/ACT inhaler Inhale 2 puffs into the lungs every 6 hours as needed for Wheezing         Please let me know if you have any questions about this encounter. Thank you!    Electronically signed by Oscar Read RPH on 2/27/2024 at 10:23 AM

## 2024-02-27 NOTE — GROUP NOTE
Group Therapy Note    Date: 2/27/2024    Group Start Time: 1000  Group End Time: 1050  Group Topic: Psychotherapy    Luciana Nam MSW, BATSHEVA        Group Therapy Note    Attendees: 6/13     Pt just arrived on the unit when group started.    Discipline Responsible: /Counselor      Signature:  CHRISTO Flood LSW

## 2024-02-27 NOTE — CARE COORDINATION
Psychosocial Assessment    Current Level of Psychosocial Functioning     Independent X  Dependent    Minimal Assist     Comments:      Psychosocial High Risk Factors (check all that apply)    Unable to obtain meds   Chronic illness/pain    Substance abuse   Lack of Family Support   Financial stress   Isolation   Inadequate Community Resources  Suicide attempt(s) X  Not taking medications  X  Victim of crime   Developmental Delay  Unable to manage personal needs    Age 65 or older   Homeless  No transportation   Readmission within 30 days  Unemployment  Traumatic Event    Family/Supports identified: Pt reports mother and grandmother are supportive      Patient Strengths: Stable housing and SSDI    Patient Barriers: Non compliance with medications and appointments.    CMHC/MH history: Linked with [facility]     Plan of Care:  medication management, group/individual therapies, family meetings, psycho -education, treatment team meetings to assist with stabilization    Initial Discharge Plan:  Return home and follow up with [facility].    Clinical Summary:  Pt is a 48 year old male admitted to the [facility] from [facility] on pink slip dated 2/27/24 at 5:17 am. Pt reports suicidal ideation, but feels safe on the unit. Pt denies homicidal ideations. Pt reports visual hallucinations. Pt reports past legal issues. Pt denies substance use. Pt denies physical, emotional, verbal, and sexual abuse. Pt asked to end assessment as he was tired.

## 2024-02-27 NOTE — H&P
place, and time   Head:  normocephalic, atraumatic.  Neck: supple, no carotid bruits, thyroid not palpable  Lungs: Bilateral equal air entry, clear to ausculation, no wheezing, rales or rhonchi, normal effort  Cardiovascular: normal rate, regular rhythm, no murmur, gallop, rub.  Abdomen: Soft, nontender, nondistended, normal bowel sounds, no hepatomegaly or splenomegaly  Neurologic: There are no new focal motor or sensory deficits, moving all extremities spontaneously   Skin: No gross lesions, rashes, bruising or bleeding on exposed skin area  Extremities:  peripheral pulses palpable, no pedal edema or calf pain with palpation    Investigations:      Laboratory Testing:  No results found for this or any previous visit (from the past 24 hour(s)).    Imaging/Diagonstics:  No results found.    Assessment :      Hospital Problems             Last Modified POA    * (Principal) Schizoaffective disorder, bipolar type (HCC) 2/27/2024 Yes       Plan:     Admitted to inpatient psych  48 gentleman class I obesity BMI 32.86 history of hypertension currently controlled without medication  Mild intermittent asthma no exacerbation denies any dyspnea  Chronic back pain and leg pain  No weakness no bowel bladder dysfunction  Asthma albuterol as needed  Chronic back pain ibuprofen as needed as needed  DVT prophylaxis,  Pt mobile   Full code status       Consultations:   IP CONSULT TO INTERNAL MEDICINE      Usama Olsen MD  2/27/2024  3:33 PM    Copy sent to Dr. Otto primary care provider on file.    Please note that this chart was generated using voice recognition Dragon dictation software.  Although every effort was made to ensure the accuracy of this automated transcription, some errors in transcription may have occurred.  
attending  Monitor need and frequency of PRN medications.    CONSULT:  [x] Yes [] No  Internal medicine for medical management/medical H&P      Risk Management: close watch per standard protocol      Psychotherapy: participation in milieu and group and individual sessions with Attending Physician,  and Physician Assistant/CNP      Estimated length of stay:  2-14 days      GENERAL PATIENT/FAMILY EDUCATION  Patient will understand basic signs and symptoms, patient will understand benefits/risks and potential side effects from proposed medications, and patient will understand their role in recovery.  Family is active in patient's care.   Patient assets that may be helpful during treatment include: Intent to participate and engage in treatment, sufficient fund of knowledge and intellect to understand and utilize treatments.    OUTCOME QUESTIONNAIRE (OQ 30.2):  [x] Completed [] Patient too ill to participate    Goals:    1) Remission of psychosis and suicidal ideation.  2) Stabilization of symptoms prior to discharge.  3) Establish efficacy and tolerability of medications.       Behavioral Services  Medicare Certification     Admission Day 1  I certify that this patient's inpatient psychiatric hospital admission is medically necessary for:    x (1) treatment which could reasonably be expected to improve this patient's condition, or    x (2) diagnostic study or its equivalent.     Time Spent: 60 minutes     Physicians Signature:  Electronically signed by MARIA D Vaughn CNP on 2/27/24 at 2:32 PM EST is a 48 y.o. male being evaluated face to face    --MARIA D Vaughn CNP on 2/27/2024 at 2:32 PM    An electronic signature was used to authenticate this note.     Please note that this chart was generated using voice recognition Dragon dictation software.  Although every effort was made to ensure the accuracy of this automated transcription, some errors in transcription may have occurred.   I

## 2024-02-27 NOTE — PROGRESS NOTES
Behavioral Services  Medicare Certification Upon Admission    I certify that this patient's inpatient psychiatric hospital admission is medically necessary for:    [x] (1) Treatment which could reasonably be expected to improve this patient's condition,       [x] (2) Or for diagnostic study;     AND     [x](2) The inpatient psychiatric services are provided while the individual is under the care of a physician and are included in the individualized plan of care.    Estimated length of stay/service 2-9 days    Plan for post-hospital care -outpatient care    Electronically signed by VALENTINE IBARRA MD on 2/27/2024 at 2:37 PM

## 2024-02-27 NOTE — PROGRESS NOTES
RT ASSESSMENT TREATMENT GOALS    [x]Pt Goal:  Pt will identify 1-2 positive coping skills by time of discharge.    []Pt Goal:  Pt will identify 1-2 positive aspects of self by time of discharge.    []Pt Goal:  Pt will remain on task/topic for 15-30 minutes during group by time of discharge.    []Pt Goal:  Pt will identify 1-2 aspects of relapse prevention plan by time of discharge.    []Pt Goal:  Pt will join in conversation with peers 1-2 times per group by time of discharge.    []Pt Goal:  Pt will identify 1-2 new leisure interests by time of discharge.    [x]Pt Goal: Pt will maintain behavorial control until the time of discharge. '

## 2024-02-27 NOTE — GROUP NOTE
Psych-Ed/Relapse Prevention Group Note        Date: February 27, 2024 Start Time:  1320   End Time:  1440      Number of Participants in Group & Unit Census:  8/13    Topic: Socialization    Goal of Group:Patient will demonstrate improved interpersonal skills and offer peer support      Comments:     Patient did not participate in Psych-Ed/Relapse Prevention group, despite staff encouragement and explanation of benefits.  Patient remain seclusive to self.  Q15 minute safety checks maintained for patient safety and will continue to encourage patient to attend unit programming.         Signature:  SANAZ PaS

## 2024-02-27 NOTE — GROUP NOTE
Health/Wellness Group Note        Date: February 27, 2024 Start Time: 11am  End Time: 11:30am      Number of Participants in Group & Unit Census:  6/13    Topic: Health and Wellness    Goal of Group:Patient will identify benefits of physical health and movement for mental health       Comments:     Patient did not participate in Psych-Ed/Relapse Prevention and Health/Wellness group, despite staff encouragement and explanation of benefits.  Patient remain seclusive to self.  Q15 minute safety checks maintained for patient safety and will continue to encourage patient to attend unit programming.         Signature:  Macrina Catalan, CTRS

## 2024-02-28 VITALS
DIASTOLIC BLOOD PRESSURE: 67 MMHG | RESPIRATION RATE: 12 BRPM | HEIGHT: 72 IN | TEMPERATURE: 97.8 F | HEART RATE: 96 BPM | BODY MASS INDEX: 32.51 KG/M2 | WEIGHT: 240 LBS | SYSTOLIC BLOOD PRESSURE: 124 MMHG

## 2024-02-28 PROCEDURE — 99239 HOSP IP/OBS DSCHRG MGMT >30: CPT | Performed by: PSYCHIATRY & NEUROLOGY

## 2024-02-28 RX ORDER — TRAZODONE HYDROCHLORIDE 50 MG/1
50 TABLET ORAL NIGHTLY PRN
Qty: 30 TABLET | Refills: 0 | Status: SHIPPED | OUTPATIENT
Start: 2024-02-28

## 2024-02-28 RX ORDER — ALBUTEROL SULFATE 90 UG/1
2 AEROSOL, METERED RESPIRATORY (INHALATION) EVERY 6 HOURS PRN
Qty: 18 G | Refills: 0 | Status: SHIPPED | OUTPATIENT
Start: 2024-02-28 | End: 2024-02-28

## 2024-02-28 RX ORDER — ALBUTEROL SULFATE 90 UG/1
2 AEROSOL, METERED RESPIRATORY (INHALATION) EVERY 6 HOURS PRN
Qty: 18 G | Refills: 0 | Status: SHIPPED | OUTPATIENT
Start: 2024-02-28

## 2024-02-28 RX ORDER — LURASIDONE HYDROCHLORIDE 120 MG/1
120 TABLET, FILM COATED ORAL
Qty: 30 TABLET | Refills: 0 | Status: SHIPPED | OUTPATIENT
Start: 2024-02-28

## 2024-02-28 RX ORDER — SERTRALINE HYDROCHLORIDE 100 MG/1
100 TABLET, FILM COATED ORAL NIGHTLY
Qty: 30 TABLET | Refills: 0 | Status: SHIPPED | OUTPATIENT
Start: 2024-02-28

## 2024-02-28 RX ORDER — TRAZODONE HYDROCHLORIDE 50 MG/1
50 TABLET ORAL NIGHTLY PRN
Qty: 30 TABLET | Refills: 0 | Status: SHIPPED | OUTPATIENT
Start: 2024-02-28 | End: 2024-02-28

## 2024-02-28 RX ORDER — LURASIDONE HYDROCHLORIDE 120 MG/1
120 TABLET, FILM COATED ORAL
Qty: 30 TABLET | Refills: 0 | Status: SHIPPED | OUTPATIENT
Start: 2024-02-28 | End: 2024-02-28

## 2024-02-28 RX ORDER — SERTRALINE HYDROCHLORIDE 100 MG/1
100 TABLET, FILM COATED ORAL NIGHTLY
Qty: 30 TABLET | Refills: 0 | Status: SHIPPED | OUTPATIENT
Start: 2024-02-28 | End: 2024-02-28

## 2024-02-28 NOTE — BH NOTE
Behavioral [facility] [place]  Admission Note     Admission Type:   Admission Type: Involuntary    Reason for admission:  Reason for Admission: Patient refused to answer admission questions. History bipolar, schizophrenia and has run out of medication a few days ago. Points to head and states \" not feeling well up here\" per paperwork from [facility]      Addictive Behavior: Denies       Medical Problems:   Past Medical History:   Diagnosis Date    Asthma     Hypertension     Morbid obesity (HCC)     Schizoaffective disorder (HCC)     Substance abuse ([facility])        Status EXAM:  Mental Status and Behavioral Exam  Normal: No  Level of Assistance: Independent/Self  Facial Expression: Flat  Affect: Blunt  Level of Consciousness: Alert  Frequency of Checks: 4 times per hour, close  Mood:Normal: No  Mood: Empty  Motor Activity:Normal: No  Motor Activity: Decreased  Eye Contact: Poor  Observed Behavior: Withdrawn  Sexual Misconduct History: Current - no  Preception: [place] to person, [place] to place, [place] to situation  Attention:Normal: No  Attention: Unable to concentrate  Thought Processes: Unremarkable  Thought Content:Normal: No  Thought Content: Poverty of content  Depression Symptoms: Impaired concentration, Increased irritability  Anxiety Symptoms: Generalized  Mayuri Symptoms: Poor judgment  Hallucinations: None  Delusions: No  Memory:Normal: No  Memory: Poor recent, Poor remote  Insight and Judgment: No  Insight and Judgment: Poor insight, Poor judgment, Unrealistic    Tobacco Screening:  Practical Counseling, on admission, sung X, if applicable and completed (first 3 are required if patient doesn't refuse)Nonsmoker:            ( ) Recognizing danger situations (included triggers and roadblocks)                    ( ) Coping skills (new ways to manage stress,relaxation techniques, changing routine, distraction)                                                           ( ) Basic information about quitting (benefits of 
Black & white cab approved by charge nurse.  
Discharge OQ completed by patient. Data uploaded to OQ software.  
Nursing students attempted to get patient's vitals but patient refused. Will attempt to get vitals again.  
Patient arrived on the unit.  Patient was changed into a hospital gown and scanned with a metal detector before oriented to the unit.  Patient remains safe at this time. Patient is closely monitored.     
Patient demanding to know when he is leaving. Patient informed that a follow-up appoint and paperwork needs to be completed first. Patient states \"I don't want a follow-up appointment and I will just tear the paperwork up.\"  
Patient heard yelling \"you can die bitch\" in his room and repeated this statement twice.  
Patient is a non-smoker.  
Patient is a nonsmoker and does not require tobacco counseling.  
Patient is becoming increasingly agitated asking what the hold up is with breakfast. Writer informed patient that breakfast comes between 8am-8:30am. Patient then asked when he was admitted. Writer informed him it was yesterday and he proceeded to interrupt the writer saying he is out of here in three days. Writer offered atarax but patient refused.  
Patient refused vitals   
routine, distraction)                                                           ( ) Basic information about quitting (benefits of quitting, techniques in how to quit, available resources  ( ) [facility]                                                                                                                     ( ) Patient refused counseling  ( ) Patient refused referral  ( ) Patient refused prescription upon discharge  (X) Patient has not smoked in the last 30 days    Metabolic Screening:    No results found for: \"LABA1C\"    No results found for: \"CHOL\"  No results found for: \"TRIG\"  No results found for: \"HDL\"  No components found for: \"LDLCAL\"  No components found for: \"LABVLDL\"  Patient discharged home with belongings via black & white cab. Patient denies all at time of discharge.  Jia Lester RN

## 2024-02-28 NOTE — TRANSITION OF CARE
Behavioral Health Transition Record to Provider    Patient Name: Marco Vizcarra  YOB: 1975   Medical Record Number: [id number]  Date of Admission: 2/27/2024  9:55 AM   Date of Discharge: 2/28/24    Attending Provider: Jhonathan Herrera MD   Discharging Provider: Dr. Herrera  To contact this individual call [phone] and ask the  to page.  If unavailable, ask to be transferred to Behavioral Health Provider on call.  A Behavioral Health Provider will be available on call 24/7 and during holidays.    Primary Care Provider: No primary care provider on file.    No Known Allergies    Reason for Admission: Depression with suicidal ideations    Admission Diagnosis: Depression with suicidal ideation [F32.A, R45.851]    * No surgery found *    No results found for this visit on 02/27/24.    Immunizations administered during this encounter:   There is no immunization history on file for this patient.  None of the above/Not documented/Unable to determine from medical record documentation    Screening for Metabolic Disorders for Patients on Antipsychotic Medications  (Data obtained from the EMR)    Estimated Body Mass Index  Body mass index is 32.86 kg/m².      Vital Signs/Blood Pressure  /67   Pulse 96   Temp 97.8 °F (36.6 °C) (Temporal)   Resp 12   Ht 1.82 m (5' 11.65\")   Wt 108.9 kg (240 lb)   BMI 32.86 kg/m²      Fasting Blood Glucose or Hemoglobin A1c  No results found for: \"GLU\", \"GLUCPOC\"    No results found for: \"LABA1C\", \"FEC4JGPV\"    Discharge Diagnosis: Schizoaffective disorder, bipolar type    Discharge Plan/Destination: Home    Discharge Medication List and Instructions:      Medication List        CONTINUE taking these medications      albuterol sulfate  (90 Base) MCG/ACT inhaler  Commonly known as: PROVENTIL;VENTOLIN;PROAIR  Inhale 2 puffs into the lungs every 6 hours as needed for Wheezing     lurasidone 120 MG tablet  Commonly known as: LATUDA  Take 1 tablet by mouth

## 2024-02-28 NOTE — DISCHARGE SUMMARY
Provider Discharge Summary     Patient ID:  Marco Vizcarra  [id number]  48 y.o.  1975    Admit date: 2/27/2024    Discharge date and time: 2/28/2024  1:02 PM     Admitting Physician: Chris Mccarty MD     Discharge Physician: Jhonathan Herrera MD    Admission Diagnoses: Depression with suicidal ideation [F32.A, R45.851]    Discharge Diagnoses:      Schizoaffective disorder, bipolar type (HCC)   Antisocial personality disorder    Patient Active Problem List   Diagnosis Code    Polysubstance abuse ([facility]) F19.10    Schizophrenia, paranoid (HCC) F20.0    Paranoid schizophrenia (HCC) F20.0    Schizoaffective disorder, bipolar type (HCC) F25.0    Schizophrenia (HCC) F20.9    Schizoaffective disorder (HCC) F25.9    Schizoaffective disorder, depressive type ([facility]) F25.1    MDD (major depressive disorder), single episode F32.9    Depression with suicidal ideation F32.A, R45.851    Acute psychosis ([facility]) F23    Abuse of smoked substance ([facility]) F18.10    Class 1 obesity due to excess calories without serious comorbidity with body mass index (BMI) of 31.0 to 31.9 in adult E66.09, Z68.31        Admission Condition: poor    Discharged Condition: stable    Indication for Admission: threat to self    History of Present Illnes (present tense wording is of findings from admission exam and are not necessarily indicative of current findings):   Marco Vizcarra is a 48 y.o. male who has a past medical history of polysubstance abuse and schizoaffective disorder. Patient presented to the ED at [facility], per documentation, The patient was interviewed at the bedside in the ED. He was pacing during the interview and reported that he is unable to stay still and feels that his legs are restless. Per chart review, the patient has presented to the [facility] ED several times in the past, most recently on (8/28/2023 and 9/20/2023). The patient reports that he ran out of all of his medications 3-4 days ago and that he is becoming increasingly paranoid and needs

## 2024-02-28 NOTE — PROGRESS NOTES
Pt refused meds and discharged per [facility] Rx discontinued by Dr Herrera-  No Active Rx On File 2/28/24 kbg

## 2024-02-28 NOTE — GROUP NOTE
Community Meeting Group Note        Date: February 28, 2024 Start Time: 9am  End Time:  9:30am      Number of Participants in Group & Unit Census:  3/13    Topic: Goal Setting    Goal of Group:Patient will identify daily goal and demonstrate understanding of unit guidelines and schedule      Comments:     Patient did not participate in Community Meeting group, despite staff encouragement and explanation of benefits.  Patient remain seclusive to self.  Q15 minute safety checks maintained for patient safety and will continue to encourage patient to attend unit programming.         Signature:  SANAZ PaS

## 2024-02-28 NOTE — PLAN OF CARE
Problem: Risk for Elopement  Goal: Patient will not exit the unit/facility without proper excort  2/28/2024 0856 by Jia Lester RN  Outcome: Progressing     Problem: Self Harm/Suicidality  Goal: Will have no self-injury during hospital stay  Description: INTERVENTIONS:  1.  Ensure constant observer at bedside with Q15M safety checks  2.  Maintain a safe environment  3.  Secure patient belongings  4.  Ensure family/visitors adhere to safety recommendations  5.  Ensure safety tray has been added to patient's diet order  6.  Every shift and PRN: Re-assess suicidal risk via Frequent Screener    2/28/2024 0856 by Jia Lester RN  Outcome: Progressing     Problem: Depression  Goal: Will be euthymic at discharge  Description: INTERVENTIONS:  1. Administer medication as ordered  2. Provide emotional support via 1:1 interaction with staff  3. Encourage involvement in milieu/groups/activities  4. Monitor for social isolation  2/28/2024 0856 by Jia Lester RN  Outcome: Progressing     Problem: Mayuri  Goal: Will exhibit normal sleep and speech and no impulsivity  Description: INTERVENTIONS:  1. Administer medication as ordered  2. Set limits on impulsive behavior  3. Make attempts to decrease external stimuli as possible  2/28/2024 0856 by Jia Lester RN  Outcome: Progressing     Problem: Behavior  Goal: Pt/Family maintain appropriate behavior and adhere to behavioral management agreement, if implemented  Description: INTERVENTIONS:  1. Assess patient/family's coping skills and  non-compliant behavior (including use of illegal substances)  2. Notify security of behavior or suspected illegal substances which indicate the need for search of the family and/or belongings  3. Encourage verbalization of thoughts and concerns in a socially appropriate manner  4. Utilize positive, consistent limit setting strategies supporting safety of patient, staff and others  5. Encourage participation in the

## 2024-02-28 NOTE — PLAN OF CARE
Problem: Risk for Elopement  Goal: Patient will not exit the unit/facility without proper excort  Outcome: Progressing     Problem: Self Harm/Suicidality  Goal: Will have no self-injury during hospital stay  Description: INTERVENTIONS:  1.  Ensure constant observer at bedside with Q15M safety checks  2.  Maintain a safe environment  3.  Secure patient belongings  4.  Ensure family/visitors adhere to safety recommendations  5.  Ensure safety tray has been added to patient's diet order  6.  Every shift and PRN: Re-assess suicidal risk via Frequent Screener    Outcome: Progressing     Problem: Depression  Goal: Will be euthymic at discharge  Description: INTERVENTIONS:  1. Administer medication as ordered  2. Provide emotional support via 1:1 interaction with staff  3. Encourage involvement in milieu/groups/activities  4. Monitor for social isolation  Outcome: Progressing     Problem: Mayuri  Goal: Will exhibit normal sleep and speech and no impulsivity  Description: INTERVENTIONS:  1. Administer medication as ordered  2. Set limits on impulsive behavior  3. Make attempts to decrease external stimuli as possible  Outcome: Progressing     Problem: Behavior  Goal: Pt/Family maintain appropriate behavior and adhere to behavioral management agreement, if implemented  Description: INTERVENTIONS:  1. Assess patient/family's coping skills and  non-compliant behavior (including use of illegal substances)  2. Notify security of behavior or suspected illegal substances which indicate the need for search of the family and/or belongings  3. Encourage verbalization of thoughts and concerns in a socially appropriate manner  4. Utilize positive, consistent limit setting strategies supporting safety of patient, staff and others  5. Encourage participation in the decision making process about the behavioral management agreement  6. If a visitor's behavior poses a threat to safety call refer to organization policy.  7. Initiate consult

## 2024-02-28 NOTE — DISCHARGE INSTRUCTIONS
Information:  Medications:   Medication summary provided   I understand that I should take only the medications on my list.     -why and when I need to take each medicine.     -which side effects to watch for.     -that I should carry my medication information at all times in case of     Emergency situations.    I will take all of my medicines to follow up appointments.     -check with my physician or pharmacist before taking any new    Medication, over the counter product or drink alcohol.    -Ask about food, drug or dietary supplement interactions.    -discard old lists and update records with medication providers.    Notify Physician:  Notify physician if you notice:   Always call 911 if you feel your life is in danger  In case of an emergency call 911 immediately!  If 911 is not available call your local emergency medical system for help    Behavioral Health Follow Up:  Original Referral Source: ED  Discharge Diagnosis: Depression with suicidal ideation [F32.A, R45.851]  Recommendations for Level of Care: Follow-up  Patient status at discharge: Stable  My hospital  was: [facility]  Aftercare plan faxed: [facility]   -faxed by: Staff   -date: 2/28/24   -time: 1pm  Prescriptions: Paper prescriptions sent with patient    Smoking: Quit Smoking.   Call the [employer]'s smoking quitline at 2-883-19E-QUIT  Know the signs of a heart attack   If you have any of the following symptoms call 911 immediately, do not wait more    Than five minutes.    1. Pressure, fullness and/ or squeezing in the center of the chest spreading to    The jaw, neck or shoulder.    2. Chest discomfort with light headedness, fainting, sweating, nausea or    Shortness of breath.   3. Upper abdominal pressure or discomfort.   4. Lower chest pain, back pain, unusual fatigue, shortness of breath, nausea   Or dizziness.     General Information:   Questions regarding your bill: Call HELP program [phone]     Suicide Hotline ([facility]

## 2025-01-27 ENCOUNTER — HOSPITAL ENCOUNTER (INPATIENT)
Age: 50
LOS: 8 days | Discharge: HOME | DRG: 885 | End: 2025-02-04
Attending: EMERGENCY MEDICINE | Admitting: PSYCHIATRY & NEUROLOGY
Payer: MEDICARE

## 2025-01-27 DIAGNOSIS — R45.851 DEPRESSION WITH SUICIDAL IDEATION: Primary | ICD-10-CM

## 2025-01-27 DIAGNOSIS — F32.A DEPRESSION WITH SUICIDAL IDEATION: Primary | ICD-10-CM

## 2025-01-27 PROCEDURE — 99285 EMERGENCY DEPT VISIT HI MDM: CPT

## 2025-01-27 PROCEDURE — 1240000000 HC EMOTIONAL WELLNESS R&B

## 2025-01-27 RX ORDER — MAGNESIUM HYDROXIDE/ALUMINUM HYDROXICE/SIMETHICONE 120; 1200; 1200 MG/30ML; MG/30ML; MG/30ML
30 SUSPENSION ORAL EVERY 6 HOURS PRN
Status: DISCONTINUED | OUTPATIENT
Start: 2025-01-27 | End: 2025-02-04 | Stop reason: HOSPADM

## 2025-01-27 RX ORDER — ACETAMINOPHEN 325 MG/1
650 TABLET ORAL EVERY 6 HOURS PRN
Status: DISCONTINUED | OUTPATIENT
Start: 2025-01-27 | End: 2025-02-04 | Stop reason: HOSPADM

## 2025-01-27 RX ORDER — HALOPERIDOL 5 MG/ML
5 INJECTION INTRAMUSCULAR EVERY 6 HOURS PRN
Status: DISCONTINUED | OUTPATIENT
Start: 2025-01-27 | End: 2025-02-04 | Stop reason: HOSPADM

## 2025-01-27 RX ORDER — POLYETHYLENE GLYCOL 3350 17 G
2 POWDER IN PACKET (EA) ORAL
Status: DISCONTINUED | OUTPATIENT
Start: 2025-01-27 | End: 2025-02-04 | Stop reason: HOSPADM

## 2025-01-27 RX ORDER — POLYETHYLENE GLYCOL 3350 17 G/17G
17 POWDER, FOR SOLUTION ORAL DAILY PRN
Status: DISCONTINUED | OUTPATIENT
Start: 2025-01-27 | End: 2025-02-04 | Stop reason: HOSPADM

## 2025-01-27 RX ORDER — HYDROXYZINE HYDROCHLORIDE 50 MG/1
50 TABLET, FILM COATED ORAL 3 TIMES DAILY PRN
Status: DISCONTINUED | OUTPATIENT
Start: 2025-01-27 | End: 2025-02-04 | Stop reason: HOSPADM

## 2025-01-27 RX ORDER — DIPHENHYDRAMINE HYDROCHLORIDE 50 MG/ML
50 INJECTION INTRAMUSCULAR; INTRAVENOUS EVERY 6 HOURS PRN
Status: DISCONTINUED | OUTPATIENT
Start: 2025-01-27 | End: 2025-02-04 | Stop reason: HOSPADM

## 2025-01-27 RX ORDER — ALBUTEROL SULFATE 90 UG/1
2 INHALANT RESPIRATORY (INHALATION) EVERY 6 HOURS PRN
COMMUNITY

## 2025-01-27 RX ORDER — IBUPROFEN 400 MG/1
400 TABLET, FILM COATED ORAL EVERY 6 HOURS PRN
Status: DISCONTINUED | OUTPATIENT
Start: 2025-01-27 | End: 2025-02-04 | Stop reason: HOSPADM

## 2025-01-27 RX ORDER — LURASIDONE HYDROCHLORIDE 120 MG/1
120 TABLET, FILM COATED ORAL
Status: ON HOLD | COMMUNITY
End: 2025-02-04

## 2025-01-27 RX ORDER — HALOPERIDOL 5 MG/1
5 TABLET ORAL EVERY 6 HOURS PRN
Status: DISCONTINUED | OUTPATIENT
Start: 2025-01-27 | End: 2025-02-04 | Stop reason: HOSPADM

## 2025-01-27 RX ORDER — TRAZODONE HYDROCHLORIDE 50 MG/1
50 TABLET, FILM COATED ORAL NIGHTLY PRN
Status: DISCONTINUED | OUTPATIENT
Start: 2025-01-27 | End: 2025-01-28

## 2025-01-27 RX ORDER — SERTRALINE HYDROCHLORIDE 100 MG/1
100 TABLET, FILM COATED ORAL NIGHTLY
Status: ON HOLD | COMMUNITY
End: 2025-02-04

## 2025-01-27 RX ORDER — TRAZODONE HYDROCHLORIDE 50 MG/1
50 TABLET, FILM COATED ORAL NIGHTLY PRN
Status: ON HOLD | COMMUNITY
End: 2025-02-04

## 2025-01-27 ASSESSMENT — SLEEP AND FATIGUE QUESTIONNAIRES
DO YOU HAVE DIFFICULTY SLEEPING: YES
DO YOU USE A SLEEP AID: NO
SLEEP PATTERN: DIFFICULTY FALLING ASLEEP;DISTURBED/INTERRUPTED SLEEP
AVERAGE NUMBER OF SLEEP HOURS: 5

## 2025-01-27 ASSESSMENT — PATIENT HEALTH QUESTIONNAIRE - PHQ9
2. FEELING DOWN, DEPRESSED OR HOPELESS: SEVERAL DAYS
SUM OF ALL RESPONSES TO PHQ QUESTIONS 1-9: 2
SUM OF ALL RESPONSES TO PHQ9 QUESTIONS 1 & 2: 2
1. LITTLE INTEREST OR PLEASURE IN DOING THINGS: SEVERAL DAYS

## 2025-01-27 ASSESSMENT — PAIN - FUNCTIONAL ASSESSMENT: PAIN_FUNCTIONAL_ASSESSMENT: NONE - DENIES PAIN

## 2025-01-27 NOTE — ED NOTES
Mode of arrival (squad #, walk in, police, etc) : walk in        Chief complaint(s): suicidal        Arrival Note (brief scenario, treatment PTA, etc).: patient is suicidal without a p[rosa m. Patient was seen at [facility] earlier today and was discharged to the [facility]. Patient reports that [facility] sent him over here.        C= \"Have you ever felt that you should Cut down on your drinking?\"  No  A= \"Have people Annoyed you by criticizing your drinking?\"  No  G= \"Have you ever felt bad or Guilty about your drinking?\"  No  E= \"Have you ever had a drink as an Eye-opener first thing in the morning to steady your nerves or to help a hangover?\"  No      Deferred []      Reason for deferring: N/A    *If yes to two or more: probable alcohol abuse.*

## 2025-01-28 PROCEDURE — 1240000000 HC EMOTIONAL WELLNESS R&B

## 2025-01-28 PROCEDURE — 99222 1ST HOSP IP/OBS MODERATE 55: CPT | Performed by: INTERNAL MEDICINE

## 2025-01-28 PROCEDURE — 6370000000 HC RX 637 (ALT 250 FOR IP): Performed by: PSYCHIATRY & NEUROLOGY

## 2025-01-28 PROCEDURE — 6370000000 HC RX 637 (ALT 250 FOR IP): Performed by: INTERNAL MEDICINE

## 2025-01-28 RX ORDER — LISINOPRIL 20 MG/1
20 TABLET ORAL DAILY
Status: DISCONTINUED | OUTPATIENT
Start: 2025-01-28 | End: 2025-02-04

## 2025-01-28 RX ORDER — TRAZODONE HYDROCHLORIDE 50 MG/1
50 TABLET, FILM COATED ORAL NIGHTLY PRN
Status: DISCONTINUED | OUTPATIENT
Start: 2025-01-28 | End: 2025-02-04 | Stop reason: HOSPADM

## 2025-01-28 RX ORDER — LURASIDONE HYDROCHLORIDE 60 MG/1
120 TABLET, FILM COATED ORAL
Status: DISCONTINUED | OUTPATIENT
Start: 2025-01-29 | End: 2025-02-04 | Stop reason: HOSPADM

## 2025-01-28 RX ORDER — SERTRALINE HYDROCHLORIDE 100 MG/1
100 TABLET, FILM COATED ORAL NIGHTLY
Status: DISCONTINUED | OUTPATIENT
Start: 2025-01-28 | End: 2025-02-04 | Stop reason: HOSPADM

## 2025-01-28 RX ORDER — ALBUTEROL SULFATE 90 UG/1
2 INHALANT RESPIRATORY (INHALATION) EVERY 4 HOURS PRN
Status: DISCONTINUED | OUTPATIENT
Start: 2025-01-28 | End: 2025-02-04 | Stop reason: HOSPADM

## 2025-01-28 RX ADMIN — SERTRALINE 100 MG: 100 TABLET, FILM COATED ORAL at 21:49

## 2025-01-28 RX ADMIN — LISINOPRIL 20 MG: 20 TABLET ORAL at 15:41

## 2025-01-28 NOTE — ED PROVIDER NOTES
EMERGENCY DEPARTMENT ENCOUNTER    Pt Name: Marco Vizcarra  MRN: [id number]  Birthdate 1975  Date of evaluation: 1/27/25  CHIEF COMPLAINT       Chief Complaint   Patient presents with    Suicidal     HISTORY OF PRESENT ILLNESS   HPI  Suicidal thoughts.  Off his meds for 5 days.  Lives at home with his cousin.  Went to [facility] today had labs done there which I reviewed.  They are unremarkable.  They sent him to [facility].  [facility] brought him here.  He would like to be admitted to go back on his medications.  He states he would participate in group therapy sessions.  He would participate in any interviews this needed      PASTMEDICAL HISTORY     Past Medical History:   Diagnosis Date    Asthma     Hypertension     Morbid obesity     Schizoaffective disorder ([facility])     Substance abuse ([facility])      Past Problem List  Patient Active Problem List   Diagnosis Code    Polysubstance abuse ([facility]) F19.10    Schizophrenia, paranoid ([facility]) F20.0    Paranoid schizophrenia ([facility]) F20.0    Schizoaffective disorder, bipolar type ([facility]) F25.0    Schizophrenia ([facility]) F20.9    Schizoaffective disorder ([facility]) F25.9    Schizoaffective disorder, depressive type ([facility]) F25.1    MDD (major depressive disorder), single episode F32.9    Depression with suicidal ideation F32.A, R45.851    Acute psychosis ([facility]) F23    Abuse of smoked substance ([facility]) F18.10    Class 1 obesity due to excess calories without serious comorbidity with body mass index (BMI) of 31.0 to 31.9 in adult E66.811, E66.09, Z68.31     SURGICAL HISTORY     No past surgical history on file.  CURRENT MEDICATIONS       Previous Medications    ALBUTEROL SULFATE HFA (PROVENTIL;VENTOLIN;PROAIR) 108 (90 BASE) MCG/ACT INHALER    Inhale 2 puffs into the lungs every 6 hours as needed for Wheezing    LURASIDONE (LATUDA) 120 MG TABLET    Take 1 tablet by mouth daily (with breakfast)    SERTRALINE (ZOLOFT) 100 MG TABLET    Take 1 tablet by mouth at bedtime    TRAZODONE (DESYREL) 50 MG TABLET

## 2025-01-28 NOTE — BH NOTE
[facility] [place]  Admission Note     Admission Type:   Voluntary     Reason for admission:  Reason for Admission: Patient reports increase in depression and suicidal ideation with no plan. Patient reports thi sis due to \"various\" life stessors. Patient has been off his medications and would like to get back on them. Patient has a history of suicidal ideation. Patient does not feel safe in the community.      Addictive Behavior:   Addictive Behavior  In the Past 3 Months, Have You Felt or Has Someone Told You That You Have a Problem With  : None    Medical Problems:   Past Medical History:   Diagnosis Date    Asthma     Hypertension     Morbid obesity     Schizoaffective disorder (HCC)     Substance abuse ([facility])        Status EXAM:  Mental Status and Behavioral Exam  Normal: No  Level of Assistance: Independent/Self  Facial Expression: Flat  Affect: Blunt  Level of Consciousness: Alert  Frequency of Checks: 4 times per hour, close  Mood:Normal: No  Mood: Depressed, Anxious, Helpless  Motor Activity:Normal: Yes  Eye Contact: Fair  Observed Behavior: Preoccupied  Sexual Misconduct History: Current - no  Preception: [place] to person, [place] to time, [place] to place, [place] to situation  Attention:Normal: No  Attention: Distractible  Thought Processes: Circumstantial  Thought Content:Normal: No  Thought Content: Preoccupations  Depression Symptoms: Feelings of helplessness, Feelings of hopelessess, Impaired concentration  Anxiety Symptoms: Generalized  Mayuri Symptoms: No problems reported or observed.  Hallucinations: None  Delusions: No  Memory:Normal: No  Memory: Poor recent, Poor remote  Insight and Judgment: No  Insight and Judgment: Poor judgment, Poor insight    Tobacco Screening:  Practical Counseling, on admission, sung X, if applicable and completed (first 3 are required if patient doesn't refuse):            ( ) Recognizing danger situations (included triggers and roadblocks)                    ( )

## 2025-01-28 NOTE — GROUP NOTE
Group Therapy Note    Date: 1/28/2025    Group Start Time: 1100  Group End Time: 1140  Group Topic: Cognitive Skills    Marielle Cheung CTRS    Cognitive Skills Group Note        Date: January 28, 2025 Start Time: 11am  End Time:  1140 am       Number of Participants in Group & Unit Census:  11/23    Topic: cognitive skills     Goal of Group: pt will demonstrate improved coping skills and improved interpersonal relationships      Comments:     Patient did not participate in Cognitive Skills group, despite staff encouragement and explanation of benefits.  Patient remain seclusive to self.  Q15 minute safety checks maintained for patient safety and will continue to encourage patient to attend unit programming.              Signature:  OLGA RUIZ

## 2025-01-28 NOTE — CARE COORDINATION
BHI Biopsychosocial Assessment    Current Level of Psychosocial Functioning     Independent   Dependent    Minimal Assist X       Psychosocial High Risk Factors (check all that apply)    Unable to obtain meds   Chronic illness/pain    Substance abuse   Lack of Family Support   Financial stress   Isolation X  Inadequate Community Resources  Suicide attempt(s)  Not taking medications X  Victim of crime   Developmental Delay  Unable to manage personal needs  X  Age 65 or older   Homeless  No transportation   Readmission within 30 days  Unemployment  Traumatic Event      Psychiatric Advanced Directives: none reported     Family to Involve in Treatment:Patient reports that his mother is supportive and involved in his care     Sexual Orientation:  MUNIRA    Patient Strengths: adequate housing, family support    Patient Barriers: not linked with HC, off medications, presenting on admission with an increase in Depression and suicidal ideation.     Opiate Education Provided: N/A Patient denies and does not have any documented history of Opiate or Heroin use/abuse. Patient denies any Alcohol or Illicit drug use and his drug screen upon admission was negative for all substances.     CMHC/mental health history: Last linked with [facility], non-compliant with treatment, off medications, lives with cousing in [place]     Plan of Care   medication management, group/individual therapies, family meetings, psycho -education, treatment team meetings to assist with stabilization    Initial Discharge Plan:  Patient reports a plan to return to live with his cousin in [place] and follow up with outpatient treatment at the [facility].     Clinical Summary:  Marco Vizcarra is a 49 y.o. male who presents on admission with suicidal ideation. Patient is reporting a recent increase in symptoms of Depression and is endorsing hopelessness, worthlessness, and helplessness..  Patient has a significant past medical history of schizoaffective disorder.

## 2025-01-28 NOTE — GROUP NOTE
Group Therapy Note    Date: 1/28/2025    Group Start Time: 0900  Group End Time: 0915  Group Topic: Community Meeting    Jazmin Ventura    Community Meeting Group Note        Date: 1/28/2025 Start Time: 9am  End Time: 0915      Number of Participants in Group & Unit Census:  7/22        Goal of Group: To discuss daily goals       Comments:     Patient did not participate in Community Meeting group, despite staff encouragement and explanation of benefits.  Patient remain seclusive to self.  Q15 minute safety checks maintained for patient safety and will continue to encourage patient to attend unit programming.          Signature:  Jazmin Boles

## 2025-01-28 NOTE — CARE COORDINATION
SOCIAL SERVICE NOTE:  returns call to Jose Alejandro at [facility], at [phone] and [phone]. Patient gave permission for call and also signed a release of information on this date.  left message requesting a call back.    Banner Transposition Flap Text: The defect edges were debeveled with a #15 scalpel blade.  Given the location of the defect and the proximity to free margins a Banner transposition flap was deemed most appropriate.  Using a sterile surgical marker, an appropriate flap drawn around the defect. The area thus outlined was incised deep to adipose tissue with a #15 scalpel blade.  The skin margins were undermined to an appropriate distance in all directions utilizing iris scissors.

## 2025-01-28 NOTE — PLAN OF CARE
Problem: Self Harm/Suicidality  Goal: Will have no self-injury during hospital stay  Description: INTERVENTIONS:  1.  Ensure constant observer at bedside with Q15M safety checks  2.  Maintain a safe environment  3.  Secure patient belongings  4.  Ensure family/visitors adhere to safety recommendations  5.  Ensure safety tray has been added to patient's diet order  6.  Every shift and PRN: Re-assess suicidal risk via Frequent Screener    Note: Mr. Vizcarra denied suicidal ideation and thoughts of harm to self and others.      Problem: Depression  Goal: Will be euthymic at discharge  Description: INTERVENTIONS:  1. Administer medication as ordered  2. Provide emotional support via 1:1 interaction with staff  3. Encourage involvement in milieu/groups/activities  4. Monitor for social isolation  Note: Mr. Vizcarra rates his depression as a 7 out of 10 where 10 is the worst. Mr. Vizcarra has spent this shift in bed thus far, coming out for breakfast and a shower only.      Problem: Safety - Adult  Goal: Free from fall injury  Note: Mr. Vizcarra has remained free of falls thus far. He ambulates in the unit with a steady gait.

## 2025-01-28 NOTE — H&P
[facility] Internal Medicine  Usama Olsen MD; Ruel Solano MD, Brian Ruvalcaba MD, Madeleine Taylor MD, Halle Mayorga MD; Maren Irvin MD    [facility] Internal Medicine   IN-PATIENT SERVICE   [facility]     HISTORY AND PHYSICAL EXAMINATION            Date:   1/28/2025  Patient name:  Marco Vizcarra  Date of admission:  1/27/2025  5:30 PM  MRN:   [id number]  Account:  [id number]  YOB: 1975  PCP:    No primary care provider on file.  Room:   [address] [place] [address]  Code Status:    Full Code      Chief Complaint:     Suicidal /Ac Psychosis  Athma    History Obtained From:     Patient/EMR/bedside RN     History of Present Illness:     HTN  Onset more than 2 years ago  mayra mild to mod  Controlled with current po meds  Not associated with headaches or blurry vision  No chest pain      Past Medical History:     Past Medical History:   Diagnosis Date    Asthma     Hypertension     Morbid obesity     Schizoaffective disorder (HCC)     Substance abuse (HCC)         Past Surgical History:     No past surgical history on file.     Medications Prior to Admission:     Prior to Admission medications    Medication Sig Start Date End Date Taking? Authorizing Provider   albuterol sulfate HFA (VENTOLIN HFA) 108 (90 Base) MCG/ACT inhaler Inhale 2 puffs into the lungs every 6 hours as needed for Wheezing  Patient not taking: Reported on 1/27/2025    Javier Givens MD   lurasidone (LATUDA) 120 MG tablet Take 1 tablet by mouth daily (with breakfast)  Patient not taking: Reported on 1/27/2025    Javier Givens MD   sertraline (ZOLOFT) 100 MG tablet Take 1 tablet by mouth nightly  Patient not taking: Reported on 1/27/2025    Javier Givens MD   traZODone (DESYREL) 50 MG tablet Take 1 tablet by mouth nightly as needed for Sleep  Patient not taking: Reported on 1/27/2025    Javier Givens MD        Allergies:     Patient has no known 
knowledge and intellect to understand and utilize treatments.    OUTCOME QUESTIONNAIRE (OQ 30.2):  [] Completed [] Patient too ill to participate    Goals:    1) Remission of auditory hallucinations and suicidal ideations  2) Stabilization of symptoms prior to discharge.  3) Establish efficacy and tolerability of medications.       Behavioral Services  Medicare Certification     Admission Day 1  I certify that this patient's inpatient psychiatric hospital admission is medically necessary for:    x (1) treatment which could reasonably be expected to improve this patient's condition, or    x (2) diagnostic study or its equivalent.     Time Spent: 45 minutes     Physicians Signature:  Electronically signed by Nadja Mustafa MD on 1/28/25 at 10:59 AM EST is a 49 y.o. male being evaluated     --Nadja Mustafa MD on 1/28/2025 at 10:59 AM    An electronic signature was used to authenticate this note.     Please note that this chart was generated using voice recognition Dragon dictation software.  Although every effort was made to ensure the accuracy of this automated transcription, some errors in transcription may have occurred.   I independently saw and evaluated the patient.  I reviewed the  documentation above    .  Any additional comments or changes to the   documentation are stated below otherwise agree with assessment.      QTc Calculation (Bazett)   Date Value Ref Range Status   05/02/2019 488 ms Final       Vitals:    01/27/25 1738 01/27/25 2110 01/27/25 2130 01/28/25 0815   BP: 133/73 (!) 138/91  (!) 165/88   Pulse: 74 73  75   Resp: 18 15  14   Temp: 98.2 °F (36.8 °C) 98.1 °F (36.7 °C)  98.2 °F (36.8 °C)   TempSrc: Oral Oral  Temporal   SpO2: 99% 98%  100%   Weight: 108.9 kg (240 lb) 108.9 kg (240 lb) 108.9 kg (240 lb 1.3 oz)    Height:  1.82 m (5' 11.65\") 1.82 m (5' 11.65\")          Current Facility-Administered Medications   Medication Dose Route Frequency Provider Last Rate Last Admin    albuterol sulfate HFA

## 2025-01-28 NOTE — PLAN OF CARE
[facility]  Initial Interdisciplinary Treatment Plan NO      Original treatment plan Date & Time: 1/28/2025   11:20 am    Admission Type:  Admission Type: Voluntary    Reason for admission:   Reason for Admission: Patient reports increase in depression and suicidal ideation with no plan. Patient reports thi sis due to \"various\" life stessors. Patient has been off his medications and would like to get back on them. Patient has a history of suicidal ideation. Patient does not feel safe in the community.    Estimated Length of Stay:  5-7days  Estimated Discharge Date: to be determined by physician    PATIENT STRENGTHS:  Patient Strengths:   Patient Strengths and Limitations:Limitations: Difficult relationships / poor social skills, Multiple barriers to leisure interests, Difficulty problem solving/relies on others to help solve problems, Tendency to isolate self  Addictive Behavior: Addictive Behavior  In the Past 3 Months, Have You Felt or Has Someone Told You That You Have a Problem With  : None  Medical Problems:  Past Medical History:   Diagnosis Date    Asthma     Hypertension     Morbid obesity     Schizoaffective disorder (HCC)     Substance abuse (HCC)      Status EXAM:Mental Status and Behavioral Exam  Normal: No  Level of Assistance: Independent/Self  Facial Expression: Flat  Affect: Blunt  Level of Consciousness: Alert  Frequency of Checks: 4 times per hour, close  Mood:Normal: No  Mood: Anxious, Depressed, Helpless  Motor Activity:Normal: Yes  Eye Contact: Fair  Observed Behavior: Preoccupied  Sexual Misconduct History: Current - no  Preception: [place] to person, [place] to time, [place] to place, [place] to situation  Attention:Normal: No  Attention: Distractible  Thought Processes: Circumstantial  Thought Content:Normal: No  Thought Content: Preoccupations  Depression Symptoms: Feelings of hopelessess, Feelings of worthlessness, Feelings of helplessness  Anxiety Symptoms: Generalized  Mayuri

## 2025-01-28 NOTE — GROUP NOTE
Group Therapy Note    Date: 1/28/2025    Group Start Time: 1015  Group End Time: 1045  Group Topic: Psychoeducation    Dharmesh Whiteside        Group Therapy Note    Attendees: 7/22     Patient was offered group therapy today but declined to participate despite encouragement from staff. 1:1 was offered.    Signature:  Dharmesh Matamoros

## 2025-01-29 PROCEDURE — 6370000000 HC RX 637 (ALT 250 FOR IP): Performed by: PSYCHIATRY & NEUROLOGY

## 2025-01-29 PROCEDURE — 99232 SBSQ HOSP IP/OBS MODERATE 35: CPT | Performed by: INTERNAL MEDICINE

## 2025-01-29 PROCEDURE — 1240000000 HC EMOTIONAL WELLNESS R&B

## 2025-01-29 PROCEDURE — 6370000000 HC RX 637 (ALT 250 FOR IP): Performed by: INTERNAL MEDICINE

## 2025-01-29 RX ADMIN — HYDROXYZINE HYDROCHLORIDE 50 MG: 50 TABLET ORAL at 21:29

## 2025-01-29 RX ADMIN — LISINOPRIL 20 MG: 20 TABLET ORAL at 08:31

## 2025-01-29 RX ADMIN — TRAZODONE HYDROCHLORIDE 50 MG: 50 TABLET ORAL at 21:29

## 2025-01-29 RX ADMIN — SERTRALINE 100 MG: 100 TABLET, FILM COATED ORAL at 21:29

## 2025-01-29 RX ADMIN — LURASIDONE HYDROCHLORIDE 120 MG: 60 TABLET ORAL at 08:31

## 2025-01-29 NOTE — GROUP NOTE
Group Therapy Note    Date: 1/29/2025    Group Start Time: 1100  Group End Time: 1140  Group Topic: Cognitive Skills    Marielle Cheung CTRS    Cognitive Skills Group Note        Date: January 29, 2025 Start Time: 11am  End Time:  1140 am       Number of Participants in Group & Unit Census:  10/21    Topic: cognitive skills     Goal of Group: pt will demonstrate improved coping skills and improved interpersonal relationships      Comments:     Patient did not participate in Cognitive Skills group, despite staff encouragement and explanation of benefits.  Patient remain seclusive to self.  Q15 minute safety checks maintained for patient safety and will continue to encourage patient to attend unit programming.              Signature:  OLGA RUIZ

## 2025-01-29 NOTE — GROUP NOTE
Group Therapy Note    Date: 2025    Group Start Time: 0900  Group End Time: 0953  Group Topic: Community Meeting    [facility] Onelia Lange LPN        Group Therapy Note    Attendees:     patient refused to attend Goals group at 0900 after encouragement from staff.  1:1 talk time provided as alternative to group session          Patient's Goal:  ***    Notes:  ***    Status After Intervention:  {Status After Intervention:863230795}    Participation Level: {Participation Level:759748786}    Participation Quality: {[facility] PARTICIPATION QUALITY:532329618}      Speech:  {[facility] CD_SPEECH:40055}      Thought Process/Content: {Thought Process/Content:902328316}      Affective Functioning: {Affective Functionin}      Mood: {Mood:221803182}      Level of consciousness:  {Level of consciousness:844721640}      Response to Learning: {[facility] Responses to Learnin}      Endings: {[facility] Endings:04701}    Modes of Intervention: {[facility] Modes of Intervention:283130302}      Discipline Responsible: {[facility] Multidisciplinary:933318268}      Signature:  Onelia Schulz LPN

## 2025-01-29 NOTE — GROUP NOTE
Group Therapy Note    Date: 1/29/2025    Group Start Time: 0900  Group End Time: 0953  Group Topic: Community Meeting    [facility] Onelia Vasquez LPN        Group Therapy Note    Attendees: 11/21    patient refused to attend Goals group at 0900 after encouragement from staff.  1:1 talk time provided as alternative to group session     Signature:  Onelia Schulz LPN

## 2025-01-29 NOTE — GROUP NOTE
Group Therapy Note    Date: 1/29/2025    Group Start Time: 1330  Group End Time: 1415  Group Topic: Cognitive Skills    Marielle Cheung CTRS                                                                        Group Therapy Note    Date: 1/29/2025    Group Start Time: 1330  Group End Time: 1415  Group Topic: leisure awareness    Marielle Cheung CTRS    Leisure Group Note        Date: January 29, 2025 Start Time: 130 pm  End Time:  215pm      Number of Participants in Group & Unit Census:  10/20    Topic: cognitive skills     Goal of Group: pt will demonstrate improved leisure awareness and improved interpersonal relationships      Comments:     Patient did not participate in leisure  group, despite staff encouragement and explanation of benefits.  Patient remain seclusive to self.  Q15 minute safety checks maintained for patient safety and will continue to encourage patient to attend unit programming.              Signature:  OLGA RUIZ

## 2025-01-29 NOTE — PLAN OF CARE
Problem: Self Harm/Suicidality  Goal: Will have no self-injury during hospital stay  Description: INTERVENTIONS:  1.  Ensure constant observer at bedside with Q15M safety checks  2.  Maintain a safe environment  3.  Secure patient belongings  4.  Ensure family/visitors adhere to safety recommendations  5.  Ensure safety tray has been added to patient's diet order  6.  Every shift and PRN: Re-assess suicidal risk via Frequent Screener    1/29/2025 0001 by Sara Thao, RN  Outcome: Progressing  Flowsheets (Taken 1/29/2025 0001)  Will have no self-injury during hospital stay:   Maintain a safe environment   Every shift and PRN: Re-assess suicidal risk via Frequent Screener  Note: Pt denies any suicidal or homicidal ideations, denies any hallucinations. Pt agreed to seek staff and report any intrusive thoughts of hurting self or others.  Pt remains free from any self-harm, safe environment maintained. Regular rounding 4 times an hour and random patient checks done for safety and per unit policy.      Problem: Depression  Goal: Will be euthymic at discharge  Description: INTERVENTIONS:  1. Administer medication as ordered  2. Provide emotional support via 1:1 interaction with staff  3. Encourage involvement in milieu/groups/activities  4. Monitor for social isolation  1/29/2025 0001 by Sara Thao, RN  Outcome: Progressing  Note: Patient remains sad, reports mild to moderate level of depression, rates it at a 5 on a scale of 0-10 (10 being the worst depression and 0 being no depression). Patient isolative to room.     Problem: Safety - Adult  Goal: Free from fall injury  1/29/2025 0001 by Sara Thao, RN  Outcome: Progressing  Flowsheets (Taken 1/29/2025 0001)  Free From Fall Injury:   Instruct family/caregiver on patient safety   Based on caregiver fall risk screen, instruct family/caregiver to ask for assistance with transferring infant if caregiver noted to have fall risk factors  Note: Pt

## 2025-01-29 NOTE — GROUP NOTE
Group Therapy Note    Date: 1/29/2025    Group Start Time: 1015  Group End Time: 1045  Group Topic: Psychoeducation    Dharmesh Whiteside        Group Therapy Note    Attendees: 13/21       Patient was offered group therapy today but declined to participate despite encouragement from staff. 1:1 was offered.    Signature:  Dharmesh Matamoros

## 2025-01-29 NOTE — GROUP NOTE
Group Therapy Note    Date: 1/28/2025    Group Start Time: 2010  Group End Time: 2050  Group Topic: Wrap-Up    CZ Sara Crane, RN      Group Therapy Note    Attendees: 10/20       Pt refused to attend Wrap-up/Goal Review group this evening after encouragement from staff.  1:1 talk time offered as alternative to group session, but pt declined.  Will continue to encourage patient to attend unit group programming.        Signature:  Sara Thao, RN

## 2025-01-29 NOTE — PLAN OF CARE
Problem: Self Harm/Suicidality  Goal: Will have no self-injury during hospital stay  Description: INTERVENTIONS:  1.  Ensure constant observer at bedside with Q15M safety checks  2.  Maintain a safe environment  3.  Secure patient belongings  4.  Ensure family/visitors adhere to safety recommendations  5.  Ensure safety tray has been added to patient's diet order  6.  Every shift and PRN: Re-assess suicidal risk via Frequent Screener    1/29/2025 1149 by Kimi Murcia RN  Note: Mr. Vizcarra denies suicidal ideation and thoughts of harm to self and others.      Problem: Depression  Goal: Will be euthymic at discharge  Description: INTERVENTIONS:  1. Administer medication as ordered  2. Provide emotional support via 1:1 interaction with staff  3. Encourage involvement in milieu/groups/activities  4. Monitor for social isolation  1/29/2025 1149 by Kimi Murcia, LATOYA  Note: Mr. Vizcarra endorses a depressed mood. He is isolative to his bed for the morning and and coming out for meals and needs only. Mr Vizcarra does not attend therapeutic treatment groups with prompting. He does adhere to his prescribed medication with prompting from writer.      Problem: Safety - Adult  Goal: Free from fall injury  1/29/2025 1149 by Kimi Murcia, LATOYA  Note: Mr. Vizcarra has remained free of falls thus far. He ambulates with a steady gait and wears hospital issued non-slip socks.

## 2025-01-30 PROCEDURE — 6370000000 HC RX 637 (ALT 250 FOR IP): Performed by: PSYCHIATRY & NEUROLOGY

## 2025-01-30 PROCEDURE — 99231 SBSQ HOSP IP/OBS SF/LOW 25: CPT | Performed by: INTERNAL MEDICINE

## 2025-01-30 PROCEDURE — 6370000000 HC RX 637 (ALT 250 FOR IP): Performed by: INTERNAL MEDICINE

## 2025-01-30 PROCEDURE — 1240000000 HC EMOTIONAL WELLNESS R&B

## 2025-01-30 RX ADMIN — LURASIDONE HYDROCHLORIDE 120 MG: 60 TABLET ORAL at 08:36

## 2025-01-30 RX ADMIN — LISINOPRIL 20 MG: 20 TABLET ORAL at 08:36

## 2025-01-30 NOTE — PLAN OF CARE
Problem: Depression  Goal: Will be euthymic at discharge  Description: INTERVENTIONS:  1. Administer medication as ordered  2. Provide emotional support via 1:1 interaction with staff  3. Encourage involvement in milieu/groups/activities  4. Monitor for social isolation  Outcome: Progressing     Problem: Safety - Adult  Goal: Free from fall injury  Outcome: Progressing  Note: Pt remains free of falls; is wearing nonskid footwear; fall risk is indicated on arm band and room door. Educated on \"Stay with Me\" practices to reduce likelihood of falls; agreeable to requesting staff assistance as needed. Q15min checks maintained.       Problem: Self Harm/Suicidality  Goal: Will have no self-injury during hospital stay  Description: INTERVENTIONS:  1.  Ensure constant observer at bedside with Q15M safety checks  2.  Maintain a safe environment  3.  Secure patient belongings  4.  Ensure family/visitors adhere to safety recommendations  5.  Ensure safety tray has been added to patient's diet order  6.  Every shift and PRN: Re-assess suicidal risk via Frequent Screener    Outcome: Progressing  Note: Patient reports suicidal ideation, contracts for safety. Patient denies homicidal ideation. Patient agreeable to seek out staff should thoughts of self harm/harming others worsen/arise. Patient denies hallucinations. Patient is positive for anxiety/depression but does not rate. Patient is cooperative, isolative to room, out for needs, aloof of peers. Patient is medication compliant and behavior controlled. Comfort and reassurance provided. Safety checks maintained every 15 minutes and as needed.

## 2025-01-30 NOTE — GROUP NOTE
Group Therapy Note    Date: 1/30/2025    Group Start Time: 1100  Group End Time: 1135  Group Topic: Cognitive Skills    Marielle Cheung CTRS    Cognitive Skills Group Note        Date: January 30, 2024 Start Time: 11am  End Time:  1135am      Number of Participants in Group & Unit Census:  8/17    Topic: cognitive skills     Goal of Group: pt will demonstrate improved coping skill and improved interpersonal relationships       Comments:     Patient did not participate in Cognitive Skills group, despite staff encouragement and explanation of benefits.  Patient remain seclusive to self.  Q15 minute safety checks maintained for patient safety and will continue to encourage patient to attend unit programming.              Signature:  OLGA RUIZ

## 2025-01-30 NOTE — GROUP NOTE
Group Therapy Note    Date: 1/30/2025    Group Start Time: 0905  Group End Time: 0947  Group Topic: Community Meeting    Ashlie De León LPN         Patient refused to attend community meeting due to resting in bed, refused 1;1 talk time      Signature:  Ashlie Jaramillo LPN

## 2025-01-30 NOTE — GROUP NOTE
Group Therapy Note    Date: 1/30/2025    Group Start Time: 1430  Group End Time: 1510  Group Topic: Recreational    [facility] Marielle Lr CTRS    Recreation Group Note        Date: January 30, 2025 Start Time: 2:30pm  End Time:  310pm      Number of Participants in Group & Unit Census:  8/15    Topic: recreation group     Goal of Group: pt will demonstrate improved coping skills and improved interpersonal relationships      Comments:     Patient did not participate in Recreation group, despite staff encouragement and explanation of benefits.  Patient remain seclusive to self.  Q15 minute safety checks maintained for patient safety and will continue to encourage patient to attend unit programming.              Signature:  OLGA RUIZ

## 2025-01-30 NOTE — PLAN OF CARE
[facility]  Day 3 Interdisciplinary Treatment Plan NOTE    Review Date & Time: 1/30/2025 1245    Admission Type:   Admission Type: Voluntary    Reason for admission:  Reason for Admission: Patient reports increase in depression and suicidal ideation with no plan. Patient reports thi sis due to \"various\" life stessors. Patient has been off his medications and would like to get back on them. Patient has a history of suicidal ideation. Patient does not feel safe in the community.  Estimated Length of Stay:  5-7 days  Estimated Discharge Date Update:   to be determined by physician    PATIENT STRENGTHS:  Patient Strengths:   Patient Strengths and Limitations:Limitations: Difficult relationships / poor social skills, Multiple barriers to leisure interests, Difficulty problem solving/relies on others to help solve problems, Tendency to isolate self  Addictive Behavior:Addictive Behavior  In the Past 3 Months, Have You Felt or Has Someone Told You That You Have a Problem With  : None  Medical Problems:  Past Medical History:   Diagnosis Date    Asthma     Hypertension     Morbid obesity     Schizoaffective disorder (HCC)     Substance abuse (HCC)        Risk:  Fall Risk   Christopher Scale Christopher Scale Score: 22  BVC    Change in scores:  No Changes to plan of Care:  No    Status EXAM:   Mental Status and Behavioral Exam  Normal: No  Level of Assistance: Independent/Self  Facial Expression: Flat  Affect: Blunt  Level of Consciousness: Alert  Frequency of Checks: 4 times per hour, close  Mood:Normal: No  Mood: Depressed, Anxious  Motor Activity:Normal: No  Motor Activity: Decreased  Eye Contact: Fair  Observed Behavior: Cooperative, Guarded, Preoccupied  Sexual Misconduct History: Current - no  Preception: [place] to person, [place] to time, [place] to place, [place] to situation  Attention:Normal: No  Attention: Distractible  Thought Processes: Blocking  Thought Content:Normal: No  Thought Content: Poverty of

## 2025-01-30 NOTE — PLAN OF CARE
Problem: Self Harm/Suicidality  Goal: Will have no self-injury during hospital stay  Description: INTERVENTIONS:  1.  Ensure constant observer at bedside with Q15M safety checks  2.  Maintain a safe environment  3.  Secure patient belongings  4.  Ensure family/visitors adhere to safety recommendations  5.  Ensure safety tray has been added to patient's diet order  6.  Every shift and PRN: Re-assess suicidal risk via Frequent Screener    1/29/2025 2157 by Jose Brown RN  Outcome: Progressing  Note: Patient denied thoughts of hurting himself or others and remains free from self harm and injury      Problem: Depression  Goal: Will be euthymic at discharge  Description: INTERVENTIONS:  1. Administer medication as ordered  2. Provide emotional support via 1:1 interaction with staff  3. Encourage involvement in milieu/groups/activities  4. Monitor for social isolation  1/29/2025 2157 by Jose Brown RN  Outcome: Progressing  Note: Patient reported anxiety to be 5/10. He denied feeling of depression. Patient was isolative to his room and slept most of shift. He was flat and guarded. He was medication compliant      Problem: Safety - Adult  Goal: Free from fall injury  1/29/2025 2157 by Jose Brown, RN  Outcome: Progressing

## 2025-01-30 NOTE — GROUP NOTE
Group Therapy Note    Date: 1/30/2025    Group Start Time: 1000  Group End Time: 1030  Group Topic: Psychoeducation    Dharmesh Whiteside        Group Therapy Note    Attendees: 8/19     Patient was offered group therapy today but declined to participate despite encouragement from staff. 1:1 was offered.  Signature:  Dharmesh Matamoros

## 2025-01-31 PROCEDURE — 99231 SBSQ HOSP IP/OBS SF/LOW 25: CPT | Performed by: INTERNAL MEDICINE

## 2025-01-31 PROCEDURE — 6370000000 HC RX 637 (ALT 250 FOR IP): Performed by: PSYCHIATRY & NEUROLOGY

## 2025-01-31 PROCEDURE — 1240000000 HC EMOTIONAL WELLNESS R&B

## 2025-01-31 PROCEDURE — 6370000000 HC RX 637 (ALT 250 FOR IP): Performed by: INTERNAL MEDICINE

## 2025-01-31 RX ADMIN — LISINOPRIL 20 MG: 20 TABLET ORAL at 08:31

## 2025-01-31 RX ADMIN — LURASIDONE HYDROCHLORIDE 120 MG: 60 TABLET ORAL at 08:31

## 2025-01-31 RX ADMIN — TRAZODONE HYDROCHLORIDE 50 MG: 50 TABLET ORAL at 21:46

## 2025-01-31 RX ADMIN — HYDROXYZINE HYDROCHLORIDE 50 MG: 50 TABLET ORAL at 21:46

## 2025-01-31 RX ADMIN — SERTRALINE 100 MG: 100 TABLET, FILM COATED ORAL at 21:46

## 2025-01-31 NOTE — PLAN OF CARE
Problem: Self Harm/Suicidality  Goal: Will have no self-injury during hospital stay  Description: INTERVENTIONS:  1.  Ensure constant observer at bedside with Q15M safety checks  2.  Maintain a safe environment  3.  Secure patient belongings  4.  Ensure family/visitors adhere to safety recommendations  5.  Ensure safety tray has been added to patient's diet order  6.  Every shift and PRN: Re-assess suicidal risk via Frequent Screener    1/31/2025 1644 by Judy Mcclure LPN  Flowsheets (Taken 1/31/2025 1644)  Will have no self-injury during hospital stay:   Maintain a safe environment   Secure patient belongings   Every shift and PRN: Re-assess suicidal risk via Frequent Screener  Note: Patient admitted to having fleeting suicidal thoughts without plan throughout the day and agreed to seek out staff if feeling unsafe or overwhelmed. Patient has been isolative to room and out for needs only. Patient has been eating % of all meals in dayroom. Patient has been compliant with meds and behavior is controlled. Safe environment maintained. Q15 minute checks for safety continued per unit policy. Will continue to monitor for safety and provide support and reassurance as needed.    1/31/2025 0305 by Rissa Paniagua LPN  Outcome: Progressing  Note: Patient endorses thoughts of suicide, but is able to contract for safety while on the unit. Denies that he has any plans, or has been thinking about how he might do it.  He is compliant with all behavioral health medications. Continues with flat affect, and to be isolative to his room.      Problem: Safety - Adult  Goal: Free from fall injury  1/31/2025 1644 by Judy Mcclure LPN  Outcome: Progressing  Note: Patient remains free of falls and verbalizes understanding of individual fall risks. Patient is wearing non skid footwear and encouraged to seek out staff for any assistance needed.

## 2025-01-31 NOTE — PLAN OF CARE
Problem: Self Harm/Suicidality  Goal: Will have no self-injury during hospital stay  Description: INTERVENTIONS:  1.  Ensure constant observer at bedside with Q15M safety checks  2.  Maintain a safe environment  3.  Secure patient belongings  4.  Ensure family/visitors adhere to safety recommendations  5.  Ensure safety tray has been added to patient's diet order  6.  Every shift and PRN: Re-assess suicidal risk via Frequent Screener    1/31/2025 0305 by Rissa Paniagua LPN  Outcome: Progressing  Note: Patient endorses thoughts of suicide, but is able to contract for safety while on the unit. Denies that he has any plans, or has been thinking about how he might do it.  He is compliant with all behavioral health medications. Continues with flat affect, and to be isolative to his room.      Problem: Depression  Goal: Will be euthymic at discharge  Description: INTERVENTIONS:  1. Administer medication as ordered  2. Provide emotional support via 1:1 interaction with staff  3. Encourage involvement in milieu/groups/activities  4. Monitor for social isolation  1/31/2025 0305 by Rissa Paniagua LPN  Outcome: Progressing  Note: Patient continues to remain mostly isolative to self, and is primarily out for needs only.  Patient did attend group, and was mildly participative. Encouraged further participation from patient, and encouraged him to attend future groups.  Patient stated that he may.      Problem: Safety - Adult  Goal: Free from fall injury  1/31/2025 0305 by Rissa Paniagua LPN  Outcome: Progressing  Note: Patient remains free from fall injury during this admission.  Non-slip footwear on.

## 2025-01-31 NOTE — GROUP NOTE
Group Therapy Note    Date: 1/31/2025    Group Start Time: 1000  Group End Time: 1030  Group Topic: Psychoeducation    Dharmesh Whiteside        Group Therapy Note  8/19     Patient was offered group therapy today but declined to participate despite encouragement from staff. 1:1 was offered.    Signature:  Dharmesh Matamoros

## 2025-01-31 NOTE — GROUP NOTE
Group Therapy Note    Date: 1/31/2025    Group Start Time: 1100  Group End Time: 1140  Group Topic: Cognitive Skills    Marielle Cheung CTRS    Cognitive Skills Group Note        Date: January 31, 2025 Start Time: 11am  End Time:  1140 am       Number of Participants in Group & Unit Census:  9/20    Topic: cognitive skills     Goal of Group: pt will demonstrate improved coping skills and improved interpersonal relationships      Comments:     Patient did not participate in Cognitive Skills group, despite staff encouragement and explanation of benefits.  Patient remain seclusive to self.  Q15 minute safety checks maintained for patient safety and will continue to encourage patient to attend unit programming.              Signature:  OLGA RUIZ

## 2025-01-31 NOTE — GROUP NOTE
Group Therapy Note    Date: 1/30/2025    Group Start Time: 2000  Group End Time: 2020  Group Topic: Focus Group    Katih Locke LPN        Group Therapy Note    Attendees: 8/22    Discussed achievements, or points of pride we achieved today.  Brainstormed about ways in which we can help others, and how it helps ourselves.         Participation Level: Active Listener and Interactive    Participation Quality: Appropriate        Signature:  KATHI SANCHEZ LPN

## 2025-01-31 NOTE — GROUP NOTE
Group Therapy Note    Date: 1/31/2025    Group Start Time: 1330  Group End Time: 1415  Group Topic: Recreational    [facility] Marielle Lr CTRS    Recreation Group Note        Date: January 31, 2025 Start Time: 1:30pm  End Time:  215pm      Number of Participants in Group & Unit Census:  5/16    Topic: recreation group    Goal of Group:pt will demonstrate improved coping skills and improved leisure awareness       Comments:     Patient did not participate in Recreation group, despite staff encouragement and explanation of benefits.  Patient remain seclusive to self.  Q15 minute safety checks maintained for patient safety and will continue to encourage patient to attend unit programming.              Signature:  OLGA RUIZ

## 2025-02-01 PROCEDURE — 1240000000 HC EMOTIONAL WELLNESS R&B

## 2025-02-01 PROCEDURE — 99232 SBSQ HOSP IP/OBS MODERATE 35: CPT | Performed by: INTERNAL MEDICINE

## 2025-02-01 PROCEDURE — 6370000000 HC RX 637 (ALT 250 FOR IP): Performed by: PSYCHIATRY & NEUROLOGY

## 2025-02-01 PROCEDURE — 6370000000 HC RX 637 (ALT 250 FOR IP): Performed by: INTERNAL MEDICINE

## 2025-02-01 RX ADMIN — HYDROXYZINE HYDROCHLORIDE 50 MG: 50 TABLET ORAL at 21:40

## 2025-02-01 RX ADMIN — LISINOPRIL 20 MG: 20 TABLET ORAL at 08:58

## 2025-02-01 RX ADMIN — TRAZODONE HYDROCHLORIDE 50 MG: 50 TABLET ORAL at 21:40

## 2025-02-01 RX ADMIN — LURASIDONE HYDROCHLORIDE 120 MG: 60 TABLET ORAL at 08:58

## 2025-02-01 RX ADMIN — SERTRALINE 100 MG: 100 TABLET, FILM COATED ORAL at 21:40

## 2025-02-01 NOTE — GROUP NOTE
Group Therapy Note    Date: 2/1/2025    Group Start Time: 1015  Group End Time: 1100  Group Topic: Cognitive Skills    Marielle Cheung CTRS    Cognitive Skills Group Note        Date: February 1, 2025 Start Time:  1015 am    End Time:  1100 am      Number of Participants in Group & Unit Census:  9/16    Topic: cognitive skills     Goal of Group: pt will demonstrate improved coping skills and improved interpersonal relationships      Comments:     Patient did not participate in Cognitive Skills group, despite staff encouragement and explanation of benefits.  Patient remain seclusive to self.  Q15 minute safety checks maintained for patient safety and will continue to encourage patient to attend unit programming.              Signature:  OLGA RUIZ

## 2025-02-01 NOTE — GROUP NOTE
Group Therapy Note    Date: 1/31/2025    Group Start Time: 2000  Group End Time: 2020  Group Topic: Wrap-Up    Gucci Tapia LPN        Group Therapy Note    Attendees: 9/17         Patient did not attend wrap-up group this evening, despite encouragement from staff. Patient was offered alternative materials, if desired    Signature:  Petr Duckworth LPN

## 2025-02-01 NOTE — PLAN OF CARE
Problem: Self Harm/Suicidality  Goal: Will have no self-injury during hospital stay  Description: INTERVENTIONS:  1.  Ensure constant observer at bedside with Q15M safety checks  2.  Maintain a safe environment  3.  Secure patient belongings  4.  Ensure family/visitors adhere to safety recommendations  5.  Ensure safety tray has been added to patient's diet order  6.  Every shift and PRN: Re-assess suicidal risk via Frequent Screener    2/1/2025 0333 by Rissa Paniagua LPN  Outcome: Progressing  Flowsheets (Taken 2/1/2025 0333)  Will have no self-injury during hospital stay:   Ensure constant observer at bedside with Q15M safety checks   Maintain a safe environment   Secure patient belongings   Every shift and PRN: Re-assess suicidal risk via Frequent Screener  Note: Patient continues to endorse passive suicidal ideation, but states he does not have a plan, and has not been planning how he would harm himself. Continue 15 minute safety checks, and to monitor the unit for potentially hazardous contraband.      Problem: Depression  Goal: Will be euthymic at discharge  Description: INTERVENTIONS:  1. Administer medication as ordered  2. Provide emotional support via 1:1 interaction with staff  3. Encourage involvement in milieu/groups/activities  4. Monitor for social isolation  Outcome: Not Progressing  Note: Patient continues to be isolative to room, and out for needs only.  Patient remains in bed sleeping, most of shift, with minimal verbal response during 1:1 emotional support.  Attempted to engage patient in group therapy, or social day room activities without success.  Encouraged patient to participate in hygiene activities without success.  He is compliant with all behavioral health medication, but continues to endorse suicidal ideation. He remains with flat, expressionless affect.     Problem: Depression  Goal: Will be euthymic at discharge  Description: INTERVENTIONS:  1. Administer medication as ordered  2.

## 2025-02-01 NOTE — GROUP NOTE
Group Therapy Note    Date: 2/1/2025    Group Start Time: 0915  Group End Time: 0930  Group Topic: Daily Inventory Group    [facility]    Elinor Sierra LPN        Group Therapy Note    Attendees: 8/17   Patient did not participate in goals group, despite staff encouragement and explanation of benefits.  Patient remain seclusive to self.  Q15 minute safety checks maintained for patient safety and will continue to encourage patient to attend unit programming.          Discipline Responsible: Licensed Practical Nurse      Signature:  Elinor Sierra LPN

## 2025-02-01 NOTE — BH NOTE
Patient ignored staff when reminded not take his meal tray in his room; all other patients ate in dining room.

## 2025-02-01 NOTE — PLAN OF CARE
Problem: Self Harm/Suicidality  Goal: Will have no self-injury during hospital stay  Description: INTERVENTIONS:  1.  Ensure constant observer at bedside with Q15M safety checks  2.  Maintain a safe environment  3.  Secure patient belongings  4.  Ensure family/visitors adhere to safety recommendations  5.  Ensure safety tray has been added to patient's diet order  6.  Every shift and PRN: Re-assess suicidal risk via Frequent Screener    2/1/2025 1120 by Kimi Murcia RN  Note: Mr. Vizcarra reports he is not having any suicidal ideation \"right now\". He notes they haven't occurred yet today. No harm to self or others thus far.      Problem: Depression  Goal: Will be euthymic at discharge  Description: INTERVENTIONS:  1. Administer medication as ordered  2. Provide emotional support via 1:1 interaction with staff  3. Encourage involvement in milieu/groups/activities  4. Monitor for social isolation  2/1/2025 1120 by Kimi Murcia RN  Note: Mr. Vizcarra endorses feeling depressed. He isolates in his room during the shift, asleep in bed. Out for meals and needs only. He does not attend therapuetic groups.      Problem: Depression  Goal: Will be euthymic at discharge  Description: INTERVENTIONS:  1. Administer medication as ordered  2. Provide emotional support via 1:1 interaction with staff  3. Encourage involvement in milieu/groups/activities  4. Monitor for social isolation  2/1/2025 1120 by Kimi Murcia, LATOYA  Note: Mr. Vizcarra endorses feeling depressed. He isolates in his room during the shift, asleep in bed. Out for meals and needs only. He does not attend therapuetic groups.   2/1/2025 0333 by Risas Paniagua LPN  Outcome: Not Progressing  Note: Patient continues to be isolative to room, and out for needs only.  Patient remains in bed sleeping, most of shift, with minimal verbal response during 1:1 emotional support.  Attempted to engage patient in group therapy, or social day room activities

## 2025-02-02 PROCEDURE — 6370000000 HC RX 637 (ALT 250 FOR IP): Performed by: PSYCHIATRY & NEUROLOGY

## 2025-02-02 PROCEDURE — 99231 SBSQ HOSP IP/OBS SF/LOW 25: CPT | Performed by: INTERNAL MEDICINE

## 2025-02-02 PROCEDURE — 1240000000 HC EMOTIONAL WELLNESS R&B

## 2025-02-02 PROCEDURE — 6370000000 HC RX 637 (ALT 250 FOR IP): Performed by: INTERNAL MEDICINE

## 2025-02-02 RX ADMIN — LISINOPRIL 20 MG: 20 TABLET ORAL at 08:47

## 2025-02-02 RX ADMIN — SERTRALINE 100 MG: 100 TABLET, FILM COATED ORAL at 21:48

## 2025-02-02 RX ADMIN — HYDROXYZINE HYDROCHLORIDE 50 MG: 50 TABLET ORAL at 21:48

## 2025-02-02 RX ADMIN — LURASIDONE HYDROCHLORIDE 120 MG: 60 TABLET ORAL at 08:47

## 2025-02-02 RX ADMIN — TRAZODONE HYDROCHLORIDE 50 MG: 50 TABLET ORAL at 21:48

## 2025-02-02 ASSESSMENT — PAIN SCALES - GENERAL: PAINLEVEL_OUTOF10: 0

## 2025-02-02 NOTE — PLAN OF CARE
Problem: Self Harm/Suicidality  Goal: Will have no self-injury during hospital stay  Description: INTERVENTIONS:  1.  Ensure constant observer at bedside with Q15M safety checks  2.  Maintain a safe environment  3.  Secure patient belongings  4.  Ensure family/visitors adhere to safety recommendations  5.  Ensure safety tray has been added to patient's diet order  6.  Every shift and PRN: Re-assess suicidal risk via Frequent Screener    2/2/2025 1105 by Zenia Hopkins RN  Outcome: Progressing  2/2/2025 0053 by Mike Jaimes RN  Outcome: Progressing  Note: Patient denies thoughts to harm self and others. Patient was cooperative with assessment, he reports a decreased mood and rates his depression a 6/10 with 10 being the worst. Patient was compliant with scheduled medications and remains in control of behavior at this time. Pt remains free of falls and verbalizes understanding of individual fall risks.  Pt wearing non skid footwear and encouraged to seek out staff for any assistance needed. Safe environment maintained, will continue to offer support.      Problem: Depression  Goal: Will be euthymic at discharge  Description: INTERVENTIONS:  1. Administer medication as ordered  2. Provide emotional support via 1:1 interaction with staff  3. Encourage involvement in milieu/groups/activities  4. Monitor for social isolation  2/2/2025 1105 by Zenia Hopkins RN  Outcome: Progressing  2/2/2025 0053 by Mike Jaimes RN  Outcome: Progressing     Problem: Safety - Adult  Goal: Free from fall injury  2/2/2025 1105 by Zenia Hopkins, RN  Outcome: Progressing  2/2/2025 0053 by Mike Jaimes RN  Outcome: Progressing     Problem: Pain  Goal: Verbalizes/displays adequate comfort level or baseline comfort level  Outcome: Progressing

## 2025-02-02 NOTE — BH NOTE
Sunday Rm Safety checks:      Pt participates in Rm checks. Pt Rm free of contraband. Food removed from Pt Rm

## 2025-02-02 NOTE — GROUP NOTE
Group Therapy Note    Date: 2/2/2025    Group Start Time: 1330  Group End Time: 1425  Group Topic: Relaxation    CZ Marielle Lr CTRS    Relaxation Group Note        Date: February 2, 2025 Start Time: 1:30pm  End Time:  225 pm      Number of Participants in Group & Unit Census:  10/19    Topic: relaxation group     Goal of Group: pt will identify benefits of using art for coping and relaxation       Comments:     Patient did not participate in Relaxation group, despite staff encouragement and explanation of benefits.  Patient remain seclusive to self.  Q15 minute safety checks maintained for patient safety and will continue to encourage patient to attend unit programming.              Signature:  OLGA RUIZ

## 2025-02-02 NOTE — GROUP NOTE
Group Therapy Note    Date: 2/1/2025    Group Start Time: 2000  Group End Time: 2030  Group Topic: Wrap-Up    [facility] B[facility]ike Freire, RN      Group Therapy Note    Attendees: 11/17         Pt refused to attend Wrap-up/Goal Review group this evening after encouragement from staff.  1:1 talk time offered as alternative to group session, but pt declined.  Will continue to encourage patient to attend unit group programming.        Signature:  Mike Jaimes RN

## 2025-02-02 NOTE — PLAN OF CARE
Problem: Depression  Goal: Will be euthymic at discharge  Description: INTERVENTIONS:  1. Administer medication as ordered  2. Provide emotional support via 1:1 interaction with staff  3. Encourage involvement in milieu/groups/activities  4. Monitor for social isolation  2/2/2025 0053 by Mike Jaimes RN  Outcome: Progressing     Problem: Safety - Adult  Goal: Free from fall injury  2/2/2025 0053 by Mike Jaimes RN  Outcome: Progressing     Problem: Self Harm/Suicidality  Goal: Will have no self-injury during hospital stay  Description: INTERVENTIONS:  1.  Ensure constant observer at bedside with Q15M safety checks  2.  Maintain a safe environment  3.  Secure patient belongings  4.  Ensure family/visitors adhere to safety recommendations  5.  Ensure safety tray has been added to patient's diet order  6.  Every shift and PRN: Re-assess suicidal risk via Frequent Screener    2/2/2025 0053 by Mike Jaimes RN  Outcome: Progressing  Note: Patient denies thoughts to harm self and others. Patient was cooperative with assessment, he reports a decreased mood and rates his depression a 6/10 with 10 being the worst. Patient was compliant with scheduled medications and remains in control of behavior at this time. Pt remains free of falls and verbalizes understanding of individual fall risks.  Pt wearing non skid footwear and encouraged to seek out staff for any assistance needed. Safe environment maintained, will continue to offer support.

## 2025-02-03 PROCEDURE — 6370000000 HC RX 637 (ALT 250 FOR IP): Performed by: PSYCHIATRY & NEUROLOGY

## 2025-02-03 PROCEDURE — 99231 SBSQ HOSP IP/OBS SF/LOW 25: CPT | Performed by: INTERNAL MEDICINE

## 2025-02-03 PROCEDURE — 6370000000 HC RX 637 (ALT 250 FOR IP): Performed by: INTERNAL MEDICINE

## 2025-02-03 PROCEDURE — 1240000000 HC EMOTIONAL WELLNESS R&B

## 2025-02-03 RX ADMIN — LURASIDONE HYDROCHLORIDE 120 MG: 60 TABLET ORAL at 08:15

## 2025-02-03 RX ADMIN — SERTRALINE 100 MG: 100 TABLET, FILM COATED ORAL at 20:37

## 2025-02-03 RX ADMIN — LISINOPRIL 20 MG: 20 TABLET ORAL at 08:15

## 2025-02-03 RX ADMIN — TRAZODONE HYDROCHLORIDE 50 MG: 50 TABLET ORAL at 20:37

## 2025-02-03 RX ADMIN — HYDROXYZINE HYDROCHLORIDE 50 MG: 50 TABLET ORAL at 20:36

## 2025-02-03 ASSESSMENT — PAIN SCALES - GENERAL: PAINLEVEL_OUTOF10: 0

## 2025-02-03 NOTE — PLAN OF CARE
Problem: Depression  Goal: Will be euthymic at discharge  Description: INTERVENTIONS:  1. Administer medication as ordered  2. Provide emotional support via 1:1 interaction with staff  3. Encourage involvement in milieu/groups/activities  4. Monitor for social isolation  2/3/2025 0022 by Mike Jaimes RN  Outcome: Progressing     Problem: Safety - Adult  Goal: Free from fall injury  2/3/2025 0022 by Mike Jaimes RN  Outcome: Progressing     Problem: Pain  Goal: Verbalizes/displays adequate comfort level or baseline comfort level  2/3/2025 0022 by Mike Jaimes RN  Outcome: Progressing     Problem: Self Harm/Suicidality  Goal: Will have no self-injury during hospital stay  Description: INTERVENTIONS:  1.  Ensure constant observer at bedside with Q15M safety checks  2.  Maintain a safe environment  3.  Secure patient belongings  4.  Ensure family/visitors adhere to safety recommendations  5.  Ensure safety tray has been added to patient's diet order  6.  Every shift and PRN: Re-assess suicidal risk via Frequent Screener    2/3/2025 0022 by Mike Jaimes RN  Outcome: Progressing  Note: Patient denies thoughts to harm self and others. Patient was cooperative with assessment, he reports his mood is \"alright\" and rates his depression a 4/10 with 10 being the worst. Patient was compliant with scheduled medications and remains in control of behavior at this time. Patient is able to verbalize pain appropriately. Patient spent more time in the milieu this evening but remained isolative to self. Safe environment maintained, will continue to offer support.

## 2025-02-03 NOTE — GROUP NOTE
Group Therapy Note    Date: 2/3/2025    Group Start Time: 1430  Group End Time: 1525  Group Topic: Cognitive Skills    Varsha Ivan CTRS        Group Therapy Note    Attendees: 8/19         Topic: Patient's Goal: To increase socialization, practice self expression, explore positive environments, activities,  soothing things, boundaries related to stress management using the senses, through creative expression   and discussion.           Comments:   Patient did not participate in Cognitive Skills Group, at 1430, despite staff encouragement.   Pt was resting in room and seclusive to self when invited to group.      Q15 minute safety checks maintained for patient safety and will continue to encourage   patient to attend unit programming.       Discipline Responsible: Psychoeducational Specialist   Signature: OLGA DURAN

## 2025-02-03 NOTE — GROUP NOTE
Group Therapy Note    Date: 2/3/2025    Group Start Time: 0900  Group End Time: 1002  Group Topic: Community Meeting    Onelia Rosas LPN        Group Therapy Note    Attendees: 7/18    patient refused to attend Goals group at 0900 after encouragement from staff.  1:1 talk time provided as alternative to group session      Signature:  Onelia Schulz LPN

## 2025-02-03 NOTE — GROUP NOTE
Group Therapy Note    Date: 2/3/2025    Group Start Time: 1000  Group End Time: 1030  Group Topic: Psychoeducation    Dharmesh Whiteside        Group Therapy Note    Attendees: 8/18     Patient was offered group therapy today but declined to participate despite encouragement from staff. 1:1 was offered.    Signature:  Dharmesh Matamoros

## 2025-02-03 NOTE — PLAN OF CARE
Problem: Self Harm/Suicidality  Goal: Will have no self-injury during hospital stay  Description: INTERVENTIONS:  1.  Ensure constant observer at bedside with Q15M safety checks  2.  Maintain a safe environment  3.  Secure patient belongings  4.  Ensure family/visitors adhere to safety recommendations  5.  Ensure safety tray has been added to patient's diet order  6.  Every shift and PRN: Re-assess suicidal risk via Frequent Screener    2/3/2025 0900 by Zenia Hopkins RN  Outcome: Progressing  Note: Patient denies thoughts of self harm at time of assessment. Patient is isolative to room, calm and cooperative with assessment and medications.   2/3/2025 0022 by Mike Jaimes RN  Outcome: Progressing  Note: Patient denies thoughts to harm self and others. Patient was cooperative with assessment, he reports his mood is \"alright\" and rates his depression a 4/10 with 10 being the worst. Patient was compliant with scheduled medications and remains in control of behavior at this time. Patient is able to verbalize pain appropriately. Patient spent more time in the milieu this evening but remained isolative to self. Safe environment maintained, will continue to offer support.      Problem: Depression  Goal: Will be euthymic at discharge  Description: INTERVENTIONS:  1. Administer medication as ordered  2. Provide emotional support via 1:1 interaction with staff  3. Encourage involvement in milieu/groups/activities  4. Monitor for social isolation  2/3/2025 0900 by Zenia Hopkins RN  Outcome: Progressing  Note: Patient denies symptoms of depression at time of assessment, rating his depression 0 out of 10 with 10 being the most severe. Patient remains isolative to room, encouraged to participate in groups.   2/3/2025 0022 by Mike Jaimes RN  Outcome: Progressing     Problem: Safety - Adult  Goal: Free from fall injury  2/3/2025 0900 by Zenia Hopkins RN  Outcome: Progressing  Note: Patient does not present as a

## 2025-02-03 NOTE — GROUP NOTE
Group Therapy Note    Date: 2/3/2025    Group Start Time: 1330  Group End Time: 1400  Group Topic: Nursing    [facility]        Group Therapy Note    Attendees: 7/19       Patient was offered group therapy today but declined to participate despite encouragement from staff. 1:1 was offered.           Signature:  Scarlet Robles LPN

## 2025-02-03 NOTE — GROUP NOTE
Group Therapy Note    Date: 2/3/2025    Group Start Time: 1100  Group End Time: 1145  Group Topic: Cognitive Skills    [facility] Varsha Hallman CTRS       Attendees: 11/18   Topic: To increase socialization, practice problem solving, listening skills, and concentration.           Comments:   Patient did not participate in Cognitive Skills Group, at 1100, despite staff encouragement.   Pt was resting in room and seclusive to self when invited to group.      Q15 minute safety checks maintained for patient safety and will continue to encourage   patient to attend unit programming.       Discipline Responsible: Psychoeducational Specialist   Signature: OLGA DURAN

## 2025-02-03 NOTE — GROUP NOTE
Group Therapy Note    Date: 2/2/2025    Group Start Time: 2005  Group End Time: 2030  Group Topic: Discharge Planning    [facility]      Group Therapy Note:     Pt declined to attend discharge planning group this evening after encouragement from staff.  1:1 talk time offered as alternative to group session, but pt declined.  Will continue to encourage patient to attend unit group programming.        Signature:  Mike Jaimes RN

## 2025-02-04 VITALS
HEART RATE: 55 BPM | HEIGHT: 72 IN | BODY MASS INDEX: 32.52 KG/M2 | TEMPERATURE: 97.7 F | OXYGEN SATURATION: 100 % | SYSTOLIC BLOOD PRESSURE: 102 MMHG | WEIGHT: 240.08 LBS | DIASTOLIC BLOOD PRESSURE: 60 MMHG | RESPIRATION RATE: 14 BRPM

## 2025-02-04 PROCEDURE — 99232 SBSQ HOSP IP/OBS MODERATE 35: CPT | Performed by: INTERNAL MEDICINE

## 2025-02-04 PROCEDURE — 6370000000 HC RX 637 (ALT 250 FOR IP): Performed by: PSYCHIATRY & NEUROLOGY

## 2025-02-04 PROCEDURE — 6370000000 HC RX 637 (ALT 250 FOR IP): Performed by: INTERNAL MEDICINE

## 2025-02-04 RX ORDER — SERTRALINE HYDROCHLORIDE 100 MG/1
100 TABLET, FILM COATED ORAL NIGHTLY
Qty: 30 TABLET | Refills: 3 | Status: SHIPPED | OUTPATIENT
Start: 2025-02-04

## 2025-02-04 RX ORDER — TRAZODONE HYDROCHLORIDE 50 MG/1
50 TABLET, FILM COATED ORAL NIGHTLY PRN
Qty: 30 TABLET | Refills: 0 | Status: SHIPPED | OUTPATIENT
Start: 2025-02-04

## 2025-02-04 RX ORDER — LURASIDONE HYDROCHLORIDE 120 MG/1
120 TABLET, FILM COATED ORAL
Qty: 30 TABLET | Refills: 0 | Status: SHIPPED | OUTPATIENT
Start: 2025-02-04

## 2025-02-04 RX ORDER — LISINOPRIL 20 MG/1
20 TABLET ORAL DAILY
Qty: 30 TABLET | Refills: 3 | Status: SHIPPED | OUTPATIENT
Start: 2025-02-05

## 2025-02-04 RX ADMIN — LISINOPRIL 20 MG: 20 TABLET ORAL at 08:59

## 2025-02-04 RX ADMIN — LURASIDONE HYDROCHLORIDE 120 MG: 60 TABLET ORAL at 08:59

## 2025-02-04 NOTE — TRANSITION OF CARE
Behavioral Health Transition Record    Patient Name: Marco Vizcarra  YOB: 1975   Medical Record Number: [id number]  Date of Admission: 1/27/2025  5:30 PM   Date of Discharge: 2/4/2025    Attending Provider: Chris Mccarty MD   Discharging Provider: Dr. Mccarty  To contact this individual call [phone] and ask the  to page.  If unavailable, ask to be transferred to Behavioral Health Provider on call.  A Behavioral Health Provider will be available on call 24/7 and during holidays.    Primary Care Provider: No primary care provider on file.    No Known Allergies    Reason for Admission: Patient: Marco Vizcarra  MRN: [id number]  Reason for Admission to Psychiatric Unit:  Threat to self requiring 24 hour professional observation  Failure of outpatient psychiatry treatment so that the beneficiary requires 24 hour professional observation and care  Concerns about patient's safety in the community  Past Mental Health Diagnosis: a history of  Schizoaffective Disorder and Alcohol and/or Drug Use Disorder  Triggering event or precipitating factor: Psych Treatment Noncompliance  Length of time/duration of triggering event: Weeks  Legal Status: Voluntary       Admission Diagnosis: Depression with suicidal ideation [F32.A, R45.851]    * No surgery found *    No results found for this visit on 01/27/25.    Immunizations administered during this encounter:   There is no immunization history on file for this patient.  Influenza Vaccination Status: Documentation of patient's or caregiver's refusal of influenza vaccine.    Screening for Metabolic Disorders for Patients on Antipsychotic Medications  (Data obtained from the EMR)    Estimated Body Mass Index  Body mass index is 32.88 kg/m².      Vital Signs/Blood Pressure  /60   Pulse 55   Temp 97.7 °F (36.5 °C) (Temporal)   Resp 14   Ht 1.82 m (5' 11.65\")   Wt 108.9 kg (240 lb 1.3 oz)   SpO2 100%   BMI 32.88 kg/m²      Fasting Blood Glucose or

## 2025-02-04 NOTE — GROUP NOTE
Group Therapy Note    Date: 2/4/2025    Group Start Time: 1000  Group End Time: 1055  Group Topic: Psychoeducation    Gabriela Cooper, MELONIEW        Group Therapy Note    Attendees: 8/22       patient refused to attend psychoeducation group at 10a after encouragement from staff.  1:1 talk time provided as alternative to group session

## 2025-02-04 NOTE — PLAN OF CARE
Problem: Self Harm/Suicidality  Goal: Will have no self-injury during hospital stay  Description: INTERVENTIONS:  1.  Ensure constant observer at bedside with Q15M safety checks  2.  Maintain a safe environment  3.  Secure patient belongings  4.  Ensure family/visitors adhere to safety recommendations  5.  Ensure safety tray has been added to patient's diet order  6.  Every shift and PRN: Re-assess suicidal risk via Frequent Screener    2/3/2025 2057 by Jose Brown RN  Outcome: Progressing  Note: Patient remains free from self harm and injury. He denied thoughts of hurting himself or others.      Problem: Depression  Goal: Will be euthymic at discharge  Description: INTERVENTIONS:  1. Administer medication as ordered  2. Provide emotional support via 1:1 interaction with staff  3. Encourage involvement in milieu/groups/activities  4. Monitor for social isolation  2/3/2025 2057 by Jose Brown RN  Outcome: Progressing  Note: Patient reported depression to be 9/10 and anxiety to be 6/10. He remains isolative to room sleeping only coming out for needs. He was medication compliant and ate snack. He denied all hallucinations.      Problem: Safety - Adult  Goal: Free from fall injury  2/3/2025 2057 by Jose Brown RN  Outcome: Progressing     Problem: Pain  Goal: Verbalizes/displays adequate comfort level or baseline comfort level  2/3/2025 2057 by Jose Brown RN  Outcome: Progressing  Note: Patient denied pain during assessment

## 2025-02-04 NOTE — BH NOTE
[facility]  Discharge Note    Pt discharged with followings belongings:   Dental Appliances: None  Vision - Corrective Lenses: None  Hearing Aid: None  Jewelry: None  Body Piercings Removed: No  Clothing: Footwear, Hat, Pants, Shirt, Sweater, Shorts, Slippers, Socks  Other Valuables: Money, Wallet, Credit/Debit Card, Personal Toiletries   Valuables sent home with patient. Patient educated on aftercare instructions: Yes  Information faxed to [facility] by RN  at 3:05 PM .Patient verbalize understanding of AVS:  Yes.    Status EXAM upon discharge:  Mental Status and Behavioral Exam  Normal: No  Level of Assistance: Independent/Self  Facial Expression: Flat  Affect: Blunt  Level of Consciousness: Alert  Frequency of Checks: 4 times per hour, close  Mood:Normal: Yes  Mood: Depressed, Sad, Anxious  Motor Activity:Normal: No  Motor Activity: Decreased  Eye Contact: Fair  Observed Behavior: Cooperative  Sexual Misconduct History: Current - no  Preception: [place] to time, [place] to place, [place] to situation, [place] to person  Attention:Normal: Yes  Attention: Distractible  Thought Processes: Unremarkable  Thought Content:Normal: Yes  Thought Content: Preoccupations  Depression Symptoms: Isolative  Anxiety Symptoms: Generalized  Mayuri Symptoms: No problems reported or observed.  Hallucinations: None  Delusions: No  Memory:Normal: Yes  Memory: Poor recent, Poor remote  Insight and Judgment: No  Insight and Judgment: Unmotivated    Tobacco Screening:  Practical Counseling, on admission, sung X, if applicable and completed (first 3 are required if patient doesn't refuse):            ( ) Recognizing danger situations (included triggers and roadblocks)                    ( ) Coping skills (new ways to manage stress,relaxation techniques, changing routine, distraction)                                                           ( ) Basic information about quitting (benefits of quitting, techniques in how to quit,

## 2025-02-04 NOTE — GROUP NOTE
Group Therapy Note    Date: 2/4/2025    Group Start Time: 0900  Group End Time: 0915  Group Topic: Community Meeting    Jazmin Ventura      Community Meeting Group Note        Date: 2/4/2025 Start Time: 9am  End Time: 0915      Number of Participants in Group & Unit Census:  9/22    Topic: To discuss and share daily goal        Comments:     Patient did not participate in Community Meeting group, despite staff encouragement and explanation of benefits.  Patient remain seclusive to self.  Q15 minute safety checks maintained for patient safety and will continue to encourage patient to attend unit programming.            Signature:  Jazmin Boles

## 2025-02-04 NOTE — PLAN OF CARE
Problem: Self Harm/Suicidality  Goal: Will have no self-injury during hospital stay  Description: INTERVENTIONS:  1.  Ensure constant observer at bedside with Q15M safety checks  2.  Maintain a safe environment  3.  Secure patient belongings  4.  Ensure family/visitors adhere to safety recommendations  5.  Ensure safety tray has been added to patient's diet order  6.  Every shift and PRN: Re-assess suicidal risk via Frequent Screener    Outcome: Completed     Problem: Depression  Goal: Will be euthymic at discharge  Description: INTERVENTIONS:  1. Administer medication as ordered  2. Provide emotional support via 1:1 interaction with staff  3. Encourage involvement in milieu/groups/activities  4. Monitor for social isolation  Outcome: Completed     Problem: Safety - Adult  Goal: Free from fall injury  Outcome: Completed     Problem: Pain  Goal: Verbalizes/displays adequate comfort level or baseline comfort level  Outcome: Completed

## 2025-02-04 NOTE — PROGRESS NOTES
Behavioral Services                                              Medicare Re-Certification    I certify that the inpatient psychiatric hospital services furnished since the previous certification/re-certification were, and continue to be, medically necessary for;    [x] (1) Treatment which could reasonably be expected to improve the patient's condition,    [x] (2) Or for diagnostic study.    Estimated length of stay/service 1-5 days    Plan for post-hospital care -outpatient care    This patient continues to need, on a daily basis, active treatment furnished directly by or requiring the supervision of inpatient psychiatric personnel.    Electronically signed by VALENTINE IBARRA MD on 2/3/2025 at 9:17 AM   
      Behavioral Services  Medicare Certification Upon Admission    I certify that this patient's inpatient psychiatric hospital admission is medically necessary for:    [x] (1) Treatment which could reasonably be expected to improve this patient's condition,       [x] (2) Or for diagnostic study;     AND     [x](2) The inpatient psychiatric services are provided while the individual is under the care of a physician and are included in the individualized plan of care.    Estimated length of stay/service 2-9 days    Plan for post-hospital care -outpatient care    Electronically signed by VALENTINE IBARRA MD on 1/28/2025 at 9:19 AM      
    [facility] Internal Medicine  Usama Olsen MD; Ruel Solano MD, Brian Ruvalcaba MD, Madeleine Taylor MD, Halle Mayorga MD; Maren Irvin MD    [facility] Internal Medicine   IN-PATIENT SERVICE   [facility]     HISTORY AND PHYSICAL EXAMINATION            Date:   1/31/2025  Patient name:  Marco Vizcarra  Date of admission:  1/27/2025  5:30 PM  MRN:   [id number]  Account:  [id number]  YOB: 1975  PCP:    No primary care provider on file.  Room:   [address] [place] [address]  Code Status:    Full Code      Chief Complaint:     Suicidal /Ac Psychosis  Athma    History Obtained From:     Patient/EMR/bedside RN     History of Present Illness:     HTN  Onset more than 2 years ago  mayra mild to mod  Controlled with current po meds  Not associated with headaches or blurry vision  No chest pain      Past Medical History:     Past Medical History:   Diagnosis Date    Asthma     Hypertension     Morbid obesity     Schizoaffective disorder (HCC)     Substance abuse (HCC)         Past Surgical History:     No past surgical history on file.     Medications Prior to Admission:     Prior to Admission medications    Medication Sig Start Date End Date Taking? Authorizing Provider   albuterol sulfate HFA (VENTOLIN HFA) 108 (90 Base) MCG/ACT inhaler Inhale 2 puffs into the lungs every 6 hours as needed for Wheezing  Patient not taking: Reported on 1/27/2025    Javier Givens MD   lurasidone (LATUDA) 120 MG tablet Take 1 tablet by mouth daily (with breakfast)  Patient not taking: Reported on 1/27/2025    Javier Givens MD   sertraline (ZOLOFT) 100 MG tablet Take 1 tablet by mouth nightly  Patient not taking: Reported on 1/27/2025    Javier Givens MD   traZODone (DESYREL) 50 MG tablet Take 1 tablet by mouth nightly as needed for Sleep  Patient not taking: Reported on 1/27/2025    Javier Givens MD        Allergies:     Patient has no known 
    [facility] Internal Medicine  Usama Olsen MD; Ruel Solano MD, Brian Ruvalcaba MD, Madeleine Taylor MD, Halle Mayorga MD; Maren Irvin MD    [facility] Internal Medicine   IN-PATIENT SERVICE   [facility]     HISTORY AND PHYSICAL EXAMINATION            Date:   1/30/2025  Patient name:  Marco Vizcarra  Date of admission:  1/27/2025  5:30 PM  MRN:   [id number]  Account:  [id number]  YOB: 1975  PCP:    No primary care provider on file.  Room:   [address] [place] [address]  Code Status:    Full Code      Chief Complaint:     Suicidal /Ac Psychosis  Athma    History Obtained From:     Patient/EMR/bedside RN     History of Present Illness:     HTN  Onset more than 2 years ago  mayra mild to mod  Controlled with current po meds  Not associated with headaches or blurry vision  No chest pain      Past Medical History:     Past Medical History:   Diagnosis Date    Asthma     Hypertension     Morbid obesity     Schizoaffective disorder (HCC)     Substance abuse (HCC)         Past Surgical History:     No past surgical history on file.     Medications Prior to Admission:     Prior to Admission medications    Medication Sig Start Date End Date Taking? Authorizing Provider   albuterol sulfate HFA (VENTOLIN HFA) 108 (90 Base) MCG/ACT inhaler Inhale 2 puffs into the lungs every 6 hours as needed for Wheezing  Patient not taking: Reported on 1/27/2025    Javier Givens MD   lurasidone (LATUDA) 120 MG tablet Take 1 tablet by mouth daily (with breakfast)  Patient not taking: Reported on 1/27/2025    Javier Givens MD   sertraline (ZOLOFT) 100 MG tablet Take 1 tablet by mouth nightly  Patient not taking: Reported on 1/27/2025    Javier Givens MD   traZODone (DESYREL) 50 MG tablet Take 1 tablet by mouth nightly as needed for Sleep  Patient not taking: Reported on 1/27/2025    Javier Givens MD        Allergies:     Patient has no known 
    [facility] Internal Medicine  Usama Olsen MD; Ruel Solano MD, Brian Ruvalcaba MD, Madeleine Taylor MD, Halle Mayorga MD; Maren Irvin MD    [facility] Internal Medicine   IN-PATIENT SERVICE   [facility]     HISTORY AND PHYSICAL EXAMINATION            Date:   2/1/2025  Patient name:  Marco Vizcarra  Date of admission:  1/27/2025  5:30 PM  MRN:   [id number]  Account:  [id number]  YOB: 1975  PCP:    No primary care provider on file.  Room:   [address] [place] [address]  Code Status:    Full Code      Chief Complaint:     Suicidal /Ac Psychosis  Athma    History Obtained From:     Patient/EMR/bedside RN     History of Present Illness:     HTN  Onset more than 2 years ago  mayra mild to mod  Controlled with current po meds  Not associated with headaches or blurry vision  No chest pain      Past Medical History:     Past Medical History:   Diagnosis Date    Asthma     Hypertension     Morbid obesity     Schizoaffective disorder (HCC)     Substance abuse (HCC)         Past Surgical History:     No past surgical history on file.     Medications Prior to Admission:     Prior to Admission medications    Medication Sig Start Date End Date Taking? Authorizing Provider   albuterol sulfate HFA (VENTOLIN HFA) 108 (90 Base) MCG/ACT inhaler Inhale 2 puffs into the lungs every 6 hours as needed for Wheezing  Patient not taking: Reported on 1/27/2025    Javier Givens MD   lurasidone (LATUDA) 120 MG tablet Take 1 tablet by mouth daily (with breakfast)  Patient not taking: Reported on 1/27/2025    Javier Givens MD   sertraline (ZOLOFT) 100 MG tablet Take 1 tablet by mouth nightly  Patient not taking: Reported on 1/27/2025    Javier Givens MD   traZODone (DESYREL) 50 MG tablet Take 1 tablet by mouth nightly as needed for Sleep  Patient not taking: Reported on 1/27/2025    Javier Givens MD        Allergies:     Patient has no known 
    [facility] Internal Medicine  Usama Olsen MD; Ruel Solano MD, Brian Ruvalcaba MD, Madeleine Taylor MD, Halle Mayorga MD; Maren Irvin MD    [facility] Internal Medicine   IN-PATIENT SERVICE   [facility]     HISTORY AND PHYSICAL EXAMINATION            Date:   2/3/2025  Patient name:  Marco Vizcarra  Date of admission:  1/27/2025  5:30 PM  MRN:   [id number]  Account:  [id number]  YOB: 1975  PCP:    No primary care provider on file.  Room:   [address] [place] [address]  Code Status:    Full Code      Chief Complaint:     Suicidal /Ac Psychosis  Athma    History Obtained From:     Patient/EMR/bedside RN     History of Present Illness:     HTN  Onset more than 2 years ago  mayra mild to mod  Controlled with current po meds  Not associated with headaches or blurry vision  No chest pain      Past Medical History:     Past Medical History:   Diagnosis Date    Asthma     Hypertension     Morbid obesity     Schizoaffective disorder (HCC)     Substance abuse (HCC)         Past Surgical History:     No past surgical history on file.     Medications Prior to Admission:     Prior to Admission medications    Medication Sig Start Date End Date Taking? Authorizing Provider   albuterol sulfate HFA (VENTOLIN HFA) 108 (90 Base) MCG/ACT inhaler Inhale 2 puffs into the lungs every 6 hours as needed for Wheezing  Patient not taking: Reported on 1/27/2025    Javier Givens MD   lurasidone (LATUDA) 120 MG tablet Take 1 tablet by mouth daily (with breakfast)  Patient not taking: Reported on 1/27/2025    Javier Givens MD   sertraline (ZOLOFT) 100 MG tablet Take 1 tablet by mouth nightly  Patient not taking: Reported on 1/27/2025    Javier Givens MD   traZODone (DESYREL) 50 MG tablet Take 1 tablet by mouth nightly as needed for Sleep  Patient not taking: Reported on 1/27/2025    Javier Givens MD        Allergies:     Patient has no known 
    [facility] Internal Medicine  Usama Olsen MD; Ruel Solano MD, Brian Ruvalcaba MD, Madeleine Taylor MD, Halle Mayorga MD; Maren Irvin MD    [facility] Internal Medicine   IN-PATIENT SERVICE   [facility]     HISTORY AND PHYSICAL EXAMINATION            Date:   1/29/2025  Patient name:  Marco Vizcarra  Date of admission:  1/27/2025  5:30 PM  MRN:   [id number]  Account:  [id number]  YOB: 1975  PCP:    No primary care provider on file.  Room:   [address] [place] [address]  Code Status:    Full Code      Chief Complaint:     Suicidal /Ac Psychosis  Athma    History Obtained From:     Patient/EMR/bedside RN     History of Present Illness:     HTN  Onset more than 2 years ago  mayra mild to mod  Controlled with current po meds  Not associated with headaches or blurry vision  No chest pain      Past Medical History:     Past Medical History:   Diagnosis Date    Asthma     Hypertension     Morbid obesity     Schizoaffective disorder (HCC)     Substance abuse (HCC)         Past Surgical History:     No past surgical history on file.     Medications Prior to Admission:     Prior to Admission medications    Medication Sig Start Date End Date Taking? Authorizing Provider   albuterol sulfate HFA (VENTOLIN HFA) 108 (90 Base) MCG/ACT inhaler Inhale 2 puffs into the lungs every 6 hours as needed for Wheezing  Patient not taking: Reported on 1/27/2025    Javier Givens MD   lurasidone (LATUDA) 120 MG tablet Take 1 tablet by mouth daily (with breakfast)  Patient not taking: Reported on 1/27/2025    Javier Givens MD   sertraline (ZOLOFT) 100 MG tablet Take 1 tablet by mouth nightly  Patient not taking: Reported on 1/27/2025    Javier Givens MD   traZODone (DESYREL) 50 MG tablet Take 1 tablet by mouth nightly as needed for Sleep  Patient not taking: Reported on 1/27/2025    Javier Givens MD        Allergies:     Patient has no known 
    [facility] Internal Medicine  Usama Olsen MD; Ruel Solano MD, Brian Ruvalcaba MD, Madeleine Taylor MD, Halle Mayorga MD; Maren Irvin MD    [facility] Internal Medicine   IN-PATIENT SERVICE   [facility]     HISTORY AND PHYSICAL EXAMINATION            Date:   2/2/2025  Patient name:  Marco Vizcarra  Date of admission:  1/27/2025  5:30 PM  MRN:   [id number]  Account:  [id number]  YOB: 1975  PCP:    No primary care provider on file.  Room:   [address] [place] [address]  Code Status:    Full Code      Chief Complaint:     Suicidal /Ac Psychosis  Athma    History Obtained From:     Patient/EMR/bedside RN     History of Present Illness:     HTN  Onset more than 2 years ago  mayra mild to mod  Controlled with current po meds  Not associated with headaches or blurry vision  No chest pain      Past Medical History:     Past Medical History:   Diagnosis Date    Asthma     Hypertension     Morbid obesity     Schizoaffective disorder (HCC)     Substance abuse (HCC)         Past Surgical History:     No past surgical history on file.     Medications Prior to Admission:     Prior to Admission medications    Medication Sig Start Date End Date Taking? Authorizing Provider   albuterol sulfate HFA (VENTOLIN HFA) 108 (90 Base) MCG/ACT inhaler Inhale 2 puffs into the lungs every 6 hours as needed for Wheezing  Patient not taking: Reported on 1/27/2025    Javier Givens MD   lurasidone (LATUDA) 120 MG tablet Take 1 tablet by mouth daily (with breakfast)  Patient not taking: Reported on 1/27/2025    Javier Givens MD   sertraline (ZOLOFT) 100 MG tablet Take 1 tablet by mouth nightly  Patient not taking: Reported on 1/27/2025    Javier Givens MD   traZODone (DESYREL) 50 MG tablet Take 1 tablet by mouth nightly as needed for Sleep  Patient not taking: Reported on 1/27/2025    Javier Givens MD        Allergies:     Patient has no known 
  Daily Progress Note  2/1/2025    Patient Name: Marco Vizcarra    CHIEF COMPLAINT:  Suicidal  ideations and auditory hallucinations          SUBJECTIVE:      Patient is seen today for a follow up assessment. Vitals and labs reviewed and appear within normal limits.  Marco is compliant with scheduled medications. He remains behaviorally in control and has not required emergency medications in the past 24 hours.  Marco is agreeable to assessment at bedside.  He continues to endorse significant depression and rates his depression as a 7 (0-10 scale with 0 being none and 10 being the worst).  He reports that he slept well last night and he denies any issues with his appetite.    Marco reports fleeting suicidal ideation stating the thoughts continue to come and go throughout the day. He denies homicidal ideation.  He continues to report improvement in both auditory and visual hallucinations. He denies paranoia.  He denies any side effects to medications at this time. He remains isolative to his room.  He reports on discharge he plans to go to the [facility].     Appetite:  [x] Normal/Adequate/Unchanged  [] Increased  [] Decreased      Sleep:       [x] Normal/Adequate/Unchanged  [] Fair  [] Poor      Group Attendance on Unit:   [] Yes  [] Selectively    [x] No    Medication Side Effects:  Patient denies any medication side effects at the time of assessment.         Mental Status Exam  Level of consciousness: Alert and awake.   Appearance: Appropriate attire for setting, resting in bed, with poor grooming and hygiene.   Behavior/Motor: Approachable, calm  Attitude toward examiner: Cooperative, attentive, good eye contact.  Speech: Normal rate, normal volume, normal tone.  Mood:  Patient reports \"I'm okay\".   Affect: Depressed  Thought processes: Linear and coherent.   Thought content: Denies homicidal ideation.  Suicidal Ideation: Fleeting suicidal ideations  Delusions: No evidence of delusions. Denies paranoia.  Perceptual 
BEHAVIORAL HEALTH FOLLOW-UP NOTE     1/29/2025     Patient was seen and examined in person, Chart reviewed   Patient's case discussed with staff/team    Chief Complaint: Suicidal ideations with auditory hallucinations    Interim History:     Patient's charts, vitals and labs reviewed.  Blood pressure well-controlled with lisinopril.  Patient did not require any emergency medications for aggression or agitation.  Patient has however not been participating in any group activity and remains isolated and standoffish.  However is compliant with his sertraline and Latuda.  Patient was seen face-to-face in his room while sleeping.  Patient was irritable that he was woken up by writer.  States his mood is \"ok\" no significant improvement since admission.  Is currently denying suicidal ideations however is still endorsing auditory hallucinations.    /80   Pulse 60   Temp 98.4 °F (36.9 °C) (Oral)   Resp 14   Ht 1.82 m (5' 11.65\")   Wt 108.9 kg (240 lb 1.3 oz)   SpO2 97%   BMI 32.88 kg/m²   Appetite:  [x] Normal/Unchanged  [] Increased  [] Decreased      Sleep:       [] Normal/Unchanged  [x] Fair       [] Poor              Energy:    [x] Normal/Unchanged  [] Increased  [] Decreased        Aggression:  [] yes  [x] no    Patient is [] able  [x] unable to CONTRACT FOR SAFETY ON THE UNIT    PAST MEDICAL/PSYCHIATRIC HISTORY:   Past Medical History:   Diagnosis Date    Asthma     Hypertension     Morbid obesity     Schizoaffective disorder (HCC)     Substance abuse (HCC)        FAMILY/SOCIAL HISTORY:  Family History   Family history unknown: Yes     Social History     Socioeconomic History    Marital status: Single     Spouse name: Not on file    Number of children: Not on file    Years of education: Not on file    Highest education level: Not on file   Occupational History    Not on file   Tobacco Use    Smoking status: Never    Smokeless tobacco: Never    Tobacco comments:     pt nonsmoker   Vaping Use    Vaping 
BEHAVIORAL HEALTH FOLLOW-UP NOTE     1/30/2025     Patient was seen and examined in person, Chart reviewed   Patient's case discussed with staff/team    Chief Complaint: Suicidal ideations and Auditory Hallucinations     Interim History:     Patient seen face-to-face.  He was seen lying in bed in his room.  Patient rarely leaves his room except for meals.  He refuses to participate in any group activity.  He continues to be very guarded withdrawn and isolated.  Is still endorsing symptoms of suicidal ideation.  However is currently denying any auditory hallucination and hallucinations that are command in nature.  He is also currently denying any homicidal ideations.     BP (!) 112/58   Pulse 55   Temp 98.4 °F (36.9 °C) (Oral)   Resp 14   Ht 1.82 m (5' 11.65\")   Wt 108.9 kg (240 lb 1.3 oz)   SpO2 97%   BMI 32.88 kg/m²   Appetite:  [x] Normal/Unchanged  [] Increased  [] Decreased      Sleep:       [x] Normal/Unchanged  [] Fair       [] Poor              Energy:    [x] Normal/Unchanged  [] Increased  [] Decreased        Aggression:  [] yes  [x] no    Patient is [] able  [x] unable to CONTRACT FOR SAFETY ON THE UNIT    PAST MEDICAL/PSYCHIATRIC HISTORY:   Past Medical History:   Diagnosis Date    Asthma     Hypertension     Morbid obesity     Schizoaffective disorder (HCC)     Substance abuse (HCC)        FAMILY/SOCIAL HISTORY:  Family History   Family history unknown: Yes     Social History     Socioeconomic History    Marital status: Single     Spouse name: Not on file    Number of children: Not on file    Years of education: Not on file    Highest education level: Not on file   Occupational History    Not on file   Tobacco Use    Smoking status: Never    Smokeless tobacco: Never    Tobacco comments:     pt nonsmoker   Vaping Use    Vaping status: Never Used   Substance and Sexual Activity    Alcohol use: Not Currently     Comment: Hx of Alcohol abuse    Drug use: Not Currently    Sexual activity: Not Currently 
BEHAVIORAL HEALTH FOLLOW-UP NOTE     2/3/2025     Patient was seen and examined in person, Chart reviewed   Patient's case discussed with staff/team    Chief Complaint: Suicidal ideations with auditory hallucinations    Interim History:     Nursing staff report the patient remains isolated in his room. He does not participate in group activities. He has been adherent with his scheduled medications. He has used PRN Atarax and Trazodone on 2/2. The patient was seen resting in bed in his assigned room. He was agreeable to be interviewed. He said his mood is doing \"okay\". He endorsed feelings of depressed mood, sadness, guilt, and hopelessness. He says his mood feels improved compared to yesterday. He denied any feeling of anxiety or paranoia. He reports improvement in suicidal ideation. He said he has not had any feelings of self-harm or suicide for a little over 1 day. He denied any homicidal ideation. He denied any auditory or visual hallucinations. He reports normal appetite, sleep, and energy levels. The patient's mood has continued to improve but he remains isolated in his room.     /76   Pulse 82   Temp 98 °F (36.7 °C) (Oral)   Resp 16   Ht 1.82 m (5' 11.65\")   Wt 108.9 kg (240 lb 1.3 oz)   SpO2 97%   BMI 32.88 kg/m²   Appetite:  [x] Normal/Unchanged  [] Increased  [] Decreased      Sleep:       [x] Normal/Unchanged  [] Fair       [] Poor              Energy:    [x] Normal/Unchanged  [] Increased  [] Decreased        Aggression:  [] yes  [x] no    Patient is [] able  [] unable to CONTRACT FOR SAFETY ON THE UNIT    PAST MEDICAL/PSYCHIATRIC HISTORY:   Past Medical History:   Diagnosis Date    Asthma     Hypertension     Morbid obesity     Schizoaffective disorder (HCC)     Substance abuse (HCC)        FAMILY/SOCIAL HISTORY:  Family History   Family history unknown: Yes     Social History     Socioeconomic History    Marital status: Single     Spouse name: Not on file    Number of children: Not on file 
CLINICAL PHARMACY NOTE: MEDS TO BEDS    Total # of Prescriptions Filled: 4   The following medications were delivered to the patient:  Sertraline HCL 100MG Tablets   Lisinopril 20MG Tablets  Lurasidone HCL 120MG Tablets  Trazodone HCL 50MG Tablets     Additional Documentation:  Delivered to [facility] Unit [facility] and signed for by Zenia at 12:50PM 2/4/25. Meds filled for 14 day supply through [facility] Voucher.   
Pharmacy Medication History Note      List of current medications patient is taking is complete.    Source of information: [employer], Care Everywhere, patient.    Changes made to medication list:  Medications removed (include reason, ex. therapy complete or physician discontinued, noncompliance):  None    Medications flagged for provider review:  Albuterol inhaler - patient not taking  Lurasidone - patient not taking  Sertraline - patient not taking  Trazodone - patient not taking    Medications added/doses adjusted:  None    Prior to Admission medications    Medication Sig   albuterol sulfate HFA (VENTOLIN HFA) 108 (90 Base) MCG/ACT inhaler Inhale 2 puffs into the lungs every 6 hours as needed for Wheezing  Patient not taking: Reported on 1/27/2025   lurasidone (LATUDA) 120 MG tablet Take 1 tablet by mouth daily (with breakfast)  Patient not taking: Reported on 1/27/2025   sertraline (ZOLOFT) 100 MG tablet Take 1 tablet by mouth nightly  Patient not taking: Reported on 1/27/2025   traZODone (DESYREL) 50 MG tablet Take 1 tablet by mouth nightly as needed for Sleep  Patient not taking: Reported on 1/27/2025     Other notes (ex. Recent course of antibiotics, Coumadin dosing):  OARRS negative  The patient reports that he is not taking any medications and is not prescribed anything. He was discharged from [place] on 2/27/2024 with orders for these listed medications.    Please let me know if you have any questions about this encounter. Thank you!    Electronically signed by NICHELLE VEGA on 1/27/2025 at 7:18 PM     
RT ASSESSMENT TREATMENT GOALS    [x]Pt Goal: Pt will identify 1-2 positive coping skills by time of discharge.    []Pt Goal: Pt will identify 1-2 positive aspects of self by time of discharge.    []Pt Goal: Pt will remain on task/topic for 15-30 minutes during group by time of discharge.    []Pt Goal: Pt will identify 1-2 aspects of relapse prevention plan by time of discharge.    [x]Pt Goal: Pt will join in conversation with peers 1-2 times per group by time of discharge.    []Pt Goal: Pt will identify 1-2 new leisure interests by time of discharge.    []Pt Goal: Pt will not voice any delusional content by time of discharge.    
never smoked. He has never used smokeless tobacco.  Alcohol:      reports that he does not currently use alcohol.  Drug Use:  reports that he does not currently use drugs.    Family History:     Family History   Family history unknown: Yes       Review of Systems:     Positive and Negative as described in HPI.    CONSTITUTIONAL:  negative for fevers, chills, sweats, fatigue, weight loss  HEENT:  negative for vision, hearing changes, runny nose, throat pain  RESPIRATORY:  negative for shortness of breath, cough, congestion, wheezing.  CARDIOVASCULAR:  negative for chest pain, palpitations.  GASTROINTESTINAL:  negative for nausea, vomiting, diarrhea, constipation, change in bowel habits, abdominal pain   GENITOURINARY:  negative for difficulty of urination, burning with urination, frequency   INTEGUMENT:  negative for rash, skin lesions, easy bruising   HEMATOLOGIC/LYMPHATIC:  negative for swelling/edema   ALLERGIC/IMMUNOLOGIC:  negative for urticaria , itching  ENDOCRINE:  negative increase in drinking, increase in urination, hot or cold intolerance  MUSCULOSKELETAL:  negative joint pains, muscle aches, swelling of joints  NEUROLOGICAL:  negative for headaches, dizziness, lightheadedness, numbness, pain, tingling extremities      Physical Exam:     /60   Pulse 55   Temp 97.7 °F (36.5 °C) (Temporal)   Resp 14   Ht 1.82 m (5' 11.65\")   Wt 108.9 kg (240 lb 1.3 oz)   SpO2 100%   BMI 32.88 kg/m²   Temp (24hrs), Av.2 °F (36.8 °C), Min:97.7 °F (36.5 °C), Max:98.7 °F (37.1 °C)    No results for input(s): \"POCGLU\" in the last 72 hours.  No intake or output data in the 24 hours ending 25 1410    General Appearance:  alert, well appearing, and in no acute distress  Mental status: oriented to person, place, and time   Head:  normocephalic, atraumatic.  Neck: supple, no carotid bruits, thyroid not palpable  Lungs: Bilateral equal air entry, clear to ausculation, no wheezing, rales or rhonchi, normal 
nonsmoker   Vaping Use    Vaping status: Never Used   Substance and Sexual Activity    Alcohol use: Not Currently     Comment: Hx of Alcohol abuse    Drug use: Not Currently    Sexual activity: Not Currently   Other Topics Concern    Not on file   Social History Narrative    Not on file     Social Determinants of Health     Financial Resource Strain: Medium Risk (10/22/2020)    [facility]    Overall Financial Resource Strain (CARDIA)     Difficulty of Paying Living Expenses: Somewhat hard   Food Insecurity: No Food Insecurity (1/27/2025)    Hunger Vital Sign     Worried About Running Out of Food in the Last Year: Never true     Ran Out of Food in the Last Year: Never true   Transportation Needs: No Transportation Needs (1/27/2025)    PRAPARE - Transportation     Lack of Transportation (Medical): No     Lack of Transportation (Non-Medical): No   Physical Activity: Not on file   Stress: Not on file   Social Connections: Unknown (10/22/2020)    [facility] from [facility]    Social Connection and Isolation Panel [NHANES]     Frequency of Communication with Friends and Family: Patient declined     Frequency of Social Gatherings with Friends and Family: Patient declined     Attends Buddhism Services: Patient declined     Active Member of Clubs or Organizations: Not on file     Attends Club or Organization Meetings: Patient declined     Marital Status: Patient declined   Intimate Partner Violence: Unknown (2/22/2024)    [facility]    [place] Safety & Environment     Fear of Current or Ex-Partner: Not on file     Emotionally Abused: Not on file     Physically Abused: Not on file     Sexually Abused: Not on file     Physically or Sexually Abused: Not on file   Housing Stability: Low Risk  (1/27/2025)    Housing Stability Vital Sign     Unable to Pay for Housing in the Last Year: No

## 2025-02-04 NOTE — GROUP NOTE
Group Therapy Note    Date: 2/4/2025    Group Start Time: 1100  Group End Time: 1145  Group Topic: Cognitive Skills    Varsha Ivan CTRS        Group Therapy Note    Attendees: 10/22     Topic: To increase socialization, practice decision making and communication skills.           Comments:   Patient did not participate in Cognitive Skills Group, at 1100, despite staff encouragement.   Pt was resting in room and seclusive to self when invited to group.      Q15 minute safety checks maintained for patient safety and will continue to encourage   patient to attend unit programming.       Discipline Responsible: Psychoeducational Specialist   Signature: OLGA DURAN

## 2025-02-04 NOTE — GROUP NOTE
Group Therapy Note    Date: 2/3/2025    Group Start Time: 2000  Group End Time: 2030  Group Topic: Focus Group    Kathi Locke LPN        Group Therapy Note    Attendees: 11/10    Discussed resiliency, and how to utilize hope to prepare for the future.       Participation Level: Active Listener and Interactive    Participation Quality: Appropriate, Attentive, and Sharing      Speech:  normal      Thought Process/Content: Logical      Affective Functioning: Congruent      Mood: euthymic      Level of consciousness:  Alert      Response to Learning: Able to verbalize current knowledge/experience        Modes of Intervention: Education, Support, and Socialization      Discipline Responsible: Licensed Practical Nurse      Signature:  KATHI SANCHEZ LPN

## 2025-02-04 NOTE — DISCHARGE INSTRUCTIONS
[address] [facility] [address]  Phone: [phone]      Phone: [phone]    [facility]      [facility]  [address] [place] [address]    [address] [facility] [address]  Phone: [phone]      Phone: [phone]    [facility]     [facility]iat[facility]  [address] [facility] [address]   [address] [place], [place] [address]  Phone: [phone]      Phone: [phone]    [facility]       [facility]  [address] [facility] [address]     [address] [facility] [address]  Phone: [phone]      Phone: [phone]    [facility]   [address] [facility] [address] [facility] [address]    [address] [facility] [address]  Phone: [phone]      Phone: [phone] Ext: [phone]    [place] [address] [facility] [address] / [address] [facility] [address] / [address] [facility] [address]  Phone: [phone]    [facility]   [facility]  [address] [facility] [address]  [address] [place] [address]  Phone: [phone]      Phone: [phone]    [facility]  [address] [facility] [address]    [address] [facility] [address]  Phone: [phone]      Phone: [phone]

## 2025-02-04 NOTE — CARE COORDINATION
Discharge Arrangements:  Return to cousins home, [facility] to [facility]    Guardian notified: N/A    Discharge destination/address: Home - address on Facesheet    Transported by:  Cab     Follow up appointment is scheduled for [facility], [address] [facility] [address], MON, Feb 10th at 1:15PM       Patient was not accepting of referral. Patient does not have current or documented history of MARVIN.  *MARVIN resources were offered to patient throughout admission and at time of discharge. This list of [facility] MARVIN providers was provided to patient:     [facility]  [address] [facility] [address]   [address] [facility] [address]  Phone: [phone]     Phone: [phone]    [facility]   [address] [facility] [address]   [address] [facility] [address]  Phone: [phone]     Phone: [phone]    [facility] [place]  [address] [facility] [address]    [address] [facility] [address]  Phone: [phone]     Phone: [phone]    [facility]     [facility]   [address] [place]   [address] [facility] [address]  [place] [address]      Phone: [phone]  Phone: [phone]        [facility]  [address] [facility] [address] [facility] [address]   [address] [place] [address]  Phone: [phone]     Phone:  [phone]    [employer]      [facility]  [address] [facility] [address]   [address] [facility] [address]  Phone: [phone]     Phone: [phone]    [facility]  [address] [facility] [address]   [address] [facility] [address]  Phone: [phone]     Phone: [phone]    [facility]  [facility]  [address] [facility] [address] [address] [facility] [address]  Phone: [phone]     Phone: [phone]    [facility] for

## 2025-02-04 NOTE — DISCHARGE SUMMARY
DISCHARGE SUMMARY      Patient ID:  Marco Vizcarra  [id number]  49 y.o.  1975    Admit date: 1/27/2025    Discharge date and time: 2/4/2025    Disposition: Home     Admitting Physician: Chris Mccarty MD     Discharge Physician: Dr LISANDRA Mccarty MD    Admission Diagnoses: Depression with suicidal ideation [F32.A, R45.851]    Admission Condition: poor    Discharged Condition: stable    Admission Circumstance: Marco Vizcarra is a 49 y.o. male who has a past medical history of hypertension, asthma and obesity.  Patient has a significant past medical history of schizoaffective disorder and polysubstance abuse.  Patient has multiple hospitalizations at [facility] and [facility] for suicidal ideations, paranoia and psychosis.  Also appears to have a history of noncompliance with psychotropic medications. Most recent admission at [facility] February 2024. On chart review, patient initially presented to [facility] ED for suicidal ideations.  [facility] ED referred him to [facility] brought him to [facility] for [facility] admission on 1/27/2024.     Patient presented to [facility] ED due to suicidal ideations without a plan.  Patient states he has been noncompliant with his medications over the past 5 days.  He is currently been prescribed trazodone, sertraline and lurasidone for his psychiatric illness.  Possible that patient has been noncompliant with these medications for more than 5 days.  Patient was afebrile and vitally stable in ED.  All workup WNL.  No electrolyte abnormalities.  UDS negative.  No elevated ethanol levels.      Patient has not required emergency medication since admission for agitation and aggression.  Per RN patient is standoffish and irritable and not willing to participate in any group activities.  During my interview this patient was ANO x 4.  He currently endorses symptoms of depressed mood including low mood, lack of interest, hopelessness/helplessness and poor